# Patient Record
Sex: FEMALE | Race: WHITE | NOT HISPANIC OR LATINO | Employment: OTHER | URBAN - NONMETROPOLITAN AREA
[De-identification: names, ages, dates, MRNs, and addresses within clinical notes are randomized per-mention and may not be internally consistent; named-entity substitution may affect disease eponyms.]

---

## 2017-01-16 DIAGNOSIS — R12 HEARTBURN: Primary | ICD-10-CM

## 2017-01-16 RX ORDER — METOPROLOL SUCCINATE 50 MG/1
TABLET, EXTENDED RELEASE ORAL
Qty: 30 TABLET | Refills: 2 | Status: SHIPPED | OUTPATIENT
Start: 2017-01-16 | End: 2017-04-16 | Stop reason: SDUPTHER

## 2017-01-16 RX ORDER — PANTOPRAZOLE SODIUM 40 MG/1
TABLET, DELAYED RELEASE ORAL
Qty: 30 TABLET | Refills: 5 | Status: SHIPPED | OUTPATIENT
Start: 2017-01-16 | End: 2017-12-12 | Stop reason: SDUPTHER

## 2017-01-16 RX ORDER — BUPROPION HYDROCHLORIDE 100 MG/1
TABLET, EXTENDED RELEASE ORAL
Qty: 60 TABLET | Refills: 0 | Status: SHIPPED | OUTPATIENT
Start: 2017-01-16 | End: 2017-02-15 | Stop reason: SDUPTHER

## 2017-01-16 RX ORDER — LEVOTHYROXINE SODIUM 88 UG/1
TABLET ORAL
Qty: 60 TABLET | Refills: 0 | Status: SHIPPED | OUTPATIENT
Start: 2017-01-16 | End: 2017-04-16 | Stop reason: SDUPTHER

## 2017-02-07 ENCOUNTER — APPOINTMENT (OUTPATIENT)
Dept: GENERAL RADIOLOGY | Facility: HOSPITAL | Age: 72
End: 2017-02-07

## 2017-02-07 ENCOUNTER — HOSPITAL ENCOUNTER (EMERGENCY)
Facility: HOSPITAL | Age: 72
Discharge: HOME | End: 2017-02-07
Attending: EMERGENCY MEDICINE | Admitting: EMERGENCY MEDICINE

## 2017-02-07 ENCOUNTER — APPOINTMENT (OUTPATIENT)
Dept: CT IMAGING | Facility: HOSPITAL | Age: 72
End: 2017-02-07

## 2017-02-07 VITALS
RESPIRATION RATE: 18 BRPM | HEIGHT: 65 IN | DIASTOLIC BLOOD PRESSURE: 78 MMHG | TEMPERATURE: 98.5 F | BODY MASS INDEX: 29.99 KG/M2 | OXYGEN SATURATION: 99 % | HEART RATE: 70 BPM | SYSTOLIC BLOOD PRESSURE: 152 MMHG | WEIGHT: 180 LBS

## 2017-02-07 DIAGNOSIS — R06.00 DYSPNEA, UNSPECIFIED TYPE: Primary | ICD-10-CM

## 2017-02-07 LAB
ALBUMIN SERPL-MCNC: 4.2 G/DL (ref 3.5–5)
ALBUMIN/GLOB SERPL: 1.4 G/DL (ref 1–2)
ALP SERPL-CCNC: 65 U/L (ref 38–126)
ALT SERPL W P-5'-P-CCNC: 32 U/L (ref 13–69)
ANION GAP SERPL CALCULATED.3IONS-SCNC: 15.6 MMOL/L
AST SERPL-CCNC: 20 U/L (ref 15–46)
BASOPHILS # BLD AUTO: 0.05 10*3/MM3 (ref 0–0.2)
BASOPHILS NFR BLD AUTO: 0.8 % (ref 0–2.5)
BILIRUB SERPL-MCNC: 0.6 MG/DL (ref 0.2–1.3)
BUN BLD-MCNC: 11 MG/DL (ref 7–20)
BUN/CREAT SERPL: 12.2 (ref 7.1–23.5)
CALCIUM SPEC-SCNC: 9.6 MG/DL (ref 8.4–10.2)
CHLORIDE SERPL-SCNC: 104 MMOL/L (ref 98–107)
CO2 SERPL-SCNC: 25 MMOL/L (ref 26–30)
CREAT BLD-MCNC: 0.9 MG/DL (ref 0.6–1.3)
D-DIMER, QUANTITATIVE (MAD,POW, STR): 1587 NG/ML (FEU) (ref 0–500)
DEPRECATED RDW RBC AUTO: 39.5 FL (ref 37–54)
EOSINOPHIL # BLD AUTO: 0.1 10*3/MM3 (ref 0–0.7)
EOSINOPHIL NFR BLD AUTO: 1.6 % (ref 0–7)
ERYTHROCYTE [DISTWIDTH] IN BLOOD BY AUTOMATED COUNT: 11.9 % (ref 11.5–14.5)
GFR SERPL CREATININE-BSD FRML MDRD: 62 ML/MIN/1.73
GLOBULIN UR ELPH-MCNC: 3.1 GM/DL
GLUCOSE BLD-MCNC: 101 MG/DL (ref 74–98)
HCT VFR BLD AUTO: 40.7 % (ref 37–47)
HGB BLD-MCNC: 14.4 G/DL (ref 12–16)
HOLD SPECIMEN: NORMAL
IMM GRANULOCYTES # BLD: 0.03 10*3/MM3 (ref 0–0.06)
IMM GRANULOCYTES NFR BLD: 0.5 % (ref 0–0.6)
LYMPHOCYTES # BLD AUTO: 2.51 10*3/MM3 (ref 0.6–3.4)
LYMPHOCYTES NFR BLD AUTO: 39.6 % (ref 10–50)
MCH RBC QN AUTO: 32.2 PG (ref 27–31)
MCHC RBC AUTO-ENTMCNC: 35.4 G/DL (ref 30–37)
MCV RBC AUTO: 91.1 FL (ref 81–99)
MONOCYTES # BLD AUTO: 0.63 10*3/MM3 (ref 0–0.9)
MONOCYTES NFR BLD AUTO: 9.9 % (ref 0–12)
NEUTROPHILS # BLD AUTO: 3.02 10*3/MM3 (ref 2–6.9)
NEUTROPHILS NFR BLD AUTO: 47.6 % (ref 37–80)
NRBC BLD MANUAL-RTO: 0 /100 WBC (ref 0–0)
NT-PROBNP SERPL-MCNC: 130 PG/ML (ref 0–125)
PLATELET # BLD AUTO: 320 10*3/MM3 (ref 130–400)
PMV BLD AUTO: 9.2 FL (ref 6–12)
POTASSIUM BLD-SCNC: 3.6 MMOL/L (ref 3.5–5.1)
PROT SERPL-MCNC: 7.3 G/DL (ref 6.3–8.2)
RBC # BLD AUTO: 4.47 10*6/MM3 (ref 4.2–5.4)
SODIUM BLD-SCNC: 141 MMOL/L (ref 137–145)
TROPONIN I SERPL-MCNC: <0.012 NG/ML (ref 0–0.03)
WBC NRBC COR # BLD: 6.34 10*3/MM3 (ref 4.8–10.8)
WHOLE BLOOD HOLD SPECIMEN: NORMAL
WHOLE BLOOD HOLD SPECIMEN: NORMAL

## 2017-02-07 PROCEDURE — 85379 FIBRIN DEGRADATION QUANT: CPT | Performed by: EMERGENCY MEDICINE

## 2017-02-07 PROCEDURE — 80053 COMPREHEN METABOLIC PANEL: CPT | Performed by: EMERGENCY MEDICINE

## 2017-02-07 PROCEDURE — 0 IOPAMIDOL 61 % SOLUTION: Performed by: EMERGENCY MEDICINE

## 2017-02-07 PROCEDURE — 85025 COMPLETE CBC W/AUTO DIFF WBC: CPT | Performed by: EMERGENCY MEDICINE

## 2017-02-07 PROCEDURE — 84484 ASSAY OF TROPONIN QUANT: CPT | Performed by: EMERGENCY MEDICINE

## 2017-02-07 PROCEDURE — 83880 ASSAY OF NATRIURETIC PEPTIDE: CPT | Performed by: EMERGENCY MEDICINE

## 2017-02-07 PROCEDURE — 99285 EMERGENCY DEPT VISIT HI MDM: CPT

## 2017-02-07 PROCEDURE — 71010 HC CHEST PA OR AP: CPT

## 2017-02-07 PROCEDURE — 71275 CT ANGIOGRAPHY CHEST: CPT

## 2017-02-07 PROCEDURE — 93005 ELECTROCARDIOGRAM TRACING: CPT

## 2017-02-07 PROCEDURE — 63710000001 PREDNISONE PER 5 MG: Performed by: EMERGENCY MEDICINE

## 2017-02-07 RX ORDER — SODIUM CHLORIDE 0.9 % (FLUSH) 0.9 %
10 SYRINGE (ML) INJECTION AS NEEDED
Status: DISCONTINUED | OUTPATIENT
Start: 2017-02-07 | End: 2017-02-07 | Stop reason: HOSPADM

## 2017-02-07 RX ORDER — PREDNISONE 20 MG/1
60 TABLET ORAL DAILY
Qty: 12 TABLET | Refills: 0 | Status: SHIPPED | OUTPATIENT
Start: 2017-02-08 | End: 2017-02-14

## 2017-02-07 RX ORDER — ASPIRIN 325 MG
325 TABLET ORAL ONCE
Status: COMPLETED | OUTPATIENT
Start: 2017-02-07 | End: 2017-02-07

## 2017-02-07 RX ADMIN — IOPAMIDOL 90 ML: 612 INJECTION, SOLUTION INTRAVENOUS at 15:45

## 2017-02-07 RX ADMIN — Medication 10 ML: at 13:10

## 2017-02-07 RX ADMIN — PREDNISONE 60 MG: 50 TABLET ORAL at 16:22

## 2017-02-07 RX ADMIN — ASPIRIN 325 MG ORAL TABLET 325 MG: 325 PILL ORAL at 13:07

## 2017-02-07 NOTE — ED PROVIDER NOTES
Subjective   HPI Comments: Patient is a 71-year-old female who reports emergency department complaining of couple days of intermittent dyspnea and some mild chest tightness.  She states that it can occur at any time with little or no exertion.  She denies santiago chest pain.  She is otherwise been well without significant cough or fever.  States that these episodes may last up to an hour.      History provided by:  Patient      Review of Systems   Constitutional: Negative for chills and fever.   HENT: Negative for congestion.    Respiratory: Positive for chest tightness and shortness of breath. Negative for cough.    Cardiovascular: Negative for chest pain and palpitations.   Gastrointestinal: Negative for abdominal pain, diarrhea, nausea and vomiting.   Musculoskeletal: Negative for back pain and neck pain.   All other systems reviewed and are negative.      Past Medical History   Diagnosis Date   • Abdominal bloating    • Abnormal EKG    • Anxiety and depression 2004   • Colon cancer screening    • Colon polyp 04/2016   • Dyspnea    • Elbow pain, left    • Fatigue    • Gastritis    • GERD (gastroesophageal reflux disease)    • H/O mammogram    • H/O sinusitis    • H/O: pneumonia    • Heartburn    • Sebaceous cyst    • Sinusitis        No Known Allergies    Past Surgical History   Procedure Laterality Date   • Tubal abdominal ligation     • Colonoscopy     • Upper gastrointestinal endoscopy  03/2016       Family History   Problem Relation Age of Onset   • Heart attack Other    • Arthritis Other    • Cancer Other    • Diabetes Other    • Hypertension Other    • Stroke Other    • Cancer Mother    • Colon polyps Neg Hx    • Esophageal cancer Neg Hx    • Irritable bowel syndrome Neg Hx    • Liver cancer Neg Hx    • Liver disease Neg Hx    • Stomach cancer Neg Hx    • Colon cancer Neg Hx        Social History     Social History   • Marital status:      Spouse name: N/A   • Number of children: N/A   • Years of  education: N/A     Social History Main Topics   • Smoking status: Former Smoker   • Smokeless tobacco: Never Used   • Alcohol use Yes      Comment: social   • Drug use: No   • Sexual activity: Defer     Other Topics Concern   • None     Social History Narrative   • None           Objective   Physical Exam   Constitutional: She is oriented to person, place, and time. She appears well-developed and well-nourished. No distress.   HENT:   Head: Normocephalic and atraumatic.   Eyes: Pupils are equal, round, and reactive to light.   Neck: Neck supple.   Cardiovascular: Normal rate, regular rhythm and normal heart sounds.    Pulmonary/Chest: Effort normal and breath sounds normal.   Abdominal: Soft. There is no tenderness.   Musculoskeletal: Normal range of motion. She exhibits no edema.   Neurological: She is alert and oriented to person, place, and time.   Skin: Skin is warm and dry. She is not diaphoretic.   Psychiatric: She has a normal mood and affect. Her behavior is normal.   Nursing note and vitals reviewed.      ECG 12 Lead    Date/Time: 2/7/2017 1:44 PM  Performed by: JASMIN DONAHUE  Authorized by: JASMIN DONAHUE   Interpreted by physician  Rhythm: sinus rhythm  Rate: normal  QRS axis: normal  Conduction: conduction normal  Clinical impression: non-specific ECG               ED Course  ED Course                  MDM  Number of Diagnoses or Management Options  Dyspnea, unspecified type:   Diagnosis management comments: CT of the chest and chest x-ray showed no acute findings.    Patient resting calmly throughout her stay in the emergency department.  She states she has never had any chest pain with any of this.  She does not have any dyspnea in the emergency department.  She is questioning whether this might have been anxiety.  I discussed that we could not eliminate other possibilities completely and discussed possibility of admission.  Patient felt that she could go home and has family to stay with her.  She  is asymptomatic but will return if she has any new or worsening symptoms.  She has no appointment with her cardiologist next week but will follow up sooner if possible.  She is a previous smoker until last year and we will use a short steroid course to see if that provides her some relief.  We discussed indications for return at any time and need for close follow-up.       Amount and/or Complexity of Data Reviewed  Clinical lab tests: ordered and reviewed  Tests in the radiology section of CPT®: ordered and reviewed  Tests in the medicine section of CPT®: ordered and reviewed        Final diagnoses:   Dyspnea, unspecified type            Alireza Lantigua MD  02/07/17 0927

## 2017-02-07 NOTE — DISCHARGE INSTRUCTIONS
Return to the emergency department for any chest pain, shortness of breath, new or worsening symptoms.

## 2017-02-08 LAB — HOLD SPECIMEN: NORMAL

## 2017-02-14 ENCOUNTER — OFFICE VISIT (OUTPATIENT)
Dept: INTERNAL MEDICINE | Facility: CLINIC | Age: 72
End: 2017-02-14

## 2017-02-14 VITALS
BODY MASS INDEX: 32.99 KG/M2 | HEIGHT: 65 IN | RESPIRATION RATE: 14 BRPM | WEIGHT: 198 LBS | SYSTOLIC BLOOD PRESSURE: 122 MMHG | DIASTOLIC BLOOD PRESSURE: 84 MMHG | TEMPERATURE: 98.6 F | HEART RATE: 64 BPM | OXYGEN SATURATION: 97 %

## 2017-02-14 DIAGNOSIS — I70.90 ARTERIOSCLEROTIC VASCULAR DISEASE: ICD-10-CM

## 2017-02-14 DIAGNOSIS — I10 ESSENTIAL HYPERTENSION: Primary | ICD-10-CM

## 2017-02-14 DIAGNOSIS — E66.3 OVERWEIGHT: ICD-10-CM

## 2017-02-14 DIAGNOSIS — E03.8 OTHER SPECIFIED HYPOTHYROIDISM: ICD-10-CM

## 2017-02-14 DIAGNOSIS — F41.8 DEPRESSION WITH ANXIETY: ICD-10-CM

## 2017-02-14 DIAGNOSIS — E78.5 HYPERLIPIDEMIA, UNSPECIFIED HYPERLIPIDEMIA TYPE: ICD-10-CM

## 2017-02-14 PROCEDURE — 99214 OFFICE O/P EST MOD 30 MIN: CPT | Performed by: INTERNAL MEDICINE

## 2017-02-14 RX ORDER — ALPRAZOLAM 0.5 MG/1
0.5 TABLET ORAL 2 TIMES DAILY PRN
Qty: 30 TABLET | Refills: 0 | Status: SHIPPED | OUTPATIENT
Start: 2017-02-14 | End: 2019-02-06 | Stop reason: SDUPTHER

## 2017-02-14 NOTE — PROGRESS NOTES
Subjective   Deya Hernandez is a 71 y.o. female.     Chief Complaint   Patient presents with   • Shortness of Breath     had previously went to ER for SOB was put on steroid still no help   • Hypertension   • Rapid Heart Rate       History of Present Illness   Patient has short of breath comes and goes denies any chest pain patient also has significant anxiety depression denies any suicidal thoughts.  Hypothyroidism stable medication.  Hypertension stable medication.  Patient is taking multiple medicines for depression.  Knee joint arthritis is stable seeing orthopedist     Current Outpatient Prescriptions:   •  amLODIPine (NORVASC) 2.5 MG tablet, take 1 tablet by mouth once daily, Disp: 30 tablet, Rfl: 3  •  buPROPion SR (WELLBUTRIN SR) 100 MG 12 hr tablet, take 1 tablet by mouth twice a day, Disp: 60 tablet, Rfl: 0  •  Cholecalciferol (VITAMIN D3) 1000 UNITS capsule, Take  by mouth. 3 DAILY, Disp: , Rfl:   •  HYALGAN 20 MG/2ML solution prefilled syringe, , Disp: , Rfl:   •  levothyroxine (SYNTHROID, LEVOTHROID) 88 MCG tablet, take 1 tablet by mouth once daily, Disp: 60 tablet, Rfl: 0  •  metoprolol succinate XL (TOPROL-XL) 50 MG 24 hr tablet, take 1 tablet by mouth once daily, Disp: 30 tablet, Rfl: 2  •  mirtazapine (REMERON) 45 MG tablet, Take 1 tablet by mouth Every Night., Disp: 30 tablet, Rfl: 1  •  pantoprazole (PROTONIX) 40 MG EC tablet, take 1 tablet by mouth every morning 30 MINUTES BEFORE BREAKFAST, Disp: 30 tablet, Rfl: 5  •  simvastatin (ZOCOR) 40 MG tablet, take 1 tablet by mouth at bedtime, Disp: 30 tablet, Rfl: 11  •  trandolapril (MAVIK) 4 MG tablet, take 1 tablet by mouth once daily, Disp: 30 tablet, Rfl: 6  •  vitamin B-12 (CYANOCOBALAMIN) 1000 MCG tablet, Take 1 tablet by mouth daily. As directed, Disp: , Rfl:     Current Facility-Administered Medications:   •  sodium hyaluronate (SUPARTZ) injection 25 mg, 25 mg, Intra-articular, Once, Arturo Geronimo PA-C    The following portions of the  patient's history were reviewed and updated as appropriate: allergies, current medications, past family history, past medical history, past social history, past surgical history and problem list.    Review of Systems   Constitutional: Negative.    Respiratory: Negative.    Cardiovascular: Negative.    Gastrointestinal: Negative.    Musculoskeletal: Negative.    Skin: Negative.    Neurological: Negative.    Psychiatric/Behavioral: Negative.        Objective   Physical Exam   Constitutional: She is oriented to person, place, and time. She appears well-nourished.   Neck: Neck supple.   Cardiovascular: Normal rate, regular rhythm and normal heart sounds.    Pulmonary/Chest: Effort normal and breath sounds normal.   Abdominal: Bowel sounds are normal.   Neurological: She is alert and oriented to person, place, and time.   Skin: Skin is warm.   Psychiatric: She has a normal mood and affect.       All tests have been reviewed.    Assessment/Plan   There are no diagnoses linked to this encounter.          soa still off and on, without chest pain,  abnormal EKG, nuc stress not covered, GXT nondiagnostic due to tachycardia, no further study needed per cardio, rec nuc stress test. ?anxiety start xanax  Gastritis, omeprazole continue medicine s/p EGD  Probable osteoarthritis of the knee, aleve, glucosamine. XR may need MRI in the future. Avoid direct impact activities. May need physical therapy if no better  Overweight continue good diet   Vitamin D low normal continue vitamin D3 1000 units daily,   pneumovax done zostavax done, flu done, prevnar today, check rec  Hypothyroidism continue medication  Hyperlipidemia unable to afford crestor, lipitor muscle pain, on zocor 40, lipitor 20 cause problem in the past.   Hypertension continue meds  Depression anxiety stable on meds. continue mirtazapine 45, bupropion 100 mg bid start xanax  pap done  dexa normal  Knee OA Follow up with ortho  10/2009 colon hemorroid only obtain  report  Weight gain encouraged patient to have good diet and exercise.  Follow-up in 1 mo PE

## 2017-02-15 ENCOUNTER — OFFICE VISIT (OUTPATIENT)
Dept: CARDIOLOGY | Facility: CLINIC | Age: 72
End: 2017-02-15

## 2017-02-15 VITALS
WEIGHT: 198 LBS | HEIGHT: 65 IN | HEART RATE: 78 BPM | SYSTOLIC BLOOD PRESSURE: 132 MMHG | BODY MASS INDEX: 32.99 KG/M2 | DIASTOLIC BLOOD PRESSURE: 90 MMHG

## 2017-02-15 DIAGNOSIS — E78.2 MIXED HYPERLIPIDEMIA: ICD-10-CM

## 2017-02-15 DIAGNOSIS — I20.9 ANGINA PECTORIS (HCC): Primary | ICD-10-CM

## 2017-02-15 DIAGNOSIS — I10 ESSENTIAL HYPERTENSION: ICD-10-CM

## 2017-02-15 PROCEDURE — 99213 OFFICE O/P EST LOW 20 MIN: CPT | Performed by: INTERNAL MEDICINE

## 2017-02-15 RX ORDER — BUPROPION HYDROCHLORIDE 100 MG/1
TABLET, EXTENDED RELEASE ORAL
Qty: 60 TABLET | Refills: 0 | Status: SHIPPED | OUTPATIENT
Start: 2017-02-15 | End: 2017-03-18 | Stop reason: SDUPTHER

## 2017-02-15 RX ORDER — MIRTAZAPINE 45 MG/1
TABLET, FILM COATED ORAL
Qty: 30 TABLET | Refills: 1 | Status: SHIPPED | OUTPATIENT
Start: 2017-02-15 | End: 2017-04-16 | Stop reason: SDUPTHER

## 2017-02-15 NOTE — PROGRESS NOTES
Deya MOTLEY Cochise  1945  [phone]  [phone]        PCP: Santi Dalal MD  [address] [place] [address] [address]  [facility] [id number]    IDENTIFICATION: A 71 y.o. female  adi jefferson from [place]    CC:  Chief Complaint   Patient presents with   • Shortness of Breath   • Hypertension   • Rapid Heart Rate       PROBLEM LIST:  Patient Active Problem List    Diagnosis   • Heartburn [R12]     Overview Note:     Controlled with Pantoprazole daily. No Theodore's.     • Delinquent immunization status [Z28.3]   • Pulsatile tinnitus [H93.A9]   • Depression with anxiety [F41.8]   • BMI 30.0-30.9,adult [Z68.30]   • Overweight [E66.3]   • Diverticulosis of large intestine without hemorrhage [K57.30]     Overview Note:     Stable. Uncomplicated. Scant diverticular change left colon.     • Colon polyps [K63.5]     Overview Note:     Tubular adenoma     • Internal hemorrhoids [K64.8]     Overview Note:     Stable. Uncomplicated.     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 Scant vascular ectasia within the transverse colon.     • Hypertension [I10]   • Hypothyroidism [E03.9]   • Osteoarthritis of knee [M17.9]     Overview Note:     2008     • Hyperlipidemia [E78.5]   • Arteriosclerotic vascular disease [I70.90]     Problem List:    Probable nonobstructive CAD  4/16 EKG stress abnormal due to withdrawal of beta-blockade with tachycardia and hypertension    Hypertension presumed essential    Dyslipidemia on statin therapy    Reformed nicotine addiction 2016    Exogenous obesity    Hypothyroidism    Allergies  No Known Allergies    Current Medications    Current Outpatient Prescriptions:   •  ALPRAZolam (XANAX) 0.5 MG tablet, Take 1 tablet by mouth 2 (Two) Times a Day As Needed for anxiety., Disp: 30 tablet, Rfl: 0  •  amLODIPine (NORVASC) 2.5 MG tablet, take 1 tablet by mouth once daily, Disp: 30 tablet, Rfl: 3  •  buPROPion SR (WELLBUTRIN SR) 100 MG 12 hr tablet, take 1 tablet by mouth twice a day,  Disp: 60 tablet, Rfl: 0  •  Cholecalciferol (VITAMIN D3) 1000 UNITS capsule, Take  by mouth. 3 DAILY, Disp: , Rfl:   •  HYALGAN 20 MG/2ML solution prefilled syringe, , Disp: , Rfl:   •  levothyroxine (SYNTHROID, LEVOTHROID) 88 MCG tablet, take 1 tablet by mouth once daily, Disp: 60 tablet, Rfl: 0  •  metoprolol succinate XL (TOPROL-XL) 50 MG 24 hr tablet, take 1 tablet by mouth once daily, Disp: 30 tablet, Rfl: 2  •  mirtazapine (REMERON) 45 MG tablet, take 1 tablet by mouth at bedtime, Disp: 30 tablet, Rfl: 1  •  pantoprazole (PROTONIX) 40 MG EC tablet, take 1 tablet by mouth every morning 30 MINUTES BEFORE BREAKFAST, Disp: 30 tablet, Rfl: 5  •  simvastatin (ZOCOR) 40 MG tablet, take 1 tablet by mouth at bedtime, Disp: 30 tablet, Rfl: 11  •  trandolapril (MAVIK) 4 MG tablet, take 1 tablet by mouth once daily, Disp: 30 tablet, Rfl: 6  •  vitamin B-12 (CYANOCOBALAMIN) 1000 MCG tablet, Take 1 tablet by mouth daily. As directed, Disp: , Rfl:     Current Facility-Administered Medications:   •  sodium hyaluronate (SUPARTZ) injection 25 mg, 25 mg, Intra-articular, Once, Arturo Geronimo PA-C    History of Present Illness   Shortness of Breath     Hypertension   Associated symptoms include shortness of breath.       Pt recent ER evaluation w dyspnea.  Enzymes, ekg, and CT chest wnl.  Given 5 day prescription for prednisone.  She states that she will have some fullness and pressure tightness or chest can happen at most any time.  She continues to exercise at a gym a few times weekly and cannot state that this worsens her activities per se.  She is found no alleviating factors.  She describes a shortness of breath as moderate associate with any nausea or palpitations.  Been compliant with all of her medications.      ROS  Review of Systems   Constitutional: Negative.    HENT: Negative.    Eyes: Negative.    Respiratory: Positive for shortness of breath.    Cardiovascular: Negative.    Gastrointestinal: Negative.   "  Endocrine: Negative.    Genitourinary: Negative.    Musculoskeletal: Negative.    Skin: Negative.    Allergic/Immunologic: Negative.    Neurological: Negative.    Hematological: Negative.    Psychiatric/Behavioral: Positive for dysphoric mood.       SOCIAL HX  Social History     Social History   • Marital status:      Spouse name: N/A   • Number of children: N/A   • Years of education: N/A     Occupational History   • Not on file.     Social History Main Topics   • Smoking status: Former Smoker   • Smokeless tobacco: Never Used   • Alcohol use Yes      Comment: social   • Drug use: No   • Sexual activity: Defer     Other Topics Concern   • Not on file     Social History Narrative       FAMILY HX  Family History   Problem Relation Age of Onset   • Heart attack Other    • Arthritis Other    • Cancer Other    • Diabetes Other    • Hypertension Other    • Stroke Other    • Cancer Mother    • Emphysema Father    • Colon polyps Neg Hx    • Esophageal cancer Neg Hx    • Irritable bowel syndrome Neg Hx    • Liver cancer Neg Hx    • Liver disease Neg Hx    • Stomach cancer Neg Hx    • Colon cancer Neg Hx        Vitals:    02/15/17 1010 02/15/17 1016   BP: 138/96 132/90   BP Location: Right arm Left arm   Patient Position: Sitting Sitting   Pulse: 78    Weight: 198 lb (89.8 kg)    Height: 65\" (165.1 cm)        PHYSICAL EXAMINATION:  Physical Exam   Constitutional: She is oriented to person, place, and time. She appears well-developed and well-nourished. No distress.   HENT:   Head: Normocephalic and atraumatic.   Nose: Nose normal.   Mouth/Throat: Uvula is midline, oropharynx is clear and moist and mucous membranes are normal.   Eyes: Conjunctivae and EOM are normal. Pupils are equal, round, and reactive to light. No scleral icterus.   Neck: Normal range of motion. Neck supple. No hepatojugular reflux and no JVD present. Carotid bruit is not present. No tracheal deviation present. No thyromegaly present. "   Cardiovascular: Normal rate, regular rhythm, S1 normal, S2 normal, intact distal pulses and normal pulses.  PMI is not displaced.  Exam reveals no gallop, no distant heart sounds, no friction rub, no midsystolic click and no opening snap.    No murmur heard.  Pulses:       Radial pulses are 2+ on the right side, and 2+ on the left side.        Dorsalis pedis pulses are 2+ on the right side, and 2+ on the left side.        Posterior tibial pulses are 2+ on the right side, and 2+ on the left side.   Pulmonary/Chest: Effort normal and breath sounds normal. She has no wheezes. She has no rhonchi. She has no rales.   Abdominal: Soft. Bowel sounds are normal. She exhibits no mass. There is no tenderness. There is no guarding.   Musculoskeletal: She exhibits no edema or tenderness.   Lymphadenopathy:     She has no cervical adenopathy.   Neurological: She is alert and oriented to person, place, and time.   Skin: Skin is warm, dry and intact. No rash noted. No cyanosis or erythema. Nails show no clubbing.   Psychiatric: She has a normal mood and affect. Her behavior is normal.   Nursing note and vitals reviewed.      Diagnostic Data:  Procedures  Lab Results   Component Value Date    CHLPL 236 (H) 01/06/2016    TRIG 184 (H) 09/14/2016    HDL 70 (H) 09/14/2016    LDLDIRECT 132 (H) 09/14/2016     Lab Results   Component Value Date    GLUCOSE 101 (H) 02/07/2017    BUN 11 02/07/2017    CREATININE 0.90 02/07/2017     02/07/2017    K 3.6 02/07/2017     02/07/2017    CO2 25.0 (L) 02/07/2017    CALCIUM 9.6 02/07/2017    EGFRCLEARA 62 04/28/2016    ANIONGAP 15.6 02/07/2017     No results found for: HGBA1C, LABMEAN  Lab Results   Component Value Date    WBC 6.34 02/07/2017    RBC 4.47 02/07/2017    HGB 14.4 02/07/2017    HCT 40.7 02/07/2017    MCV 91.1 02/07/2017    MCH 32.2 (H) 02/07/2017    MCHC 35.4 02/07/2017    RDWCV 12.9 04/06/2016     02/07/2017       ASSESSMENT:   Diagnosis Plan   1. Angina pectoris      2. Essential hypertension     3. Mixed hyperlipidemia           PLAN:  Lexiscan Cardiolite on medications to evaluate for obstructive coronary disease and LV function.    Hypertension controlled on current regimen    Dyslipidemia on statin therapy with acceptable total to HDL ratio      Timbo Roberts MD, FACC

## 2017-02-22 ENCOUNTER — HOSPITAL ENCOUNTER (OUTPATIENT)
Dept: CARDIOLOGY | Facility: HOSPITAL | Age: 72
Discharge: HOME | End: 2017-02-22
Attending: INTERNAL MEDICINE | Admitting: INTERNAL MEDICINE

## 2017-02-22 ENCOUNTER — HOSPITAL ENCOUNTER (OUTPATIENT)
Dept: CARDIOLOGY | Facility: HOSPITAL | Age: 72
Discharge: HOME | End: 2017-02-22
Attending: INTERNAL MEDICINE

## 2017-02-22 DIAGNOSIS — I20.9 ANGINA PECTORIS (HCC): ICD-10-CM

## 2017-02-22 PROCEDURE — 78451 HT MUSCLE IMAGE SPECT SING: CPT

## 2017-02-22 PROCEDURE — A9500 TC99M SESTAMIBI: HCPCS | Performed by: INTERNAL MEDICINE

## 2017-02-22 PROCEDURE — 78452 HT MUSCLE IMAGE SPECT MULT: CPT | Performed by: INTERNAL MEDICINE

## 2017-02-22 PROCEDURE — 93018 CV STRESS TEST I&R ONLY: CPT | Performed by: INTERNAL MEDICINE

## 2017-02-22 PROCEDURE — 25010000002 REGADENOSON 0.4 MG/5ML SOLUTION: Performed by: INTERNAL MEDICINE

## 2017-02-22 PROCEDURE — 0 TECHNETIUM SESTAMIBI: Performed by: INTERNAL MEDICINE

## 2017-02-22 PROCEDURE — 93017 CV STRESS TEST TRACING ONLY: CPT

## 2017-02-22 RX ADMIN — Medication 1 DOSE: at 08:15

## 2017-02-22 RX ADMIN — REGADENOSON 0.4 MG: 0.08 INJECTION, SOLUTION INTRAVENOUS at 09:57

## 2017-02-22 RX ADMIN — Medication 1 DOSE: at 10:00

## 2017-02-24 LAB
BH CV STRESS BP STAGE 1: NORMAL
BH CV STRESS BP STAGE 2: NORMAL
BH CV STRESS BP STAGE 3: NORMAL
BH CV STRESS COMMENTS STAGE 1: NORMAL
BH CV STRESS COMMENTS STAGE 2: NORMAL
BH CV STRESS DOSE REGADENOSON STAGE 1: 0.4
BH CV STRESS DURATION MIN STAGE 1: 1
BH CV STRESS DURATION MIN STAGE 2: 3
BH CV STRESS DURATION MIN STAGE 3: 2
BH CV STRESS DURATION SEC STAGE 1: 0
BH CV STRESS DURATION SEC STAGE 2: 0
BH CV STRESS DURATION SEC STAGE 3: 0
BH CV STRESS HR STAGE 1: 72
BH CV STRESS HR STAGE 2: 83
BH CV STRESS HR STAGE 3: 80
BH CV STRESS PROTOCOL 1: NORMAL
BH CV STRESS RECOVERY BP: NORMAL MMHG
BH CV STRESS RECOVERY HR: 80 BPM
BH CV STRESS STAGE 1: 1
BH CV STRESS STAGE 2: 2
BH CV STRESS STAGE 3: 3
LV EF NUC BP: 72 %
MAXIMAL PREDICTED HEART RATE: 151 BPM
PERCENT MAX PREDICTED HR: 61.59 %
STRESS BASELINE BP: NORMAL MMHG
STRESS BASELINE HR: 61 BPM
STRESS PERCENT HR: 72 %
STRESS POST PEAK BP: NORMAL MMHG
STRESS POST PEAK HR: 93 BPM
STRESS TARGET HR: 128 BPM

## 2017-03-20 RX ORDER — BUPROPION HYDROCHLORIDE 100 MG/1
TABLET, EXTENDED RELEASE ORAL
Qty: 60 TABLET | Refills: 11 | Status: SHIPPED | OUTPATIENT
Start: 2017-03-20 | End: 2018-03-11 | Stop reason: SDUPTHER

## 2017-03-20 RX ORDER — AMLODIPINE BESYLATE 2.5 MG/1
TABLET ORAL
Qty: 30 TABLET | Refills: 11 | Status: SHIPPED | OUTPATIENT
Start: 2017-03-20 | End: 2018-03-12 | Stop reason: SDUPTHER

## 2017-03-20 RX ORDER — TRANDOLAPRIL TABLETS 4 MG/1
TABLET ORAL
Qty: 30 TABLET | Refills: 11 | Status: SHIPPED | OUTPATIENT
Start: 2017-03-20 | End: 2018-03-11 | Stop reason: SDUPTHER

## 2017-04-18 RX ORDER — MIRTAZAPINE 45 MG/1
TABLET, FILM COATED ORAL
Qty: 30 TABLET | Refills: 1 | Status: SHIPPED | OUTPATIENT
Start: 2017-04-18 | End: 2017-06-15 | Stop reason: SDUPTHER

## 2017-04-18 RX ORDER — METOPROLOL SUCCINATE 50 MG/1
TABLET, EXTENDED RELEASE ORAL
Qty: 30 TABLET | Refills: 2 | Status: SHIPPED | OUTPATIENT
Start: 2017-04-18 | End: 2017-07-16 | Stop reason: SDUPTHER

## 2017-04-18 RX ORDER — LEVOTHYROXINE SODIUM 88 UG/1
TABLET ORAL
Qty: 60 TABLET | Refills: 0 | Status: SHIPPED | OUTPATIENT
Start: 2017-04-18 | End: 2017-05-16 | Stop reason: SDUPTHER

## 2017-05-16 RX ORDER — SIMVASTATIN 40 MG
TABLET ORAL
Qty: 30 TABLET | Refills: 11 | Status: SHIPPED | OUTPATIENT
Start: 2017-05-16 | End: 2018-04-04 | Stop reason: SDUPTHER

## 2017-05-16 RX ORDER — LEVOTHYROXINE SODIUM 88 UG/1
TABLET ORAL
Qty: 30 TABLET | Refills: 5 | Status: SHIPPED | OUTPATIENT
Start: 2017-05-16 | End: 2017-12-12 | Stop reason: SDUPTHER

## 2017-06-06 DIAGNOSIS — M25.562 LEFT KNEE PAIN, UNSPECIFIED CHRONICITY: Primary | ICD-10-CM

## 2017-06-15 RX ORDER — MIRTAZAPINE 45 MG/1
TABLET, FILM COATED ORAL
Qty: 30 TABLET | Refills: 1 | Status: SHIPPED | OUTPATIENT
Start: 2017-06-15 | End: 2017-08-14 | Stop reason: SDUPTHER

## 2017-07-16 DIAGNOSIS — R12 HEARTBURN: ICD-10-CM

## 2017-07-17 RX ORDER — PANTOPRAZOLE SODIUM 40 MG/1
TABLET, DELAYED RELEASE ORAL
Qty: 30 TABLET | Refills: 5 | OUTPATIENT
Start: 2017-07-17

## 2017-07-17 RX ORDER — METOPROLOL SUCCINATE 50 MG/1
TABLET, EXTENDED RELEASE ORAL
Qty: 30 TABLET | Refills: 2 | Status: SHIPPED | OUTPATIENT
Start: 2017-07-17 | End: 2017-10-13 | Stop reason: SDUPTHER

## 2017-07-17 NOTE — TELEPHONE ENCOUNTER
Pantoprazole 40 mg -take 1 tab 30 min before breakfast- #30 with 4 refills to [facility] in [facility]

## 2017-08-14 RX ORDER — MIRTAZAPINE 45 MG/1
TABLET, FILM COATED ORAL
Qty: 30 TABLET | Refills: 1 | Status: SHIPPED | OUTPATIENT
Start: 2017-08-14 | End: 2017-10-13 | Stop reason: SDUPTHER

## 2017-09-01 ENCOUNTER — OFFICE VISIT (OUTPATIENT)
Dept: ORTHOPEDIC SURGERY | Facility: CLINIC | Age: 72
End: 2017-09-01

## 2017-09-01 VITALS — HEIGHT: 65 IN | WEIGHT: 200 LBS | RESPIRATION RATE: 18 BRPM | BODY MASS INDEX: 33.32 KG/M2

## 2017-09-01 DIAGNOSIS — M17.12 OSTEOARTHRITIS OF LEFT KNEE, UNSPECIFIED OSTEOARTHRITIS TYPE: Primary | ICD-10-CM

## 2017-09-01 PROCEDURE — 20610 DRAIN/INJ JOINT/BURSA W/O US: CPT | Performed by: PHYSICIAN ASSISTANT

## 2017-09-01 RX ORDER — HYALURONATE SODIUM 10 MG/ML
20 SYRINGE (ML) INTRAARTICULAR
Status: COMPLETED | OUTPATIENT
Start: 2017-09-01 | End: 2017-09-01

## 2017-09-01 RX ADMIN — Medication 20 MG: at 13:05

## 2017-09-01 NOTE — PROGRESS NOTES
"Subjective   Deya Hernandez is a 72 y.o.  female is here today for injection therapy.  Injections of the Left Knee (Euflexxa #1 (Sample))      History of Present Illness     Patient is here for her first Visco supplementation injection into the left knee.    Past Medical History:   Diagnosis Date   • Abdominal bloating    • Abnormal EKG    • Anxiety and depression 2004   • Colon cancer screening    • Colon polyp 04/2016   • Dyspnea    • Elbow pain, left    • Fatigue    • Gastritis    • GERD (gastroesophageal reflux disease)    • H/O mammogram    • H/O sinusitis    • H/O: pneumonia    • Heartburn    • Sebaceous cyst    • Sinusitis         Past Surgical History:   Procedure Laterality Date   • COLONOSCOPY     • TUBAL ABDOMINAL LIGATION     • UPPER GASTROINTESTINAL ENDOSCOPY  03/2016       No Known Allergies    Review of Systems    Objective   Resp 18  Ht 65\" (165.1 cm)  Wt 200 lb (90.7 kg)  BMI 33.28 kg/m2   Physical Exam  Skin exam stable with no erythema, ecchymosis or rash.  No new swelling.  No motor or sensory changes.  Distal pulse intact.    Large Joint Arthrocentesis - SAMPLE MEDICATION   Date/Time: 9/1/2017 1:05 PM  Consent given by: patient  Site marked: site marked  Timeout: Immediately prior to procedure a time out was called to verify the correct patient, procedure, equipment, support staff and site/side marked as required   Supporting Documentation  Indications: pain   Procedure Details  Location: knee - L knee  Preparation: Patient was prepped and draped in the usual sterile fashion  Needle size: 22 G  Medications administered: 20 mg Sodium Hyaluronate 20 MG/2ML  Patient tolerance: patient tolerated the procedure well with no immediate complications          Assessment/Plan   Independent Review of Radiographic Studies:    Reviewed at a prior visit.  Laboratory and Other Studies:  No new results reviewed today.       Deya was seen today for injections.    Diagnoses and all orders for this " visit:    Osteoarthritis of left knee, unspecified osteoarthritis type  -     Large Joint Arthrocentesis    Orthopedic activities reviewed and patient expressed appreciation  Discussion of orthopedic goals  Risk, benefits, and merits of treatment alternatives reviewed with the patient and questions answered  Call or notify for any adverse effect from injection therapy    Recommendations/Plan:  Exercise, medications, injections, other patient advice, and return appointment as noted.  Patient is encouraged to call or return for any issues or concerns.    FU in 1 week  Patient agreeable to call or return sooner for any concerns.

## 2017-09-06 ENCOUNTER — OFFICE VISIT (OUTPATIENT)
Dept: CARDIOLOGY | Facility: CLINIC | Age: 72
End: 2017-09-06

## 2017-09-06 VITALS
DIASTOLIC BLOOD PRESSURE: 84 MMHG | BODY MASS INDEX: 33.82 KG/M2 | HEIGHT: 65 IN | SYSTOLIC BLOOD PRESSURE: 138 MMHG | WEIGHT: 203 LBS | HEART RATE: 76 BPM

## 2017-09-06 DIAGNOSIS — R07.89 OTHER CHEST PAIN: Primary | ICD-10-CM

## 2017-09-06 DIAGNOSIS — I10 ESSENTIAL HYPERTENSION: ICD-10-CM

## 2017-09-06 DIAGNOSIS — E78.5 HYPERLIPIDEMIA, UNSPECIFIED HYPERLIPIDEMIA TYPE: ICD-10-CM

## 2017-09-06 PROCEDURE — 99214 OFFICE O/P EST MOD 30 MIN: CPT | Performed by: INTERNAL MEDICINE

## 2017-09-06 NOTE — PROGRESS NOTES
[facility]  Office Progress Note  Deya Hernandez  1945  [phone]    Visit Date: 09/06/2017    PCP: Santi Dalal MD  [address] [place] [id number]    IDENTIFICATION: A 72 y.o. female     Chief Complaint   Patient presents with   • Follow-up   • Shortness of Breath       PROBLEM LIST:   1. 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 4/16 EKG stress abnormal due to withdrawal of beta-blockade with tachycardia and hypertension  2. 2/15/17 Marie scan Cardiolite WNL  2. Hypertension presumed essential  3. Dyslipidemia   1. on statin therapy  4. Reformed nicotine addiction 2016  5. Exogenous obesity  6. Hypothyroidism    Allergies  No Known Allergies    Current Medications    Current Outpatient Prescriptions:   •  amLODIPine (NORVASC) 2.5 MG tablet, take 1 tablet by mouth once daily, Disp: 30 tablet, Rfl: 11  •  buPROPion SR (WELLBUTRIN SR) 100 MG 12 hr tablet, take 1 tablet by mouth twice a day, Disp: 60 tablet, Rfl: 11  •  Cholecalciferol (VITAMIN D3) 1000 UNITS capsule, Take  by mouth. 3 DAILY, Disp: , Rfl:   •  HYALGAN 20 MG/2ML solution prefilled syringe, , Disp: , Rfl:   •  levothyroxine (SYNTHROID, LEVOTHROID) 88 MCG tablet, take 1 tablet by mouth once daily, Disp: 30 tablet, Rfl: 5  •  metoprolol succinate XL (TOPROL-XL) 50 MG 24 hr tablet, take 1 tablet by mouth once daily, Disp: 30 tablet, Rfl: 2  •  mirtazapine (REMERON) 45 MG tablet, take 1 tablet by mouth at bedtime, Disp: 30 tablet, Rfl: 1  •  pantoprazole (PROTONIX) 40 MG EC tablet, take 1 tablet by mouth every morning 30 MINUTES BEFORE BREAKFAST, Disp: 30 tablet, Rfl: 5  •  simvastatin (ZOCOR) 40 MG tablet, take 1 tablet by mouth at bedtime, Disp: 30 tablet, Rfl: 11  •  trandolapril (MAVIK) 4 MG tablet, take 1 tablet by mouth once daily, Disp: 30 tablet, Rfl: 11  •  vitamin B-12 (CYANOCOBALAMIN) 1000 MCG tablet, Take 1 tablet by mouth daily. As directed, Disp: , Rfl:   •  ALPRAZolam (XANAX) 0.5 MG  "tablet, Take 1 tablet by mouth 2 (Two) Times a Day As Needed for anxiety., Disp: 30 tablet, Rfl: 0    Current Facility-Administered Medications:   •  sodium hyaluronate (SUPARTZ) injection 25 mg, 25 mg, Intra-articular, Once, Arturo Geronimo PA-C      History of Present Illness   Pt here for follow up. Had a negative Lexiscan last spring. Still occassionally has dyspnea on exertion and lower chest pressure that is relieved with belching. Has also been given prn xanax for anxiety when she feels symptoms. HTN and HLD are well controlled on rx. Pt has not been going to the gym as often lately due to knee pain and plantar fasciitis.  She is doing exercises to help her plantar fasciitis and getting injections in hernia present, which is helping.  She intends on getting back in the gym.  She is gained about 5 pounds since last visit.  Pt denies any  orthopnea, PND, palpitations, lower extremity edema, or claudication.    ROS:  All systems have been reviewed and are negative with the exception of those mentioned in the HPI.    OBJECTIVE:  Vitals:    09/06/17 0937   BP: 138/84   BP Location: Right arm   Patient Position: Sitting   Pulse: 76   Weight: 203 lb (92.1 kg)   Height: 65\" (165.1 cm)     Physical Exam   Constitutional: She is oriented to person, place, and time. She appears well-developed and well-nourished. No distress.   obese   Cardiovascular: Normal rate, regular rhythm, normal heart sounds and intact distal pulses.    No murmur heard.  Pulses:       Radial pulses are 2+ on the right side, and 2+ on the left side.        Dorsalis pedis pulses are 2+ on the right side, and 2+ on the left side.        Posterior tibial pulses are 2+ on the right side, and 2+ on the left side.   Pulmonary/Chest: Effort normal and breath sounds normal. She has no wheezes. She has no rales.   Abdominal: Soft. Bowel sounds are normal. There is no tenderness. There is no guarding.   Musculoskeletal: She exhibits no edema or " tenderness.   Neurological: She is alert and oriented to person, place, and time.   Skin: Skin is warm and dry. No rash noted.   Psychiatric: She has a normal mood and affect.   Nursing note and vitals reviewed.      Diagnostic Data:  Procedures      ASSESSMENT:   Diagnosis Plan   1. Other chest pain     2. Essential hypertension     3. Hyperlipidemia, unspecified hyperlipidemia type         PLAN:  1. Atypical chest pain relieved by belching with negative stress 6 months ago, recommended the patient to try simethicone for gas pain  2. Well controlled today, continue antihypertensives per PCP  3. Labs from PCP, continue statin therapy with simvastatin  4. Patient encouraged to get back into an exercise routine.  Counseled that diet modifications are more effective in weight loss and exercise.  Patient counseled to avoid starchy foods such as bread, pasta, potatoes, sweets.    Santi Dalal MD, thank you for referring Ms. Hernandez for evaluation.  I have forwarded my electronically generated recommendations to you for review.  Please do not hesitate to call with any questions.    RTC 1 year, sooner if needd    Scribed for Timbo Roberts MD by Akosua Proctor PA-C. 9/6/2017  10:02 AM  I, Timbo Roberts MD, personally performed the services described in this documentation as scribed by the above named individual in my presence, and it is both accurate and complete.  9/6/2017  10:07 AM    Timbo Roberts MD, FACC

## 2017-09-08 ENCOUNTER — OFFICE VISIT (OUTPATIENT)
Dept: ORTHOPEDIC SURGERY | Facility: CLINIC | Age: 72
End: 2017-09-08

## 2017-09-08 VITALS — RESPIRATION RATE: 18 BRPM | WEIGHT: 203 LBS | BODY MASS INDEX: 33.82 KG/M2 | HEIGHT: 65 IN

## 2017-09-08 DIAGNOSIS — M17.12 PRIMARY OSTEOARTHRITIS OF LEFT KNEE: Primary | ICD-10-CM

## 2017-09-08 PROCEDURE — 20610 DRAIN/INJ JOINT/BURSA W/O US: CPT | Performed by: PHYSICIAN ASSISTANT

## 2017-09-08 RX ORDER — HYALURONATE SODIUM 10 MG/ML
20 SYRINGE (ML) INTRAARTICULAR
Status: COMPLETED | OUTPATIENT
Start: 2017-09-08 | End: 2017-09-08

## 2017-09-08 RX ADMIN — Medication 20 MG: at 13:26

## 2017-09-08 NOTE — PROGRESS NOTES
"Subjective   Deya Hernandez is a 72 y.o.  female is here today for injection therapy.  Follow-up, Pain, and Injections of the Left Knee      History of Present Illness     Patient is here for her second Visco supplementation injection.    Past Medical History:   Diagnosis Date   • Abdominal bloating    • Abnormal EKG    • Anxiety and depression 2004   • Colon cancer screening    • Colon polyp 04/2016   • Dyspnea    • Elbow pain, left    • Fatigue    • Gastritis    • GERD (gastroesophageal reflux disease)    • H/O mammogram    • H/O sinusitis    • H/O: pneumonia    • Heartburn    • Plantar fasciitis    • Sebaceous cyst    • Sinusitis         Past Surgical History:   Procedure Laterality Date   • COLONOSCOPY     • TUBAL ABDOMINAL LIGATION     • UPPER GASTROINTESTINAL ENDOSCOPY  03/2016       No Known Allergies    Review of Systems   Constitutional: Negative for fever.   HENT: Negative for voice change.    Eyes: Negative for visual disturbance.   Respiratory: Negative for shortness of breath.    Cardiovascular: Negative for chest pain.   Gastrointestinal: Negative for abdominal distention and abdominal pain.   Genitourinary: Negative for dysuria.   Musculoskeletal: Positive for arthralgias. Negative for gait problem and joint swelling.   Skin: Negative for rash.   Neurological: Negative for speech difficulty.   Hematological: Does not bruise/bleed easily.   Psychiatric/Behavioral: Negative for confusion.       Objective   Resp 18  Ht 65\" (165.1 cm)  Wt 203 lb (92.1 kg)  BMI 33.78 kg/m2   Physical Exam  Since last injection, patient notes mild relief of symptoms.  No adverse effects of prior injection.  Skin exam stable with no erythema, ecchymosis or rash.  No new swelling.  No motor or sensory changes.  Distal pulse intact.    Large Joint Arthrocentesis- SAMPLE  Date/Time: 9/8/2017 1:26 PM  Consent given by: patient  Site marked: site marked  Timeout: Immediately prior to procedure a time out was called to verify " the correct patient, procedure, equipment, support staff and site/side marked as required   Supporting Documentation  Indications: pain   Procedure Details  Location: knee - L knee  Preparation: Patient was prepped and draped in the usual sterile fashion  Needle size: 22 G  Approach: anterolateral  Medications administered: 20 mg Sodium Hyaluronate 20 MG/2ML  Patient tolerance: patient tolerated the procedure well with no immediate complications          Assessment/Plan   Independent Review of Radiographic Studies:    No new imaging done today.      Deya was seen today for follow-up, pain and injections.    Diagnoses and all orders for this visit:    Primary osteoarthritis of left knee    Other orders  -     Large Joint Arthrocentesis    Orthopedic activities reviewed and patient expressed appreciation  Discussion of orthopedic goals  Risk, benefits, and merits of treatment alternatives reviewed with the patient and questions answered  Call or notify for any adverse effect from injection therapy    Recommendations/Plan:  Exercise, medications, injections, other patient advice, and return appointment as noted.  Patient is encouraged to call or return for any issues or concerns.  FU in 1 week     Patient agreeable to call or return sooner for any concerns.

## 2017-09-15 ENCOUNTER — OFFICE VISIT (OUTPATIENT)
Dept: ORTHOPEDIC SURGERY | Facility: CLINIC | Age: 72
End: 2017-09-15

## 2017-09-15 VITALS — RESPIRATION RATE: 18 BRPM | HEIGHT: 65 IN | BODY MASS INDEX: 33.82 KG/M2 | WEIGHT: 203 LBS

## 2017-09-15 DIAGNOSIS — M17.12 PRIMARY OSTEOARTHRITIS OF LEFT KNEE: Primary | ICD-10-CM

## 2017-09-15 PROCEDURE — 20610 DRAIN/INJ JOINT/BURSA W/O US: CPT | Performed by: PHYSICIAN ASSISTANT

## 2017-09-15 RX ORDER — HYALURONATE SODIUM 10 MG/ML
20 SYRINGE (ML) INTRAARTICULAR
Status: COMPLETED | OUTPATIENT
Start: 2017-09-15 | End: 2017-09-15

## 2017-09-15 RX ADMIN — Medication 20 MG: at 13:37

## 2017-09-15 NOTE — PROGRESS NOTES
"Sebastien Hernandez is a 72 y.o.  female is here today for injection therapy.  Follow-up, Pain, and Injections of the Left Knee      History of Present Illness     Past Medical History:   Diagnosis Date   • Abdominal bloating    • Abnormal EKG    • Anxiety and depression 2004   • Colon cancer screening    • Colon polyp 04/2016   • Dyspnea    • Elbow pain, left    • Fatigue    • Gastritis    • GERD (gastroesophageal reflux disease)    • H/O mammogram    • H/O sinusitis    • H/O: pneumonia    • Heartburn    • Plantar fasciitis    • Sebaceous cyst    • Sinusitis         Past Surgical History:   Procedure Laterality Date   • COLONOSCOPY     • TUBAL ABDOMINAL LIGATION     • UPPER GASTROINTESTINAL ENDOSCOPY  03/2016       No Known Allergies    Review of Systems   Constitutional: Negative for fever.   HENT: Negative for voice change.    Eyes: Negative for visual disturbance.   Respiratory: Negative for shortness of breath.    Cardiovascular: Negative for chest pain.   Gastrointestinal: Negative for abdominal distention and abdominal pain.   Genitourinary: Negative for dysuria.   Musculoskeletal: Positive for arthralgias. Negative for gait problem and joint swelling.   Skin: Negative for rash.   Neurological: Negative for speech difficulty.   Hematological: Does not bruise/bleed easily.   Psychiatric/Behavioral: Negative for confusion.       Objective   Resp 18  Ht 65\" (165.1 cm)  Wt 203 lb (92.1 kg)  BMI 33.78 kg/m2   Physical Exam  Since last injection, patient notes substantial relief of symptoms.  No adverse effects of prior injection.  Skin exam stable with no erythema, ecchymosis or rash.  No new swelling.  No motor or sensory changes.  Distal pulse intact.    Large Joint Arthrocentesis-SAMPLE  Date/Time: 9/15/2017 1:37 PM  Consent given by: patient  Site marked: site marked  Timeout: Immediately prior to procedure a time out was called to verify the correct patient, procedure, equipment, support staff " and site/side marked as required   Supporting Documentation  Indications: pain   Procedure Details  Location: knee - L knee  Preparation: Patient was prepped and draped in the usual sterile fashion  Needle size: 22 G  Approach: anterolateral  Medications administered: 20 mg Sodium Hyaluronate 20 MG/2ML  Patient tolerance: patient tolerated the procedure well with no immediate complications          Assessment/Plan   Independent Review of Radiographic Studies:    No new imaging done today.  Laboratory and Other Studies:  No new results reviewed today.       Deya was seen today for follow-up, pain and injections.    Diagnoses and all orders for this visit:    Primary osteoarthritis of left knee  -     Large Joint Arthrocentesis    Orthopedic activities reviewed and patient expressed appreciation  Discussion of orthopedic goals  Risk, benefits, and merits of treatment alternatives reviewed with the patient and questions answered  Call or notify for any adverse effect from injection therapy    Recommendations/Plan:  Exercise, medications, injections, other patient advice, and return appointment as noted.  Patient is encouraged to call or return for any issues or concerns.    FU in 6 months or as needed  Patient agreeable to call or return sooner for any concerns.

## 2017-10-13 RX ORDER — MIRTAZAPINE 45 MG/1
TABLET, FILM COATED ORAL
Qty: 30 TABLET | Refills: 1 | Status: SHIPPED | OUTPATIENT
Start: 2017-10-13 | End: 2018-01-26 | Stop reason: SDUPTHER

## 2017-10-13 RX ORDER — METOPROLOL SUCCINATE 50 MG/1
TABLET, EXTENDED RELEASE ORAL
Qty: 30 TABLET | Refills: 2 | Status: SHIPPED | OUTPATIENT
Start: 2017-10-13 | End: 2018-03-11 | Stop reason: SDUPTHER

## 2017-11-02 RX ORDER — METOPROLOL SUCCINATE 50 MG/1
TABLET, EXTENDED RELEASE ORAL
Qty: 30 TABLET | Refills: 2 | Status: SHIPPED | OUTPATIENT
Start: 2017-11-02 | End: 2018-01-22 | Stop reason: SDUPTHER

## 2017-11-02 RX ORDER — MIRTAZAPINE 45 MG/1
TABLET, FILM COATED ORAL
Qty: 30 TABLET | Refills: 1 | Status: SHIPPED | OUTPATIENT
Start: 2017-11-02 | End: 2018-01-22 | Stop reason: SDUPTHER

## 2017-12-12 DIAGNOSIS — R12 HEARTBURN: ICD-10-CM

## 2017-12-12 RX ORDER — PANTOPRAZOLE SODIUM 40 MG/1
TABLET, DELAYED RELEASE ORAL
Qty: 30 TABLET | Refills: 0 | Status: SHIPPED | OUTPATIENT
Start: 2017-12-12 | End: 2018-09-10

## 2017-12-12 RX ORDER — LEVOTHYROXINE SODIUM 88 UG/1
TABLET ORAL
Qty: 30 TABLET | Refills: 5 | Status: SHIPPED | OUTPATIENT
Start: 2017-12-12 | End: 2018-05-22 | Stop reason: SDUPTHER

## 2018-01-22 ENCOUNTER — HOSPITAL ENCOUNTER (OUTPATIENT)
Dept: CT IMAGING | Facility: HOSPITAL | Age: 73
Discharge: HOME | End: 2018-01-22
Attending: INTERNAL MEDICINE | Admitting: INTERNAL MEDICINE

## 2018-01-22 ENCOUNTER — OFFICE VISIT (OUTPATIENT)
Dept: INTERNAL MEDICINE | Facility: CLINIC | Age: 73
End: 2018-01-22

## 2018-01-22 VITALS
DIASTOLIC BLOOD PRESSURE: 78 MMHG | HEIGHT: 65 IN | WEIGHT: 206 LBS | BODY MASS INDEX: 34.32 KG/M2 | RESPIRATION RATE: 14 BRPM | TEMPERATURE: 98.5 F | SYSTOLIC BLOOD PRESSURE: 130 MMHG | OXYGEN SATURATION: 94 % | HEART RATE: 64 BPM

## 2018-01-22 DIAGNOSIS — H53.8 BLURRED VISION, BILATERAL: ICD-10-CM

## 2018-01-22 DIAGNOSIS — R51.9 NONINTRACTABLE HEADACHE, UNSPECIFIED CHRONICITY PATTERN, UNSPECIFIED HEADACHE TYPE: ICD-10-CM

## 2018-01-22 DIAGNOSIS — R51.9 NONINTRACTABLE HEADACHE, UNSPECIFIED CHRONICITY PATTERN, UNSPECIFIED HEADACHE TYPE: Primary | ICD-10-CM

## 2018-01-22 PROCEDURE — 99214 OFFICE O/P EST MOD 30 MIN: CPT | Performed by: INTERNAL MEDICINE

## 2018-01-22 PROCEDURE — 70450 CT HEAD/BRAIN W/O DYE: CPT

## 2018-01-22 RX ORDER — DIPHENHYDRAMINE HYDROCHLORIDE 25 MG/1
3000 TABLET ORAL DAILY
COMMUNITY
End: 2021-03-10

## 2018-01-22 NOTE — PROGRESS NOTES
"Subjective   Deya Hernandez is a 72 y.o. female.     Chief Complaint   Patient presents with   • Eye Problem     was at a movie when her vision went extreamly fuzzy - after that she had a small headache        Eye Problem    Both eyes are affected.This is a new problem. The current episode started yesterday. Episode frequency: yesterday 1 PM while watching movie lasted for 3\" The problem has been resolved. There was no injury mechanism. Associated symptoms include blurred vision. Pertinent negatives include no eye redness, nausea, photophobia, tingling or vomiting. Associated symptoms comments: Headache afterwards mild for whole evening. She has tried nothing for the symptoms.   Headache    This is a new problem. The current episode started yesterday. Episode frequency: afternoon. The problem has been resolved. Pain location: diffuse. The pain does not radiate. The pain quality is not similar to prior headaches. The quality of the pain is described as aching. The pain is at a severity of 4/10. Associated symptoms include blurred vision. Pertinent negatives include no ear pain, eye pain, eye redness, eye watering, muscle aches, nausea, phonophobia, photophobia, tingling or vomiting. Nothing aggravates the symptoms. She has tried Excedrin for the symptoms. The treatment provided significant relief.          Current Outpatient Prescriptions:   •  ALPRAZolam (XANAX) 0.5 MG tablet, Take 1 tablet by mouth 2 (Two) Times a Day As Needed for anxiety., Disp: 30 tablet, Rfl: 0  •  amLODIPine (NORVASC) 2.5 MG tablet, take 1 tablet by mouth once daily, Disp: 30 tablet, Rfl: 11  •  Biotin 5 MG capsule, Take  by mouth., Disp: , Rfl:   •  buPROPion SR (WELLBUTRIN SR) 100 MG 12 hr tablet, take 1 tablet by mouth twice a day, Disp: 60 tablet, Rfl: 11  •  Cholecalciferol (VITAMIN D3) 1000 UNITS capsule, Take  by mouth. 3 DAILY, Disp: , Rfl:   •  HYALGAN 20 MG/2ML solution prefilled syringe, , Disp: , Rfl:   •  levothyroxine " (SYNTHROID, LEVOTHROID) 88 MCG tablet, take 1 tablet by mouth once daily, Disp: 30 tablet, Rfl: 5  •  metoprolol succinate XL (TOPROL-XL) 50 MG 24 hr tablet, take 1 tablet by mouth once daily, Disp: 30 tablet, Rfl: 2  •  mirtazapine (REMERON) 45 MG tablet, take 1 tablet by mouth at bedtime, Disp: 30 tablet, Rfl: 1  •  pantoprazole (PROTONIX) 40 MG EC tablet, take 1 tablet by mouth every morning 30 MINUTES BEFORE BREAKFAST as directed, Disp: 30 tablet, Rfl: 0  •  simvastatin (ZOCOR) 40 MG tablet, take 1 tablet by mouth at bedtime, Disp: 30 tablet, Rfl: 11  •  trandolapril (MAVIK) 4 MG tablet, take 1 tablet by mouth once daily, Disp: 30 tablet, Rfl: 11  •  vitamin B-12 (CYANOCOBALAMIN) 1000 MCG tablet, Take 1 tablet by mouth daily. As directed, Disp: , Rfl:     Current Facility-Administered Medications:   •  sodium hyaluronate (SUPARTZ) injection 25 mg, 25 mg, Intra-articular, Once, Arturo Geronimo PA-C    The following portions of the patient's history were reviewed and updated as appropriate: allergies, current medications, past family history, past medical history, past social history, past surgical history and problem list.    Review of Systems   Constitutional: Negative.    HENT: Negative for ear pain.         Blurred vision   Eyes: Positive for blurred vision. Negative for photophobia, pain and redness.   Respiratory: Negative.    Cardiovascular: Negative.    Gastrointestinal: Negative.  Negative for nausea and vomiting.   Musculoskeletal: Negative.    Skin: Negative.    Neurological: Positive for headaches. Negative for tingling.   Psychiatric/Behavioral: Negative.        Objective   Physical Exam   Constitutional: She is oriented to person, place, and time. She appears well-developed and well-nourished.   HENT:   Head: Normocephalic and atraumatic.   Eyes: Pupils are equal, round, and reactive to light.   Neck: Neck supple.   Cardiovascular: Normal rate, regular rhythm and normal heart sounds.     Pulmonary/Chest: Effort normal and breath sounds normal.   Abdominal: Bowel sounds are normal.   Musculoskeletal: She exhibits no tenderness.   Neurological: She is alert and oriented to person, place, and time. No cranial nerve deficit.   Skin: Skin is warm.   Psychiatric: She has a normal mood and affect.       All tests have been reviewed.    Assessment/Plan   Diagnoses and all orders for this visit:    Nonintractable headache, unspecified chronicity pattern, unspecified headache type  -     CT Head Without Contrast; Future    Blurred vision, bilateral  -     CT Head Without Contrast; Future

## 2018-01-25 ENCOUNTER — TELEPHONE (OUTPATIENT)
Dept: INTERNAL MEDICINE | Facility: CLINIC | Age: 73
End: 2018-01-25

## 2018-01-26 RX ORDER — MIRTAZAPINE 45 MG/1
TABLET, FILM COATED ORAL
Qty: 30 TABLET | Refills: 1 | Status: SHIPPED | OUTPATIENT
Start: 2018-01-26 | End: 2018-03-29 | Stop reason: SDUPTHER

## 2018-02-05 DIAGNOSIS — H53.8 BLURRY VISION: Primary | ICD-10-CM

## 2018-02-05 DIAGNOSIS — R12 HEARTBURN: ICD-10-CM

## 2018-02-05 RX ORDER — PANTOPRAZOLE SODIUM 40 MG/1
TABLET, DELAYED RELEASE ORAL
Qty: 30 TABLET | Refills: 0 | OUTPATIENT
Start: 2018-02-05

## 2018-02-10 DIAGNOSIS — R12 HEARTBURN: ICD-10-CM

## 2018-02-12 RX ORDER — PANTOPRAZOLE SODIUM 40 MG/1
TABLET, DELAYED RELEASE ORAL
Qty: 30 TABLET | Refills: 0 | OUTPATIENT
Start: 2018-02-12

## 2018-03-01 DIAGNOSIS — R12 HEARTBURN: ICD-10-CM

## 2018-03-01 RX ORDER — PANTOPRAZOLE SODIUM 40 MG/1
TABLET, DELAYED RELEASE ORAL
Qty: 30 TABLET | Refills: 0 | OUTPATIENT
Start: 2018-03-01

## 2018-03-12 DIAGNOSIS — R12 HEARTBURN: ICD-10-CM

## 2018-03-12 RX ORDER — METOPROLOL SUCCINATE 50 MG/1
TABLET, EXTENDED RELEASE ORAL
Qty: 30 TABLET | Refills: 2 | Status: SHIPPED | OUTPATIENT
Start: 2018-03-12 | End: 2018-06-12 | Stop reason: SDUPTHER

## 2018-03-12 RX ORDER — AMLODIPINE BESYLATE 2.5 MG/1
TABLET ORAL
Qty: 60 TABLET | Refills: 11 | Status: SHIPPED | OUTPATIENT
Start: 2018-03-12 | End: 2018-09-13 | Stop reason: SDUPTHER

## 2018-03-12 RX ORDER — BUPROPION HYDROCHLORIDE 100 MG/1
TABLET, EXTENDED RELEASE ORAL
Qty: 60 TABLET | Refills: 11 | Status: SHIPPED | OUTPATIENT
Start: 2018-03-12 | End: 2018-04-04

## 2018-03-12 RX ORDER — TRANDOLAPRIL TABLETS 4 MG/1
TABLET ORAL
Qty: 30 TABLET | Refills: 11 | Status: SHIPPED | OUTPATIENT
Start: 2018-03-12 | End: 2019-03-10 | Stop reason: SDUPTHER

## 2018-03-13 RX ORDER — PANTOPRAZOLE SODIUM 40 MG/1
TABLET, DELAYED RELEASE ORAL
Qty: 30 TABLET | Refills: 0 | OUTPATIENT
Start: 2018-03-13

## 2018-03-14 ENCOUNTER — OFFICE VISIT (OUTPATIENT)
Dept: INTERNAL MEDICINE | Facility: CLINIC | Age: 73
End: 2018-03-14

## 2018-03-14 VITALS
BODY MASS INDEX: 33.99 KG/M2 | HEART RATE: 74 BPM | RESPIRATION RATE: 14 BRPM | HEIGHT: 65 IN | WEIGHT: 204 LBS | TEMPERATURE: 98.6 F | OXYGEN SATURATION: 97 % | DIASTOLIC BLOOD PRESSURE: 60 MMHG | SYSTOLIC BLOOD PRESSURE: 110 MMHG

## 2018-03-14 DIAGNOSIS — E03.8 OTHER SPECIFIED HYPOTHYROIDISM: ICD-10-CM

## 2018-03-14 DIAGNOSIS — F41.8 DEPRESSION WITH ANXIETY: ICD-10-CM

## 2018-03-14 DIAGNOSIS — I10 ESSENTIAL HYPERTENSION: Primary | ICD-10-CM

## 2018-03-14 DIAGNOSIS — M83.2 ADULT OSTEOMALACIA DUE TO MALABSORPTION: ICD-10-CM

## 2018-03-14 DIAGNOSIS — L08.1 ERYTHRASMA: ICD-10-CM

## 2018-03-14 DIAGNOSIS — K55.20 VASCULAR ECTASIA OF COLON: ICD-10-CM

## 2018-03-14 DIAGNOSIS — I70.90 ARTERIOSCLEROTIC VASCULAR DISEASE: ICD-10-CM

## 2018-03-14 DIAGNOSIS — E78.5 HYPERLIPIDEMIA, UNSPECIFIED HYPERLIPIDEMIA TYPE: ICD-10-CM

## 2018-03-14 DIAGNOSIS — M17.9 OSTEOARTHRITIS OF KNEE, UNSPECIFIED LATERALITY, UNSPECIFIED OSTEOARTHRITIS TYPE: ICD-10-CM

## 2018-03-14 DIAGNOSIS — E66.3 OVERWEIGHT: ICD-10-CM

## 2018-03-14 DIAGNOSIS — K64.8 INTERNAL HEMORRHOIDS: ICD-10-CM

## 2018-03-14 DIAGNOSIS — K63.5 POLYP OF COLON, UNSPECIFIED PART OF COLON, UNSPECIFIED TYPE: ICD-10-CM

## 2018-03-14 DIAGNOSIS — K57.30 DIVERTICULOSIS OF LARGE INTESTINE WITHOUT HEMORRHAGE: ICD-10-CM

## 2018-03-14 DIAGNOSIS — Z12.39 BREAST SCREENING: ICD-10-CM

## 2018-03-14 PROCEDURE — 99213 OFFICE O/P EST LOW 20 MIN: CPT | Performed by: INTERNAL MEDICINE

## 2018-03-14 PROCEDURE — G0439 PPPS, SUBSEQ VISIT: HCPCS | Performed by: INTERNAL MEDICINE

## 2018-03-14 PROCEDURE — 99397 PER PM REEVAL EST PAT 65+ YR: CPT | Performed by: INTERNAL MEDICINE

## 2018-03-14 RX ORDER — CLOTRIMAZOLE AND BETAMETHASONE DIPROPIONATE 10; .64 MG/G; MG/G
CREAM TOPICAL EVERY 12 HOURS SCHEDULED
Qty: 45 G | Refills: 0 | Status: SHIPPED | OUTPATIENT
Start: 2018-03-14 | End: 2018-04-04 | Stop reason: SDUPTHER

## 2018-03-14 NOTE — PROGRESS NOTES
QUICK REFERENCE INFORMATION:  The ABCs of the Annual Wellness Visit    Subsequent Medicare Wellness Visit    HEALTH RISK ASSESSMENT    1945    Recent Hospitalizations:  No hospitalization(s) within the last year..        Current Medical Providers:  Patient Care Team:  Santi Dalal MD as PCP - General  Santi Dalal MD as PCP - Family Medicine        Smoking Status:  History   Smoking Status   • Former Smoker   Smokeless Tobacco   • Never Used       Alcohol Consumption:  History   Alcohol Use   • Yes     Comment: social       Depression Screen:   PHQ-2/PHQ-9 Depression Screening 3/14/2018   Little interest or pleasure in doing things 0   Feeling down, depressed, or hopeless 0   Total Score 0       Health Habits and Functional and Cognitive Screening:  Functional & Cognitive Status 3/14/2018   Do you have difficulty preparing food and eating? No   Do you have difficulty bathing yourself, getting dressed or grooming yourself? No   Do you have difficulty using the toilet? No   Do you have difficulty moving around from place to place? No   Do you have trouble with steps or getting out of a bed or a chair? No   In the past year have you fallen or experienced a near fall? No   Current Diet Well Balanced Diet   Dental Exam Up to date   Eye Exam Up to date   Exercise (times per week) 3 times per week   Current Exercise Activities Include Cardiovasular Workout on Exercise Equipment   Do you need help using the phone?  No   Are you deaf or do you have serious difficulty hearing?  No   Do you need help with transportation? No   Do you need help shopping? No   Do you need help preparing meals?  No   Do you need help with housework?  No   Do you need help with laundry? No   Do you need help taking your medications? No   Do you need help managing money? No   Have you felt unusual stress, anger or loneliness in the last month? No   Who do you live with? Alone   If you need help, do you have trouble finding someone available  to you? No   Have you been bothered in the last four weeks by sexual problems? No   Do you have difficulty concentrating, remembering or making decisions? No           Does the patient have evidence of cognitive impairment? No    Aspirin use counseling: Does not need ASA (and currently is not on it)      Recent Lab Results:  CMP:  Lab Results   Component Value Date     (H) 04/08/2016    BUN 11 02/07/2017    CREATININE 0.90 02/07/2017    EGFRIFNONA 62 02/07/2017    BCR 12.2 02/07/2017     02/07/2017    K 3.6 02/07/2017    CO2 25.0 (L) 02/07/2017    CALCIUM 9.6 02/07/2017    ALBUMIN 4.20 02/07/2017    LABIL2 1.4 02/07/2017    BILITOT 0.6 02/07/2017    ALKPHOS 65 02/07/2017    AST 20 02/07/2017    ALT 32 02/07/2017     Lipid Panel:  Lab Results   Component Value Date    CHOL 228 (H) 09/14/2016    TRIG 184 (H) 09/14/2016    HDL 70 (H) 09/14/2016     HbA1c:       Visual Acuity:  No exam data present    Age-appropriate Screening Schedule:  Refer to the list below for future screening recommendations based on patient's age, sex and/or medical conditions. Orders for these recommended tests are listed in the plan section. The patient has been provided with a written plan.    Health Maintenance   Topic Date Due   • ZOSTER VACCINE  04/28/2016   • PNEUMOCOCCAL VACCINES (65+ LOW/MEDIUM RISK) (2 of 2 - PCV13) 02/04/2017   • INFLUENZA VACCINE  08/01/2017   • MAMMOGRAM  08/10/2017   • LIPID PANEL  09/14/2017   • COLONOSCOPY  04/07/2021   • TDAP/TD VACCINES (2 - Td) 07/03/2023        Subjective   History of Present Illness    Deya Hernandez is a 72 y.o. female who presents for an Subsequent Wellness Visit.Patient here for annual checkup.  Also physical.  Hypertension stable medication.  Hyperlipidemia stable medication.  Depression anxiety patient is on medication stable.  Possible occasional anxiety attack.  Knee joint arthritis patient is going to see orthopedist.  Hypothyroidism stable.  Patient also complains rash  at the skin folds area after going to [place]. Itch OTC med  No help    The following portions of the patient's history were reviewed and updated as appropriate: allergies, current medications, past family history, past medical history, past social history, past surgical history and problem list.    Outpatient Medications Prior to Visit   Medication Sig Dispense Refill   • ALPRAZolam (XANAX) 0.5 MG tablet Take 1 tablet by mouth 2 (Two) Times a Day As Needed for anxiety. 30 tablet 0   • amLODIPine (NORVASC) 2.5 MG tablet take 1 tablet by mouth once daily 60 tablet 11   • Biotin 5 MG capsule Take  by mouth.     • buPROPion SR (WELLBUTRIN SR) 100 MG 12 hr tablet take 1 tablet by mouth twice a day 60 tablet 11   • Cholecalciferol (VITAMIN D3) 1000 UNITS capsule Take  by mouth. 3 DAILY     • HYALGAN 20 MG/2ML solution prefilled syringe      • levothyroxine (SYNTHROID, LEVOTHROID) 88 MCG tablet take 1 tablet by mouth once daily 30 tablet 5   • metoprolol succinate XL (TOPROL-XL) 50 MG 24 hr tablet take 1 tablet by mouth once daily 30 tablet 2   • mirtazapine (REMERON) 45 MG tablet take 1 tablet by mouth at bedtime 30 tablet 1   • pantoprazole (PROTONIX) 40 MG EC tablet take 1 tablet by mouth every morning 30 MINUTES BEFORE BREAKFAST as directed 30 tablet 0   • simvastatin (ZOCOR) 40 MG tablet take 1 tablet by mouth at bedtime 30 tablet 11   • trandolapril (MAVIK) 4 MG tablet take 1 tablet by mouth once daily 30 tablet 11   • vitamin B-12 (CYANOCOBALAMIN) 1000 MCG tablet Take 1 tablet by mouth daily. 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    Review of Systems   Constitutional: Negative.    HENT: Negative.    Eyes: Negative.    Respiratory: Negative.    Cardiovascular: Negative.    Gastrointestinal: Negative.    Endocrine: Negative.    Genitourinary: Negative.    Musculoskeletal: Negative.    Skin: Negative.    Allergic/Immunologic: Negative.    Neurological: Negative.    Hematological: Negative.    Psychiatric/Behavioral: Negative.        Compared to one year ago, the patient feels her physical health is the same.  Compared to one year ago, the patient feels her mental health is the same.    Objective     Physical Exam   Constitutional: She is oriented to person, place, and time. She appears well-developed and well-nourished.   HENT:   Head: Normocephalic and atraumatic.   Right Ear: External ear normal.   Left Ear: External ear normal.   Nose: Nose normal.   Mouth/Throat: Oropharynx is clear and moist.   Eyes: Conjunctivae and EOM are normal. Pupils are equal, round, and reactive to light.   Neck: Normal range of motion. Neck supple.   Cardiovascular: Normal rate, regular rhythm, normal heart sounds and intact distal pulses.    Pulmonary/Chest: Effort normal and breath sounds normal.   Abdominal: Soft. Bowel sounds are normal.   Genitourinary:   Genitourinary Comments: Pap smear done today   Musculoskeletal: Normal range of motion.   Neurological: She is alert and oriented to person, place, and time. She has normal reflexes.   Skin: Skin is warm and dry.   Psychiatric: She has a normal  "mood and affect. Her behavior is normal. Judgment and thought content normal.       Vitals:    03/14/18 1503   BP: 110/60   Pulse: 74   Resp: 14   Temp: 98.6 °F (37 °C)   SpO2: 97%   Weight: 92.5 kg (204 lb)   Height: 165.1 cm (65\")       Body mass index is 33.95 kg/m².  Discussed the patient's BMI with her. BMI is above normal parameters. Follow-up plan includes:  exercise counseling.    Assessment/Plan   Patient Self-Management and Personalized Health Advice  The patient has been provided with information about: diet, exercise and weight management and preventive services including:   · Advance directive.    Visit Diagnoses:  No diagnosis found.    No orders of the defined types were placed in this encounter.      Outpatient Encounter Prescriptions as of 3/14/2018   Medication Sig Dispense Refill   • ALPRAZolam (XANAX) 0.5 MG tablet Take 1 tablet by mouth 2 (Two) Times a Day As Needed for anxiety. 30 tablet 0   • amLODIPine (NORVASC) 2.5 MG tablet take 1 tablet by mouth once daily 60 tablet 11   • Biotin 5 MG capsule Take  by mouth.     • buPROPion SR (WELLBUTRIN SR) 100 MG 12 hr tablet take 1 tablet by mouth twice a day 60 tablet 11   • Cholecalciferol (VITAMIN D3) 1000 UNITS capsule Take  by mouth. 3 DAILY     • HYALGAN 20 MG/2ML solution prefilled syringe      • levothyroxine (SYNTHROID, LEVOTHROID) 88 MCG tablet take 1 tablet by mouth once daily 30 tablet 5   • metoprolol succinate XL (TOPROL-XL) 50 MG 24 hr tablet take 1 tablet by mouth once daily 30 tablet 2   • mirtazapine (REMERON) 45 MG tablet take 1 tablet by mouth at bedtime 30 tablet 1   • pantoprazole (PROTONIX) 40 MG EC tablet take 1 tablet by mouth every morning 30 MINUTES BEFORE BREAKFAST as directed 30 tablet 0   • simvastatin (ZOCOR) 40 MG tablet take 1 tablet by mouth at bedtime 30 tablet 11   • trandolapril (MAVIK) 4 MG tablet take 1 tablet by mouth once daily 30 tablet 11   • vitamin B-12 (CYANOCOBALAMIN) 1000 MCG tablet Take 1 tablet by mouth " daily. As directed       Facility-Administered Encounter Medications as of 3/14/2018   Medication Dose Route Frequency Provider Last Rate Last Dose   • sodium hyaluronate (SUPARTZ) injection 25 mg  25 mg Intra-articular Once Arturo Geronimo PA-C           Reviewed use of high risk medication in the elderly: yes  Reviewed for potential of harmful drug interactions in the elderly: no    Follow Up:  No Follow-up on file.     An After Visit Summary and PPPS with all of these plans were given to the patient.           Nonintractable headache, resolved CT Head Without Contrast; normal     Blurred vision, bilateral CT Head Without Contrast; normal   soa resolved  Gastritis, omeprazole continue medicine s/p EGD  Probable osteoarthritis of the knee, follow ortho  Overweight continue good diet   Vitamin D low normal continue vitamin D3 1000 units daily,   pneumovax done zostavax done, flu done, prevnar today, check rec--  Hypothyroidism continue medication  Hyperlipidemia unable to afford crestor, lipitor muscle pain, on zocor 40, lipitor 20 cause problem in the past.   Hypertension continue meds  Depression anxiety stable on meds. continue mirtazapine 45, bupropion 100 mg bid continue prn xanax  pap today, mamm to schedule  dexa normal  colon 4/7/16  Weight gain encouraged patient to have good diet and exercise.  Erythrasma start clotrimazole  Follow-up in 6 mo    Pap smear done today with issues

## 2018-03-30 RX ORDER — MIRTAZAPINE 45 MG/1
TABLET, FILM COATED ORAL
Qty: 90 TABLET | Refills: 3 | Status: SHIPPED | OUTPATIENT
Start: 2018-03-30 | End: 2018-04-04 | Stop reason: SDUPTHER

## 2018-04-02 LAB
25(OH)D3+25(OH)D2 SERPL-MCNC: 55.1 NG/ML
ALBUMIN SERPL-MCNC: 3.8 G/DL (ref 3.5–5)
ALBUMIN/GLOB SERPL: 1.5 G/DL (ref 1–2)
ALP SERPL-CCNC: 64 U/L (ref 38–126)
ALT SERPL-CCNC: 29 U/L (ref 13–69)
APPEARANCE UR: ABNORMAL
AST SERPL-CCNC: 19 U/L (ref 15–46)
BACTERIA #/AREA URNS HPF: ABNORMAL /HPF
BASOPHILS # BLD AUTO: 0.04 10*3/MM3 (ref 0–0.2)
BASOPHILS NFR BLD AUTO: 0.7 % (ref 0–2.5)
BILIRUB SERPL-MCNC: 0.5 MG/DL (ref 0.2–1.3)
BILIRUB UR QL STRIP: NEGATIVE
BUN SERPL-MCNC: 13 MG/DL (ref 7–20)
BUN/CREAT SERPL: 14.4 (ref 7.1–23.5)
CALCIUM SERPL-MCNC: 10.3 MG/DL (ref 8.4–10.2)
CASTS URNS MICRO: ABNORMAL
CHLORIDE SERPL-SCNC: 104 MMOL/L (ref 98–107)
CHOLEST SERPL-MCNC: 227 MG/DL (ref 0–199)
CK SERPL-CCNC: 77 U/L (ref 30–170)
CO2 SERPL-SCNC: 31 MMOL/L (ref 26–30)
COLOR UR: YELLOW
CREAT SERPL-MCNC: 0.9 MG/DL (ref 0.6–1.3)
EOSINOPHIL # BLD AUTO: 0.09 10*3/MM3 (ref 0–0.7)
EOSINOPHIL NFR BLD AUTO: 1.5 % (ref 0–7)
EPI CELLS #/AREA URNS HPF: ABNORMAL /HPF
ERYTHROCYTE [DISTWIDTH] IN BLOOD BY AUTOMATED COUNT: 12.8 % (ref 11.5–14.5)
GFR SERPLBLD CREATININE-BSD FMLA CKD-EPI: 62 ML/MIN/1.73
GFR SERPLBLD CREATININE-BSD FMLA CKD-EPI: 75 ML/MIN/1.73
GLOBULIN SER CALC-MCNC: 2.6 GM/DL
GLUCOSE SERPL-MCNC: 100 MG/DL (ref 74–98)
GLUCOSE UR QL: NEGATIVE
HCT VFR BLD AUTO: 42.2 % (ref 37–47)
HDLC SERPL-MCNC: 66 MG/DL (ref 40–60)
HGB BLD-MCNC: 14.3 G/DL (ref 12–16)
HGB UR QL STRIP: NEGATIVE
IMM GRANULOCYTES # BLD: 0.01 10*3/MM3 (ref 0–0.06)
IMM GRANULOCYTES NFR BLD: 0.2 % (ref 0–0.6)
KETONES UR QL STRIP: NEGATIVE
LDLC SERPL CALC-MCNC: 122 MG/DL (ref 0–99)
LEUKOCYTE ESTERASE UR QL STRIP: ABNORMAL
LYMPHOCYTES # BLD AUTO: 2.44 10*3/MM3 (ref 0.6–3.4)
LYMPHOCYTES NFR BLD AUTO: 42 % (ref 10–50)
MCH RBC QN AUTO: 32.6 PG (ref 27–31)
MCHC RBC AUTO-ENTMCNC: 33.9 G/DL (ref 30–37)
MCV RBC AUTO: 96.1 FL (ref 81–99)
MONOCYTES # BLD AUTO: 0.53 10*3/MM3 (ref 0–0.9)
MONOCYTES NFR BLD AUTO: 9.1 % (ref 0–12)
MUCOUS THREADS URNS QL MICRO: ABNORMAL /HPF
NEUTROPHILS # BLD AUTO: 2.7 10*3/MM3 (ref 2–6.9)
NEUTROPHILS NFR BLD AUTO: 46.5 % (ref 37–80)
NITRITE UR QL STRIP: NEGATIVE
NRBC BLD AUTO-RTO: 0 /100 WBC (ref 0–0)
PH UR STRIP: 6.5 [PH] (ref 5–8)
PLATELET # BLD AUTO: 341 10*3/MM3 (ref 130–400)
POTASSIUM SERPL-SCNC: 4.5 MMOL/L (ref 3.5–5.1)
PROT SERPL-MCNC: 6.4 G/DL (ref 6.3–8.2)
PROT UR QL STRIP: ABNORMAL
RBC # BLD AUTO: 4.39 10*6/MM3 (ref 4.2–5.4)
RBC #/AREA URNS HPF: ABNORMAL /HPF
SODIUM SERPL-SCNC: 142 MMOL/L (ref 137–145)
SP GR UR: 1.02 (ref 1–1.03)
TRIGL SERPL-MCNC: 196 MG/DL
TSH SERPL DL<=0.005 MIU/L-ACNC: 1.79 MIU/ML (ref 0.47–4.68)
UROBILINOGEN UR STRIP-MCNC: ABNORMAL MG/DL
VIT B12 SERPL-MCNC: 965 PG/ML (ref 239–931)
VLDLC SERPL CALC-MCNC: 39.2 MG/DL
WBC # BLD AUTO: 5.81 10*3/MM3 (ref 4.8–10.8)
WBC #/AREA URNS HPF: ABNORMAL /HPF

## 2018-04-03 LAB — HCV AB S/CO SERPL IA: <0.1 S/CO RATIO (ref 0–0.9)

## 2018-04-03 RX ORDER — NITROFURANTOIN 25; 75 MG/1; MG/1
100 CAPSULE ORAL 2 TIMES DAILY
Qty: 14 CAPSULE | Refills: 0 | Status: SHIPPED | OUTPATIENT
Start: 2018-04-03 | End: 2018-09-10

## 2018-04-04 ENCOUNTER — OFFICE VISIT (OUTPATIENT)
Dept: INTERNAL MEDICINE | Facility: CLINIC | Age: 73
End: 2018-04-04

## 2018-04-04 VITALS
OXYGEN SATURATION: 96 % | SYSTOLIC BLOOD PRESSURE: 124 MMHG | HEIGHT: 65 IN | WEIGHT: 206 LBS | DIASTOLIC BLOOD PRESSURE: 78 MMHG | BODY MASS INDEX: 34.32 KG/M2 | RESPIRATION RATE: 14 BRPM | HEART RATE: 76 BPM | TEMPERATURE: 99 F

## 2018-04-04 DIAGNOSIS — I70.90 ARTERIOSCLEROTIC VASCULAR DISEASE: ICD-10-CM

## 2018-04-04 DIAGNOSIS — K57.30 DIVERTICULOSIS OF LARGE INTESTINE WITHOUT HEMORRHAGE: ICD-10-CM

## 2018-04-04 DIAGNOSIS — K63.5 POLYP OF COLON, UNSPECIFIED PART OF COLON, UNSPECIFIED TYPE: ICD-10-CM

## 2018-04-04 DIAGNOSIS — F41.8 DEPRESSION WITH ANXIETY: ICD-10-CM

## 2018-04-04 DIAGNOSIS — E78.5 HYPERLIPIDEMIA, UNSPECIFIED HYPERLIPIDEMIA TYPE: ICD-10-CM

## 2018-04-04 DIAGNOSIS — I10 ESSENTIAL HYPERTENSION: Primary | ICD-10-CM

## 2018-04-04 DIAGNOSIS — M17.9 OSTEOARTHRITIS OF KNEE, UNSPECIFIED LATERALITY, UNSPECIFIED OSTEOARTHRITIS TYPE: ICD-10-CM

## 2018-04-04 DIAGNOSIS — R51.9 NONINTRACTABLE HEADACHE, UNSPECIFIED CHRONICITY PATTERN, UNSPECIFIED HEADACHE TYPE: ICD-10-CM

## 2018-04-04 DIAGNOSIS — E03.8 OTHER SPECIFIED HYPOTHYROIDISM: ICD-10-CM

## 2018-04-04 PROBLEM — H53.8 BLURRED VISION, BILATERAL: Status: RESOLVED | Noted: 2018-01-22 | Resolved: 2018-04-04

## 2018-04-04 PROCEDURE — 99214 OFFICE O/P EST MOD 30 MIN: CPT | Performed by: INTERNAL MEDICINE

## 2018-04-04 RX ORDER — CLOTRIMAZOLE AND BETAMETHASONE DIPROPIONATE 10; .64 MG/G; MG/G
CREAM TOPICAL EVERY 12 HOURS SCHEDULED
Qty: 45 G | Refills: 0 | Status: SHIPPED | OUTPATIENT
Start: 2018-04-04 | End: 2019-02-06 | Stop reason: SDUPTHER

## 2018-04-04 RX ORDER — CLOTRIMAZOLE AND BETAMETHASONE DIPROPIONATE 10; .64 MG/G; MG/G
CREAM TOPICAL EVERY 12 HOURS SCHEDULED
Qty: 45 G | Refills: 0 | Status: SHIPPED | OUTPATIENT
Start: 2018-04-04 | End: 2018-04-04 | Stop reason: SDUPTHER

## 2018-04-04 RX ORDER — MIRTAZAPINE 30 MG/1
45 TABLET, FILM COATED ORAL
Qty: 30 TABLET | Refills: 1 | Status: SHIPPED | OUTPATIENT
Start: 2018-04-04 | End: 2018-08-28

## 2018-04-04 RX ORDER — SIMVASTATIN 40 MG
40 TABLET ORAL
Qty: 30 TABLET | Refills: 11 | Status: SHIPPED | OUTPATIENT
Start: 2018-04-04 | End: 2019-03-04 | Stop reason: SDUPTHER

## 2018-04-04 NOTE — PROGRESS NOTES
Subjective   Deya Hernandez is a 72 y.o. female.     Chief Complaint   Patient presents with   • Follow-up       History of Present Illness   Patient here for follow-up.  Depression anxiety much improved tissue self changed bupropion from twice a day to once a day.  Pleural effusion resolved headache a resolved weight is still elevated.  Vitamin D slightly elevated.  Sugar elevated calcium mildly elevated hyperlipidemia still high on medication.  Blood pressure stable medication.  Skin rash improved on medication.  Urine test that showed a UTI    Current Outpatient Prescriptions:   •  ALPRAZolam (XANAX) 0.5 MG tablet, Take 1 tablet by mouth 2 (Two) Times a Day As Needed for anxiety., Disp: 30 tablet, Rfl: 0  •  amLODIPine (NORVASC) 2.5 MG tablet, take 1 tablet by mouth once daily, Disp: 60 tablet, Rfl: 11  •  Biotin 5 MG capsule, Take  by mouth., Disp: , Rfl:   •  buPROPion SR (WELLBUTRIN SR) 100 MG 12 hr tablet, take 1 tablet by mouth twice a day, Disp: 60 tablet, Rfl: 11  •  Cholecalciferol (VITAMIN D3) 1000 UNITS capsule, Take  by mouth. 3 DAILY, Disp: , Rfl:   •  clotrimazole-betamethasone (LOTRISONE) 1-0.05 % cream, Apply  topically Every 12 (Twelve) Hours., Disp: 45 g, Rfl: 0  •  HYALGAN 20 MG/2ML solution prefilled syringe, , Disp: , Rfl:   •  levothyroxine (SYNTHROID, LEVOTHROID) 88 MCG tablet, take 1 tablet by mouth once daily, Disp: 30 tablet, Rfl: 5  •  metoprolol succinate XL (TOPROL-XL) 50 MG 24 hr tablet, take 1 tablet by mouth once daily, Disp: 30 tablet, Rfl: 2  •  mirtazapine (REMERON) 45 MG tablet, TAKE 1 TABLET BY MOUTH EVERY NIGHT AT BEDTIME, Disp: 90 tablet, Rfl: 3  •  nitrofurantoin, macrocrystal-monohydrate, (MACROBID) 100 MG capsule, Take 1 capsule by mouth 2 (Two) Times a Day., Disp: 14 capsule, Rfl: 0  •  pantoprazole (PROTONIX) 40 MG EC tablet, take 1 tablet by mouth every morning 30 MINUTES BEFORE BREAKFAST as directed, Disp: 30 tablet, Rfl: 0  •  simvastatin (ZOCOR) 40 MG tablet, take  1 tablet by mouth at bedtime, Disp: 30 tablet, Rfl: 11  •  trandolapril (MAVIK) 4 MG tablet, take 1 tablet by mouth once daily, Disp: 30 tablet, Rfl: 11  •  vitamin B-12 (CYANOCOBALAMIN) 1000 MCG tablet, Take 1 tablet by mouth daily. As directed, Disp: , Rfl:     Current Facility-Administered Medications:   •  sodium hyaluronate (SUPARTZ) injection 25 mg, 25 mg, Intra-articular, Once, Arturo Geronimo PA-C    The following portions of the patient's history were reviewed and updated as appropriate: allergies, current medications, past family history, past medical history, past social history, past surgical history and problem list.    Review of Systems   Constitutional: Negative.    Respiratory: Negative.    Cardiovascular: Negative.    Gastrointestinal: Negative.    Musculoskeletal: Negative.    Skin: Negative.    Neurological: Negative.    Psychiatric/Behavioral: Negative.        Objective   Physical Exam   Constitutional: She is oriented to person, place, and time. She appears well-nourished.   Neck: Neck supple.   Cardiovascular: Normal rate, regular rhythm and normal heart sounds.    Pulmonary/Chest: Effort normal and breath sounds normal.   Abdominal: Bowel sounds are normal.   Neurological: She is alert and oriented to person, place, and time.   Skin: Skin is warm.   Psychiatric: She has a normal mood and affect.       All tests have been reviewed.    Assessment/Plan   There are no diagnoses linked to this encounter.          Nonintractable headache, resolved CT Head Without Contrast; normal     Blurred vision, bilateral resolved , CT Head Without Contrast; normal   Gastritis, omeprazole continue medicine s/p EGD  Probable osteoarthritis of the knee, follow ortho  Overweight continue good diet   Vitamin D low normal continue vitamin D3 1000 units daily,   pneumovax done zostavax done, flu done, prevnar today, check rec--  Hypothyroidism continue medication  Hyperlipidemia unable to afford crestor, lipitor  muscle pain, on zocor 40, lipitor 20 cause problem in the past.   Hypertension continue meds  Depression anxiety stable on meds. continue mirtazapine 45 cut down to 30 , bupropion 100 mg daily d/c now  pap done, mamm to schedule  dexa normal  colon 4/7/16  Calcium high repeat next  Glucose 100 diet  UTI start macrobid  Erythrasma continue clotrimazole  Follow-up in 6 mo

## 2018-04-11 ENCOUNTER — APPOINTMENT (OUTPATIENT)
Dept: MAMMOGRAPHY | Facility: HOSPITAL | Age: 73
End: 2018-04-11
Attending: INTERNAL MEDICINE

## 2018-04-11 DIAGNOSIS — R12 HEARTBURN: ICD-10-CM

## 2018-04-11 RX ORDER — PANTOPRAZOLE SODIUM 40 MG/1
TABLET, DELAYED RELEASE ORAL
Qty: 30 TABLET | Refills: 0 | OUTPATIENT
Start: 2018-04-11

## 2018-04-11 RX ORDER — MIRTAZAPINE 45 MG/1
TABLET, FILM COATED ORAL
Qty: 30 TABLET | Refills: 1 | Status: SHIPPED | OUTPATIENT
Start: 2018-04-11 | End: 2018-08-08 | Stop reason: SDUPTHER

## 2018-04-22 ENCOUNTER — APPOINTMENT (OUTPATIENT)
Dept: GENERAL RADIOLOGY | Facility: HOSPITAL | Age: 73
End: 2018-04-22

## 2018-04-22 ENCOUNTER — HOSPITAL ENCOUNTER (EMERGENCY)
Facility: HOSPITAL | Age: 73
Discharge: HOME | End: 2018-04-22
Attending: EMERGENCY MEDICINE | Admitting: EMERGENCY MEDICINE

## 2018-04-22 ENCOUNTER — APPOINTMENT (OUTPATIENT)
Dept: CT IMAGING | Facility: HOSPITAL | Age: 73
End: 2018-04-22

## 2018-04-22 VITALS
OXYGEN SATURATION: 96 % | SYSTOLIC BLOOD PRESSURE: 141 MMHG | WEIGHT: 190 LBS | HEART RATE: 78 BPM | TEMPERATURE: 98 F | RESPIRATION RATE: 18 BRPM | DIASTOLIC BLOOD PRESSURE: 95 MMHG | HEIGHT: 65 IN | BODY MASS INDEX: 31.65 KG/M2

## 2018-04-22 DIAGNOSIS — Z86.59 HX OF ANXIETY DISORDER: ICD-10-CM

## 2018-04-22 DIAGNOSIS — R00.2 PALPITATIONS: Primary | ICD-10-CM

## 2018-04-22 DIAGNOSIS — Z87.898 HISTORY OF PALPITATIONS: ICD-10-CM

## 2018-04-22 LAB
ALBUMIN SERPL-MCNC: 4.5 G/DL (ref 3.5–5)
ALBUMIN/GLOB SERPL: 1.6 G/DL (ref 1–2)
ALP SERPL-CCNC: 72 U/L (ref 38–126)
ALT SERPL W P-5'-P-CCNC: 24 U/L (ref 13–69)
ANION GAP SERPL CALCULATED.3IONS-SCNC: 17.8 MMOL/L (ref 10–20)
AST SERPL-CCNC: 24 U/L (ref 15–46)
BASOPHILS # BLD AUTO: 0.05 10*3/MM3 (ref 0–0.2)
BASOPHILS NFR BLD AUTO: 0.7 % (ref 0–2.5)
BILIRUB SERPL-MCNC: 0.5 MG/DL (ref 0.2–1.3)
BUN BLD-MCNC: 11 MG/DL (ref 7–20)
BUN/CREAT SERPL: 12.2 (ref 7.1–23.5)
CALCIUM SPEC-SCNC: 10.1 MG/DL (ref 8.4–10.2)
CHLORIDE SERPL-SCNC: 102 MMOL/L (ref 98–107)
CO2 SERPL-SCNC: 27 MMOL/L (ref 26–30)
CREAT BLD-MCNC: 0.9 MG/DL (ref 0.6–1.3)
D-DIMER, QUANTITATIVE (MAD,POW, STR): 1909 NG/ML (FEU) (ref 0–500)
DEPRECATED RDW RBC AUTO: 41.9 FL (ref 37–54)
EOSINOPHIL # BLD AUTO: 0.07 10*3/MM3 (ref 0–0.7)
EOSINOPHIL NFR BLD AUTO: 1 % (ref 0–7)
ERYTHROCYTE [DISTWIDTH] IN BLOOD BY AUTOMATED COUNT: 12.2 % (ref 11.5–14.5)
GFR SERPL CREATININE-BSD FRML MDRD: 62 ML/MIN/1.73
GLOBULIN UR ELPH-MCNC: 2.9 GM/DL
GLUCOSE BLD-MCNC: 200 MG/DL (ref 74–98)
HCT VFR BLD AUTO: 41 % (ref 37–47)
HGB BLD-MCNC: 14.3 G/DL (ref 12–16)
IMM GRANULOCYTES # BLD: 0.01 10*3/MM3 (ref 0–0.06)
IMM GRANULOCYTES NFR BLD: 0.1 % (ref 0–0.6)
LYMPHOCYTES # BLD AUTO: 2.66 10*3/MM3 (ref 0.6–3.4)
LYMPHOCYTES NFR BLD AUTO: 37.3 % (ref 10–50)
MCH RBC QN AUTO: 32.5 PG (ref 27–31)
MCHC RBC AUTO-ENTMCNC: 34.9 G/DL (ref 30–37)
MCV RBC AUTO: 93.2 FL (ref 81–99)
MONOCYTES # BLD AUTO: 0.51 10*3/MM3 (ref 0–0.9)
MONOCYTES NFR BLD AUTO: 7.1 % (ref 0–12)
NEUTROPHILS # BLD AUTO: 3.84 10*3/MM3 (ref 2–6.9)
NEUTROPHILS NFR BLD AUTO: 53.8 % (ref 37–80)
NRBC BLD MANUAL-RTO: 0 /100 WBC (ref 0–0)
PLATELET # BLD AUTO: 331 10*3/MM3 (ref 130–400)
PMV BLD AUTO: 8.8 FL (ref 6–12)
POTASSIUM BLD-SCNC: 3.8 MMOL/L (ref 3.5–5.1)
PROT SERPL-MCNC: 7.4 G/DL (ref 6.3–8.2)
RBC # BLD AUTO: 4.4 10*6/MM3 (ref 4.2–5.4)
SODIUM BLD-SCNC: 143 MMOL/L (ref 137–145)
TROPONIN I SERPL-MCNC: <0.012 NG/ML (ref 0–0.03)
TSH SERPL DL<=0.05 MIU/L-ACNC: 0.48 MIU/ML (ref 0.47–4.68)
WBC NRBC COR # BLD: 7.14 10*3/MM3 (ref 4.8–10.8)

## 2018-04-22 PROCEDURE — 84443 ASSAY THYROID STIM HORMONE: CPT | Performed by: PHYSICIAN ASSISTANT

## 2018-04-22 PROCEDURE — 93005 ELECTROCARDIOGRAM TRACING: CPT | Performed by: EMERGENCY MEDICINE

## 2018-04-22 PROCEDURE — 99283 EMERGENCY DEPT VISIT LOW MDM: CPT

## 2018-04-22 PROCEDURE — 71275 CT ANGIOGRAPHY CHEST: CPT

## 2018-04-22 PROCEDURE — 80053 COMPREHEN METABOLIC PANEL: CPT | Performed by: PHYSICIAN ASSISTANT

## 2018-04-22 PROCEDURE — 84484 ASSAY OF TROPONIN QUANT: CPT | Performed by: PHYSICIAN ASSISTANT

## 2018-04-22 PROCEDURE — 71046 X-RAY EXAM CHEST 2 VIEWS: CPT

## 2018-04-22 PROCEDURE — 85025 COMPLETE CBC W/AUTO DIFF WBC: CPT | Performed by: PHYSICIAN ASSISTANT

## 2018-04-22 PROCEDURE — 25010000002 IOPAMIDOL 61 % SOLUTION: Performed by: EMERGENCY MEDICINE

## 2018-04-22 PROCEDURE — 85379 FIBRIN DEGRADATION QUANT: CPT | Performed by: PHYSICIAN ASSISTANT

## 2018-04-22 RX ORDER — SODIUM CHLORIDE 0.9 % (FLUSH) 0.9 %
10 SYRINGE (ML) INJECTION AS NEEDED
Status: DISCONTINUED | OUTPATIENT
Start: 2018-04-22 | End: 2018-04-22 | Stop reason: HOSPADM

## 2018-04-22 RX ORDER — SODIUM CHLORIDE 9 MG/ML
125 INJECTION, SOLUTION INTRAVENOUS CONTINUOUS
Status: DISCONTINUED | OUTPATIENT
Start: 2018-04-22 | End: 2018-04-22 | Stop reason: HOSPADM

## 2018-04-22 RX ADMIN — IOPAMIDOL 100 ML: 612 INJECTION, SOLUTION INTRAVENOUS at 19:38

## 2018-04-22 NOTE — ED PROVIDER NOTES
Subjective   Patient is here with complaint of intermittent palpitation elevated heart rate earlier today.     this am.. patient states she has had history of the same in the past... denies chest pain shortness of air no fevers chills... No nausea vomiting reported        History provided by:  Patient and friend      Review of Systems   Constitutional: Negative.    HENT: Negative.    Respiratory: Negative.  Negative for chest tightness and shortness of breath.    Cardiovascular: Positive for palpitations. Negative for leg swelling.   Gastrointestinal: Negative.    Genitourinary: Negative.    Musculoskeletal: Negative.    Skin: Negative.    Neurological: Negative.    Psychiatric/Behavioral: The patient is nervous/anxious.    All other systems reviewed and are negative.      Past Medical History:   Diagnosis Date   • Abdominal bloating    • Abnormal EKG    • Anxiety and depression 2004   • Colon cancer screening    • Colon polyp 04/2016   • Disease of thyroid gland    • Dyspnea    • Elbow pain, left    • Fatigue    • Gastritis    • GERD (gastroesophageal reflux disease)    • H/O mammogram    • H/O sinusitis    • H/O: pneumonia    • Heartburn    • Hypertension    • Plantar fasciitis    • Sebaceous cyst    • Sinusitis        No Known Allergies    Past Surgical History:   Procedure Laterality Date   • COLONOSCOPY     • TUBAL ABDOMINAL LIGATION     • UPPER GASTROINTESTINAL ENDOSCOPY  03/2016       Family History   Problem Relation Age of Onset   • Heart attack Other    • Arthritis Other    • Cancer Other    • Diabetes Other    • Hypertension Other    • Stroke Other    • Cancer Mother    • Emphysema Father    • Colon polyps Neg Hx    • Esophageal cancer Neg Hx    • Irritable bowel syndrome Neg Hx    • Liver cancer Neg Hx    • Liver disease Neg Hx    • Stomach cancer Neg Hx    • Colon cancer Neg Hx        Social History     Social History   • Marital status:      Social History Main Topics   • Smoking status: Former  Smoker   • Smokeless tobacco: Never Used   • Alcohol use Yes      Comment: social   • Drug use: No   • Sexual activity: Defer     Other Topics Concern   • Not on file           Objective   Physical Exam   Constitutional: She is oriented to person, place, and time. She appears well-developed and well-nourished. No distress.   Afebrile nontoxic no acute distress heart rate currently 90   HENT:   Head: Normocephalic and atraumatic.   Mouth/Throat: No oropharyngeal exudate.   Eyes: Conjunctivae and EOM are normal. Pupils are equal, round, and reactive to light.   Neck: Normal range of motion.   Cardiovascular: Normal rate, regular rhythm, normal heart sounds and intact distal pulses.    Pulmonary/Chest: Effort normal and breath sounds normal.   Abdominal: Soft. There is no tenderness.   Musculoskeletal: Normal range of motion. She exhibits no edema or deformity.   Neurological: She is alert and oriented to person, place, and time. No cranial nerve deficit. She exhibits normal muscle tone. Coordination normal.   Skin: Skin is warm and dry. Capillary refill takes less than 2 seconds.   Psychiatric: Her behavior is normal. Judgment and thought content normal.   Anxious   Nursing note and vitals reviewed.      Procedures         ED Course  ED Course   Comment By Time   EKG reviewed by Dr. Valdemar Tran PA-C 04/22 1745   Patient case and management reviewed with Dr. Aldrich  Patient resting comfortably no distress Bob Tran PA-C 04/22 1756   Patient resting comfortably no acute distress Bob Tran PA-C 04/22 1852   Patient case labs management reviewed with Dr. Etelvina Tran PA-C 04/22 1946   Patient resting comfortably no distress Bob Tran PA-C 04/22 1949   Patient has been back and forth to the restroom no distress no chest pain shortness of air reported again have had discussed with patient to DC caffeine she does have Xanax at home when she has had this  before was advised to take one when necessary I've advised if she wants to take that she could also recommended she follow-up with cardiology for Holter monitoring she is agreeable with this plan to return here for any sudden changes worsening symptoms otherwise Bob Tran PA-C 04/22 2013                  [facility]    Final diagnoses:   Palpitations   History of palpitations   Hx of anxiety disorder            Bob Tran PA-C  04/22/18 2034

## 2018-04-23 ENCOUNTER — TELEPHONE (OUTPATIENT)
Dept: CARDIOLOGY | Facility: CLINIC | Age: 73
End: 2018-04-23

## 2018-04-23 DIAGNOSIS — R00.2 PALPITATIONS: Primary | ICD-10-CM

## 2018-04-23 NOTE — TELEPHONE ENCOUNTER
Patient was seen in ER for palpitations and advised to f/u with DR Roberts. Appeared they also recommended a monitor will  order and then determine need for follow up following results. Discussed with patient today.

## 2018-04-26 ENCOUNTER — TELEPHONE (OUTPATIENT)
Dept: INTERNAL MEDICINE | Facility: CLINIC | Age: 73
End: 2018-04-26

## 2018-04-26 NOTE — TELEPHONE ENCOUNTER
Pt accidentally took 2 thyroid meds this am, wanted to know how to procede, she is currently wearing a holter for Dr. Roberts for palpitations, is there anything she should worry about re: side effects, when should she resume med

## 2018-04-30 ENCOUNTER — APPOINTMENT (OUTPATIENT)
Dept: MAMMOGRAPHY | Facility: HOSPITAL | Age: 73
End: 2018-04-30
Attending: INTERNAL MEDICINE

## 2018-05-11 DIAGNOSIS — R12 HEARTBURN: ICD-10-CM

## 2018-05-11 RX ORDER — PANTOPRAZOLE SODIUM 40 MG/1
TABLET, DELAYED RELEASE ORAL
Qty: 30 TABLET | Refills: 0 | OUTPATIENT
Start: 2018-05-11

## 2018-05-18 ENCOUNTER — HOSPITAL ENCOUNTER (OUTPATIENT)
Dept: MAMMOGRAPHY | Facility: HOSPITAL | Age: 73
Discharge: HOME | End: 2018-05-18
Attending: INTERNAL MEDICINE | Admitting: INTERNAL MEDICINE

## 2018-05-18 DIAGNOSIS — Z12.39 BREAST SCREENING: ICD-10-CM

## 2018-05-18 PROCEDURE — 77067 SCR MAMMO BI INCL CAD: CPT | Performed by: RADIOLOGY

## 2018-05-18 PROCEDURE — 77063 BREAST TOMOSYNTHESIS BI: CPT | Performed by: RADIOLOGY

## 2018-05-18 PROCEDURE — 77063 BREAST TOMOSYNTHESIS BI: CPT

## 2018-05-18 PROCEDURE — 77067 SCR MAMMO BI INCL CAD: CPT

## 2018-05-22 DIAGNOSIS — I47.1 PAROXYSMAL SVT (SUPRAVENTRICULAR TACHYCARDIA) (HCC): Primary | ICD-10-CM

## 2018-05-22 RX ORDER — LEVOTHYROXINE SODIUM 88 UG/1
TABLET ORAL
Qty: 30 TABLET | Refills: 5 | Status: SHIPPED | OUTPATIENT
Start: 2018-05-22 | End: 2018-11-06 | Stop reason: SDUPTHER

## 2018-05-23 ENCOUNTER — TELEPHONE (OUTPATIENT)
Dept: CARDIOLOGY | Facility: CLINIC | Age: 73
End: 2018-05-23

## 2018-05-23 NOTE — TELEPHONE ENCOUNTER
Called patient and let her know that Dr Roberts reviewed her monitor results and she did have some beats of SVT and he recommends continuing her metoprolol and she can add OTC magnesium. Patient stated that some of this could be anxiety related and Dr Dalal ordered anxiety meds but she hadn't tried yet but will. Advised patient if the anxiety meds do not help and she has more episodes of heart racing to please call and we will get her set up to see Dr Roberts earlier. Patient verbalized understanding.

## 2018-06-12 RX ORDER — METOPROLOL SUCCINATE 50 MG/1
TABLET, EXTENDED RELEASE ORAL
Qty: 30 TABLET | Refills: 2 | Status: SHIPPED | OUTPATIENT
Start: 2018-06-12 | End: 2018-09-10

## 2018-08-08 RX ORDER — MIRTAZAPINE 45 MG/1
TABLET, FILM COATED ORAL
Qty: 30 TABLET | Refills: 1 | Status: SHIPPED | OUTPATIENT
Start: 2018-08-08 | End: 2018-10-06 | Stop reason: SDUPTHER

## 2018-08-28 ENCOUNTER — OFFICE VISIT (OUTPATIENT)
Dept: INTERNAL MEDICINE | Facility: CLINIC | Age: 73
End: 2018-08-28

## 2018-08-28 VITALS
TEMPERATURE: 98.6 F | RESPIRATION RATE: 14 BRPM | WEIGHT: 204 LBS | DIASTOLIC BLOOD PRESSURE: 90 MMHG | HEIGHT: 65 IN | OXYGEN SATURATION: 96 % | SYSTOLIC BLOOD PRESSURE: 152 MMHG | HEART RATE: 68 BPM | BODY MASS INDEX: 33.99 KG/M2

## 2018-08-28 DIAGNOSIS — R73.9 HYPERGLYCEMIA: ICD-10-CM

## 2018-08-28 DIAGNOSIS — E78.5 HYPERLIPIDEMIA, UNSPECIFIED HYPERLIPIDEMIA TYPE: ICD-10-CM

## 2018-08-28 DIAGNOSIS — I70.90 ARTERIOSCLEROTIC VASCULAR DISEASE: ICD-10-CM

## 2018-08-28 DIAGNOSIS — E03.8 OTHER SPECIFIED HYPOTHYROIDISM: ICD-10-CM

## 2018-08-28 DIAGNOSIS — E55.9 VITAMIN D DEFICIENCY: ICD-10-CM

## 2018-08-28 DIAGNOSIS — M17.9 OSTEOARTHRITIS OF KNEE, UNSPECIFIED LATERALITY, UNSPECIFIED OSTEOARTHRITIS TYPE: ICD-10-CM

## 2018-08-28 DIAGNOSIS — F41.8 DEPRESSION WITH ANXIETY: ICD-10-CM

## 2018-08-28 DIAGNOSIS — I10 ESSENTIAL HYPERTENSION: Primary | ICD-10-CM

## 2018-08-28 PROCEDURE — 99214 OFFICE O/P EST MOD 30 MIN: CPT | Performed by: INTERNAL MEDICINE

## 2018-08-28 RX ORDER — CARVEDILOL 12.5 MG/1
12.5 TABLET ORAL 2 TIMES DAILY WITH MEALS
Qty: 60 TABLET | Refills: 1 | Status: SHIPPED | OUTPATIENT
Start: 2018-08-28 | End: 2018-10-25 | Stop reason: SDUPTHER

## 2018-08-28 NOTE — PROGRESS NOTES
Subjective   Deya Hernandez is a 73 y.o. female.     Chief Complaint   Patient presents with   • Hypertension   • Fatigue       History of Present Illness   Patient here for follow-up.  Blood pressure elevated.  Patient also has a lot of stress in life.  Depression anxiety stable.  Hyperlipidemia stable on medication.  Gastritis is stable medication.    Current Outpatient Prescriptions:   •  ALPRAZolam (XANAX) 0.5 MG tablet, Take 1 tablet by mouth 2 (Two) Times a Day As Needed for anxiety., Disp: 30 tablet, Rfl: 0  •  amLODIPine (NORVASC) 2.5 MG tablet, take 1 tablet by mouth once daily, Disp: 60 tablet, Rfl: 11  •  Biotin 5 MG capsule, Take  by mouth., Disp: , Rfl:   •  Cholecalciferol (VITAMIN D3) 1000 UNITS capsule, Take  by mouth. 3 DAILY, Disp: , Rfl:   •  clotrimazole-betamethasone (LOTRISONE) 1-0.05 % cream, Apply  topically Every 12 (Twelve) Hours., Disp: 45 g, Rfl: 0  •  HYALGAN 20 MG/2ML solution prefilled syringe, , Disp: , Rfl:   •  levothyroxine (SYNTHROID, LEVOTHROID) 88 MCG tablet, take 1 tablet by mouth once daily, Disp: 30 tablet, Rfl: 5  •  metoprolol succinate XL (TOPROL-XL) 50 MG 24 hr tablet, take 1 tablet by mouth once daily, Disp: 30 tablet, Rfl: 2  •  mirtazapine (REMERON) 45 MG tablet, take 1 tablet by mouth at bedtime, Disp: 30 tablet, Rfl: 1  •  nitrofurantoin, macrocrystal-monohydrate, (MACROBID) 100 MG capsule, Take 1 capsule by mouth 2 (Two) Times a Day., Disp: 14 capsule, Rfl: 0  •  pantoprazole (PROTONIX) 40 MG EC tablet, take 1 tablet by mouth every morning 30 MINUTES BEFORE BREAKFAST as directed, Disp: 30 tablet, Rfl: 0  •  simvastatin (ZOCOR) 40 MG tablet, Take 1 tablet by mouth every night at bedtime., Disp: 30 tablet, Rfl: 11  •  trandolapril (MAVIK) 4 MG tablet, take 1 tablet by mouth once daily, Disp: 30 tablet, Rfl: 11  •  vitamin B-12 (CYANOCOBALAMIN) 1000 MCG tablet, Take 1 tablet by mouth daily. As directed, Disp: , Rfl:     Current Facility-Administered Medications:   •   sodium hyaluronate (SUPARTZ) injection 25 mg, 25 mg, Intra-articular, Once, Arturo Geronimo PA-C    The following portions of the patient's history were reviewed and updated as appropriate: allergies, current medications, past family history, past medical history, past social history, past surgical history and problem list.    Review of Systems   Constitutional: Negative.    Respiratory: Negative.    Cardiovascular: Negative.    Gastrointestinal: Negative.    Musculoskeletal: Negative.    Skin: Negative.    Neurological: Negative.    Psychiatric/Behavioral: Negative.        Objective   Physical Exam   Constitutional: She is oriented to person, place, and time. She appears well-nourished.   Neck: Neck supple.   Cardiovascular: Normal rate, regular rhythm and normal heart sounds.    Pulmonary/Chest: Effort normal and breath sounds normal.   Abdominal: Bowel sounds are normal.   Neurological: She is alert and oriented to person, place, and time.   Skin: Skin is warm.   Psychiatric: She has a normal mood and affect.       All tests have been reviewed.    Assessment/Plan   There are no diagnoses linked to this encounter.          Nonintractable headache, resolved CT Head Without Contrast; normal     Blurred vision, bilateral resolved , CT Head Without Contrast; normal   Gastritis, omeprazole continue medicine s/p EGD  Probable osteoarthritis of the knee, follow ortho  Overweight continue good diet   Vitamin D low normal continue vitamin D3 1000 units daily,   pneumovax done zostavax done, flu done, prevnar today, check rec--  Hypothyroidism continue medication  Hyperlipidemia unable to afford crestor, lipitor muscle pain, on zocor 40,  Hypertension continue meds high now. Change toprol to coreg  Depression anxiety stable on meds. continue mirtazapine 45   pap done, mamm done  dexa normal  colon 4/7/16  Calcium high repeat next  Glucose 100 diet  Erythrasma continue clotrimazole  Follow-up in 3 weeks after labs

## 2018-09-10 ENCOUNTER — OFFICE VISIT (OUTPATIENT)
Dept: CARDIOLOGY | Facility: CLINIC | Age: 73
End: 2018-09-10

## 2018-09-10 VITALS
DIASTOLIC BLOOD PRESSURE: 72 MMHG | HEIGHT: 65 IN | SYSTOLIC BLOOD PRESSURE: 126 MMHG | WEIGHT: 200 LBS | HEART RATE: 76 BPM | BODY MASS INDEX: 33.32 KG/M2

## 2018-09-10 DIAGNOSIS — R53.82 CHRONIC FATIGUE: ICD-10-CM

## 2018-09-10 DIAGNOSIS — E78.2 MIXED HYPERLIPIDEMIA: ICD-10-CM

## 2018-09-10 DIAGNOSIS — I10 ESSENTIAL HYPERTENSION: Primary | ICD-10-CM

## 2018-09-10 DIAGNOSIS — R00.2 PALPITATIONS: ICD-10-CM

## 2018-09-10 PROCEDURE — 93000 ELECTROCARDIOGRAM COMPLETE: CPT | Performed by: INTERNAL MEDICINE

## 2018-09-10 PROCEDURE — 99214 OFFICE O/P EST MOD 30 MIN: CPT | Performed by: INTERNAL MEDICINE

## 2018-09-10 RX ORDER — METOPROLOL SUCCINATE 50 MG/1
TABLET, EXTENDED RELEASE ORAL
Qty: 30 TABLET | Refills: 2 | Status: SHIPPED | OUTPATIENT
Start: 2018-09-10 | End: 2018-09-13

## 2018-09-10 NOTE — PROGRESS NOTES
[facility]  Office Progress Note  Deya Hernandez  1945  [phone]      Visit Date: 9/10/2018     PCP: Santi Dalal MD  [address] [place] [id number]    IDENTIFICATION: A 73 y.o. female     Chief Complaint   Patient presents with   • Hypertension   • Fatigue   • Depression       PROBLEM LIST:   1. 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 4/16 EKG stress abnormal due to withdrawal of beta-blockade with tachycardia and hypertension  2. 2/15/17 Marie scan Cardiolite WNL  2. Hypertension presumed essential  3. Dyslipidemia   1. on statin therapy  2. 4/18 227/196/66/122  4. Palpitations/SVT  1. ED visit 5/18. W/u benign. Holter recommended.  2. Holter 5/18 nonsustained SVT  5. Reformed nicotine addiction 2016  6. Exogenous obesity  7. Hypothyroidism    Allergies  No Known Allergies    Current Medications    Current Outpatient Prescriptions:   •  ALPRAZolam (XANAX) 0.5 MG tablet, Take 1 tablet by mouth 2 (Two) Times a Day As Needed for anxiety., Disp: 30 tablet, Rfl: 0  •  amLODIPine (NORVASC) 2.5 MG tablet, take 1 tablet by mouth once daily, Disp: 60 tablet, Rfl: 11  •  Biotin 5 MG capsule, Take  by mouth Daily., Disp: , Rfl:   •  carvedilol (COREG) 12.5 MG tablet, Take 1 tablet by mouth 2 (Two) Times a Day With Meals., Disp: 60 tablet, Rfl: 1  •  Cholecalciferol (VITAMIN D3) 1000 UNITS capsule, Take  by mouth. 3 DAILY, Disp: , Rfl:   •  clotrimazole-betamethasone (LOTRISONE) 1-0.05 % cream, Apply  topically Every 12 (Twelve) Hours., Disp: 45 g, Rfl: 0  •  HYALGAN 20 MG/2ML solution prefilled syringe, , Disp: , Rfl:   •  levothyroxine (SYNTHROID, LEVOTHROID) 88 MCG tablet, take 1 tablet by mouth once daily, Disp: 30 tablet, Rfl: 5  •  mirtazapine (REMERON) 45 MG tablet, take 1 tablet by mouth at bedtime, Disp: 30 tablet, Rfl: 1  •  simvastatin (ZOCOR) 40 MG tablet, Take 1 tablet by mouth every night at bedtime., Disp: 30 tablet, Rfl: 11  •  trandolapril (MAVIK) 4 MG tablet,  "take 1 tablet by mouth once daily, Disp: 30 tablet, Rfl: 11  •  Turmeric, Curcuma Longa, powder, Daily., Disp: , Rfl:   •  vitamin B-12 (CYANOCOBALAMIN) 1000 MCG tablet, Take 1 tablet by mouth daily. As directed, Disp: , Rfl:     Current Facility-Administered Medications:   •  sodium hyaluronate (SUPARTZ) injection 25 mg, 25 mg, Intra-articular, Once, Arturo Geronimo PA-C      History of Present Illness   Pt here for follow up. Reports her BP was not optimally controlled, but was recently switched from metoprolol to carvedilol with acceptable readings.  Reports feeling very fatigued. Likes she needs \"4 batteries to operate but is only running on 2\". Reports a history of depression. Has an upcomming appointment to discuss this w Dr. Dalal. Reports not exercising much.  Pt denies any  orthopnea, PND, lower extremity edema, or claudication.    ROS:  All systems have been reviewed and are negative with the exception of those mentioned in the HPI.    OBJECTIVE:  Vitals:    09/10/18 0914   BP: 126/72   BP Location: Right arm   Patient Position: Sitting   Pulse: 76   Weight: 90.7 kg (200 lb)   Height: 165.1 cm (65\")     Physical Exam   Constitutional: She is oriented to person, place, and time. She appears well-developed and well-nourished. No distress.   obese   Cardiovascular: Normal rate, regular rhythm, normal heart sounds and intact distal pulses.  Frequent extrasystoles are present.   No murmur heard.  Pulses:       Radial pulses are 2+ on the right side, and 2+ on the left side.        Dorsalis pedis pulses are 2+ on the right side, and 2+ on the left side.        Posterior tibial pulses are 2+ on the right side, and 2+ on the left side.   Pulmonary/Chest: Effort normal and breath sounds normal. She has no wheezes. She has no rales.   Abdominal: Soft. Bowel sounds are normal. There is no tenderness. There is no guarding.   Musculoskeletal: She exhibits no edema or tenderness.   Neurological: She is alert and " oriented to person, place, and time.   Skin: Skin is warm and dry. No rash noted.   Psychiatric: She has a normal mood and affect.   Nursing note and vitals reviewed.      Diagnostic Data:    ECG 12 Lead  Date/Time: 9/10/2018 9:44 AM  Performed by: FAMILIA ROBERTS  Authorized by: FAMILIA ROBERTS   Rhythm: sinus rhythm  BPM: 64  Clinical impression: non-specific ECG  Comments: QRS changes same as previous.              ASSESSMENT:   Diagnosis Plan   1. Essential hypertension     2. Mixed hyperlipidemia     3. Palpitations  ECG 12 Lead   4. Chronic fatigue  ECG 12 Lead       PLAN:  1. Well controlled today, continue antihypertensives per PCP  2. Labs from PCP, continue statin therapy with simvastatin  3. Patient encouraged to get back into an exercise routine.  Counseled that diet modifications are more effective in weight loss and exercise.  Patient counseled to avoid starchy foods such as bread, pasta, potatoes, sweets. PCP has order for labs including TSH, vit D, CBC.  4. EKG today completed due to ectopy heard on exam, was unchanged from previous.    Santi Dalal MD, thank you for referring Ms. Hernandez for evaluation.  I have forwarded my electronically generated recommendations to you for review.  Please do not hesitate to call with any questions.    RTC 1 year, sooner if needd    Scribed for Familia Roberts MD by Akosua Proctor PA-C. 9/10/2018  9:54 AM  I, Familia Roberts MD, personally performed the services described in this documentation as scribed by the above named individual in my presence, and it is both accurate and complete.  9/10/2018  10:03 AM    Familia Roberts MD, FACC

## 2018-09-13 ENCOUNTER — OFFICE VISIT (OUTPATIENT)
Dept: INTERNAL MEDICINE | Facility: CLINIC | Age: 73
End: 2018-09-13

## 2018-09-13 VITALS
HEIGHT: 65 IN | OXYGEN SATURATION: 96 % | HEART RATE: 74 BPM | DIASTOLIC BLOOD PRESSURE: 98 MMHG | SYSTOLIC BLOOD PRESSURE: 148 MMHG

## 2018-09-13 DIAGNOSIS — R06.02 SOB (SHORTNESS OF BREATH) ON EXERTION: ICD-10-CM

## 2018-09-13 DIAGNOSIS — R53.83 OTHER FATIGUE: ICD-10-CM

## 2018-09-13 DIAGNOSIS — I10 ESSENTIAL HYPERTENSION: Primary | ICD-10-CM

## 2018-09-13 DIAGNOSIS — F41.8 DEPRESSION WITH ANXIETY: ICD-10-CM

## 2018-09-13 PROCEDURE — 99214 OFFICE O/P EST MOD 30 MIN: CPT | Performed by: INTERNAL MEDICINE

## 2018-09-13 PROCEDURE — 93000 ELECTROCARDIOGRAM COMPLETE: CPT | Performed by: INTERNAL MEDICINE

## 2018-09-13 RX ORDER — AMLODIPINE BESYLATE 5 MG/1
TABLET ORAL
Qty: 30 TABLET | Refills: 1 | Status: SHIPPED | OUTPATIENT
Start: 2018-09-13 | End: 2018-12-13 | Stop reason: SDUPTHER

## 2018-09-13 RX ORDER — BUPROPION HYDROCHLORIDE 150 MG/1
150 TABLET ORAL DAILY
Qty: 30 TABLET | Refills: 1 | OUTPATIENT
Start: 2018-09-13 | End: 2018-12-19 | Stop reason: SDUPTHER

## 2018-09-13 NOTE — PROGRESS NOTES
Subjective   Deya Hernandez is a 73 y.o. female.     Chief Complaint   Patient presents with   • Illness   • Fatigue       History of Present Illness   After shower this am patient c/o fatigue, started exe program this week. No chest pain and mild soa , no dizzy no palpitation. Sleep well. No breakfast today. Mild congestion and running nose for 30' . This has been there for years. Mild loose stool. No fever chills. No worsening of depression or anxiety. BP elevated this am no HA.     Current Outpatient Prescriptions:   •  ALPRAZolam (XANAX) 0.5 MG tablet, Take 1 tablet by mouth 2 (Two) Times a Day As Needed for anxiety., Disp: 30 tablet, Rfl: 0  •  amLODIPine (NORVASC) 2.5 MG tablet, take 1 tablet by mouth once daily, Disp: 60 tablet, Rfl: 11  •  Biotin 5 MG capsule, Take  by mouth Daily., Disp: , Rfl:   •  carvedilol (COREG) 12.5 MG tablet, Take 1 tablet by mouth 2 (Two) Times a Day With Meals., Disp: 60 tablet, Rfl: 1  •  Cholecalciferol (VITAMIN D3) 1000 UNITS capsule, Take  by mouth. 3 DAILY, Disp: , Rfl:   •  clotrimazole-betamethasone (LOTRISONE) 1-0.05 % cream, Apply  topically Every 12 (Twelve) Hours., Disp: 45 g, Rfl: 0  •  HYALGAN 20 MG/2ML solution prefilled syringe, , Disp: , Rfl:   •  levothyroxine (SYNTHROID, LEVOTHROID) 88 MCG tablet, take 1 tablet by mouth once daily, Disp: 30 tablet, Rfl: 5  •  metoprolol succinate XL (TOPROL-XL) 50 MG 24 hr tablet, take 1 tablet by mouth once daily, Disp: 30 tablet, Rfl: 2  •  mirtazapine (REMERON) 45 MG tablet, take 1 tablet by mouth at bedtime, Disp: 30 tablet, Rfl: 1  •  simvastatin (ZOCOR) 40 MG tablet, Take 1 tablet by mouth every night at bedtime., Disp: 30 tablet, Rfl: 11  •  trandolapril (MAVIK) 4 MG tablet, take 1 tablet by mouth once daily, Disp: 30 tablet, Rfl: 11  •  Turmeric, Curcuma Longa, powder, Daily., Disp: , Rfl:   •  vitamin B-12 (CYANOCOBALAMIN) 1000 MCG tablet, Take 1 tablet by mouth daily. As directed, Disp: , Rfl:     Current  Facility-Administered Medications:   •  sodium hyaluronate (SUPARTZ) injection 25 mg, 25 mg, Intra-articular, Once, Arturo Geronimo PA-C    The following portions of the patient's history were reviewed and updated as appropriate: allergies, current medications, past family history, past medical history, past social history, past surgical history and problem list.    Review of Systems   Constitutional: Positive for fatigue.   Respiratory: Negative.    Cardiovascular: Negative.    Gastrointestinal: Negative.    Musculoskeletal: Negative.    Skin: Negative.    Neurological: Negative.    Psychiatric/Behavioral: The patient is nervous/anxious.        Objective   Physical Exam   Constitutional: She is oriented to person, place, and time. She appears well-developed and well-nourished.   Neck: Neck supple.   Cardiovascular: Normal rate, regular rhythm and normal heart sounds.    Pulmonary/Chest: Effort normal and breath sounds normal.   Abdominal: Bowel sounds are normal.   Neurological: She is alert and oriented to person, place, and time.   Skin: Skin is warm.   Psychiatric:   melancholy       All tests have been reviewed.    Assessment/Plan   Diagnoses and all orders for this visit:    Essential hypertension increase amlodipine to 5mg daily.     soa mild, do EKG     Other fatigue do labs, ?related increase in dose of coreg.    Depression with anxiety add bupropion    As scheduled        ECG 12 Lead  Date/Time: 9/13/2018 2:24 PM  Performed by: MARIS WALDRON  Authorized by: MARIS WALDRON   Comparison: not compared with previous ECG   Rhythm: sinus rhythm  Rate: normal  Conduction: conduction normal  ST Segments: ST segments normal  T Waves: T waves normal  QRS axis: normal  Other: no other findings

## 2018-09-17 DIAGNOSIS — I10 ESSENTIAL HYPERTENSION: Primary | ICD-10-CM

## 2018-09-17 LAB
25(OH)D3+25(OH)D2 SERPL-MCNC: 68.5 NG/ML
ALBUMIN SERPL-MCNC: 4.2 G/DL (ref 3.5–5)
ALBUMIN/GLOB SERPL: 1.8 G/DL (ref 1–2)
ALP SERPL-CCNC: 53 U/L (ref 38–126)
ALT SERPL-CCNC: 33 U/L (ref 13–69)
AST SERPL-CCNC: 29 U/L (ref 15–46)
BILIRUB SERPL-MCNC: 0.6 MG/DL (ref 0.2–1.3)
BUN SERPL-MCNC: 6 MG/DL (ref 7–20)
BUN/CREAT SERPL: 7.5 (ref 7.1–23.5)
CALCIUM SERPL-MCNC: 9.9 MG/DL (ref 8.4–10.2)
CHLORIDE SERPL-SCNC: 102 MMOL/L (ref 98–107)
CHOLEST SERPL-MCNC: 166 MG/DL (ref 0–199)
CK SERPL-CCNC: 163 U/L (ref 30–170)
CO2 SERPL-SCNC: 28 MMOL/L (ref 26–30)
CREAT SERPL-MCNC: 0.8 MG/DL (ref 0.6–1.3)
GLOBULIN SER CALC-MCNC: 2.3 GM/DL
GLUCOSE SERPL-MCNC: 95 MG/DL (ref 74–98)
HBA1C MFR BLD: 5.8 %
HDLC SERPL-MCNC: 71 MG/DL (ref 40–60)
LDLC SERPL CALC-MCNC: 64 MG/DL (ref 0–99)
POTASSIUM SERPL-SCNC: 4.7 MMOL/L (ref 3.5–5.1)
PROT SERPL-MCNC: 6.5 G/DL (ref 6.3–8.2)
SODIUM SERPL-SCNC: 138 MMOL/L (ref 137–145)
TRIGL SERPL-MCNC: 157 MG/DL
TSH SERPL DL<=0.005 MIU/L-ACNC: 1.77 MIU/ML (ref 0.47–4.68)
VLDLC SERPL CALC-MCNC: 31.4 MG/DL

## 2018-09-18 ENCOUNTER — OFFICE VISIT (OUTPATIENT)
Dept: INTERNAL MEDICINE | Facility: CLINIC | Age: 73
End: 2018-09-18

## 2018-09-18 VITALS
SYSTOLIC BLOOD PRESSURE: 110 MMHG | DIASTOLIC BLOOD PRESSURE: 70 MMHG | HEIGHT: 65 IN | WEIGHT: 199 LBS | HEART RATE: 84 BPM | OXYGEN SATURATION: 96 % | BODY MASS INDEX: 33.15 KG/M2

## 2018-09-18 DIAGNOSIS — R51.9 NONINTRACTABLE HEADACHE, UNSPECIFIED CHRONICITY PATTERN, UNSPECIFIED HEADACHE TYPE: ICD-10-CM

## 2018-09-18 DIAGNOSIS — I10 ESSENTIAL HYPERTENSION: Primary | ICD-10-CM

## 2018-09-18 DIAGNOSIS — E03.8 OTHER SPECIFIED HYPOTHYROIDISM: ICD-10-CM

## 2018-09-18 DIAGNOSIS — R53.83 OTHER FATIGUE: ICD-10-CM

## 2018-09-18 DIAGNOSIS — E78.2 MIXED HYPERLIPIDEMIA: ICD-10-CM

## 2018-09-18 DIAGNOSIS — F41.8 DEPRESSION WITH ANXIETY: ICD-10-CM

## 2018-09-18 PROCEDURE — 99214 OFFICE O/P EST MOD 30 MIN: CPT | Performed by: INTERNAL MEDICINE

## 2018-09-18 PROCEDURE — G0008 ADMIN INFLUENZA VIRUS VAC: HCPCS | Performed by: INTERNAL MEDICINE

## 2018-09-18 PROCEDURE — 90662 IIV NO PRSV INCREASED AG IM: CPT | Performed by: INTERNAL MEDICINE

## 2018-09-18 NOTE — PROGRESS NOTES
Subjective   Deya Hernandez is a 73 y.o. female.     Chief Complaint   Patient presents with   • Follow-up       History of Present Illness   Patient here for follow-up.  The blood pressure normalized after increase dose of 4 amlodipine.  Fatigue also improved after bupropion and a headache a resolved.  Depression anxiety stable on medication.  Weight is still elevated.  The hypothyroidism stable medication.  Hyperlipidemia stable medication.  Patient requests to be seen by endocrinologist for fluctuation of hypertension.    Current Outpatient Prescriptions:   •  ALPRAZolam (XANAX) 0.5 MG tablet, Take 1 tablet by mouth 2 (Two) Times a Day As Needed for anxiety., Disp: 30 tablet, Rfl: 0  •  amLODIPine (NORVASC) 5 MG tablet, 1 daily po, Disp: 30 tablet, Rfl: 1  •  Biotin 5 MG capsule, Take  by mouth Daily., Disp: , Rfl:   •  buPROPion XL (WELLBUTRIN XL) 150 MG 24 hr tablet, Take 1 tablet by mouth Daily., Disp: 30 tablet, Rfl: 1  •  carvedilol (COREG) 12.5 MG tablet, Take 1 tablet by mouth 2 (Two) Times a Day With Meals., Disp: 60 tablet, Rfl: 1  •  Cholecalciferol (VITAMIN D3) 1000 UNITS capsule, Take  by mouth. 3 DAILY, Disp: , Rfl:   •  clotrimazole-betamethasone (LOTRISONE) 1-0.05 % cream, Apply  topically Every 12 (Twelve) Hours., Disp: 45 g, Rfl: 0  •  HYALGAN 20 MG/2ML solution prefilled syringe, , Disp: , Rfl:   •  levothyroxine (SYNTHROID, LEVOTHROID) 88 MCG tablet, take 1 tablet by mouth once daily, Disp: 30 tablet, Rfl: 5  •  mirtazapine (REMERON) 45 MG tablet, take 1 tablet by mouth at bedtime, Disp: 30 tablet, Rfl: 1  •  simvastatin (ZOCOR) 40 MG tablet, Take 1 tablet by mouth every night at bedtime., Disp: 30 tablet, Rfl: 11  •  trandolapril (MAVIK) 4 MG tablet, take 1 tablet by mouth once daily, Disp: 30 tablet, Rfl: 11  •  Turmeric, Curcuma Longa, powder, Daily., Disp: , Rfl:   •  vitamin B-12 (CYANOCOBALAMIN) 1000 MCG tablet, Take 1 tablet by mouth daily. As directed, Disp: , Rfl:     Current  Facility-Administered Medications:   •  sodium hyaluronate (SUPARTZ) injection 25 mg, 25 mg, Intra-articular, Once, Arturo Geronimo PA-C    The following portions of the patient's history were reviewed and updated as appropriate: allergies, current medications, past family history, past medical history, past social history, past surgical history and problem list.    Review of Systems   Constitutional: Negative.    Respiratory: Negative.    Cardiovascular: Negative.    Gastrointestinal: Negative.    Musculoskeletal: Negative.    Skin: Negative.    Neurological: Negative.    Psychiatric/Behavioral: Negative.        Objective   Physical Exam   Constitutional: She is oriented to person, place, and time. She appears well-nourished.   Neck: Neck supple.   Cardiovascular: Normal rate, regular rhythm and normal heart sounds.    Pulmonary/Chest: Effort normal and breath sounds normal.   Abdominal: Bowel sounds are normal.   Neurological: She is alert and oriented to person, place, and time.   Skin: Skin is warm.   Psychiatric: She has a normal mood and affect.       All tests have been reviewed.    Assessment/Plan   There are no diagnoses linked to this encounter.          Essential hypertensioncontinue amlodipine 5mg daily. Patient believes there is endocrinological reason for her blood pressure to be fluctuated.  I told patient the fluctuations due to stress mood disorder.  Patient prefers to see endocrinologist to clarify. Referral made     soa mild, no more   Other fatigue normal lab, improved after bupropion     Depression with anxiety continue bupropion   Nonintractable headache, resolved CT Head Without Contrast; normal  Gastritis, omeprazole continue medicine s/p EGD  Probable osteoarthritis of the knee, follow ortho  Overweight continue good diet   Vitamin D low normal continue vitamin D3 1000 units daily,   pneumovax done zostavax done, flu done, prevnar today, check rec--  Hypothyroidism continue  medication  Hyperlipidemia unable to afford crestor, lipitor muscle pain, on zocor 40,  pap done, mamm done  dexa normal  colon 4/7/16  Glucose 100 diet  Erythrasma continue clotrimazole  Follow-up in 3 mo

## 2018-10-08 RX ORDER — MIRTAZAPINE 45 MG/1
TABLET, FILM COATED ORAL
Qty: 30 TABLET | Refills: 1 | Status: SHIPPED | OUTPATIENT
Start: 2018-10-08 | End: 2018-12-13 | Stop reason: SDUPTHER

## 2018-10-25 RX ORDER — CARVEDILOL 12.5 MG/1
TABLET ORAL
Qty: 60 TABLET | Refills: 1 | Status: SHIPPED | OUTPATIENT
Start: 2018-10-25 | End: 2018-11-26 | Stop reason: SDUPTHER

## 2018-11-06 RX ORDER — LEVOTHYROXINE SODIUM 88 UG/1
TABLET ORAL
Qty: 30 TABLET | Refills: 5 | Status: SHIPPED | OUTPATIENT
Start: 2018-11-06 | End: 2019-03-11 | Stop reason: SDUPTHER

## 2018-11-27 RX ORDER — CARVEDILOL 12.5 MG/1
TABLET ORAL
Qty: 60 TABLET | Refills: 1 | Status: SHIPPED | OUTPATIENT
Start: 2018-11-27 | End: 2019-01-08 | Stop reason: SDUPTHER

## 2018-12-13 RX ORDER — MIRTAZAPINE 45 MG/1
TABLET, FILM COATED ORAL
Qty: 30 TABLET | Refills: 1 | Status: SHIPPED | OUTPATIENT
Start: 2018-12-13 | End: 2019-03-10 | Stop reason: SDUPTHER

## 2018-12-13 RX ORDER — AMLODIPINE BESYLATE 5 MG/1
TABLET ORAL
Qty: 30 TABLET | Refills: 1 | Status: SHIPPED | OUTPATIENT
Start: 2018-12-13 | End: 2019-03-10 | Stop reason: SDUPTHER

## 2018-12-19 ENCOUNTER — OFFICE VISIT (OUTPATIENT)
Dept: INTERNAL MEDICINE | Facility: CLINIC | Age: 73
End: 2018-12-19

## 2018-12-19 VITALS
HEIGHT: 65 IN | SYSTOLIC BLOOD PRESSURE: 110 MMHG | BODY MASS INDEX: 33.12 KG/M2 | HEART RATE: 74 BPM | DIASTOLIC BLOOD PRESSURE: 60 MMHG | OXYGEN SATURATION: 99 %

## 2018-12-19 DIAGNOSIS — E03.8 OTHER SPECIFIED HYPOTHYROIDISM: ICD-10-CM

## 2018-12-19 DIAGNOSIS — M17.9 OSTEOARTHRITIS OF KNEE, UNSPECIFIED LATERALITY, UNSPECIFIED OSTEOARTHRITIS TYPE: ICD-10-CM

## 2018-12-19 DIAGNOSIS — I70.90 ARTERIOSCLEROTIC VASCULAR DISEASE: ICD-10-CM

## 2018-12-19 DIAGNOSIS — E78.2 MIXED HYPERLIPIDEMIA: ICD-10-CM

## 2018-12-19 DIAGNOSIS — I10 ESSENTIAL HYPERTENSION: Primary | ICD-10-CM

## 2018-12-19 DIAGNOSIS — E53.8 B12 DEFICIENCY: ICD-10-CM

## 2018-12-19 DIAGNOSIS — R51.9 NONINTRACTABLE HEADACHE, UNSPECIFIED CHRONICITY PATTERN, UNSPECIFIED HEADACHE TYPE: ICD-10-CM

## 2018-12-19 DIAGNOSIS — F41.8 DEPRESSION WITH ANXIETY: ICD-10-CM

## 2018-12-19 DIAGNOSIS — R53.83 OTHER FATIGUE: ICD-10-CM

## 2018-12-19 PROCEDURE — 99214 OFFICE O/P EST MOD 30 MIN: CPT | Performed by: INTERNAL MEDICINE

## 2018-12-19 RX ORDER — METOPROLOL SUCCINATE 50 MG/1
TABLET, EXTENDED RELEASE ORAL
Refills: 0 | COMMUNITY
Start: 2018-12-13 | End: 2019-06-06

## 2018-12-19 RX ORDER — AMLODIPINE BESYLATE 2.5 MG/1
TABLET ORAL
Refills: 1 | COMMUNITY
Start: 2018-11-03 | End: 2018-12-19

## 2018-12-19 RX ORDER — BUPROPION HYDROCHLORIDE 300 MG/1
300 TABLET ORAL DAILY
Qty: 30 TABLET | Refills: 6 | OUTPATIENT
Start: 2018-12-19 | End: 2019-01-22 | Stop reason: SDUPTHER

## 2018-12-19 NOTE — PROGRESS NOTES
Subjective   Deya Hernadnez is a 73 y.o. female.     Chief Complaint   Patient presents with   • Follow-up     3 month follow up        History of Present Illness   Hypertension stable medication.  Fatigue improved after medication depression anxiety improved after increasing Wellbutrin to 300 mg daily.  Weight loss 10 pounds on purpose.  Rash improved on medication.  Hypothyroidism stable medication.    Current Outpatient Medications:   •  ALPRAZolam (XANAX) 0.5 MG tablet, Take 1 tablet by mouth 2 (Two) Times a Day As Needed for anxiety., Disp: 30 tablet, Rfl: 0  •  amLODIPine (NORVASC) 2.5 MG tablet, , Disp: , Rfl: 1  •  amLODIPine (NORVASC) 5 MG tablet, take 1 tablet by mouth once daily, Disp: 30 tablet, Rfl: 1  •  Biotin 5 MG capsule, Take  by mouth Daily., Disp: , Rfl:   •  buPROPion XL (WELLBUTRIN XL) 150 MG 24 hr tablet, Take 1 tablet by mouth Daily., Disp: 30 tablet, Rfl: 1  •  carvedilol (COREG) 12.5 MG tablet, take 1 tablet by mouth twice a day with meals, Disp: 60 tablet, Rfl: 1  •  Cholecalciferol (VITAMIN D3) 1000 UNITS capsule, Take  by mouth. 3 DAILY, Disp: , Rfl:   •  clotrimazole-betamethasone (LOTRISONE) 1-0.05 % cream, Apply  topically Every 12 (Twelve) Hours., Disp: 45 g, Rfl: 0  •  HYALGAN 20 MG/2ML solution prefilled syringe, , Disp: , Rfl:   •  levothyroxine (SYNTHROID, LEVOTHROID) 88 MCG tablet, take 1 tablet by mouth once daily, Disp: 30 tablet, Rfl: 5  •  metoprolol succinate XL (TOPROL-XL) 50 MG 24 hr tablet, , Disp: , Rfl: 0  •  mirtazapine (REMERON) 45 MG tablet, take 1 tablet by mouth at bedtime, Disp: 30 tablet, Rfl: 1  •  simvastatin (ZOCOR) 40 MG tablet, Take 1 tablet by mouth every night at bedtime., Disp: 30 tablet, Rfl: 11  •  trandolapril (MAVIK) 4 MG tablet, take 1 tablet by mouth once daily, Disp: 30 tablet, Rfl: 11  •  Turmeric, Curcuma Longa, powder, Daily., Disp: , Rfl:   •  vitamin B-12 (CYANOCOBALAMIN) 1000 MCG tablet, Take 1 tablet by mouth daily. As directed, Disp: ,  Rfl:     Current Facility-Administered Medications:   •  sodium hyaluronate (SUPARTZ) injection 25 mg, 25 mg, Intra-articular, Once, Arturo Geronimo PA-C    The following portions of the patient's history were reviewed and updated as appropriate: allergies, current medications, past family history, past medical history, past social history, past surgical history and problem list.    Review of Systems   Constitutional: Negative.    Respiratory: Negative.    Cardiovascular: Negative.    Gastrointestinal: Negative.    Musculoskeletal: Negative.    Skin: Negative.    Neurological: Negative.    Psychiatric/Behavioral: Negative.        Objective   Physical Exam   Constitutional: She is oriented to person, place, and time. She appears well-nourished.   Neck: Neck supple.   Cardiovascular: Normal rate, regular rhythm and normal heart sounds.   Pulmonary/Chest: Effort normal and breath sounds normal.   Abdominal: Bowel sounds are normal.   Neurological: She is alert and oriented to person, place, and time.   Skin: Skin is warm.   Psychiatric: She has a normal mood and affect.       All tests have been reviewed.    Assessment/Plan   There are no diagnoses linked to this encounter.          Essential hypertension continue med  Other fatigue normal lab, improved after bupropion   Depression with anxiety continue bupropion   Nonintractable headache, resolved CT Head Without Contrast; normal  Gastritis, omeprazole continue medicine s/p EGD  Probable osteoarthritis of the knee, follow ortho  Overweight continue good diet , weight loss 10 Ib on purpose  Vitamin D low normal continue vitamin D3 1000 units daily,   pneumovax done zostavax done, flu done, prevnar today, check rec--  Hypothyroidism continue medication  Hyperlipidemia unable to afford crestor, lipitor muscle pain, on zocor 40,  pap done, mamm done dexa normal  colon 4/7/16  Glucose 100 diet  Erythrasma continue clotrimazole  Follow-up in 3 mo

## 2019-01-08 RX ORDER — CARVEDILOL 12.5 MG/1
TABLET ORAL
Qty: 60 TABLET | Refills: 5 | Status: SHIPPED | OUTPATIENT
Start: 2019-01-08 | End: 2019-05-04 | Stop reason: SDUPTHER

## 2019-01-23 RX ORDER — BUPROPION HYDROCHLORIDE 300 MG/1
300 TABLET ORAL DAILY
Qty: 30 TABLET | Refills: 6 | Status: SHIPPED | OUTPATIENT
Start: 2019-01-23 | End: 2019-08-21 | Stop reason: SDUPTHER

## 2019-02-08 RX ORDER — CLOTRIMAZOLE AND BETAMETHASONE DIPROPIONATE 10; .64 MG/G; MG/G
CREAM TOPICAL EVERY 12 HOURS SCHEDULED
Qty: 45 G | Refills: 0 | Status: SHIPPED | OUTPATIENT
Start: 2019-02-08 | End: 2019-06-06

## 2019-02-11 RX ORDER — ALPRAZOLAM 0.5 MG/1
0.5 TABLET ORAL 2 TIMES DAILY PRN
Qty: 30 TABLET | Refills: 0 | Status: SHIPPED | OUTPATIENT
Start: 2019-02-11 | End: 2022-09-09 | Stop reason: SDUPTHER

## 2019-02-11 RX ORDER — ALPRAZOLAM 0.5 MG/1
0.5 TABLET ORAL 2 TIMES DAILY PRN
Qty: 30 TABLET | Refills: 0 | Status: SHIPPED | OUTPATIENT
Start: 2019-02-11 | End: 2019-02-11 | Stop reason: SDUPTHER

## 2019-02-11 RX ORDER — ALPRAZOLAM 0.5 MG/1
0.5 TABLET ORAL 2 TIMES DAILY PRN
Qty: 30 TABLET | Refills: 0 | OUTPATIENT
Start: 2019-02-11

## 2019-03-04 RX ORDER — SIMVASTATIN 40 MG
TABLET ORAL
Qty: 90 TABLET | Refills: 3 | Status: SHIPPED | OUTPATIENT
Start: 2019-03-04 | End: 2019-12-03 | Stop reason: SDUPTHER

## 2019-03-11 RX ORDER — TRANDOLAPRIL TABLETS 4 MG/1
TABLET ORAL
Qty: 90 TABLET | Refills: 1 | Status: SHIPPED | OUTPATIENT
Start: 2019-03-11 | End: 2019-08-11 | Stop reason: SDUPTHER

## 2019-03-11 RX ORDER — LEVOTHYROXINE SODIUM 88 UG/1
TABLET ORAL
Qty: 90 TABLET | Refills: 3 | Status: SHIPPED | OUTPATIENT
Start: 2019-03-11 | End: 2019-09-26 | Stop reason: SDUPTHER

## 2019-03-11 RX ORDER — MIRTAZAPINE 45 MG/1
TABLET, FILM COATED ORAL
Qty: 30 TABLET | Refills: 1 | Status: SHIPPED | OUTPATIENT
Start: 2019-03-11 | End: 2019-05-04 | Stop reason: SDUPTHER

## 2019-03-11 RX ORDER — AMLODIPINE BESYLATE 5 MG/1
TABLET ORAL
Qty: 30 TABLET | Refills: 1 | Status: SHIPPED | OUTPATIENT
Start: 2019-03-11 | End: 2019-04-07 | Stop reason: SDUPTHER

## 2019-03-11 RX ORDER — TRANDOLAPRIL TABLETS 4 MG/1
TABLET ORAL
Qty: 30 TABLET | Refills: 0 | Status: SHIPPED | OUTPATIENT
Start: 2019-03-11 | End: 2019-03-11 | Stop reason: SDUPTHER

## 2019-03-26 ENCOUNTER — OFFICE VISIT (OUTPATIENT)
Dept: INTERNAL MEDICINE | Facility: CLINIC | Age: 74
End: 2019-03-26

## 2019-03-26 VITALS
WEIGHT: 182.8 LBS | BODY MASS INDEX: 30.46 KG/M2 | DIASTOLIC BLOOD PRESSURE: 74 MMHG | OXYGEN SATURATION: 99 % | HEART RATE: 70 BPM | TEMPERATURE: 96.9 F | SYSTOLIC BLOOD PRESSURE: 116 MMHG | HEIGHT: 65 IN

## 2019-03-26 DIAGNOSIS — I10 ESSENTIAL HYPERTENSION: Primary | ICD-10-CM

## 2019-03-26 DIAGNOSIS — K55.20 VASCULAR ECTASIA OF COLON: ICD-10-CM

## 2019-03-26 DIAGNOSIS — F41.8 DEPRESSION WITH ANXIETY: ICD-10-CM

## 2019-03-26 DIAGNOSIS — Z78.0 MENOPAUSE: ICD-10-CM

## 2019-03-26 DIAGNOSIS — I70.90 ARTERIOSCLEROTIC VASCULAR DISEASE: ICD-10-CM

## 2019-03-26 DIAGNOSIS — R53.83 OTHER FATIGUE: ICD-10-CM

## 2019-03-26 DIAGNOSIS — K63.5 POLYP OF COLON, UNSPECIFIED PART OF COLON, UNSPECIFIED TYPE: ICD-10-CM

## 2019-03-26 DIAGNOSIS — R79.9 ABNORMAL FINDING OF BLOOD CHEMISTRY: ICD-10-CM

## 2019-03-26 DIAGNOSIS — M17.9 OSTEOARTHRITIS OF KNEE, UNSPECIFIED LATERALITY, UNSPECIFIED OSTEOARTHRITIS TYPE: ICD-10-CM

## 2019-03-26 DIAGNOSIS — E03.8 OTHER SPECIFIED HYPOTHYROIDISM: ICD-10-CM

## 2019-03-26 DIAGNOSIS — K57.30 DIVERTICULOSIS OF LARGE INTESTINE WITHOUT HEMORRHAGE: ICD-10-CM

## 2019-03-26 DIAGNOSIS — E55.9 VITAMIN D DEFICIENCY: ICD-10-CM

## 2019-03-26 DIAGNOSIS — E78.2 MIXED HYPERLIPIDEMIA: ICD-10-CM

## 2019-03-26 DIAGNOSIS — K64.8 INTERNAL HEMORRHOIDS: ICD-10-CM

## 2019-03-26 DIAGNOSIS — R51.9 NONINTRACTABLE HEADACHE, UNSPECIFIED CHRONICITY PATTERN, UNSPECIFIED HEADACHE TYPE: ICD-10-CM

## 2019-03-26 PROCEDURE — 99397 PER PM REEVAL EST PAT 65+ YR: CPT | Performed by: INTERNAL MEDICINE

## 2019-03-26 PROCEDURE — G0439 PPPS, SUBSEQ VISIT: HCPCS | Performed by: INTERNAL MEDICINE

## 2019-03-26 NOTE — PROGRESS NOTES
"Subsequent Medicare Wellness Visit   The ABC's of the Annual Wellness Visit    Chief Complaint   Patient presents with   • Medicare Wellness-subsequent       HPI:  Deya Hernandez, -1945, is a 73 y.o. female who presents for a Subsequent Medicare Wellness Visit.    Recent Hospitalizations:  {Hospitalization history:[id number]::\"No hospitalization(s) within the last year.\"}.    Current Medical Providers:  Patient Care Team:  Santi Dalal MD as PCP - General  Santi Dalal MD as PCP - Family Medicine    Health Habits and Functional and Cognitive Screening and Depression Screening:  Functional & Cognitive Status 3/14/2018   Do you have difficulty preparing food and eating? No   Do you have difficulty bathing yourself, getting dressed or grooming yourself? No   Do you have difficulty using the toilet? No   Do you have difficulty moving around from place to place? No   Do you have trouble with steps or getting out of a bed or a chair? No   In the past year have you fallen or experienced a near fall? No   Current Diet Well Balanced Diet   Dental Exam Up to date   Eye Exam Up to date   Exercise (times per week) 3 times per week   Current Exercise Activities Include Cardiovasular Workout on Exercise Equipment   Do you need help using the phone?  No   Are you deaf or do you have serious difficulty hearing?  No   Do you need help with transportation? No   Do you need help shopping? No   Do you need help preparing meals?  No   Do you need help with housework?  No   Do you need help with laundry? No   Do you need help taking your medications? No   Do you need help managing money? No   Have you felt unusual stress, anger or loneliness in the last month? No   Who do you live with? Alone   If you need help, do you have trouble finding someone available to you? No   Have you been bothered in the last four weeks by sexual problems? No   Do you have difficulty concentrating, remembering or making decisions? No " "      Compared to one year ago, the patient feels her physical health is {better worse same:68561} and her mental health is {better worse same:85386}.    Depression Screen:  PHQ-2/PHQ-9 Depression Screening 3/14/2018   Little interest or pleasure in doing things 0   Feeling down, depressed, or hopeless 0   Total Score 0         Past Medical/Family/Social History:  The following portions of the patient's history were reviewed and updated as appropriate: {history reviewed:20406::\"allergies\",\"current medications\",\"past family history\",\"past medical history\",\"past social history\",\"past surgical history\",\"problem list\"}.    No Known Allergies      Current Outpatient Medications:   •  ALPRAZolam (XANAX) 0.5 MG tablet, Take 1 tablet by mouth 2 (Two) Times a Day As Needed for Anxiety., Disp: 30 tablet, Rfl: 0  •  amLODIPine (NORVASC) 5 MG tablet, take 1 tablet by mouth once daily, Disp: 30 tablet, Rfl: 1  •  Biotin 5 MG capsule, Take  by mouth Daily., Disp: , Rfl:   •  buPROPion XL (WELLBUTRIN XL) 300 MG 24 hr tablet, Take 1 tablet by mouth Daily., Disp: 30 tablet, Rfl: 6  •  carvedilol (COREG) 12.5 MG tablet, take 1 tablet by mouth twice a day with meals, Disp: 60 tablet, Rfl: 5  •  Cholecalciferol (VITAMIN D3) 1000 UNITS capsule, Take  by mouth. 3 DAILY, Disp: , Rfl:   •  clotrimazole-betamethasone (LOTRISONE) 1-0.05 % cream, Apply  topically to the appropriate area as directed Every 12 (Twelve) Hours., Disp: 45 g, Rfl: 0  •  HYALGAN 20 MG/2ML solution prefilled syringe, , Disp: , Rfl:   •  levothyroxine (SYNTHROID, LEVOTHROID) 88 MCG tablet, take 1 tablet by mouth once daily, Disp: 90 tablet, Rfl: 3  •  metoprolol succinate XL (TOPROL-XL) 50 MG 24 hr tablet, , Disp: , Rfl: 0  •  mirtazapine (REMERON) 45 MG tablet, take 1 tablet by mouth at bedtime, Disp: 30 tablet, Rfl: 1  •  simvastatin (ZOCOR) 40 MG tablet, take 1 tablet by mouth at bedtime, Disp: 90 tablet, Rfl: 3  •  trandolapril (MAVIK) 4 MG tablet, take 1 tablet by " "mouth once daily, Disp: 90 tablet, Rfl: 1  •  Turmeric, Curcuma Longa, powder, Daily., Disp: , Rfl:   •  vitamin B-12 (CYANOCOBALAMIN) 1000 MCG tablet, Take 1 tablet by mouth daily. As directed, Disp: , Rfl:     Current Facility-Administered Medications:   •  sodium hyaluronate (SUPARTZ) injection 25 mg, 25 mg, Intra-articular, Once, Arturo Geronimo PA-C    Aspirin use counseling: {Aspirin :84409}    {Review of High Risk Medication/Potential Harmful Drug Interactions:0996827977::\"Current medication list contains no high risk medications.  No harmful drug interactions have been identified. \"}    Family History   Problem Relation Age of Onset   • Heart attack Other    • Arthritis Other    • Cancer Other    • Diabetes Other    • Hypertension Other    • Stroke Other    • Cancer Mother    • Breast cancer Mother 70   • Emphysema Father    • Colon polyps Neg Hx    • Esophageal cancer Neg Hx    • Irritable bowel syndrome Neg Hx    • Liver cancer Neg Hx    • Liver disease Neg Hx    • Stomach cancer Neg Hx    • Colon cancer Neg Hx    • Ovarian cancer Neg Hx        Social History     Tobacco Use   • Smoking status: Light Tobacco Smoker     Packs/day: 0.25   • Smokeless tobacco: Never Used   Substance Use Topics   • Alcohol use: Yes     Comment: social       Past Surgical History:   Procedure Laterality Date   • COLONOSCOPY     • TUBAL ABDOMINAL LIGATION     • UPPER GASTROINTESTINAL ENDOSCOPY  03/2016       Patient Active Problem List   Diagnosis   • Hypertension   • Hypothyroidism   • Osteoarthritis of knee   • Hyperlipidemia   • Arteriosclerotic vascular disease   • Diverticulosis of large intestine without hemorrhage   • Colon polyps   • Internal hemorrhoids   • Vascular ectasia of colon   • Depression with anxiety   • Nonintractable headache   • Other fatigue       Review of Systems    Objective     Vitals:    03/26/19 1358   BP: 116/74   Pulse: 70   Temp: 96.9 °F (36.1 °C)   TempSrc: Temporal   SpO2: 99% " "  Weight: 82.9 kg (182 lb 12.8 oz)   Height: 165.1 cm (65\")       Patient's Body mass index is 30.42 kg/m². BMI is {BMI range:24602}.      No exam data present    {Cognition Testing for Medicare Wellness:3896292359::\"The patient has no evidence of cognitve impairment. \"}    Physical Exam    Recent Lab Results:  Lab Results   Component Value Date    GLU 95 09/17/2018     Lab Results   Component Value Date    CHOL 228 (H) 09/14/2016    TRIG 157 (H) 09/17/2018    HDL 71 (H) 09/17/2018    VLDL 31.4 09/17/2018       Assessment/Plan   Age-appropriate Screening Schedule:  Refer to the list below for future screening recommendations based on patient's age, sex and/or medical conditions.      Health Maintenance   Topic Date Due   • PNEUMOCOCCAL VACCINES (65+ LOW/MEDIUM RISK) (2 of 2 - PCV13) 02/04/2017   • ZOSTER VACCINE (2 of 2) 12/28/2019 (Originally 4/1/2015)   • LIPID PANEL  09/17/2019   • MAMMOGRAM  05/18/2020   • COLONOSCOPY  04/07/2021   • TDAP/TD VACCINES (2 - Td) 07/03/2023   • INFLUENZA VACCINE  Completed       Medicare Risks and Personalized Health Plan:  {Medicare Wellness Risk Factors and Personalized Health Plan:31668}      CMS-Preventive Services Quick Reference  Medicare Preventive Services Addressed:  {Available Medicare Preventative Services (Details under hyperlink CMS-Medicare Preventive Services above);:96414}    Advance Care Planning:  {Advance Directive Status:67961}    There are no diagnoses linked to this encounter.    An After Visit Summary and PPPS with all of these plans were given to the patient.      Follow Up:  No Follow-up on file.                 Essential hypertension continue med  Other fatigue normal lab, improved after bupropion   Depression with anxiety continue bupropion   Nonintractable headache, resolved CT Head Without Contrast; normal  Gastritis, omeprazole continue medicine s/p EGD  Probable osteoarthritis of the knee, follow ortho  Overweight continue good diet , weight loss 17 Ib " again on purpose  Vitamin D low normal continue vitamin D3 1000 units daily,   pneumovax done zostavax done, flu done, prevnar today, check rec--  Hypothyroidism continue medication  Hyperlipidemia unable to afford crestor, lipitor muscle pain, on zocor 40,  pap done, mamm done dexa normal  colon 4/7/16  Glucose 100 diet  Erythrasma continue clotrimazole  Follow-up in 3 mo

## 2019-03-26 NOTE — PROGRESS NOTES
Subsequent Medicare Wellness Visit   The ABC's of the Annual Wellness Visit    Chief Complaint   Patient presents with   • Medicare Wellness-subsequent       HPI:  Deya Hernandez, -1945, is a 73 y.o. female who presents for a Subsequent Medicare Wellness Visit.    Recent Hospitalizations:  No hospitalization(s) within the last year..    Current Medical Providers:  Patient Care Team:  Santi Dalal MD as PCP - General  Santi Dalal MD as PCP - Family Medicine    Health Habits and Functional and Cognitive Screening and Depression Screening:  Functional & Cognitive Status 3/26/2019   Do you have difficulty preparing food and eating? No   Do you have difficulty bathing yourself, getting dressed or grooming yourself? No   Do you have difficulty using the toilet? No   Do you have difficulty moving around from place to place? No   Do you have trouble with steps or getting out of a bed or a chair? No   In the past year have you fallen or experienced a near fall? No   Current Diet Well Balanced Diet   Dental Exam Up to date   Eye Exam Up to date   Exercise (times per week) 2 times per week   Current Exercise Activities Include Yoga   Do you need help using the phone?  No   Are you deaf or do you have serious difficulty hearing?  No   Do you need help with transportation? No   Do you need help shopping? No   Do you need help preparing meals?  No   Do you need help with housework?  No   Do you need help with laundry? No   Do you need help taking your medications? No   Do you need help managing money? No   Do you ever drive or ride in a car without wearing a seat belt? No   Have you felt unusual stress, anger or loneliness in the last month? No   Who do you live with? Alone   If you need help, do you have trouble finding someone available to you? No   Have you been bothered in the last four weeks by sexual problems? No   Do you have difficulty concentrating, remembering or making decisions? No       Compared to one  year ago, the patient feels her physical health is better and her mental health is better.    Depression Screen:  PHQ-2/PHQ-9 Depression Screening 3/26/2019   Little interest or pleasure in doing things 0   Feeling down, depressed, or hopeless 0   Total Score 0         Past Medical/Family/Social History:  The following portions of the patient's history were reviewed and updated as appropriate: allergies, current medications, past family history, past medical history, past social history, past surgical history and problem list.    No Known Allergies      Current Outpatient Medications:   •  ALPRAZolam (XANAX) 0.5 MG tablet, Take 1 tablet by mouth 2 (Two) Times a Day As Needed for Anxiety., Disp: 30 tablet, Rfl: 0  •  amLODIPine (NORVASC) 5 MG tablet, take 1 tablet by mouth once daily, Disp: 30 tablet, Rfl: 1  •  Biotin 5 MG capsule, Take  by mouth Daily., Disp: , Rfl:   •  buPROPion XL (WELLBUTRIN XL) 300 MG 24 hr tablet, Take 1 tablet by mouth Daily., Disp: 30 tablet, Rfl: 6  •  carvedilol (COREG) 12.5 MG tablet, take 1 tablet by mouth twice a day with meals, Disp: 60 tablet, Rfl: 5  •  Cholecalciferol (VITAMIN D3) 1000 UNITS capsule, Take  by mouth. 3 DAILY, Disp: , Rfl:   •  clotrimazole-betamethasone (LOTRISONE) 1-0.05 % cream, Apply  topically to the appropriate area as directed Every 12 (Twelve) Hours., Disp: 45 g, Rfl: 0  •  HYALGAN 20 MG/2ML solution prefilled syringe, , Disp: , Rfl:   •  levothyroxine (SYNTHROID, LEVOTHROID) 88 MCG tablet, take 1 tablet by mouth once daily, Disp: 90 tablet, Rfl: 3  •  metoprolol succinate XL (TOPROL-XL) 50 MG 24 hr tablet, , Disp: , Rfl: 0  •  mirtazapine (REMERON) 45 MG tablet, take 1 tablet by mouth at bedtime, Disp: 30 tablet, Rfl: 1  •  simvastatin (ZOCOR) 40 MG tablet, take 1 tablet by mouth at bedtime, Disp: 90 tablet, Rfl: 3  •  trandolapril (MAVIK) 4 MG tablet, take 1 tablet by mouth once daily, Disp: 90 tablet, Rfl: 1  •  Turmeric, Curcuma Longa, powder, Daily.,  Disp: , Rfl:   •  vitamin B-12 (CYANOCOBALAMIN) 1000 MCG tablet, Take 1 tablet by mouth daily. As directed, Disp: , Rfl:     Current Facility-Administered Medications:   •  sodium hyaluronate (SUPARTZ) injection 25 mg, 25 mg, Intra-articular, Once, Arturo Geronimo PA-C    Aspirin use counseling: Does not need ASA (and currently is not on it)    Current medication list contains no high risk medications.  No harmful drug interactions have been identified.     Family History   Problem Relation Age of Onset   • Heart attack Other    • Arthritis Other    • Cancer Other    • Diabetes Other    • Hypertension Other    • Stroke Other    • Cancer Mother    • Breast cancer Mother 70   • Emphysema Father    • Colon polyps Neg Hx    • Esophageal cancer Neg Hx    • Irritable bowel syndrome Neg Hx    • Liver cancer Neg Hx    • Liver disease Neg Hx    • Stomach cancer Neg Hx    • Colon cancer Neg Hx    • Ovarian cancer Neg Hx        Social History     Tobacco Use   • Smoking status: Former Smoker   • Smokeless tobacco: Never Used   • Tobacco comment: Quit in 2015   Substance Use Topics   • Alcohol use: Yes     Comment: social       Past Surgical History:   Procedure Laterality Date   • COLONOSCOPY     • TUBAL ABDOMINAL LIGATION     • UPPER GASTROINTESTINAL ENDOSCOPY  03/2016       Patient Active Problem List   Diagnosis   • Hypertension   • Hypothyroidism   • Osteoarthritis of knee   • Hyperlipidemia   • Arteriosclerotic vascular disease   • Diverticulosis of large intestine without hemorrhage   • Colon polyps   • Internal hemorrhoids   • Vascular ectasia of colon   • Depression with anxiety   • Nonintractable headache   • Other fatigue       Review of Systems   Constitutional: Negative.    HENT: Negative.    Eyes: Negative.    Respiratory: Negative.    Cardiovascular: Negative.    Gastrointestinal: Negative.    Endocrine: Negative.    Genitourinary: Negative.    Musculoskeletal: Negative.    Skin: Negative.   "  Allergic/Immunologic: Negative.    Neurological: Negative.    Hematological: Negative.    Psychiatric/Behavioral: Negative.        Objective     Vitals:    03/26/19 1358   BP: 116/74   Pulse: 70   Temp: 96.9 °F (36.1 °C)   TempSrc: Temporal   SpO2: 99%   Weight: 82.9 kg (182 lb 12.8 oz)   Height: 165.1 cm (65\")       Patient's Body mass index is 30.42 kg/m². BMI is above normal parameters. Recommendations include: exercise counseling.      No exam data present    The patient has no evidence of cognitve impairment.     Physical Exam   Constitutional: She is oriented to person, place, and time. She appears well-developed and well-nourished.   HENT:   Head: Normocephalic and atraumatic.   Right Ear: External ear normal.   Left Ear: External ear normal.   Nose: Nose normal.   Mouth/Throat: Oropharynx is clear and moist.   Eyes: Conjunctivae and EOM are normal. Pupils are equal, round, and reactive to light.   Neck: Normal range of motion. Neck supple.   Cardiovascular: Normal rate, regular rhythm, normal heart sounds and intact distal pulses.   Pulmonary/Chest: Effort normal and breath sounds normal.   Abdominal: Soft. Bowel sounds are normal.   Musculoskeletal: Normal range of motion.   Neurological: She is alert and oriented to person, place, and time. She has normal reflexes.   Skin: Skin is warm.   Psychiatric: She has a normal mood and affect. Her behavior is normal. Judgment and thought content normal.   Nursing note and vitals reviewed.      Recent Lab Results:  Lab Results   Component Value Date    GLU 95 09/17/2018     Lab Results   Component Value Date    CHOL 228 (H) 09/14/2016    TRIG 157 (H) 09/17/2018    HDL 71 (H) 09/17/2018    VLDL 31.4 09/17/2018       Assessment/Plan   Age-appropriate Screening Schedule:  Refer to the list below for future screening recommendations based on patient's age, sex and/or medical conditions.      Health Maintenance   Topic Date Due   • PNEUMOCOCCAL VACCINES (65+ LOW/MEDIUM " RISK) (2 of 2 - PCV13) 02/04/2017   • ZOSTER VACCINE (2 of 2) 12/28/2019 (Originally 4/1/2015)   • LIPID PANEL  09/17/2019   • MAMMOGRAM  05/18/2020   • COLONOSCOPY  04/07/2021   • TDAP/TD VACCINES (2 - Td) 07/03/2023   • INFLUENZA VACCINE  Completed       Medicare Risks and Personalized Health Plan:  Obesity/Overweight condition;  Referral to Nutritionist ordered and Weight Watchers advised      CMS-Preventive Services Quick Reference  Medicare Preventive Services Addressed:  Annual Wellness Visit (AWV)    Advance Care Planning:  Patient does not have an advance directive - information provided to the patient today    There are no diagnoses linked to this encounter.    An After Visit Summary and PPPS with all of these plans were given to the patient.      Follow Up:  No Follow-up on file.          Essential hypertension continue med  Other fatigue normal lab, improved after bupropion   Depression with anxiety continue bupropion   Nonintractable headache, resolved CT Head Without Contrast; normal  Gastritis, omeprazole continue medicine s/p EGD  Probable osteoarthritis of the knee, follow ortho  Overweight continue good diet , weight loss 10 Ib on purpose  Vitamin D low normal continue vitamin D3 1000 units daily,   pneumovax done zostavax done, flu done, prevnar today, check rec--  Hypothyroidism continue medication  Hyperlipidemia unable to afford crestor, lipitor muscle pain, on zocor 40,  pap done, mamm done dexa normal  colon 4/7/16  Glucose 100 diet  Erythrasma resolved after clotrimazole  Follow-up in 3 mo

## 2019-04-05 ENCOUNTER — APPOINTMENT (OUTPATIENT)
Dept: BONE DENSITY | Facility: HOSPITAL | Age: 74
End: 2019-04-05

## 2019-04-05 RX ORDER — TRANDOLAPRIL TABLETS 4 MG/1
TABLET ORAL
Qty: 30 TABLET | Refills: 0 | Status: SHIPPED | OUTPATIENT
Start: 2019-04-05 | End: 2019-06-06 | Stop reason: SDUPTHER

## 2019-04-08 ENCOUNTER — APPOINTMENT (OUTPATIENT)
Dept: BONE DENSITY | Facility: HOSPITAL | Age: 74
End: 2019-04-08

## 2019-04-08 PROCEDURE — 77080 DXA BONE DENSITY AXIAL: CPT

## 2019-04-08 RX ORDER — AMLODIPINE BESYLATE 5 MG/1
TABLET ORAL
Qty: 60 TABLET | Refills: 0 | Status: SHIPPED | OUTPATIENT
Start: 2019-04-08 | End: 2019-07-09 | Stop reason: SDUPTHER

## 2019-04-15 RX ORDER — TRANDOLAPRIL TABLETS 4 MG/1
TABLET ORAL
Qty: 90 TABLET | Refills: 1 | OUTPATIENT
Start: 2019-04-15

## 2019-04-24 LAB
25(OH)D3+25(OH)D2 SERPL-MCNC: 56.7 NG/ML
ALBUMIN SERPL-MCNC: 4 G/DL (ref 3.5–5)
ALBUMIN/GLOB SERPL: 1.5 G/DL (ref 1–2)
ALP SERPL-CCNC: 59 U/L (ref 38–126)
ALT SERPL-CCNC: 18 U/L (ref 13–69)
APPEARANCE UR: CLEAR
AST SERPL-CCNC: 20 U/L (ref 15–46)
BACTERIA #/AREA URNS HPF: ABNORMAL /HPF
BASOPHILS # BLD AUTO: 0.04 10*3/MM3 (ref 0–0.2)
BASOPHILS NFR BLD AUTO: 0.6 % (ref 0–1.5)
BILIRUB SERPL-MCNC: 0.4 MG/DL (ref 0.2–1.3)
BILIRUB UR QL STRIP: NEGATIVE
BUN SERPL-MCNC: 11 MG/DL (ref 7–20)
BUN/CREAT SERPL: 12.2 (ref 7.1–23.5)
CALCIUM SERPL-MCNC: 9.8 MG/DL (ref 8.4–10.2)
CASTS URNS MICRO: ABNORMAL
CHLORIDE SERPL-SCNC: 101 MMOL/L (ref 98–107)
CHOLEST SERPL-MCNC: 192 MG/DL (ref 0–199)
CK SERPL-CCNC: 96 U/L (ref 30–170)
CO2 SERPL-SCNC: 30 MMOL/L (ref 26–30)
COLOR UR: YELLOW
CREAT SERPL-MCNC: 0.9 MG/DL (ref 0.6–1.3)
EOSINOPHIL # BLD AUTO: 0.1 10*3/MM3 (ref 0–0.4)
EOSINOPHIL NFR BLD AUTO: 1.6 % (ref 0.3–6.2)
EPI CELLS #/AREA URNS HPF: ABNORMAL /HPF
ERYTHROCYTE [DISTWIDTH] IN BLOOD BY AUTOMATED COUNT: 12 % (ref 12.3–15.4)
GLOBULIN SER CALC-MCNC: 2.6 GM/DL
GLUCOSE SERPL-MCNC: 89 MG/DL (ref 74–98)
GLUCOSE UR QL: NEGATIVE
HBA1C MFR BLD: 5.6 % (ref 4.8–5.6)
HCT VFR BLD AUTO: 40.9 % (ref 34–46.6)
HDLC SERPL-MCNC: 64 MG/DL (ref 40–60)
HGB BLD-MCNC: 14 G/DL (ref 12–15.9)
HGB UR QL STRIP: NEGATIVE
IMM GRANULOCYTES # BLD AUTO: 0.01 10*3/MM3 (ref 0–0.05)
IMM GRANULOCYTES NFR BLD AUTO: 0.2 % (ref 0–0.5)
KETONES UR QL STRIP: NEGATIVE
LDLC SERPL CALC-MCNC: 94 MG/DL (ref 0–99)
LEUKOCYTE ESTERASE UR QL STRIP: ABNORMAL
LYMPHOCYTES # BLD AUTO: 2.32 10*3/MM3 (ref 0.7–3.1)
LYMPHOCYTES NFR BLD AUTO: 36.8 % (ref 19.6–45.3)
MCH RBC QN AUTO: 32.4 PG (ref 26.6–33)
MCHC RBC AUTO-ENTMCNC: 34.2 G/DL (ref 31.5–35.7)
MCV RBC AUTO: 94.7 FL (ref 79–97)
MONOCYTES # BLD AUTO: 0.52 10*3/MM3 (ref 0.1–0.9)
MONOCYTES NFR BLD AUTO: 8.2 % (ref 5–12)
NEUTROPHILS # BLD AUTO: 3.32 10*3/MM3 (ref 1.7–7)
NEUTROPHILS NFR BLD AUTO: 52.6 % (ref 42.7–76)
NITRITE UR QL STRIP: NEGATIVE
NRBC BLD AUTO-RTO: 0 /100 WBC (ref 0–0.2)
PH UR STRIP: 7 [PH] (ref 5–8)
PLATELET # BLD AUTO: 346 10*3/MM3 (ref 140–450)
POTASSIUM SERPL-SCNC: 4.1 MMOL/L (ref 3.5–5.1)
PROT SERPL-MCNC: 6.6 G/DL (ref 6.3–8.2)
PROT UR QL STRIP: NEGATIVE
RBC # BLD AUTO: 4.32 10*6/MM3 (ref 3.77–5.28)
RBC #/AREA URNS HPF: ABNORMAL /HPF
SODIUM SERPL-SCNC: 141 MMOL/L (ref 137–145)
SP GR UR: 1.02 (ref 1–1.03)
TRIGL SERPL-MCNC: 170 MG/DL
TSH SERPL DL<=0.005 MIU/L-ACNC: 3.29 MIU/ML (ref 0.47–4.68)
UROBILINOGEN UR STRIP-MCNC: ABNORMAL MG/DL
VLDLC SERPL CALC-MCNC: 34 MG/DL
WBC # BLD AUTO: 6.31 10*3/MM3 (ref 3.4–10.8)
WBC #/AREA URNS HPF: ABNORMAL /HPF

## 2019-05-06 RX ORDER — MIRTAZAPINE 45 MG/1
TABLET, FILM COATED ORAL
Qty: 30 TABLET | Refills: 1 | Status: SHIPPED | OUTPATIENT
Start: 2019-05-06 | End: 2019-07-09 | Stop reason: SDUPTHER

## 2019-05-06 RX ORDER — CARVEDILOL 12.5 MG/1
TABLET ORAL
Qty: 180 TABLET | Refills: 1 | Status: SHIPPED | OUTPATIENT
Start: 2019-05-06 | End: 2019-11-05 | Stop reason: SDUPTHER

## 2019-06-05 ENCOUNTER — TELEPHONE (OUTPATIENT)
Dept: INTERNAL MEDICINE | Facility: CLINIC | Age: 74
End: 2019-06-05

## 2019-06-05 NOTE — TELEPHONE ENCOUNTER
Patient called stating she has been having pain and swelling in the back of her right knee. Patient stated pain started on 06/01/19. Patient states she did not notice swelling until yesterday. (06/04/19)  Patient requested to be seen by Dr. Dalal, informed patient Dr. Dalal was out of the office the rest of the week, patient requested to be seen by another provider on Thursday.  Scheduled patient an appointment with Dr. Keating on 06/06/19, informed patient of appointment.

## 2019-06-06 ENCOUNTER — OFFICE VISIT (OUTPATIENT)
Dept: INTERNAL MEDICINE | Facility: CLINIC | Age: 74
End: 2019-06-06

## 2019-06-06 ENCOUNTER — HOSPITAL ENCOUNTER (OUTPATIENT)
Dept: ULTRASOUND IMAGING | Facility: HOSPITAL | Age: 74
Discharge: HOME | End: 2019-06-06
Admitting: FAMILY MEDICINE

## 2019-06-06 VITALS
HEART RATE: 76 BPM | WEIGHT: 179 LBS | HEIGHT: 65 IN | BODY MASS INDEX: 29.82 KG/M2 | TEMPERATURE: 98.1 F | DIASTOLIC BLOOD PRESSURE: 86 MMHG | SYSTOLIC BLOOD PRESSURE: 120 MMHG | OXYGEN SATURATION: 98 %

## 2019-06-06 DIAGNOSIS — M79.661 PAIN AND SWELLING OF RIGHT LOWER LEG: Primary | ICD-10-CM

## 2019-06-06 DIAGNOSIS — M79.89 PAIN AND SWELLING OF RIGHT LOWER LEG: Primary | ICD-10-CM

## 2019-06-06 PROCEDURE — 93971 EXTREMITY STUDY: CPT

## 2019-06-06 PROCEDURE — 99213 OFFICE O/P EST LOW 20 MIN: CPT | Performed by: FAMILY MEDICINE

## 2019-06-11 NOTE — PROGRESS NOTES
Deya Hernandez is a 74 y.o. female.    Chief Complaint   Patient presents with   • Knee Pain     Pt states that the back of her right knee has been hurting for the past 4-5 days, she states it feels swollen as well.        HPI   Patient complains of pain and swelling to her right knee.  Pain is located behind the knee.  This has been ongoing for the past 4 days.  She denies any known injury to the area.      The following portions of the patient's history were reviewed and updated as appropriate: allergies, current medications, past family history, past medical history, past social history, past surgical history and problem list.     No Known Allergies      Current Outpatient Medications:   •  ALPRAZolam (XANAX) 0.5 MG tablet, Take 1 tablet by mouth 2 (Two) Times a Day As Needed for Anxiety., Disp: 30 tablet, Rfl: 0  •  amLODIPine (NORVASC) 5 MG tablet, take 1 tablet by mouth once daily, Disp: 60 tablet, Rfl: 0  •  Biotin 5 MG capsule, Take 3,000 mcg by mouth Daily., Disp: , Rfl:   •  buPROPion XL (WELLBUTRIN XL) 300 MG 24 hr tablet, Take 1 tablet by mouth Daily., Disp: 30 tablet, Rfl: 6  •  carvedilol (COREG) 12.5 MG tablet, take 1 tablet by mouth twice a day with food, Disp: 180 tablet, Rfl: 1  •  Cholecalciferol (VITAMIN D3) 1000 UNITS capsule, Take  by mouth. 3 DAILY, Disp: , Rfl:   •  levothyroxine (SYNTHROID, LEVOTHROID) 88 MCG tablet, take 1 tablet by mouth once daily, Disp: 90 tablet, Rfl: 3  •  mirtazapine (REMERON) 45 MG tablet, take 1 tablet by mouth at bedtime, Disp: 30 tablet, Rfl: 1  •  simvastatin (ZOCOR) 40 MG tablet, take 1 tablet by mouth at bedtime, Disp: 90 tablet, Rfl: 3  •  trandolapril (MAVIK) 4 MG tablet, take 1 tablet by mouth once daily, Disp: 90 tablet, Rfl: 1  •  Turmeric, Curcuma Longa, powder, Daily., Disp: , Rfl:   •  vitamin B-12 (CYANOCOBALAMIN) 1000 MCG tablet, Take 1 tablet by mouth daily. As directed, Disp: , Rfl:   •  diclofenac (VOLTAREN) 1 % gel gel, Apply 4 g topically to  "the appropriate area as directed 4 (Four) Times a Day As Needed (pain)., Disp: 100 g, Rfl: 0    ROS    Review of Systems   Constitutional: Negative for chills and fever.   Respiratory: Negative for shortness of breath.    Cardiovascular: Positive for leg swelling. Negative for chest pain.   Musculoskeletal:        Right lower extremity pain and swelling.       Vitals:    06/06/19 1107   BP: 120/86   Pulse: 76   Temp: 98.1 °F (36.7 °C)   TempSrc: Temporal   SpO2: 98%   Weight: 81.2 kg (179 lb)   Height: 165.1 cm (65\")     Body mass index is 29.79 kg/m².    Physical Exam     Physical Exam   Constitutional: She is oriented to person, place, and time. She appears well-developed and well-nourished. No distress.   HENT:   Head: Normocephalic and atraumatic.   Right Ear: External ear normal.   Left Ear: External ear normal.   Eyes: Conjunctivae and EOM are normal.   Cardiovascular: Normal rate and regular rhythm.   No murmur heard.  Pulmonary/Chest: Effort normal and breath sounds normal. No respiratory distress. She has no wheezes.   Abdominal: Soft. Bowel sounds are normal. She exhibits no distension. There is no tenderness.   Musculoskeletal:        Legs:  Slight swelling just below the popliteal region on the right leg.  There is trace tenderness to palpation.  No noticeable redness or heat.      Neurological: She is alert and oriented to person, place, and time. No cranial nerve deficit.   Skin: Skin is warm and dry.   Psychiatric: She has a normal mood and affect. Her behavior is normal.       Assessment/Plan    Problem List Items Addressed This Visit        Nervous and Auditory    Pain and swelling of right lower leg - Primary     Will obtain an ultrasound of the RLE to r/o DVT.  If negative will treat as a muscle strain.          Relevant Orders    US venous doppler lower extremity right (duplex) (Completed)          No orders of the defined types were placed in this encounter.      No orders of the defined types " were placed in this encounter.      No Follow-up on file.    Blanca Keating DO

## 2019-07-10 RX ORDER — MIRTAZAPINE 45 MG/1
TABLET, FILM COATED ORAL
Qty: 60 TABLET | Refills: 5 | Status: SHIPPED | OUTPATIENT
Start: 2019-07-10 | End: 2020-04-03 | Stop reason: SDUPTHER

## 2019-07-10 RX ORDER — LEVOTHYROXINE SODIUM 88 UG/1
TABLET ORAL
Qty: 90 TABLET | Refills: 1 | Status: SHIPPED | OUTPATIENT
Start: 2019-07-10 | End: 2019-09-18 | Stop reason: SDUPTHER

## 2019-07-10 RX ORDER — AMLODIPINE BESYLATE 5 MG/1
TABLET ORAL
Qty: 60 TABLET | Refills: 5 | Status: SHIPPED | OUTPATIENT
Start: 2019-07-10 | End: 2020-04-03 | Stop reason: SDUPTHER

## 2019-08-13 RX ORDER — TRANDOLAPRIL TABLETS 4 MG/1
TABLET ORAL
Qty: 90 TABLET | Refills: 1 | Status: SHIPPED | OUTPATIENT
Start: 2019-08-13 | End: 2020-04-30

## 2019-08-22 RX ORDER — BUPROPION HYDROCHLORIDE 300 MG/1
TABLET ORAL
Qty: 210 TABLET | Refills: 0 | Status: SHIPPED | OUTPATIENT
Start: 2019-08-22 | End: 2020-02-22 | Stop reason: SDUPTHER

## 2019-09-18 ENCOUNTER — OFFICE VISIT (OUTPATIENT)
Dept: CARDIOLOGY | Facility: CLINIC | Age: 74
End: 2019-09-18

## 2019-09-18 VITALS
BODY MASS INDEX: 30.16 KG/M2 | HEART RATE: 70 BPM | WEIGHT: 181 LBS | SYSTOLIC BLOOD PRESSURE: 124 MMHG | HEIGHT: 65 IN | DIASTOLIC BLOOD PRESSURE: 70 MMHG | OXYGEN SATURATION: 98 %

## 2019-09-18 DIAGNOSIS — E78.2 MIXED HYPERLIPIDEMIA: ICD-10-CM

## 2019-09-18 DIAGNOSIS — R00.2 PALPITATIONS: Primary | ICD-10-CM

## 2019-09-18 DIAGNOSIS — I10 ESSENTIAL HYPERTENSION: ICD-10-CM

## 2019-09-18 PROCEDURE — 99214 OFFICE O/P EST MOD 30 MIN: CPT | Performed by: INTERNAL MEDICINE

## 2019-09-18 NOTE — PROGRESS NOTES
[facility] at [facility]  Office Progress Note  Deya Hernandez  1945  [phone]      Visit Date: 9/18/2019     PCP: Santi Dalal MD  [address] [place] [id number]    IDENTIFICATION: A 74 y.o. female     Chief Complaint   Patient presents with   • Hypertension       PROBLEM LIST:   1. 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 4/16 EKG stress abnormal due to withdrawal of beta-blockade with tachycardia and hypertension  2. 2/15/17 Marie scan Cardiolite WNL  2. Hypertension presumed essential  3. Dyslipidemia   1. on statin therapy  2. 4/18 227/196/66/122  4. Palpitations/SVT  1. ED visit 5/18. W/u benign. Holter recommended.  2. Holter 5/18 nonsustained SVT  5. Reformed nicotine addiction 2016  6. Exogenous obesity  7. Hypothyroidism    Allergies  No Known Allergies    Current Medications    Current Outpatient Medications:   •  ALPRAZolam (XANAX) 0.5 MG tablet, Take 1 tablet by mouth 2 (Two) Times a Day As Needed for Anxiety., Disp: 30 tablet, Rfl: 0  •  amLODIPine (NORVASC) 5 MG tablet, take 1 tablet by mouth once daily, Disp: 60 tablet, Rfl: 5  •  Biotin 5 MG capsule, Take 3,000 mcg by mouth Daily., Disp: , Rfl:   •  buPROPion XL (WELLBUTRIN XL) 300 MG 24 hr tablet, take 1 tablet by mouth once daily, Disp: 210 tablet, Rfl: 0  •  carvedilol (COREG) 12.5 MG tablet, take 1 tablet by mouth twice a day with food, Disp: 180 tablet, Rfl: 1  •  Cholecalciferol (VITAMIN D3) 1000 UNITS capsule, Take 2,500 mg by mouth Daily., Disp: , Rfl:   •  diclofenac (VOLTAREN) 1 % gel gel, Apply 4 g topically to the appropriate area as directed 4 (Four) Times a Day As Needed (pain)., Disp: 100 g, Rfl: 0  •  levothyroxine (SYNTHROID, LEVOTHROID) 88 MCG tablet, take 1 tablet by mouth once daily, Disp: 90 tablet, Rfl: 3  •  mirtazapine (REMERON) 45 MG tablet, take 1 tablet by mouth at bedtime, Disp: 60 tablet, Rfl: 5  •  simvastatin (ZOCOR) 40 MG tablet, take 1 tablet by mouth at bedtime, Disp: 90 tablet, Rfl:  "3  •  trandolapril (MAVIK) 4 MG tablet, take 1 tablet by mouth once daily, Disp: 90 tablet, Rfl: 1  •  Turmeric, Curcuma Longa, powder, Daily., Disp: , Rfl:   •  vitamin B-12 (CYANOCOBALAMIN) 1000 MCG tablet, Take 1 tablet by mouth daily. As directed, Disp: , Rfl:       History of Present Illness   Pt here for follow up. No new palpitations.  Has lost 19 lbs since last visit doing some yoga activity but no regular aerobics.  No chest pain issues.    OBJECTIVE:  Vitals:    09/18/19 1252   BP: 124/70   BP Location: Right arm   Patient Position: Sitting   Pulse: 70   SpO2: 98%   Weight: 82.1 kg (181 lb)   Height: 165.1 cm (65\")     Physical Exam   Constitutional: She is oriented to person, place, and time. She appears well-developed and well-nourished. No distress.   Cardiovascular: Normal rate, regular rhythm, normal heart sounds and intact distal pulses.  No extrasystoles are present.   No murmur heard.  Pulses:       Radial pulses are 2+ on the right side, and 2+ on the left side.        Dorsalis pedis pulses are 2+ on the right side, and 2+ on the left side.        Posterior tibial pulses are 2+ on the right side, and 2+ on the left side.   Pulmonary/Chest: Effort normal and breath sounds normal. She has no wheezes. She has no rales.   Abdominal: Soft. Bowel sounds are normal. There is no tenderness. There is no guarding.   Musculoskeletal: She exhibits no edema or tenderness.   Neurological: She is alert and oriented to person, place, and time.   Skin: Skin is warm and dry. No rash noted.   Psychiatric: She has a normal mood and affect.   Nursing note and vitals reviewed.      Diagnostic Data:  Procedures      ASSESSMENT:   Diagnosis Plan   1. Palpitations     2. Essential hypertension     3. Mixed hyperlipidemia         PLAN:  1. Palpitations much improved w Rx change and weight loss.  Nl lvef  2. HTn controlled on mavik/coreg/amlod  3. Mixed hyperlipidemia- cont statin  4. Commended weight loss    Santi Dalal, " MD, thank you for referring Ms. Hernandez for evaluation.  I have forwarded my electronically generated recommendations to you for review.  Please do not hesitate to call with any questions.      Timbo Roberts MD, FACC

## 2019-09-26 ENCOUNTER — OFFICE VISIT (OUTPATIENT)
Dept: INTERNAL MEDICINE | Facility: CLINIC | Age: 74
End: 2019-09-26

## 2019-09-26 VITALS
HEIGHT: 65 IN | SYSTOLIC BLOOD PRESSURE: 118 MMHG | RESPIRATION RATE: 18 BRPM | OXYGEN SATURATION: 98 % | HEART RATE: 92 BPM | WEIGHT: 181.8 LBS | DIASTOLIC BLOOD PRESSURE: 82 MMHG | BODY MASS INDEX: 30.29 KG/M2 | TEMPERATURE: 97.3 F

## 2019-09-26 DIAGNOSIS — I10 ESSENTIAL HYPERTENSION: Primary | ICD-10-CM

## 2019-09-26 DIAGNOSIS — R79.9 ABNORMAL FINDING OF BLOOD CHEMISTRY: ICD-10-CM

## 2019-09-26 DIAGNOSIS — E03.8 OTHER SPECIFIED HYPOTHYROIDISM: ICD-10-CM

## 2019-09-26 DIAGNOSIS — E78.2 MIXED HYPERLIPIDEMIA: ICD-10-CM

## 2019-09-26 DIAGNOSIS — M17.9 OSTEOARTHRITIS OF KNEE, UNSPECIFIED LATERALITY, UNSPECIFIED OSTEOARTHRITIS TYPE: ICD-10-CM

## 2019-09-26 DIAGNOSIS — F41.8 DEPRESSION WITH ANXIETY: ICD-10-CM

## 2019-09-26 PROBLEM — R53.83 OTHER FATIGUE: Status: RESOLVED | Noted: 2018-09-13 | Resolved: 2019-09-26

## 2019-09-26 PROCEDURE — 99214 OFFICE O/P EST MOD 30 MIN: CPT | Performed by: INTERNAL MEDICINE

## 2019-09-26 RX ORDER — HEPATITIS A VACCINE 1440 [IU]/ML
INJECTION, SUSPENSION INTRAMUSCULAR
Refills: 0 | COMMUNITY
Start: 2019-08-11 | End: 2020-12-16

## 2019-09-26 RX ORDER — ZOSTER VACCINE RECOMBINANT, ADJUVANTED 50 MCG/0.5
KIT INTRAMUSCULAR
Refills: 0 | COMMUNITY
Start: 2019-08-11 | End: 2020-12-16

## 2019-09-26 NOTE — PROGRESS NOTES
Subjective   Deya Hernandez is a 74 y.o. female.     Chief Complaint   Patient presents with   • Follow-up     6 mo f/u for HTN, HLD.        History of Present Illness   Patient here for follow-up.  Hypertension stable on medication.  Depression anxiety stable on medication gastritis is stable on medication.  Knee arthritis is stable now.  Weight is still high.  Vitamin D stable on supplement.  Hypothyroidism stable on medication.  Hyperlipidemia stable now.    Current Outpatient Medications:   •  ALPRAZolam (XANAX) 0.5 MG tablet, Take 1 tablet by mouth 2 (Two) Times a Day As Needed for Anxiety., Disp: 30 tablet, Rfl: 0  •  amLODIPine (NORVASC) 5 MG tablet, take 1 tablet by mouth once daily, Disp: 60 tablet, Rfl: 5  •  Biotin 5 MG capsule, Take 3,000 mcg by mouth Daily., Disp: , Rfl:   •  buPROPion XL (WELLBUTRIN XL) 300 MG 24 hr tablet, take 1 tablet by mouth once daily, Disp: 210 tablet, Rfl: 0  •  carvedilol (COREG) 12.5 MG tablet, take 1 tablet by mouth twice a day with food, Disp: 180 tablet, Rfl: 1  •  Cholecalciferol (VITAMIN D3) 1000 UNITS capsule, Take 2,500 mg by mouth Daily., Disp: , Rfl:   •  diclofenac (VOLTAREN) 1 % gel gel, Apply 4 g topically to the appropriate area as directed 4 (Four) Times a Day As Needed (pain)., Disp: 100 g, Rfl: 0  •  HAVRIX 1440 EL U/ML vaccine, inject 1 milliliter intramuscularly, Disp: , Rfl: 0  •  mirtazapine (REMERON) 45 MG tablet, take 1 tablet by mouth at bedtime, Disp: 60 tablet, Rfl: 5  •  SHINGRIX 50 MCG/0.5ML reconstituted suspension, inject 0.5 milliliter intramuscularly, Disp: , Rfl: 0  •  simvastatin (ZOCOR) 40 MG tablet, take 1 tablet by mouth at bedtime, Disp: 90 tablet, Rfl: 3  •  trandolapril (MAVIK) 4 MG tablet, take 1 tablet by mouth once daily, Disp: 90 tablet, Rfl: 1  •  Turmeric, Curcuma Longa, powder, Daily., Disp: , Rfl:   •  vitamin B-12 (CYANOCOBALAMIN) 1000 MCG tablet, Take 1 tablet by mouth daily. As directed, Disp: , Rfl:     The following  portions of the patient's history were reviewed and updated as appropriate: allergies, current medications, past family history, past medical history, past social history, past surgical history and problem list.    Review of Systems   Constitutional: Negative.    Respiratory: Negative.    Cardiovascular: Negative.    Gastrointestinal: Negative.    Musculoskeletal: Negative.    Skin: Negative.    Neurological: Negative.    Psychiatric/Behavioral: Negative.        Objective   Physical Exam   Constitutional: She is oriented to person, place, and time. She appears well-nourished.   Neck: Neck supple.   Cardiovascular: Normal rate, regular rhythm and normal heart sounds.   Pulmonary/Chest: Effort normal and breath sounds normal.   Abdominal: Bowel sounds are normal.   Neurological: She is alert and oriented to person, place, and time.   Skin: Skin is warm.   Psychiatric: She has a normal mood and affect.       All tests have been reviewed.    Assessment/Plan   There are no diagnoses linked to this encounter.           Essential hypertension continue med  Other fatigue normal lab, improved after bupropion   Depression with anxiety continue bupropion   Nonintractable headache, resolved CT Head Without Contrast; normal  Gastritis, omeprazole continue medicine s/p EGD  Probable osteoarthritis of the knee, follow ortho  Overweight continue good diet ,   Vitamin D low normal continue vitamin D3 1000 units daily,   pneumovax done zostavax done, flu   Hypothyroidism continue medication  Hyperlipidemia unable to afford crestor, lipitor muscle pain, on zocor 40,  pap done, mamm done dexa normal  colon 4/7/16  Glucose 100 diet  Do lab now  Follow-up in 6 mo wellness

## 2019-10-02 LAB
ALBUMIN SERPL-MCNC: 4.4 G/DL (ref 3.5–5.2)
ALBUMIN/GLOB SERPL: 2 G/DL
ALP SERPL-CCNC: 57 U/L (ref 39–117)
ALT SERPL-CCNC: 13 U/L (ref 1–33)
AST SERPL-CCNC: 15 U/L (ref 1–32)
BILIRUB SERPL-MCNC: 0.3 MG/DL (ref 0.2–1.2)
BUN SERPL-MCNC: 10 MG/DL (ref 8–23)
BUN/CREAT SERPL: 10.4 (ref 7–25)
CALCIUM SERPL-MCNC: 9.7 MG/DL (ref 8.6–10.5)
CHLORIDE SERPL-SCNC: 104 MMOL/L (ref 98–107)
CHOLEST SERPL-MCNC: 219 MG/DL (ref 0–200)
CK SERPL-CCNC: 82 U/L (ref 20–180)
CO2 SERPL-SCNC: 28.8 MMOL/L (ref 22–29)
CREAT SERPL-MCNC: 0.96 MG/DL (ref 0.57–1)
GLOBULIN SER CALC-MCNC: 2.2 GM/DL
GLUCOSE SERPL-MCNC: 92 MG/DL (ref 65–99)
HBA1C MFR BLD: 5.5 % (ref 4.8–5.6)
HDLC SERPL-MCNC: 69 MG/DL (ref 40–60)
LDLC SERPL CALC-MCNC: 114 MG/DL (ref 0–100)
POTASSIUM SERPL-SCNC: 4.1 MMOL/L (ref 3.5–5.2)
PROT SERPL-MCNC: 6.6 G/DL (ref 6–8.5)
SODIUM SERPL-SCNC: 142 MMOL/L (ref 136–145)
TRIGL SERPL-MCNC: 182 MG/DL (ref 0–150)
VLDLC SERPL CALC-MCNC: 36.4 MG/DL

## 2019-11-05 RX ORDER — CARVEDILOL 12.5 MG/1
TABLET ORAL
Qty: 180 TABLET | Refills: 1 | Status: SHIPPED | OUTPATIENT
Start: 2019-11-05 | End: 2020-07-27

## 2019-11-22 ENCOUNTER — OFFICE VISIT (OUTPATIENT)
Dept: INTERNAL MEDICINE | Facility: CLINIC | Age: 74
End: 2019-11-22

## 2019-11-22 VITALS
OXYGEN SATURATION: 98 % | RESPIRATION RATE: 18 BRPM | BODY MASS INDEX: 30.79 KG/M2 | WEIGHT: 184.8 LBS | HEIGHT: 65 IN | HEART RATE: 79 BPM | DIASTOLIC BLOOD PRESSURE: 84 MMHG | TEMPERATURE: 97 F | SYSTOLIC BLOOD PRESSURE: 126 MMHG

## 2019-11-22 DIAGNOSIS — M79.661 RIGHT CALF PAIN: ICD-10-CM

## 2019-11-22 DIAGNOSIS — M17.9 OSTEOARTHRITIS OF KNEE, UNSPECIFIED LATERALITY, UNSPECIFIED OSTEOARTHRITIS TYPE: Primary | ICD-10-CM

## 2019-11-22 PROCEDURE — 99213 OFFICE O/P EST LOW 20 MIN: CPT | Performed by: INTERNAL MEDICINE

## 2019-11-22 PROCEDURE — G0008 ADMIN INFLUENZA VIRUS VAC: HCPCS | Performed by: INTERNAL MEDICINE

## 2019-11-22 PROCEDURE — 90653 IIV ADJUVANT VACCINE IM: CPT | Performed by: INTERNAL MEDICINE

## 2019-11-22 RX ORDER — LEVOTHYROXINE SODIUM 88 UG/1
TABLET ORAL
Refills: 0 | COMMUNITY
Start: 2019-10-07 | End: 2020-04-01

## 2019-11-22 NOTE — PROGRESS NOTES
Subjective   Deya Hernandez is a 74 y.o. female.     Chief Complaint   Patient presents with   • Leg Pain     Pt c/o of right leg pain last Saturday, she states she worked out and started experencing pain on right side of calf. She states the pain is intermittent and not constant.        History of Present Illness   Patient complains right leg pain mainly right side of upper calf pain after exercise and also some twinges in the left upper thigh after exercise.  Patient had right knee joint popliteal pain in the past 5 months ago.  Ultrasound showed a Baker's cyst 5.4 cm.  Weight is still elevated.  Blood pressure within normal limits    Current Outpatient Medications:   •  ALPRAZolam (XANAX) 0.5 MG tablet, Take 1 tablet by mouth 2 (Two) Times a Day As Needed for Anxiety., Disp: 30 tablet, Rfl: 0  •  amLODIPine (NORVASC) 5 MG tablet, take 1 tablet by mouth once daily, Disp: 60 tablet, Rfl: 5  •  Biotin 5 MG capsule, Take 3,000 mcg by mouth Daily., Disp: , Rfl:   •  buPROPion XL (WELLBUTRIN XL) 300 MG 24 hr tablet, take 1 tablet by mouth once daily, Disp: 210 tablet, Rfl: 0  •  carvedilol (COREG) 12.5 MG tablet, take 1 tablet by mouth twice a day with food, Disp: 180 tablet, Rfl: 1  •  Cholecalciferol (VITAMIN D3) 1000 UNITS capsule, Take 2,500 mg by mouth Daily., Disp: , Rfl:   •  diclofenac (VOLTAREN) 1 % gel gel, Apply 4 g topically to the appropriate area as directed 4 (Four) Times a Day As Needed (pain)., Disp: 100 g, Rfl: 0  •  HAVRIX 1440 EL U/ML vaccine, inject 1 milliliter intramuscularly, Disp: , Rfl: 0  •  levothyroxine (SYNTHROID, LEVOTHROID) 88 MCG tablet, , Disp: , Rfl: 0  •  mirtazapine (REMERON) 45 MG tablet, take 1 tablet by mouth at bedtime, Disp: 60 tablet, Rfl: 5  •  SHINGRIX 50 MCG/0.5ML reconstituted suspension, inject 0.5 milliliter intramuscularly, Disp: , Rfl: 0  •  simvastatin (ZOCOR) 40 MG tablet, take 1 tablet by mouth at bedtime, Disp: 90 tablet, Rfl: 3  •  trandolapril (MAVIK) 4 MG  tablet, take 1 tablet by mouth once daily, Disp: 90 tablet, Rfl: 1  •  Turmeric, Curcuma Longa, powder, Daily., Disp: , Rfl:   •  vitamin B-12 (CYANOCOBALAMIN) 1000 MCG tablet, Take 1 tablet by mouth daily. As directed, Disp: , Rfl:     The following portions of the patient's history were reviewed and updated as appropriate: allergies, current medications, past family history, past medical history, past social history, past surgical history and problem list.    Review of Systems   Constitutional: Negative.    Respiratory: Negative.    Cardiovascular: Negative.    Gastrointestinal: Negative.    Musculoskeletal: Positive for arthralgias.   Skin: Negative.    Neurological: Negative.    Psychiatric/Behavioral: Negative.        Objective   Physical Exam   Constitutional: She is oriented to person, place, and time. She appears well-nourished.   Neck: Neck supple.   Cardiovascular: Normal rate, regular rhythm and normal heart sounds.   Pulmonary/Chest: Effort normal and breath sounds normal.   Abdominal: Bowel sounds are normal.   Musculoskeletal: She exhibits no tenderness.   Deluna's cyst on the right side   Neurological: She is alert and oriented to person, place, and time.   Skin: Skin is warm.   Psychiatric: She has a normal mood and affect.       All tests have been reviewed.    Assessment/Plan   Diagnoses and all orders for this visit:    Osteoarthritis of knee, unspecified laterality, unspecified osteoarthritis type Baker's cyst counseling on diet losing weight, Aleve glucosamine and a strengthening muscle around the knee    Right calf pain I asked patient to do stretching exercises before vigorous exercise.  Aleve as needed ice as needed resting for now    Call if no better

## 2019-12-03 RX ORDER — SIMVASTATIN 40 MG
TABLET ORAL
Qty: 90 TABLET | Refills: 3 | Status: SHIPPED | OUTPATIENT
Start: 2019-12-03 | End: 2020-12-10 | Stop reason: SDUPTHER

## 2020-02-24 RX ORDER — BUPROPION HYDROCHLORIDE 300 MG/1
300 TABLET ORAL DAILY
Qty: 210 TABLET | Refills: 0 | Status: SHIPPED | OUTPATIENT
Start: 2020-02-24 | End: 2020-03-24

## 2020-02-24 RX ORDER — BUPROPION HYDROCHLORIDE 300 MG/1
TABLET ORAL
Qty: 210 TABLET | Refills: 0 | OUTPATIENT
Start: 2020-02-24

## 2020-03-24 RX ORDER — BUPROPION HYDROCHLORIDE 300 MG/1
TABLET ORAL
Qty: 30 TABLET | Refills: 5 | Status: SHIPPED | OUTPATIENT
Start: 2020-03-24 | End: 2020-10-23 | Stop reason: SDUPTHER

## 2020-04-01 RX ORDER — LEVOTHYROXINE SODIUM 88 UG/1
TABLET ORAL
Qty: 90 TABLET | Refills: 0 | Status: SHIPPED | OUTPATIENT
Start: 2020-04-01 | End: 2020-04-03 | Stop reason: SDUPTHER

## 2020-04-06 RX ORDER — LEVOTHYROXINE SODIUM 88 UG/1
88 TABLET ORAL DAILY
Qty: 90 TABLET | Refills: 0 | Status: SHIPPED | OUTPATIENT
Start: 2020-04-06 | End: 2020-09-28

## 2020-04-06 RX ORDER — MIRTAZAPINE 45 MG/1
45 TABLET, FILM COATED ORAL
Qty: 60 TABLET | Refills: 5 | Status: SHIPPED | OUTPATIENT
Start: 2020-04-06 | End: 2020-06-15 | Stop reason: SDUPTHER

## 2020-04-06 RX ORDER — AMLODIPINE BESYLATE 5 MG/1
5 TABLET ORAL DAILY
Qty: 60 TABLET | Refills: 5 | Status: SHIPPED | OUTPATIENT
Start: 2020-04-06 | End: 2021-03-22

## 2020-04-30 RX ORDER — TRANDOLAPRIL TABLETS 4 MG/1
TABLET ORAL
Qty: 90 TABLET | Refills: 1 | Status: SHIPPED | OUTPATIENT
Start: 2020-04-30 | End: 2020-10-28 | Stop reason: SDUPTHER

## 2020-06-15 ENCOUNTER — OFFICE VISIT (OUTPATIENT)
Dept: INTERNAL MEDICINE | Facility: CLINIC | Age: 75
End: 2020-06-15

## 2020-06-15 VITALS
DIASTOLIC BLOOD PRESSURE: 76 MMHG | HEIGHT: 65 IN | TEMPERATURE: 97.5 F | OXYGEN SATURATION: 97 % | WEIGHT: 191 LBS | HEART RATE: 71 BPM | RESPIRATION RATE: 16 BRPM | BODY MASS INDEX: 31.82 KG/M2 | SYSTOLIC BLOOD PRESSURE: 128 MMHG

## 2020-06-15 DIAGNOSIS — K55.20 VASCULAR ECTASIA OF COLON: ICD-10-CM

## 2020-06-15 DIAGNOSIS — K64.8 INTERNAL HEMORRHOIDS: ICD-10-CM

## 2020-06-15 DIAGNOSIS — F41.8 DEPRESSION WITH ANXIETY: ICD-10-CM

## 2020-06-15 DIAGNOSIS — K57.30 DIVERTICULOSIS OF LARGE INTESTINE WITHOUT HEMORRHAGE: ICD-10-CM

## 2020-06-15 DIAGNOSIS — E03.8 OTHER SPECIFIED HYPOTHYROIDISM: ICD-10-CM

## 2020-06-15 DIAGNOSIS — K63.5 POLYP OF COLON, UNSPECIFIED PART OF COLON, UNSPECIFIED TYPE: ICD-10-CM

## 2020-06-15 DIAGNOSIS — M17.9 OSTEOARTHRITIS OF KNEE, UNSPECIFIED LATERALITY, UNSPECIFIED OSTEOARTHRITIS TYPE: ICD-10-CM

## 2020-06-15 DIAGNOSIS — I10 ESSENTIAL HYPERTENSION: Primary | ICD-10-CM

## 2020-06-15 DIAGNOSIS — E78.2 MIXED HYPERLIPIDEMIA: ICD-10-CM

## 2020-06-15 DIAGNOSIS — M79.89 PAIN AND SWELLING OF RIGHT LOWER LEG: ICD-10-CM

## 2020-06-15 DIAGNOSIS — R51.9 NONINTRACTABLE HEADACHE, UNSPECIFIED CHRONICITY PATTERN, UNSPECIFIED HEADACHE TYPE: ICD-10-CM

## 2020-06-15 DIAGNOSIS — I70.90 ARTERIOSCLEROTIC VASCULAR DISEASE: ICD-10-CM

## 2020-06-15 DIAGNOSIS — M79.661 PAIN AND SWELLING OF RIGHT LOWER LEG: ICD-10-CM

## 2020-06-15 PROCEDURE — G0439 PPPS, SUBSEQ VISIT: HCPCS | Performed by: INTERNAL MEDICINE

## 2020-06-15 RX ORDER — MIRTAZAPINE 30 MG/1
TABLET, FILM COATED ORAL
Qty: 30 TABLET | Refills: 1 | Status: SHIPPED | OUTPATIENT
Start: 2020-06-15 | End: 2020-08-17

## 2020-06-15 NOTE — PROGRESS NOTES
The ABCs of the Annual Wellness Visit  Subsequent Medicare Wellness Visit    Chief Complaint   Patient presents with   • Medicare Wellness-subsequent     wellness, some fatigue lately       Subjective   History of Present Illness:  Deya Hernandez is a 75 y.o. female who presents for a Subsequent Medicare Wellness Visit.    HEALTH RISK ASSESSMENT    Recent Hospitalizations:  No hospitalization(s) within the last year.    Current Medical Providers:  Patient Care Team:  Santi Dalal MD as PCP - General  Santi Dalal MD as PCP - Family Medicine    Smoking Status:  Social History     Tobacco Use   Smoking Status Former Smoker   Smokeless Tobacco Never Used   Tobacco Comment    Quit in 2015       Alcohol Consumption:  Social History     Substance and Sexual Activity   Alcohol Use Yes    Comment: social       Depression Screen:   PHQ-2/PHQ-9 Depression Screening 6/15/2020   Little interest or pleasure in doing things 0   Feeling down, depressed, or hopeless 0   Total Score 0       Fall Risk Screen:  LINDAADI Fall Risk Assessment has not been completed.    Health Habits and Functional and Cognitive Screening:  Functional & Cognitive Status 6/15/2020   Do you have difficulty preparing food and eating? No   Do you have difficulty bathing yourself, getting dressed or grooming yourself? No   Do you have difficulty using the toilet? No   Do you have difficulty moving around from place to place? No   Do you have trouble with steps or getting out of a bed or a chair? No   Current Diet Well Balanced Diet   Dental Exam Up to date   Eye Exam Up to date   Exercise (times per week) (No Data)   Current Exercise Activities Include -   Do you need help using the phone?  No   Are you deaf or do you have serious difficulty hearing?  No   Do you need help with transportation? No   Do you need help shopping? No   Do you need help preparing meals?  No   Do you need help with housework?  No   Do you need help with laundry? No   Do you need  help taking your medications? No   Do you need help managing money? No   Do you ever drive or ride in a car without wearing a seat belt? No   Have you felt unusual stress, anger or loneliness in the last month? No   Who do you live with? Alone   If you need help, do you have trouble finding someone available to you? No   Have you been bothered in the last four weeks by sexual problems? No   Do you have difficulty concentrating, remembering or making decisions? No         Does the patient have evidence of cognitive impairment? No    Asprin use counseling:Start ASA 81 mg daily     Age-appropriate Screening Schedule:  Refer to the list below for future screening recommendations based on patient's age, sex and/or medical conditions. Orders for these recommended tests are listed in the plan section. The patient has been provided with a written plan.    Health Maintenance   Topic Date Due   • MAMMOGRAM  05/18/2020   • INFLUENZA VACCINE  08/01/2020   • LIPID PANEL  10/02/2020   • COLONOSCOPY  04/07/2021   • TDAP/TD VACCINES (2 - Td) 07/03/2023   • ZOSTER VACCINE  Completed          The following portions of the patient's history were reviewed and updated as appropriate: allergies, current medications, past family history, past medical history, past social history, past surgical history and problem list.    Outpatient Medications Prior to Visit   Medication Sig Dispense Refill   • ALPRAZolam (XANAX) 0.5 MG tablet Take 1 tablet by mouth 2 (Two) Times a Day As Needed for Anxiety. 30 tablet 0   • amLODIPine (NORVASC) 5 MG tablet Take 1 tablet by mouth Daily. 60 tablet 5   • Biotin 5 MG capsule Take 3,000 mcg by mouth Daily.     • buPROPion XL (WELLBUTRIN XL) 300 MG 24 hr tablet TAKE 1 TABLET BY MOUTH EVERY DAY 30 tablet 5   • carvedilol (COREG) 12.5 MG tablet take 1 tablet by mouth twice a day with food 180 tablet 1   • Cholecalciferol (VITAMIN D3) 1000 UNITS capsule Take 2,500 mg by mouth Daily.     • diclofenac (VOLTAREN) 1  % gel gel Apply 4 g topically to the appropriate area as directed 4 (Four) Times a Day As Needed (pain). 100 g 0   • levothyroxine (SYNTHROID, LEVOTHROID) 88 MCG tablet Take 1 tablet by mouth Daily. 90 tablet 0   • simvastatin (ZOCOR) 40 MG tablet take 1 tablet by mouth at bedtime 90 tablet 3   • trandolapril (MAVIK) 4 MG tablet TAKE 1 TABLET BY MOUTH ONCE DAILY 90 tablet 1   • Turmeric, Curcuma Longa, powder Daily.     • vitamin B-12 (CYANOCOBALAMIN) 1000 MCG tablet Take 1 tablet by mouth daily. As directed     • mirtazapine (REMERON) 45 MG tablet Take 1 tablet by mouth every night at bedtime. 60 tablet 5   • HAVRIX 1440 EL U/ML vaccine inject 1 milliliter intramuscularly  0   • SHINGRIX 50 MCG/0.5ML reconstituted suspension inject 0.5 milliliter intramuscularly  0     No facility-administered medications prior to visit.        Patient Active Problem List   Diagnosis   • Hypertension   • Hypothyroidism   • Osteoarthritis of knee   • Hyperlipidemia   • Arteriosclerotic vascular disease   • Diverticulosis of large intestine without hemorrhage   • Colon polyps   • Internal hemorrhoids   • Vascular ectasia of colon   • Depression with anxiety   • Nonintractable headache   • Pain and swelling of right lower leg       Advanced Care Planning:  ACP discussion was held with the patient during this visit. Patient does not have an advance directive, information provided.    Review of Systems   Constitutional: Negative.    HENT: Negative.    Eyes: Negative.    Respiratory: Negative.    Cardiovascular: Negative.    Gastrointestinal: Negative.    Endocrine: Negative.    Genitourinary: Negative.    Musculoskeletal: Negative.    Skin: Negative.    Allergic/Immunologic: Negative.    Neurological: Negative.    Hematological: Negative.    Psychiatric/Behavioral: Negative.        Compared to one year ago, the patient feels her physical health is the same.  Compared to one year ago, the patient feels her mental health is the  "same.    Reviewed chart for potential of high risk medication in the elderly: yes  Reviewed chart for potential of harmful drug interactions in the elderly:no    Objective         Vitals:    06/15/20 1022   BP: 128/76   Pulse: 71   Resp: 16   Temp: 97.5 °F (36.4 °C)   SpO2: 97%   Weight: 86.6 kg (191 lb)   Height: 165.1 cm (65\")   PainSc: 0-No pain       Body mass index is 31.78 kg/m².  Discussed the patient's BMI with her. The BMI is above average; BMI management plan is completed.    Physical Exam   Constitutional: She is oriented to person, place, and time. She appears well-developed and well-nourished.   HENT:   Head: Normocephalic and atraumatic.   Right Ear: External ear normal.   Left Ear: External ear normal.   Nose: Nose normal.   Mouth/Throat: Oropharynx is clear and moist.   Eyes: Pupils are equal, round, and reactive to light. Conjunctivae and EOM are normal.   Neck: Normal range of motion. Neck supple.   Cardiovascular: Normal rate, regular rhythm, normal heart sounds and intact distal pulses.   Pulmonary/Chest: Effort normal and breath sounds normal.   Abdominal: Soft. Bowel sounds are normal.   Musculoskeletal: Normal range of motion.   Neurological: She is alert and oriented to person, place, and time. She has normal reflexes.   Skin: Skin is warm and dry.   Psychiatric: She has a normal mood and affect. Her behavior is normal. Judgment and thought content normal.             Assessment/Plan   Medicare Risks and Personalized Health Plan  CMS Preventative Services Quick Reference  Advance Directive Discussion    The above risks/problems have been discussed with the patient.  Pertinent information has been shared with the patient in the After Visit Summary.  Follow up plans and orders are seen below in the Assessment/Plan Section.    Diagnoses and all orders for this visit:    1. Essential hypertension (Primary)    2. Mixed hyperlipidemia    3. Arteriosclerotic vascular disease    4. Internal " hemorrhoids    5. Vascular ectasia of colon    6. Diverticulosis of large intestine without hemorrhage    7. Polyp of colon, unspecified part of colon, unspecified type    8. Other specified hypothyroidism    9. Nonintractable headache, unspecified chronicity pattern, unspecified headache type    10. Pain and swelling of right lower leg    11. Osteoarthritis of knee, unspecified laterality, unspecified osteoarthritis type    12. Depression with anxiety wean off  remeron  -     mirtazapine (REMERON) 30 MG tablet; 1 qhs po  Dispense: 30 tablet; Refill: 1      Follow Up:  Return in about 6 months (around 12/15/2020) for Recheck.     An After Visit Summary and PPPS were given to the patient.       Below is to historical records for reference only:  Essential hypertension continue med  Other fatigue normal lab, improved after bupropion   Depression with anxiety continue bupropion   knee oa stable,   Start asa for ascvd   Nonintractable headache, resolved CT Head Without Contrast; normal  Gastritis, omeprazole continue medicine s/p EGD  Probable osteoarthritis of the knee, follow ortho  Overweight continue good diet ,   Vitamin D low normal continue vitamin D3 1000 units daily,   pneumovax done shingrex, done, flu   Hypothyroidism continue medication  Hyperlipidemia unable to afford crestor, lipitor muscle pain, on zocor 40,  pap done, mamm pending  colon 4/7/16  Glucose 100 diet  Do lab now    Answers for HPI/ROS submitted by the patient on 6/13/2020   What is the primary reason for your visit?: Physical

## 2020-07-20 ENCOUNTER — TELEPHONE (OUTPATIENT)
Dept: INTERNAL MEDICINE | Facility: CLINIC | Age: 75
End: 2020-07-20

## 2020-07-20 DIAGNOSIS — Z12.11 COLON CANCER SCREENING: Primary | ICD-10-CM

## 2020-07-20 NOTE — TELEPHONE ENCOUNTER
"\"This is eDya Hernandez. Date of birth [birth year]. I'm a patient of Dr. Dalal but I tried to make an appointment with Reasor at [facility] here in [place] and they said I would need a referral sent to the been four years since I've been in to see her. So if I could get a referral to see her I would appreciate it because I really need this appointment. You can reach me at [phone]. Thank you.\"    "

## 2020-07-22 ENCOUNTER — OFFICE VISIT (OUTPATIENT)
Dept: GASTROENTEROLOGY | Facility: CLINIC | Age: 75
End: 2020-07-22

## 2020-07-22 VITALS
RESPIRATION RATE: 18 BRPM | TEMPERATURE: 98.4 F | WEIGHT: 191 LBS | HEART RATE: 80 BPM | HEIGHT: 65 IN | SYSTOLIC BLOOD PRESSURE: 164 MMHG | DIASTOLIC BLOOD PRESSURE: 88 MMHG | BODY MASS INDEX: 31.82 KG/M2

## 2020-07-22 DIAGNOSIS — R14.2 BURPING: Chronic | ICD-10-CM

## 2020-07-22 DIAGNOSIS — R19.7 DIARRHEA, UNSPECIFIED TYPE: ICD-10-CM

## 2020-07-22 DIAGNOSIS — R10.9 ABDOMINAL PRESSURE: Primary | Chronic | ICD-10-CM

## 2020-07-22 PROCEDURE — 99203 OFFICE O/P NEW LOW 30 MIN: CPT | Performed by: NURSE PRACTITIONER

## 2020-07-22 RX ORDER — PANTOPRAZOLE SODIUM 40 MG/1
TABLET, DELAYED RELEASE ORAL
Qty: 30 TABLET | Refills: 5 | Status: SHIPPED | OUTPATIENT
Start: 2020-07-22 | End: 2020-10-21 | Stop reason: SDUPTHER

## 2020-07-22 NOTE — PATIENT INSTRUCTIONS
1. Antireflux measures: Avoid fried, fatty foods, alcohol, chocolate, coffee, tea,  soft drinks, peppermint and spearmint, spicy foods, tomatoes and tomato based foods, onion based foods, and smoking. Other antireflux measures include weight reduction if overweight, avoiding tight clothing around the abdomen, elevating the head of the bed 6 inches with blocks under the head board, and don't drink or eat before going to bed and avoid lying down immediately after meals.  2. Pantoprazole 40 mg 1 po daily in the am 30 minutes before breakfast.  3. Recommended to take Levothyroxine first in the am upon waking, wait 30 minutes, then take Pantoprazole 40 mg, wait 30 minutes, then eat breakfast and take other medications.  4. Possible EGD in the future if indicated.  5. Follow up: 8 weeks

## 2020-07-22 NOTE — PROGRESS NOTES
"     New Patient Consult      Date: 2020   Patient Name: Deya Hernandez  MRN: [id number]  : 1945     Primary Care Provider: Santi Dalal MD    Chief Complaint   Patient presents with   • Bloated     History of Present Illness: Deya Hernandez is a 75 y.o. female who is here today to establish care with Gastroenterology for abdominal pressure.    For the past year or so, the patient will have periumbilical abdominal pain and bloating. The pain is described as a pressure in her abdomen and is very mild. The pressure may last a few days, then \"go away\" for a few days or even a month or so. Burping or passing gas improves the pressure. The patient denies nausea or vomiting. There is no history of heartburn or reflux. She had taken Pantoprazole in the past for similar symptoms, but she stopped it over 3 years ago.    The patient has had a few episodes of diarrhea over the past 2 weeks or so. She is not sure if it was something she had eaten. No rectal bleeding.     Last EGD was 3/28/2016 with esophagitis, gastritis, bulbar duodenitis. Biopsies negative  Last colonoscopy was 2016 with small polyps (1 tubular adenoma without dysplasia), diverticulosis, and internal hemorrhoids.     Subjective      Past Medical History:   Diagnosis Date   • Abdominal bloating    • Abnormal EKG    • Anxiety and depression    • Colon cancer screening    • Colon polyp 2016   • Disease of thyroid gland    • Dyspnea    • Elbow pain, left    • Fatigue    • Gastritis    • GERD (gastroesophageal reflux disease)    • H/O mammogram    • H/O sinusitis    • H/O: pneumonia    • Heartburn    • Hypertension    • Plantar fasciitis    • Sebaceous cyst    • Sinusitis      Past Surgical History:   Procedure Laterality Date   • COLONOSCOPY  2016   • TUBAL ABDOMINAL LIGATION     • UPPER GASTROINTESTINAL ENDOSCOPY  2016     Family History   Problem Relation Age of Onset   • Heart attack Other    • Arthritis Other    • " Cancer Other    • Diabetes Other    • Hypertension Other    • Stroke Other    • Cancer Mother    • Breast cancer Mother 70   • Emphysema Father    • Colon polyps Neg Hx    • Esophageal cancer Neg Hx    • Irritable bowel syndrome Neg Hx    • Liver cancer Neg Hx    • Liver disease Neg Hx    • Stomach cancer Neg Hx    • Colon cancer Neg Hx    • Ovarian cancer Neg Hx      Social History     Socioeconomic History   • Marital status:      Spouse name: Not on file   • Number of children: Not on file   • Years of education: Not on file   • Highest education level: Not on file   Tobacco Use   • Smoking status: Former Smoker   • Smokeless tobacco: Never Used   • Tobacco comment: Quit in 2015   Substance and Sexual Activity   • Alcohol use: Yes     Comment: social   • Drug use: No   • Sexual activity: Not Currently       Current Outpatient Medications:   •  ALPRAZolam (XANAX) 0.5 MG tablet, Take 1 tablet by mouth 2 (Two) Times a Day As Needed for Anxiety., Disp: 30 tablet, Rfl: 0  •  amLODIPine (NORVASC) 5 MG tablet, Take 1 tablet by mouth Daily., Disp: 60 tablet, Rfl: 5  •  Biotin 5 MG capsule, Take 3,000 mcg by mouth Daily., Disp: , Rfl:   •  buPROPion XL (WELLBUTRIN XL) 300 MG 24 hr tablet, TAKE 1 TABLET BY MOUTH EVERY DAY, Disp: 30 tablet, Rfl: 5  •  Calcium Carbonate Antacid (TUMS PO), Take  by mouth As Needed., Disp: , Rfl:   •  carvedilol (COREG) 12.5 MG tablet, take 1 tablet by mouth twice a day with food, Disp: 180 tablet, Rfl: 1  •  Cholecalciferol (VITAMIN D3) 1000 UNITS capsule, Take 2,500 mg by mouth Daily., Disp: , Rfl:   •  diclofenac (VOLTAREN) 1 % gel gel, Apply 4 g topically to the appropriate area as directed 4 (Four) Times a Day As Needed (pain)., Disp: 100 g, Rfl: 0  •  levothyroxine (SYNTHROID, LEVOTHROID) 88 MCG tablet, Take 1 tablet by mouth Daily., Disp: 90 tablet, Rfl: 0  •  mirtazapine (REMERON) 30 MG tablet, 1 qhs po, Disp: 30 tablet, Rfl: 1  •  Simethicone (GAS-X PO), Take  by mouth As  Needed., Disp: , Rfl:   •  simvastatin (ZOCOR) 40 MG tablet, take 1 tablet by mouth at bedtime, Disp: 90 tablet, Rfl: 3  •  trandolapril (MAVIK) 4 MG tablet, TAKE 1 TABLET BY MOUTH ONCE DAILY, Disp: 90 tablet, Rfl: 1  •  Turmeric, Curcuma Longa, powder, Daily., Disp: , Rfl:   •  vitamin B-12 (CYANOCOBALAMIN) 1000 MCG tablet, Take 1 tablet by mouth daily. As directed, Disp: , Rfl:   •  HAVRIX 1440 EL U/ML vaccine, inject 1 milliliter intramuscularly, Disp: , Rfl: 0  •  pantoprazole (PROTONIX) 40 MG EC tablet, 1 po daily in the am 30 minutes before breakfast, Disp: 30 tablet, Rfl: 5  •  SHINGRIX 50 MCG/0.5ML reconstituted suspension, inject 0.5 milliliter intramuscularly, Disp: , Rfl: 0    No Known Allergies    Review of Systems   Constitutional: Negative for appetite change and unexpected weight change.   HENT: Negative for trouble swallowing.    Gastrointestinal: Positive for abdominal distention, abdominal pain and diarrhea. Negative for anal bleeding, blood in stool, constipation, nausea and vomiting.     The following portions of the patient's history were reviewed and updated as appropriate: allergies, current medications, past family history, past medical history, past social history, past surgical history and problem list.    Objective     Physical Exam   Constitutional: She is oriented to person, place, and time. She appears well-developed and well-nourished. No distress.   HENT:   Head: Normocephalic and atraumatic.   Right Ear: Hearing and external ear normal.   Left Ear: Hearing and external ear normal.   Nose: Nose normal.   Mouth/Throat: Oropharynx is clear and moist and mucous membranes are normal. Mucous membranes are not pale, not dry and not cyanotic. No oral lesions. No oropharyngeal exudate.   Eyes: Conjunctivae and EOM are normal. Right eye exhibits no discharge. Left eye exhibits no discharge.   Neck: Trachea normal. Neck supple. No JVD present. No edema present. No thyroid mass and no  "thyromegaly present.   Cardiovascular: Normal rate, regular rhythm, S2 normal and normal heart sounds. Exam reveals no gallop, no S3 and no friction rub.   No murmur heard.  Pulmonary/Chest: Effort normal and breath sounds normal. No respiratory distress. She exhibits no tenderness.   Abdominal: Normal appearance and bowel sounds are normal. She exhibits no distension, no ascites and no mass. There is no splenomegaly or hepatomegaly. There is no tenderness. There is no rigidity, no rebound and no guarding. No hernia.     Vascular Status -  Her right foot exhibits no edema. Her left foot exhibits no edema.  Lymphadenopathy:     She has no cervical adenopathy.        Left: No supraclavicular adenopathy present.   Neurological: She is alert and oriented to person, place, and time. She has normal strength. No cranial nerve deficit or sensory deficit.   Skin: No rash noted. She is not diaphoretic. No cyanosis. No pallor. Nails show no clubbing.   Psychiatric: She has a normal mood and affect.   Nursing note and vitals reviewed.    Vital Signs:   Vitals:    07/22/20 1033   BP: 164/88   Pulse: 80   Resp: 18   Temp: 98.4 °F (36.9 °C)   Weight: 86.6 kg (191 lb)   Height: 165.1 cm (65\")     Results Review:   I have reviewed the patient's new clinical and imaging results.    No visits with results within 180 Day(s) from this visit.   Latest known visit with results is:   Office Visit on 09/26/2019   Component Date Value Ref Range Status   • Creatine Kinase 10/02/2019 82  20 - 180 U/L Final   • Glucose 10/02/2019 92  65 - 99 mg/dL Final   • BUN 10/02/2019 10  8 - 23 mg/dL Final   • Creatinine 10/02/2019 0.96  0.57 - 1.00 mg/dL Final   • eGFR Non  Am 10/02/2019 57* >60 mL/min/1.73 Final    Comment: The MDRD GFR formula is only valid for adults with stable  renal function between ages 18 and 70.     • eGFR  Am 10/02/2019 69  >60 mL/min/1.73 Final   • BUN/Creatinine Ratio 10/02/2019 10.4  7.0 - 25.0 Final   • Sodium " 10/02/2019 142  136 - 145 mmol/L Final   • Potassium 10/02/2019 4.1  3.5 - 5.2 mmol/L Final   • Chloride 10/02/2019 104  98 - 107 mmol/L Final   • Total CO2 10/02/2019 28.8  22.0 - 29.0 mmol/L Final   • Calcium 10/02/2019 9.7  8.6 - 10.5 mg/dL Final   • Total Protein 10/02/2019 6.6  6.0 - 8.5 g/dL Final   • Albumin 10/02/2019 4.40  3.50 - 5.20 g/dL Final   • Globulin 10/02/2019 2.2  gm/dL Final   • A/G Ratio 10/02/2019 2.0  g/dL Final   • Total Bilirubin 10/02/2019 0.3  0.2 - 1.2 mg/dL Final   • Alkaline Phosphatase 10/02/2019 57  39 - 117 U/L Final   • AST (SGOT) 10/02/2019 15  1 - 32 U/L Final   • ALT (SGPT) 10/02/2019 13  1 - 33 U/L Final   • Total Cholesterol 10/02/2019 219* 0 - 200 mg/dL Final   • Triglycerides 10/02/2019 182* 0 - 150 mg/dL Final   • HDL Cholesterol 10/02/2019 69* 40 - 60 mg/dL Final   • VLDL Cholesterol 10/02/2019 36.4  mg/dL Final   • LDL Cholesterol  10/02/2019 114* 0 - 100 mg/dL Final   • Hemoglobin A1C 10/02/2019 5.50  4.80 - 5.60 % Final    Comment: Hemoglobin A1C Ranges:  Increased Risk for Diabetes  5.7% to 6.4%  Diabetes                     >= 6.5%  Diabetic Goal                < 7.0%        Assessment / Plan      Diagnoses and all orders for this visit:    Abdominal pressure  Differentials include gastritis. Patient had taken Pantoprazole in the past with improvement of similar symptoms.    -     pantoprazole (PROTONIX) 40 MG EC tablet; 1 po daily in the am 30 minutes before breakfast    Burping  Differentials include gastritis.  -     pantoprazole (PROTONIX) 40 MG EC tablet; 1 po daily in the am 30 minutes before breakfast    Diarrhea, unspecified type  2 episodes in 2 weeks. Etiology unclear. Differentials include viral.    Patient Instructions   1. Antireflux measures: Avoid fried, fatty foods, alcohol, chocolate, coffee, tea,  soft drinks, peppermint and spearmint, spicy foods, tomatoes and tomato based foods, onion based foods, and smoking. Other antireflux measures include  weight reduction if overweight, avoiding tight clothing around the abdomen, elevating the head of the bed 6 inches with blocks under the head board, and don't drink or eat before going to bed and avoid lying down immediately after meals.  2. Pantoprazole 40 mg 1 po daily in the am 30 minutes before breakfast.  3. Recommended to take Levothyroxine first in the am upon waking, wait 30 minutes, then take Pantoprazole 40 mg, wait 30 minutes, then eat breakfast and take other medications.  4. Possible EGD in the future if indicated.  5. Follow up: 8 weeks     Cedrick Chowdhury, APRN  7/22/2020    Please note that portions of this note may have been completed with a voice recognition program. Efforts were made to edit the dictations, but occasionally words are mistranscribed.

## 2020-07-27 RX ORDER — CARVEDILOL 12.5 MG/1
TABLET ORAL
Qty: 180 TABLET | Refills: 1 | Status: SHIPPED | OUTPATIENT
Start: 2020-07-27 | End: 2021-01-29

## 2020-08-17 DIAGNOSIS — F41.8 DEPRESSION WITH ANXIETY: ICD-10-CM

## 2020-08-17 RX ORDER — MIRTAZAPINE 30 MG/1
TABLET, FILM COATED ORAL
Qty: 30 TABLET | Refills: 1 | Status: SHIPPED | OUTPATIENT
Start: 2020-08-17 | End: 2021-01-09 | Stop reason: SDUPTHER

## 2020-09-16 ENCOUNTER — RESULTS ENCOUNTER (OUTPATIENT)
Dept: GASTROENTEROLOGY | Facility: CLINIC | Age: 75
End: 2020-09-16

## 2020-09-16 ENCOUNTER — OFFICE VISIT (OUTPATIENT)
Dept: GASTROENTEROLOGY | Facility: CLINIC | Age: 75
End: 2020-09-16

## 2020-09-16 VITALS
HEIGHT: 65 IN | RESPIRATION RATE: 16 BRPM | WEIGHT: 193 LBS | BODY MASS INDEX: 32.15 KG/M2 | HEART RATE: 68 BPM | DIASTOLIC BLOOD PRESSURE: 78 MMHG | SYSTOLIC BLOOD PRESSURE: 158 MMHG | TEMPERATURE: 97.7 F

## 2020-09-16 DIAGNOSIS — R14.2 BURPING: ICD-10-CM

## 2020-09-16 DIAGNOSIS — R10.9 ABDOMINAL PRESSURE: ICD-10-CM

## 2020-09-16 DIAGNOSIS — R19.7 DIARRHEA, UNSPECIFIED TYPE: ICD-10-CM

## 2020-09-16 DIAGNOSIS — R10.9 ABDOMINAL PRESSURE: Primary | ICD-10-CM

## 2020-09-16 PROCEDURE — 99214 OFFICE O/P EST MOD 30 MIN: CPT | Performed by: NURSE PRACTITIONER

## 2020-09-16 NOTE — PATIENT INSTRUCTIONS
1. Antireflux measures: Avoid fried, fatty foods, alcohol, chocolate, coffee, tea,  soft drinks, peppermint and spearmint, spicy foods, tomatoes and tomato based foods, onion based foods, and smoking. Other antireflux measures include weight reduction if overweight, avoiding tight clothing around the abdomen, elevating the head of the bed 6 inches with blocks under the head board, and don't drink or eat before going to bed and avoid lying down immediately after meals.  2. Pantoprazole 40 mg 1 by mouth in the am 30 minutes before breakfast.  3. Discussed trial of avoidance of dairy.   4. CBC, CMP, TSH, IgA, tTg IgA  5. CT abdomen and pelvis with contrast  6. Follow up: 4 weeks

## 2020-09-16 NOTE — PROGRESS NOTES
"     Follow Up Note     Date: 2020   Patient Name: Deya Hernandez  MRN: [id number]  : 1945     Referring Physician: Santi Dalal MD    Chief Complaint   Patient presents with   • Follow-up   • Bloated   • Gas     2020  Deya Hernandez is a 75 y.o. female who is here today for follow up for abdominal pressure. The patient has been taking Pantoprazole and had some improvement of her symptoms, but she has continued to not feel well in general. She has a difficult time explaining how she feels, states she \"just doesn't feel good\". She continues to have abdominal pressure and bloating. Frequently has gas. She has had a few episodes of loose stool since her last visit, but nothing on a regular basis. She cannot link it to anything particular she has eaten.    Interval History:   2020  For the past year or so, the patient will have periumbilical abdominal pain and bloating. The pain is described as a pressure in her abdomen and is very mild. The pressure may last a few days, then \"go away\" for a few days or even a month or so. Burping or passing gas improves the pressure. The patient denies nausea or vomiting. There is no history of heartburn or reflux. She had taken Pantoprazole in the past for similar symptoms, but she stopped it over 3 years ago.     The patient has had a few episodes of diarrhea over the past 2 weeks or so. She is not sure if it was something she had eaten. No rectal bleeding.      Last EGD was 3/28/2016 with esophagitis, gastritis, bulbar duodenitis. Biopsies negative  Last colonoscopy was 2016 with small polyps (1 tubular adenoma without dysplasia), diverticulosis, and internal hemorrhoids.     Subjective      Past Medical History:   Diagnosis Date   • Abdominal bloating    • Abnormal EKG    • Anxiety and depression    • Colon cancer screening    • Colon polyp 2016   • Disease of thyroid gland    • Dyspnea    • Elbow pain, left    • Fatigue    • Gastritis    • " GERD (gastroesophageal reflux disease)    • H/O mammogram    • H/O sinusitis    • H/O: pneumonia    • Heartburn    • Hypertension    • Plantar fasciitis    • Sebaceous cyst    • Sinusitis      Past Surgical History:   Procedure Laterality Date   • COLONOSCOPY  04/07/2016   • TUBAL ABDOMINAL LIGATION     • UPPER GASTROINTESTINAL ENDOSCOPY  03/2016     Family History   Problem Relation Age of Onset   • Heart attack Other    • Arthritis Other    • Cancer Other    • Diabetes Other    • Hypertension Other    • Stroke Other    • Cancer Mother    • Breast cancer Mother 70   • Emphysema Father    • Colon polyps Neg Hx    • Esophageal cancer Neg Hx    • Irritable bowel syndrome Neg Hx    • Liver cancer Neg Hx    • Liver disease Neg Hx    • Stomach cancer Neg Hx    • Colon cancer Neg Hx    • Ovarian cancer Neg Hx      Social History     Socioeconomic History   • Marital status:      Spouse name: Not on file   • Number of children: Not on file   • Years of education: Not on file   • Highest education level: Not on file   Tobacco Use   • Smoking status: Former Smoker   • Smokeless tobacco: Never Used   • Tobacco comment: Quit in 2015   Substance and Sexual Activity   • Alcohol use: Yes     Comment: social   • Drug use: No   • Sexual activity: Not Currently     Medications:     Current Outpatient Medications:   •  ALPRAZolam (XANAX) 0.5 MG tablet, Take 1 tablet by mouth 2 (Two) Times a Day As Needed for Anxiety., Disp: 30 tablet, Rfl: 0  •  amLODIPine (NORVASC) 5 MG tablet, Take 1 tablet by mouth Daily., Disp: 60 tablet, Rfl: 5  •  Biotin 5 MG capsule, Take 3,000 mcg by mouth Daily., Disp: , Rfl:   •  buPROPion XL (WELLBUTRIN XL) 300 MG 24 hr tablet, TAKE 1 TABLET BY MOUTH EVERY DAY, Disp: 30 tablet, Rfl: 5  •  Calcium Carbonate Antacid (TUMS PO), Take  by mouth As Needed., Disp: , Rfl:   •  carvedilol (COREG) 12.5 MG tablet, TAKE 1 TABLET BY MOUTH TWICE A DAY WITH FOOD, Disp: 180 tablet, Rfl: 1  •  Cholecalciferol  (VITAMIN D3) 1000 UNITS capsule, Take 2,500 mg by mouth Daily., Disp: , Rfl:   •  diclofenac (VOLTAREN) 1 % gel gel, Apply 4 g topically to the appropriate area as directed 4 (Four) Times a Day As Needed (pain)., Disp: 100 g, Rfl: 0  •  levothyroxine (SYNTHROID, LEVOTHROID) 88 MCG tablet, Take 1 tablet by mouth Daily., Disp: 90 tablet, Rfl: 0  •  mirtazapine (REMERON) 30 MG tablet, TAKE 1 TABLET BY MOUTH EVERY NIGHT AT BEDTIME, Disp: 30 tablet, Rfl: 1  •  pantoprazole (PROTONIX) 40 MG EC tablet, 1 po daily in the am 30 minutes before breakfast, Disp: 30 tablet, Rfl: 5  •  Simethicone (GAS-X PO), Take  by mouth As Needed., Disp: , Rfl:   •  simvastatin (ZOCOR) 40 MG tablet, take 1 tablet by mouth at bedtime, Disp: 90 tablet, Rfl: 3  •  trandolapril (MAVIK) 4 MG tablet, TAKE 1 TABLET BY MOUTH ONCE DAILY, Disp: 90 tablet, Rfl: 1  •  Turmeric, Curcuma Longa, powder, Daily., Disp: , Rfl:   •  vitamin B-12 (CYANOCOBALAMIN) 1000 MCG tablet, Take 1 tablet by mouth daily. As directed, Disp: , Rfl:   •  HAVRIX 1440 EL U/ML vaccine, inject 1 milliliter intramuscularly, Disp: , Rfl: 0  •  SHINGRIX 50 MCG/0.5ML reconstituted suspension, inject 0.5 milliliter intramuscularly, Disp: , Rfl: 0    Allergies:   No Known Allergies    Review of Systems:   Review of Systems   Constitutional: Negative for appetite change and unexpected weight loss.   HENT: Negative for trouble swallowing.    Eyes: Negative for blurred vision.   Respiratory: Negative for choking and chest tightness.    Cardiovascular: Negative for leg swelling.   Gastrointestinal: Positive for abdominal distention and diarrhea. Negative for abdominal pain, anal bleeding, blood in stool, constipation, nausea, rectal pain, vomiting, GERD and indigestion.   Endocrine: Negative for polyphagia.   Genitourinary: Negative for hematuria.   Musculoskeletal: Negative for arthralgias and myalgias.   Skin: Negative for rash.   Allergic/Immunologic: Negative for food allergies.  "  Neurological: Negative for dizziness, syncope and confusion.   Hematological: Does not bruise/bleed easily.   Psychiatric/Behavioral: Negative for depressed mood.     The following portions of the patient's history were reviewed and updated as appropriate: allergies, current medications, past family history, past medical history, past social history, past surgical history and problem list.    Objective     Physical Exam:  Vital Signs:   Vitals:    09/16/20 1422   BP: 158/78   Pulse: 68   Resp: 16   Temp: 97.7 °F (36.5 °C)   Weight: 87.5 kg (193 lb)   Height: 165.1 cm (65\")     Physical Exam  Vitals signs and nursing note reviewed.   Constitutional:       General: She is awake. She is not in acute distress.     Appearance: Normal appearance. She is well-developed and normal weight. She is not diaphoretic.   HENT:      Head: Normocephalic and atraumatic.      Right Ear: Hearing and external ear normal.      Left Ear: Hearing and external ear normal.      Nose: Nose normal.      Mouth/Throat:      Lips: Pink.      Mouth: Mucous membranes are not pale, not dry and not cyanotic. No oral lesions.      Pharynx: No oropharyngeal exudate.   Eyes:      General: Lids are normal.         Right eye: No discharge.         Left eye: No discharge.      Conjunctiva/sclera: Conjunctivae normal.   Neck:      Musculoskeletal: Neck supple. No edema.      Thyroid: No thyroid mass or thyromegaly.      Vascular: No JVD.      Trachea: Trachea normal.   Cardiovascular:      Rate and Rhythm: Normal rate and regular rhythm.      Heart sounds: Normal heart sounds and S2 normal. No murmur. No friction rub. No gallop. No S3 sounds.    Pulmonary:      Effort: Pulmonary effort is normal. No respiratory distress.      Breath sounds: Normal breath sounds.   Chest:      Chest wall: No tenderness.   Abdominal:      General: Bowel sounds are normal. There is no distension.      Palpations: Abdomen is not rigid. There is no hepatomegaly, splenomegaly " or mass.      Tenderness: There is no abdominal tenderness. There is no guarding or rebound.      Hernia: No hernia is present.   Musculoskeletal: Normal range of motion.   Lymphadenopathy:      Cervical: No cervical adenopathy.      Upper Body:      Left upper body: No supraclavicular adenopathy.   Skin:     General: Skin is warm and dry.      Coloration: Skin is not pale.      Findings: No rash.      Nails: There is no clubbing.     Neurological:      Mental Status: She is alert and oriented to person, place, and time.      Cranial Nerves: No cranial nerve deficit.      Sensory: No sensory deficit.   Psychiatric:         Mood and Affect: Mood normal.         Speech: Speech normal.         Behavior: Behavior is cooperative.       Results Review:   I reviewed the patient's new clinical results.    No visits with results within 90 Day(s) from this visit.   Latest known visit with results is:   Office Visit on 09/26/2019   Component Date Value Ref Range Status   • Creatine Kinase 10/02/2019 82  20 - 180 U/L Final   • Glucose 10/02/2019 92  65 - 99 mg/dL Final   • BUN 10/02/2019 10  8 - 23 mg/dL Final   • Creatinine 10/02/2019 0.96  0.57 - 1.00 mg/dL Final   • eGFR Non  Am 10/02/2019 57* >60 mL/min/1.73 Final    Comment: The MDRD GFR formula is only valid for adults with stable  renal function between ages 18 and 70.     • eGFR  Am 10/02/2019 69  >60 mL/min/1.73 Final   • BUN/Creatinine Ratio 10/02/2019 10.4  7.0 - 25.0 Final   • Sodium 10/02/2019 142  136 - 145 mmol/L Final   • Potassium 10/02/2019 4.1  3.5 - 5.2 mmol/L Final   • Chloride 10/02/2019 104  98 - 107 mmol/L Final   • Total CO2 10/02/2019 28.8  22.0 - 29.0 mmol/L Final   • Calcium 10/02/2019 9.7  8.6 - 10.5 mg/dL Final   • Total Protein 10/02/2019 6.6  6.0 - 8.5 g/dL Final   • Albumin 10/02/2019 4.40  3.50 - 5.20 g/dL Final   • Globulin 10/02/2019 2.2  gm/dL Final   • A/G Ratio 10/02/2019 2.0  g/dL Final   • Total Bilirubin 10/02/2019 0.3   0.2 - 1.2 mg/dL Final   • Alkaline Phosphatase 10/02/2019 57  39 - 117 U/L Final   • AST (SGOT) 10/02/2019 15  1 - 32 U/L Final   • ALT (SGPT) 10/02/2019 13  1 - 33 U/L Final   • Total Cholesterol 10/02/2019 219* 0 - 200 mg/dL Final   • Triglycerides 10/02/2019 182* 0 - 150 mg/dL Final   • HDL Cholesterol 10/02/2019 69* 40 - 60 mg/dL Final   • VLDL Cholesterol 10/02/2019 36.4  mg/dL Final   • LDL Cholesterol  10/02/2019 114* 0 - 100 mg/dL Final   • Hemoglobin A1C 10/02/2019 5.50  4.80 - 5.60 % Final    Comment: Hemoglobin A1C Ranges:  Increased Risk for Diabetes  5.7% to 6.4%  Diabetes                     >= 6.5%  Diabetic Goal                < 7.0%        No radiology results for the last 90 days.    EGD dated 3/28/2016 reveals erythematous distal esophagitis. No Theodore's esophagus. Erythematous erosive gastritis. Erythematous bulbar duodenitis. Tiny sliding hiatal hernia. Second portion duodenum biopsies revealed preserved villous architecture with no significant histopathologic change. No parasites noted. No villous blunting or increased intraepithelial lymphocytes suggestive of celiac disease. Gastric antrum body biopsies revealed reactive gastritis with focal activity. No definitive H pylori-like organisms noted on routine staining. No intestinal metaplasia or dysplasia identified.     Colonoscopy dated 4/7/2016 reveals scant vascular ectasia within the transverse colon. Small colon polyps. Scant early diverticular change in the left colon. Internal hemorrhoids and hypertrophic anal papilla. Rectal polyps ×5 revealed hyperplastic polyp. Multiple fragments of rectal mucosa showing prolapse changes. Cecum: Biopsy reveals no pathologic alterations, mucosal lymphoid aggregate. Hepatic flexure polyp ×1 reveals tubular adenoma without high-grade dysplasia.    Assessment / Plan      Diagnoses and all orders for this visit:    1. Abdominal pressure (Primary)  History of abdominal pressure for the past year or so. No  "improvement with Pantoprazole. The patient has a difficult time explaining her symptoms, but states she \"does not feel good\". The patient denies abdominal pain, just pressure. No nausea or vomiting. No heartburn or reflux. No difficulty swallowing.  EGD with esophagitis, gastritis and duodenitis, otherwise unremarkable. Colonoscopy with small polyps, otherwise unremarkable.  Basic labs, IgA/tTg IgA to rule out celiac.  CT abdomen and pelvis with contrast to rule out solid organ pathology.    -     CT Abdomen Pelvis With Contrast; Future  -     Tissue Transglutaminase, IgA; Future  -     IgA; Future  -     Comprehensive Metabolic Panel; Future  -     CBC (No Diff); Future    2. Burping  History of frequent burping for the past year. Improved with PPI therapy. Will continue for now.    3. Diarrhea, unspecified type  History of intermittent diarrhea for the past month or so. Diarrhea is very sporadic and does not occur on a regular basis. Upon further discussion, the patient had eaten ice cream yesterday and within an hour, she began having an \"unwell\" feeling, then had an episode of diarrhea this morning. No history of rectal bleeding.  Discussed trial of avoiding all dairy.  The patient will keep food diary and log of times she is experiencing symptoms.  TSH as patient is on Levothyroxine. Last level was normal in April 2019.  -     TSH; Future    Patient Instructions   1. Antireflux measures: Avoid fried, fatty foods, alcohol, chocolate, coffee, tea,  soft drinks, peppermint and spearmint, spicy foods, tomatoes and tomato based foods, onion based foods, and smoking. Other antireflux measures include weight reduction if overweight, avoiding tight clothing around the abdomen, elevating the head of the bed 6 inches with blocks under the head board, and don't drink or eat before going to bed and avoid lying down immediately after meals.  2. Pantoprazole 40 mg 1 by mouth in the am 30 minutes before breakfast.  3. " Discussed trial of avoidance of dairy.   4. CBC, CMP, TSH, IgA, tTg IgA  5. CT abdomen and pelvis with contrast  6. Follow up: 4 weeks    TERESA Tobias  [facility]  9/16/2020  16:15 EDT     Please note that portions of this note may have been completed with a voice recognition program.

## 2020-09-17 LAB
ALBUMIN SERPL-MCNC: 4.6 G/DL (ref 3.5–5.2)
ALBUMIN/GLOB SERPL: 2.7 G/DL
ALP SERPL-CCNC: 62 U/L (ref 39–117)
ALT SERPL-CCNC: 16 U/L (ref 1–33)
AST SERPL-CCNC: 17 U/L (ref 1–32)
BILIRUB SERPL-MCNC: 0.3 MG/DL (ref 0–1.2)
BUN SERPL-MCNC: 10 MG/DL (ref 8–23)
BUN/CREAT SERPL: 11 (ref 7–25)
CALCIUM SERPL-MCNC: 9.5 MG/DL (ref 8.6–10.5)
CHLORIDE SERPL-SCNC: 98 MMOL/L (ref 98–107)
CO2 SERPL-SCNC: 25.2 MMOL/L (ref 22–29)
CREAT SERPL-MCNC: 0.91 MG/DL (ref 0.57–1)
ERYTHROCYTE [DISTWIDTH] IN BLOOD BY AUTOMATED COUNT: 12.1 % (ref 12.3–15.4)
GLOBULIN SER CALC-MCNC: 1.7 GM/DL
GLUCOSE SERPL-MCNC: 99 MG/DL (ref 65–99)
HCT VFR BLD AUTO: 37.6 % (ref 34–46.6)
HGB BLD-MCNC: 13.1 G/DL (ref 12–15.9)
IGA SERPL-MCNC: 155 MG/DL (ref 64–422)
MCH RBC QN AUTO: 32.2 PG (ref 26.6–33)
MCHC RBC AUTO-ENTMCNC: 34.8 G/DL (ref 31.5–35.7)
MCV RBC AUTO: 92.4 FL (ref 79–97)
PLATELET # BLD AUTO: 364 10*3/MM3 (ref 140–450)
POTASSIUM SERPL-SCNC: 4.2 MMOL/L (ref 3.5–5.2)
PROT SERPL-MCNC: 6.3 G/DL (ref 6–8.5)
RBC # BLD AUTO: 4.07 10*6/MM3 (ref 3.77–5.28)
SODIUM SERPL-SCNC: 140 MMOL/L (ref 136–145)
TSH SERPL DL<=0.005 MIU/L-ACNC: 2.2 UIU/ML (ref 0.27–4.2)
TTG IGA SER-ACNC: <2 U/ML (ref 0–3)
WBC # BLD AUTO: 6.99 10*3/MM3 (ref 3.4–10.8)

## 2020-09-28 ENCOUNTER — HOSPITAL ENCOUNTER (OUTPATIENT)
Dept: CT IMAGING | Facility: HOSPITAL | Age: 75
Discharge: HOME | End: 2020-09-28
Admitting: NURSE PRACTITIONER

## 2020-09-28 DIAGNOSIS — R10.9 ABDOMINAL PRESSURE: ICD-10-CM

## 2020-09-28 PROCEDURE — 25010000002 IOPAMIDOL 61 % SOLUTION: Performed by: NURSE PRACTITIONER

## 2020-09-28 PROCEDURE — 74177 CT ABD & PELVIS W/CONTRAST: CPT

## 2020-09-28 RX ORDER — LEVOTHYROXINE SODIUM 88 UG/1
88 TABLET ORAL DAILY
Qty: 90 TABLET | Refills: 0 | Status: SHIPPED | OUTPATIENT
Start: 2020-09-28 | End: 2020-12-24

## 2020-09-28 RX ADMIN — IOPAMIDOL 100 ML: 612 INJECTION, SOLUTION INTRAVENOUS at 11:48

## 2020-10-12 ENCOUNTER — OFFICE VISIT (OUTPATIENT)
Dept: GASTROENTEROLOGY | Facility: CLINIC | Age: 75
End: 2020-10-12

## 2020-10-12 VITALS
RESPIRATION RATE: 16 BRPM | HEART RATE: 72 BPM | SYSTOLIC BLOOD PRESSURE: 149 MMHG | DIASTOLIC BLOOD PRESSURE: 76 MMHG | TEMPERATURE: 98.4 F | BODY MASS INDEX: 32.32 KG/M2 | HEIGHT: 65 IN | WEIGHT: 194 LBS

## 2020-10-12 DIAGNOSIS — R10.9 ABDOMINAL PRESSURE: Primary | Chronic | ICD-10-CM

## 2020-10-12 DIAGNOSIS — K76.0 FATTY INFILTRATION OF LIVER: Chronic | ICD-10-CM

## 2020-10-12 DIAGNOSIS — Z86.010 PERSONAL HISTORY OF COLONIC POLYPS: Chronic | ICD-10-CM

## 2020-10-12 DIAGNOSIS — E66.09 CLASS 1 OBESITY DUE TO EXCESS CALORIES WITHOUT SERIOUS COMORBIDITY WITH BODY MASS INDEX (BMI) OF 32.0 TO 32.9 IN ADULT: Chronic | ICD-10-CM

## 2020-10-12 PROBLEM — E66.811 CLASS 1 OBESITY DUE TO EXCESS CALORIES WITHOUT SERIOUS COMORBIDITY WITH BODY MASS INDEX (BMI) OF 32.0 TO 32.9 IN ADULT: Status: ACTIVE | Noted: 2020-10-12

## 2020-10-12 PROBLEM — Z86.0100 PERSONAL HISTORY OF COLONIC POLYPS: Status: ACTIVE | Noted: 2020-10-12

## 2020-10-12 PROBLEM — R51.9 NONINTRACTABLE HEADACHE: Status: RESOLVED | Noted: 2018-01-22 | Resolved: 2020-10-12

## 2020-10-12 PROCEDURE — 99214 OFFICE O/P EST MOD 30 MIN: CPT | Performed by: NURSE PRACTITIONER

## 2020-10-12 NOTE — PROGRESS NOTES
"     Follow Up Note     Date: 10/12/2020   Patient Name: Deya Hernandez  MRN: [id number]  : 1945     Primary Care Provider: Santi Dalal MD     Chief Complaint:    Chief Complaint   Patient presents with   • Follow-up     History of present illness:   10/12/2020  Deya Hernandez is a 75 y.o. female who is here today for follow up.    She has been feeling much better. Diarrhea resolved.    Interval History:  2020  Deay Hernandez is a 75 y.o. female who is here today for follow up for abdominal pressure. The patient has been taking Pantoprazole and had some improvement of her symptoms, but she has continued to not feel well in general. She has a difficult time explaining how she feels, states she \"just doesn't feel good\". She continues to have abdominal pressure and bloating. Frequently has gas. She has had a few episodes of loose stool since her last visit, but nothing on a regular basis. She cannot link it to anything particular she has eaten.     2020  For the past year or so, the patient will have periumbilical abdominal pain and bloating. The pain is described as a pressure in her abdomen and is very mild. The pressure may last a few days, then \"go away\" for a few days or even a month or so. Burping or passing gas improves the pressure. The patient denies nausea or vomiting. There is no history of heartburn or reflux. She had taken Pantoprazole in the past for similar symptoms, but she stopped it over 3 years ago.     The patient has had a few episodes of diarrhea over the past 2 weeks or so. She is not sure if it was something she had eaten. No rectal bleeding.      Last colonoscopy was 2016 with small polyps (1 tubular adenoma without dysplasia), diverticulosis, and internal hemorrhoids.     Subjective      Past Medical History:   Diagnosis Date   • Abdominal bloating    • Abnormal EKG    • Anxiety and depression    • Colon cancer screening    • Colon polyp 2016   • Disease " of thyroid gland    • Dyspnea    • Elbow pain, left    • Fatigue    • Fatty infiltration of liver 10/12/2020   • Gastritis    • GERD (gastroesophageal reflux disease)    • H/O mammogram    • H/O sinusitis    • H/O: pneumonia    • Heartburn    • Hypertension    • Plantar fasciitis    • Sebaceous cyst    • Sinusitis      Past Surgical History:   Procedure Laterality Date   • COLONOSCOPY  04/07/2016   • TUBAL ABDOMINAL LIGATION     • UPPER GASTROINTESTINAL ENDOSCOPY  03/2016     Family History   Problem Relation Age of Onset   • Heart attack Other    • Arthritis Other    • Cancer Other    • Diabetes Other    • Hypertension Other    • Stroke Other    • Cancer Mother    • Breast cancer Mother 70   • Emphysema Father    • Colon polyps Neg Hx    • Esophageal cancer Neg Hx    • Irritable bowel syndrome Neg Hx    • Liver cancer Neg Hx    • Liver disease Neg Hx    • Stomach cancer Neg Hx    • Colon cancer Neg Hx    • Ovarian cancer Neg Hx      Social History     Socioeconomic History   • Marital status:      Spouse name: Not on file   • Number of children: Not on file   • Years of education: Not on file   • Highest education level: Not on file   Tobacco Use   • Smoking status: Former Smoker   • Smokeless tobacco: Never Used   • Tobacco comment: Quit in 2015   Substance and Sexual Activity   • Alcohol use: Yes     Comment: social   • Drug use: No   • Sexual activity: Not Currently       Current Outpatient Medications:   •  ALPRAZolam (XANAX) 0.5 MG tablet, Take 1 tablet by mouth 2 (Two) Times a Day As Needed for Anxiety., Disp: 30 tablet, Rfl: 0  •  amLODIPine (NORVASC) 5 MG tablet, Take 1 tablet by mouth Daily., Disp: 60 tablet, Rfl: 5  •  Biotin 5 MG capsule, Take 3,000 mcg by mouth Daily., Disp: , Rfl:   •  buPROPion XL (WELLBUTRIN XL) 300 MG 24 hr tablet, TAKE 1 TABLET BY MOUTH EVERY DAY, Disp: 30 tablet, Rfl: 5  •  Calcium Carbonate Antacid (TUMS PO), Take  by mouth As Needed., Disp: , Rfl:   •  carvedilol (COREG)  12.5 MG tablet, TAKE 1 TABLET BY MOUTH TWICE A DAY WITH FOOD, Disp: 180 tablet, Rfl: 1  •  diclofenac (VOLTAREN) 1 % gel gel, Apply 4 g topically to the appropriate area as directed 4 (Four) Times a Day As Needed (pain)., Disp: 100 g, Rfl: 0  •  levothyroxine (SYNTHROID, LEVOTHROID) 88 MCG tablet, TAKE 1 TABLET BY MOUTH DAILY, Disp: 90 tablet, Rfl: 0  •  mirtazapine (REMERON) 30 MG tablet, TAKE 1 TABLET BY MOUTH EVERY NIGHT AT BEDTIME, Disp: 30 tablet, Rfl: 1  •  Misc Natural Products (IMMUNE FORMULA PO), Take  by mouth., Disp: , Rfl:   •  pantoprazole (PROTONIX) 40 MG EC tablet, 1 po daily in the am 30 minutes before breakfast, Disp: 30 tablet, Rfl: 5  •  Simethicone (GAS-X PO), Take  by mouth As Needed., Disp: , Rfl:   •  simvastatin (ZOCOR) 40 MG tablet, take 1 tablet by mouth at bedtime, Disp: 90 tablet, Rfl: 3  •  trandolapril (MAVIK) 4 MG tablet, TAKE 1 TABLET BY MOUTH ONCE DAILY, Disp: 90 tablet, Rfl: 1  •  Turmeric, Curcuma Longa, powder, Daily., Disp: , Rfl:   •  vitamin B-12 (CYANOCOBALAMIN) 1000 MCG tablet, Take 1 tablet by mouth daily. As directed, Disp: , Rfl:   •  Cholecalciferol (VITAMIN D3) 1000 UNITS capsule, Take 2,500 mg by mouth Daily., Disp: , Rfl:   •  HAVRIX 1440 EL U/ML vaccine, inject 1 milliliter intramuscularly, Disp: , Rfl: 0  •  SHINGRIX 50 MCG/0.5ML reconstituted suspension, inject 0.5 milliliter intramuscularly, Disp: , Rfl: 0     No Known Allergies     Review of Systems   Constitutional: Negative for appetite change and unexpected weight loss.   HENT: Negative for trouble swallowing.    Eyes: Negative for blurred vision.   Respiratory: Negative for choking and chest tightness.    Cardiovascular: Negative for leg swelling.   Gastrointestinal: Positive for abdominal distention. Negative for abdominal pain, anal bleeding, blood in stool, constipation, diarrhea, nausea, rectal pain, vomiting, GERD and indigestion.   Endocrine: Negative for polyphagia.   Genitourinary: Negative for  hematuria.   Musculoskeletal: Negative for arthralgias and myalgias.   Skin: Negative for rash.   Allergic/Immunologic: Negative for food allergies.   Neurological: Negative for dizziness, syncope and confusion.   Hematological: Does not bruise/bleed easily.   Psychiatric/Behavioral: Negative for depressed mood.      The following portions of the patient's history were reviewed and updated as appropriate: allergies, current medications, past family history, past medical history, past social history, past surgical history and problem list.  Objective     Physical Exam  Vitals signs and nursing note reviewed.   Constitutional:       General: She is awake. She is not in acute distress.     Appearance: Normal appearance. She is well-developed. She is not diaphoretic.   HENT:      Head: Normocephalic and atraumatic.      Right Ear: Hearing and external ear normal.      Left Ear: Hearing and external ear normal.      Nose: Nose normal.      Mouth/Throat:      Lips: Pink.      Mouth: Mucous membranes are not pale, not dry and not cyanotic. No oral lesions.      Pharynx: No oropharyngeal exudate.   Eyes:      General: Lids are normal.         Right eye: No discharge.         Left eye: No discharge.      Conjunctiva/sclera: Conjunctivae normal.   Neck:      Musculoskeletal: Neck supple. No edema.      Thyroid: No thyroid mass or thyromegaly.      Vascular: No JVD.      Trachea: Trachea normal.   Cardiovascular:      Rate and Rhythm: Normal rate and regular rhythm.      Heart sounds: Normal heart sounds and S2 normal. No murmur. No friction rub. No gallop. No S3 sounds.    Pulmonary:      Effort: Pulmonary effort is normal. No respiratory distress.      Breath sounds: Normal breath sounds.   Chest:      Chest wall: No tenderness.   Abdominal:      General: Bowel sounds are normal. There is no distension.      Palpations: Abdomen is not rigid. There is no hepatomegaly, splenomegaly or mass.      Tenderness: There is no  "abdominal tenderness. There is no guarding or rebound.      Hernia: No hernia is present.   Musculoskeletal: Normal range of motion.   Lymphadenopathy:      Cervical: No cervical adenopathy.      Upper Body:      Left upper body: No supraclavicular adenopathy.   Skin:     General: Skin is warm and dry.      Coloration: Skin is not pale.      Findings: No rash.      Nails: There is no clubbing.     Neurological:      Mental Status: She is alert and oriented to person, place, and time.      Cranial Nerves: No cranial nerve deficit.      Sensory: No sensory deficit.   Psychiatric:         Mood and Affect: Mood normal.         Speech: Speech normal.         Behavior: Behavior is cooperative.       Vitals:    10/12/20 1544   BP: 149/76   Pulse: 72   Resp: 16   Temp: 98.4 °F (36.9 °C)   Weight: 88 kg (194 lb)   Height: 165.1 cm (65\")     Results Review:   I reviewed the patient's new clinical results.    Orders Only on 09/16/2020   Component Date Value Ref Range Status   • Glucose 09/16/2020 99  65 - 99 mg/dL Final   • BUN 09/16/2020 10  8 - 23 mg/dL Final   • Creatinine 09/16/2020 0.91  0.57 - 1.00 mg/dL Final   • eGFR Non  Am 09/16/2020 61  >60 mL/min/1.73 Final    Comment: The MDRD GFR formula is only valid for adults with stable  renal function between ages 18 and 70.     • eGFR  Am 09/16/2020 73  >60 mL/min/1.73 Final   • BUN/Creatinine Ratio 09/16/2020 11.0  7.0 - 25.0 Final   • Sodium 09/16/2020 140  136 - 145 mmol/L Final   • Potassium 09/16/2020 4.2  3.5 - 5.2 mmol/L Final   • Chloride 09/16/2020 98  98 - 107 mmol/L Final   • Total CO2 09/16/2020 25.2  22.0 - 29.0 mmol/L Final   • Calcium 09/16/2020 9.5  8.6 - 10.5 mg/dL Final   • Total Protein 09/16/2020 6.3  6.0 - 8.5 g/dL Final   • Albumin 09/16/2020 4.60  3.50 - 5.20 g/dL Final   • Globulin 09/16/2020 1.7  gm/dL Final   • A/G Ratio 09/16/2020 2.7  g/dL Final   • Total Bilirubin 09/16/2020 0.3  0.0 - 1.2 mg/dL Final   • Alkaline Phosphatase " 09/16/2020 62  39 - 117 U/L Final   • AST (SGOT) 09/16/2020 17  1 - 32 U/L Final   • ALT (SGPT) 09/16/2020 16  1 - 33 U/L Final   • WBC 09/16/2020 6.99  3.40 - 10.80 10*3/mm3 Final   • RBC 09/16/2020 4.07  3.77 - 5.28 10*6/mm3 Final   • Hemoglobin 09/16/2020 13.1  12.0 - 15.9 g/dL Final   • Hematocrit 09/16/2020 37.6  34.0 - 46.6 % Final   • MCV 09/16/2020 92.4  79.0 - 97.0 fL Final   • MCH 09/16/2020 32.2  26.6 - 33.0 pg Final   • MCHC 09/16/2020 34.8  31.5 - 35.7 g/dL Final   • RDW 09/16/2020 12.1* 12.3 - 15.4 % Final   • Platelets 09/16/2020 364  140 - 450 10*3/mm3 Final   • TSH 09/16/2020 2.200  0.270 - 4.200 uIU/mL Final   • Tissue Transglutaminase IgA 09/16/2020 <2  0 - 3 U/mL Final    Comment:                               Negative        0 -  3                                Weak Positive   4 - 10                                Positive           >10   Tissue Transglutaminase (tTG) has been identified   as the endomysial antigen.  Studies have demonstr-   ated that endomysial IgA antibodies have over 99%   specificity for gluten sensitive enteropathy.     • IgA 09/16/2020 155  64 - 422 mg/dL Final      Ct Abdomen Pelvis With Contrast    Result Date: 9/28/2020  No evidence of acute intra-abdominal process.  994.08 mGy.cm   This study was performed with techniques to keep radiation doses as low as reasonably achievable (ALARA). Individualized dose reduction techniques using automated exposure control or adjustment of mA and/or kV according to the patient size were employed.  This report was finalized on 9/28/2020 12:05 PM by Itzel Mendez M.D..     EGD dated 3/28/2016 reveals erythematous distal esophagitis. No Theodore's esophagus. Erythematous erosive gastritis. Erythematous bulbar duodenitis. Tiny sliding hiatal hernia. Second portion duodenum biopsies revealed preserved villous architecture with no significant histopathologic change. No parasites noted. No villous blunting or increased intraepithelial  lymphocytes suggestive of celiac disease. Gastric antrum body biopsies revealed reactive gastritis with focal activity. No definitive H pylori-like organisms noted on routine staining. No intestinal metaplasia or dysplasia identified.     Colonoscopy dated 4/7/2016 reveals scant vascular ectasia within the transverse colon. Small colon polyps. Scant early diverticular change in the left colon. Internal hemorrhoids and hypertrophic anal papilla. Rectal polyps ×5 revealed hyperplastic polyp. Multiple fragments of rectal mucosa showing prolapse changes. Cecum: Biopsy reveals no pathologic alterations, mucosal lymphoid aggregate. Hepatic flexure polyp ×1 reveals tubular adenoma without high-grade dysplasia.    Assessment / Plan      1. Abdominal pressure  Significantly improved with PPI daily. CT scan with fatty liver, otherwise unremarkable. TSH normal. Negative celiac panel.  Continue PPI    2. Fatty infiltration of liver  Incidental finding of fatty infiltration of liver noted on recent CT scan. There is no history of liver disease in the past. There is no history of elevated liver enzymes.  High fiber, low fat diet with liberal water intake.   Advised to exercise 30 minutes 3-4 days per week.  Advised to lose 15-20 pounds in the next 6-12 months.    3. Class 1 obesity due to excess calories without serious comorbidity with body mass index (BMI) of 32.0 to 32.9 in adult  BMI 32.3  The patient admits she has not been watching her diet since her  passed and COVID started.   High fiber, low fat diet with liberal water intake.   Advised to exercise 30 minutes 3-4 days per week.  Advised to lose 20 pounds in the next 6-12 months.    4. Personal history of colonic polyps  Colonoscopy in 2016 with 1 tubular adenoma removed. No family history of colon cancer.  Colonoscopy in 2021 for surveillance.    Patient Instructions   1. Antireflux measures: Avoid fried, fatty foods, alcohol, chocolate, coffee, tea,  soft drinks,  peppermint and spearmint, spicy foods, tomatoes and tomato based foods, onion based foods, and smoking. Other antireflux measures include weight reduction if overweight, avoiding tight clothing around the abdomen, elevating the head of the bed 6 inches with blocks under the head board, and don't drink or eat before going to bed and avoid lying down immediately after meals.  2. Pantoprazole 40 mg 1 by mouth in the am 30 minutes before breakfast.  3. High fiber, low fat diet with liberal water intake.   4. Advised to exercise 30 minutes 3-4 days per week.  5. Advised to lose 15-20 pounds in the next 6-12 months.  6. Colonoscopy due in April 2021.  7. Follow up: March 2020 or sooner if any problems    Cedrick Chowdhury, APRN  10/12/2020    Please note that portions of this note may have been completed with a voice recognition program. Efforts were made to edit the dictations, but occasionally words are mistranscribed.

## 2020-10-12 NOTE — PATIENT INSTRUCTIONS
1. Antireflux measures: Avoid fried, fatty foods, alcohol, chocolate, coffee, tea,  soft drinks, peppermint and spearmint, spicy foods, tomatoes and tomato based foods, onion based foods, and smoking. Other antireflux measures include weight reduction if overweight, avoiding tight clothing around the abdomen, elevating the head of the bed 6 inches with blocks under the head board, and don't drink or eat before going to bed and avoid lying down immediately after meals.  2. Pantoprazole 40 mg 1 by mouth in the am 30 minutes before breakfast.  3. High fiber, low fat diet with liberal water intake.   4. Advised to exercise 30 minutes 3-4 days per week.  5. Advised to lose 15-20 pounds in the next 6-12 months.  6. Colonoscopy due in April 2021.  7. Follow up: March 2020 or sooner if any problems

## 2020-10-21 DIAGNOSIS — R10.9 ABDOMINAL PRESSURE: Chronic | ICD-10-CM

## 2020-10-21 DIAGNOSIS — R14.2 BURPING: Chronic | ICD-10-CM

## 2020-10-21 RX ORDER — PANTOPRAZOLE SODIUM 40 MG/1
TABLET, DELAYED RELEASE ORAL
Qty: 30 TABLET | Refills: 5 | Status: SHIPPED | OUTPATIENT
Start: 2020-10-21 | End: 2021-03-18 | Stop reason: SDUPTHER

## 2020-10-26 RX ORDER — BUPROPION HYDROCHLORIDE 300 MG/1
300 TABLET ORAL DAILY
Qty: 90 TABLET | Refills: 3 | Status: SHIPPED | OUTPATIENT
Start: 2020-10-26 | End: 2021-10-14

## 2020-10-28 RX ORDER — TRANDOLAPRIL TABLETS 4 MG/1
4 TABLET ORAL DAILY
Qty: 90 TABLET | Refills: 3 | Status: SHIPPED | OUTPATIENT
Start: 2020-10-28 | End: 2021-10-14

## 2020-11-20 ENCOUNTER — TELEPHONE (OUTPATIENT)
Dept: INTERNAL MEDICINE | Facility: CLINIC | Age: 75
End: 2020-11-20

## 2020-11-20 NOTE — TELEPHONE ENCOUNTER
Patient requested an appointment with Dr. Dalal. Patient has a cough, shortness of breath, fatigue, and congestion. This started about 4 days ago. Patient refused a telehealth visit.     Please call and advise. Patient call back [phone]

## 2020-11-21 DIAGNOSIS — R06.02 SOB (SHORTNESS OF BREATH): Primary | ICD-10-CM

## 2020-11-22 DIAGNOSIS — Z20.822 EXPOSURE TO COVID-19 VIRUS: Primary | ICD-10-CM

## 2020-11-22 NOTE — TELEPHONE ENCOUNTER
Called the patient stating short of breath for 3 days.  Oxygen saturation 97%.  Currently patient has no short of breath.  Patient has a mild cough and congestion.  No fever chills.  Ordered a Covid test and the chest x-ray for patient.  In the meantime over-the-counter cough medicine decongestant as needed.  Patient knows to go to emergency room if short of breath is worsened.  [facility]

## 2020-12-10 RX ORDER — SIMVASTATIN 40 MG
40 TABLET ORAL
Qty: 90 TABLET | Refills: 3 | Status: SHIPPED | OUTPATIENT
Start: 2020-12-10 | End: 2021-12-18 | Stop reason: SDUPTHER

## 2020-12-16 ENCOUNTER — HOSPITAL ENCOUNTER (OUTPATIENT)
Dept: GENERAL RADIOLOGY | Facility: HOSPITAL | Age: 75
Discharge: HOME | End: 2020-12-16
Admitting: INTERNAL MEDICINE

## 2020-12-16 ENCOUNTER — OFFICE VISIT (OUTPATIENT)
Dept: INTERNAL MEDICINE | Facility: CLINIC | Age: 75
End: 2020-12-16

## 2020-12-16 VITALS
DIASTOLIC BLOOD PRESSURE: 84 MMHG | WEIGHT: 194.12 LBS | HEART RATE: 70 BPM | SYSTOLIC BLOOD PRESSURE: 122 MMHG | OXYGEN SATURATION: 99 % | BODY MASS INDEX: 32.34 KG/M2 | HEIGHT: 65 IN | TEMPERATURE: 97.7 F

## 2020-12-16 DIAGNOSIS — F41.8 DEPRESSION WITH ANXIETY: ICD-10-CM

## 2020-12-16 DIAGNOSIS — R06.09 DOE (DYSPNEA ON EXERTION): ICD-10-CM

## 2020-12-16 DIAGNOSIS — M79.89 PAIN AND SWELLING OF RIGHT LOWER LEG: ICD-10-CM

## 2020-12-16 DIAGNOSIS — I10 ESSENTIAL HYPERTENSION: Primary | ICD-10-CM

## 2020-12-16 DIAGNOSIS — R94.31 ABNORMAL EKG: ICD-10-CM

## 2020-12-16 DIAGNOSIS — E78.2 MIXED HYPERLIPIDEMIA: ICD-10-CM

## 2020-12-16 DIAGNOSIS — I70.90 ARTERIOSCLEROTIC VASCULAR DISEASE: ICD-10-CM

## 2020-12-16 DIAGNOSIS — E03.8 OTHER SPECIFIED HYPOTHYROIDISM: ICD-10-CM

## 2020-12-16 DIAGNOSIS — M79.661 PAIN AND SWELLING OF RIGHT LOWER LEG: ICD-10-CM

## 2020-12-16 PROBLEM — R10.9 ABDOMINAL PRESSURE: Chronic | Status: RESOLVED | Noted: 2020-07-22 | Resolved: 2020-12-16

## 2020-12-16 PROCEDURE — 71046 X-RAY EXAM CHEST 2 VIEWS: CPT

## 2020-12-16 PROCEDURE — 93005 ELECTROCARDIOGRAM TRACING: CPT | Performed by: INTERNAL MEDICINE

## 2020-12-16 PROCEDURE — 99214 OFFICE O/P EST MOD 30 MIN: CPT | Performed by: INTERNAL MEDICINE

## 2020-12-16 NOTE — PROGRESS NOTES
Subjective   Deya Hernandez is a 75 y.o. female.     Chief Complaint   Patient presents with   • Hypertension     6 mo f/u   • Hyperlipidemia   • Shortness of Breath     onset 3-4 weeks, pt states she is still having the sob but not as bad as it was.        History of Present Illness   soa with exertion for 4 weeks. No cough no fever or chill, no chest pain , occa lightheaded, no palpitation, occa soa with resting. bp normal and weight still high.  Mild fatigue or weakness, GERD stable on med, depression and anxiety stable on med.    Current Outpatient Medications:   •  ALPRAZolam (XANAX) 0.5 MG tablet, Take 1 tablet by mouth 2 (Two) Times a Day As Needed for Anxiety., Disp: 30 tablet, Rfl: 0  •  amLODIPine (NORVASC) 5 MG tablet, Take 1 tablet by mouth Daily., Disp: 60 tablet, Rfl: 5  •  Biotin 5 MG capsule, Take 3,000 mcg by mouth Daily., Disp: , Rfl:   •  buPROPion XL (WELLBUTRIN XL) 300 MG 24 hr tablet, Take 1 tablet by mouth Daily., Disp: 90 tablet, Rfl: 3  •  Calcium Carbonate Antacid (TUMS PO), Take  by mouth As Needed., Disp: , Rfl:   •  carvedilol (COREG) 12.5 MG tablet, TAKE 1 TABLET BY MOUTH TWICE A DAY WITH FOOD, Disp: 180 tablet, Rfl: 1  •  Cholecalciferol (VITAMIN D3) 1000 UNITS capsule, Take 2,500 mg by mouth Daily., Disp: , Rfl:   •  diclofenac (VOLTAREN) 1 % gel gel, Apply 4 g topically to the appropriate area as directed 4 (Four) Times a Day As Needed (pain)., Disp: 100 g, Rfl: 0  •  levothyroxine (SYNTHROID, LEVOTHROID) 88 MCG tablet, TAKE 1 TABLET BY MOUTH DAILY, Disp: 90 tablet, Rfl: 0  •  mirtazapine (REMERON) 30 MG tablet, TAKE 1 TABLET BY MOUTH EVERY NIGHT AT BEDTIME, Disp: 30 tablet, Rfl: 1  •  Misc Natural Products (IMMUNE FORMULA PO), Take  by mouth., Disp: , Rfl:   •  pantoprazole (PROTONIX) 40 MG EC tablet, 1 po daily in the am 30 minutes before breakfast, Disp: 30 tablet, Rfl: 5  •  Simethicone (GAS-X PO), Take  by mouth As Needed., Disp: , Rfl:   •  simvastatin (ZOCOR) 40 MG tablet,  Take 1 tablet by mouth every night at bedtime., Disp: 90 tablet, Rfl: 3  •  trandolapril (MAVIK) 4 MG tablet, Take 1 tablet by mouth Daily., Disp: 90 tablet, Rfl: 3  •  Turmeric, Curcuma Longa, powder, Daily., Disp: , Rfl:   •  vitamin B-12 (CYANOCOBALAMIN) 1000 MCG tablet, Take 1 tablet by mouth daily. As directed, Disp: , Rfl:     The following portions of the patient's history were reviewed and updated as appropriate: allergies, current medications, past family history, past medical history, past social history, past surgical history and problem list.    Review of Systems   Constitutional: Negative.    Respiratory: Positive for shortness of breath.    Cardiovascular: Negative.    Gastrointestinal: Negative.    Musculoskeletal: Negative.    Skin: Negative.    Neurological: Negative.    Psychiatric/Behavioral: Negative.        Objective   Physical Exam  HENT:      Head: Normocephalic and atraumatic.      Nose: Nose normal.   Neck:      Musculoskeletal: Neck supple.   Cardiovascular:      Rate and Rhythm: Normal rate and regular rhythm.      Heart sounds: Normal heart sounds.   Pulmonary:      Effort: Pulmonary effort is normal.      Breath sounds: Normal breath sounds.   Abdominal:      General: Bowel sounds are normal.   Skin:     General: Skin is warm.   Neurological:      Mental Status: She is alert and oriented to person, place, and time.   Psychiatric:         Mood and Affect: Mood normal.         Behavior: Behavior normal.         All tests have been reviewed.    Assessment/Plan   Diagnoses and all orders for this visit:    Essential hypertension continue medications    Mixed hyperlipidemia continue medication    Arteriosclerotic vascular disease short of breath with exertion do stress test.  EKG no significant changes    Other specified hypothyroidism continue medication    Pain and swelling of right lower leg resolved    Depression with anxiety continue medication    SHERIDAN (dyspnea on exertion) if chest x-ray  stress test are normal probably symptoms related to mood disorder or deconditioning.  -     Stress Test With Myocardial Perfusion One Day    Abnormal EKG  -     Stress Test With Myocardial Perfusion One Day    Other orders  -     ECG 12 Lead    1 month follow-up counseling for patient having good diet lose weight.    ECG 12 Lead    Date/Time: 12/16/2020 2:02 PM  Performed by: Santi Dalal MD  Authorized by: Santi Dalal MD   Comparison: compared with previous ECG from 9/13/2017  Rhythm: sinus rhythm  Rate: normal  Conduction: conduction normal  Q waves: II    ST Segments: ST segments normal  T elevation: V1  T inversion: II  QRS axis: normal  Other: no other findings    Clinical impression: abnormal EKG                 1 mo   Answers for HPI/ROS submitted by the patient on 12/14/2020   What is the primary reason for your visit?: Other  Please describe your symptoms.: This appt. was originally scheduled as a routine 6 month follow-up, I believe.   About 3 weeks ago I talked to Dr. Dalal about some shortness of breath I was experiencing.  He had me take a Covid test.  I got a negative result.  I’m still having some shortness of breath, but not as much as before.  Hoping we can address that issue at this  Wed.’s appt.  Have you had these symptoms before?: Yes  How long have you been having these symptoms?: Greater than 2 weeks  Please list any medications you are currently taking for this condition.: None  Please describe any probable cause for these symptoms. : ???

## 2020-12-27 RX ORDER — LEVOTHYROXINE SODIUM 88 UG/1
88 TABLET ORAL DAILY
Qty: 90 TABLET | Refills: 3 | Status: SHIPPED | OUTPATIENT
Start: 2020-12-27 | End: 2021-09-20

## 2020-12-31 ENCOUNTER — LAB (OUTPATIENT)
Dept: LAB | Facility: HOSPITAL | Age: 75
End: 2020-12-31

## 2020-12-31 DIAGNOSIS — Z01.818 PRE-OP TESTING: Primary | ICD-10-CM

## 2020-12-31 PROCEDURE — U0004 COV-19 TEST NON-CDC HGH THRU: HCPCS

## 2020-12-31 PROCEDURE — C9803 HOPD COVID-19 SPEC COLLECT: HCPCS

## 2021-01-01 LAB — SARS-COV-2 RNA RESP QL NAA+PROBE: NOT DETECTED

## 2021-01-04 ENCOUNTER — OFFICE VISIT (OUTPATIENT)
Dept: CARDIOLOGY | Facility: CLINIC | Age: 76
End: 2021-01-04

## 2021-01-04 ENCOUNTER — HOSPITAL ENCOUNTER (OUTPATIENT)
Dept: CARDIOLOGY | Facility: HOSPITAL | Age: 76
Discharge: HOME | End: 2021-01-04
Admitting: INTERNAL MEDICINE

## 2021-01-04 VITALS
HEART RATE: 91 BPM | SYSTOLIC BLOOD PRESSURE: 126 MMHG | BODY MASS INDEX: 32.32 KG/M2 | DIASTOLIC BLOOD PRESSURE: 78 MMHG | OXYGEN SATURATION: 98 % | WEIGHT: 194 LBS | HEIGHT: 65 IN

## 2021-01-04 VITALS — BODY MASS INDEX: 32.32 KG/M2 | WEIGHT: 194 LBS | HEIGHT: 65 IN

## 2021-01-04 DIAGNOSIS — I10 ESSENTIAL HYPERTENSION: ICD-10-CM

## 2021-01-04 DIAGNOSIS — R00.2 PALPITATIONS: Primary | ICD-10-CM

## 2021-01-04 DIAGNOSIS — E78.2 MIXED HYPERLIPIDEMIA: ICD-10-CM

## 2021-01-04 LAB
BH CV NUCLEAR PRIOR STUDY: 1
BH CV STRESS BP STAGE 1: NORMAL
BH CV STRESS DURATION MIN STAGE 1: 3
BH CV STRESS DURATION SEC STAGE 1: 30
BH CV STRESS GRADE STAGE 1: 10
BH CV STRESS HR STAGE 1: 131
BH CV STRESS METS STAGE 1: 4.3
BH CV STRESS O2 STAGE 1: 97
BH CV STRESS PROTOCOL 1: NORMAL
BH CV STRESS RECOVERY BP: NORMAL MMHG
BH CV STRESS RECOVERY HR: 86 BPM
BH CV STRESS RECOVERY O2: 98 %
BH CV STRESS SPEED STAGE 1: 1.7
BH CV STRESS STAGE 1: 1
LV EF NUC BP: 96 %
MAXIMAL PREDICTED HEART RATE: 147 BPM
PERCENT MAX PREDICTED HR: 90.48 %
STRESS BASELINE BP: NORMAL MMHG
STRESS BASELINE HR: 74 BPM
STRESS O2 SAT REST: 98 %
STRESS PERCENT HR: 106 %
STRESS POST ESTIMATED WORKLOAD: 4.3 METS
STRESS POST EXERCISE DUR MIN: 3 MIN
STRESS POST EXERCISE DUR SEC: 30 SEC
STRESS POST O2 SAT PEAK: 97 %
STRESS POST PEAK BP: NORMAL MMHG
STRESS POST PEAK HR: 133 BPM
STRESS TARGET HR: 125 BPM

## 2021-01-04 PROCEDURE — 0 TECHNETIUM SESTAMIBI: Performed by: INTERNAL MEDICINE

## 2021-01-04 PROCEDURE — 78452 HT MUSCLE IMAGE SPECT MULT: CPT

## 2021-01-04 PROCEDURE — 93018 CV STRESS TEST I&R ONLY: CPT | Performed by: INTERNAL MEDICINE

## 2021-01-04 PROCEDURE — 99214 OFFICE O/P EST MOD 30 MIN: CPT | Performed by: INTERNAL MEDICINE

## 2021-01-04 PROCEDURE — A9500 TC99M SESTAMIBI: HCPCS | Performed by: INTERNAL MEDICINE

## 2021-01-04 PROCEDURE — 93017 CV STRESS TEST TRACING ONLY: CPT

## 2021-01-04 PROCEDURE — 78452 HT MUSCLE IMAGE SPECT MULT: CPT | Performed by: INTERNAL MEDICINE

## 2021-01-04 RX ADMIN — TECHNETIUM TC 99M SESTAMIBI 1 DOSE: 1 INJECTION INTRAVENOUS at 08:35

## 2021-01-04 RX ADMIN — TECHNETIUM TC 99M SESTAMIBI 1 DOSE: 1 INJECTION INTRAVENOUS at 10:11

## 2021-01-04 NOTE — PROGRESS NOTES
[facility]  Office Progress Note  Deya Hernandez  1945  [phone]      Visit Date: 1/4/2021     PCP: Santi Dalal MD  [address] [address]    IDENTIFICATION: A 75 y.o. female     Chief Complaint   Patient presents with   • Hypertension       PROBLEM LIST:   1. 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 4/16 EKG stress abnormal due to withdrawal of beta-blockade with tachycardia and hypertension  2. 1/2021 Marie scan Cardiolite WNL ef 90%  2. Hypertension presumed essential  3. Dyslipidemia   1. on statin therapy  2. 4/18 227/196/66/122  4. Palpitations/SVT  1. ED visit 5/18. W/u benign. Holter recommended.  2. Holter 5/18 nonsustained SVT  5. Reformed nicotine addiction 2016  6. Exogenous obesity  7. Hypothyroidism    Allergies  No Known Allergies    Current Medications    Current Outpatient Medications:   •  ALPRAZolam (XANAX) 0.5 MG tablet, Take 1 tablet by mouth 2 (Two) Times a Day As Needed for Anxiety., Disp: 30 tablet, Rfl: 0  •  amLODIPine (NORVASC) 5 MG tablet, Take 1 tablet by mouth Daily., Disp: 60 tablet, Rfl: 5  •  Biotin 5 MG capsule, Take 3,000 mcg by mouth Daily., Disp: , Rfl:   •  buPROPion XL (WELLBUTRIN XL) 300 MG 24 hr tablet, Take 1 tablet by mouth Daily., Disp: 90 tablet, Rfl: 3  •  Calcium Carbonate Antacid (TUMS PO), Take  by mouth As Needed., Disp: , Rfl:   •  carvedilol (COREG) 12.5 MG tablet, TAKE 1 TABLET BY MOUTH TWICE A DAY WITH FOOD, Disp: 180 tablet, Rfl: 1  •  Cholecalciferol (VITAMIN D3) 1000 UNITS capsule, Take 2,500 mg by mouth Daily., Disp: , Rfl:   •  diclofenac (VOLTAREN) 1 % gel gel, Apply 4 g topically to the appropriate area as directed 4 (Four) Times a Day As Needed (pain)., Disp: 100 g, Rfl: 0  •  levothyroxine (SYNTHROID, LEVOTHROID) 88 MCG tablet, TAKE 1 TABLET BY MOUTH DAILY, Disp: 90 tablet, Rfl: 3  •  mirtazapine (REMERON) 30 MG tablet, TAKE 1 TABLET BY MOUTH EVERY NIGHT AT BEDTIME, Disp: 30 tablet, Rfl: 1  •  Misc  "Natural Products (IMMUNE FORMULA PO), Take  by mouth., Disp: , Rfl:   •  pantoprazole (PROTONIX) 40 MG EC tablet, 1 po daily in the am 30 minutes before breakfast, Disp: 30 tablet, Rfl: 5  •  Simethicone (GAS-X PO), Take  by mouth As Needed., Disp: , Rfl:   •  simvastatin (ZOCOR) 40 MG tablet, Take 1 tablet by mouth every night at bedtime., Disp: 90 tablet, Rfl: 3  •  trandolapril (MAVIK) 4 MG tablet, Take 1 tablet by mouth Daily., Disp: 90 tablet, Rfl: 3  •  Turmeric, Curcuma Longa, powder, Daily., Disp: , Rfl:   •  vitamin B-12 (CYANOCOBALAMIN) 1000 MCG tablet, Take 1 tablet by mouth daily. As directed, Disp: , Rfl:   No current facility-administered medications for this visit.       History of Present Illness   Pt here for follow up.  He is worked in today same day of her stress test that was low risk.  She states that she has had difficulty with gaining some of her weight back unfortunately.  She is been less active not going to the gym due to Covid.  OBJECTIVE:  Vitals:    01/04/21 1122   BP: 126/78   BP Location: Right arm   Patient Position: Sitting   Pulse: 91   SpO2: 98%   Weight: 88 kg (194 lb)   Height: 165.1 cm (65\")     Physical Exam   Constitutional: She is oriented to person, place, and time. She appears well-developed and well-nourished. No distress.   Cardiovascular: Normal rate, regular rhythm, normal heart sounds and intact distal pulses.  No extrasystoles are present.   No murmur heard.  Pulses:       Radial pulses are 2+ on the right side and 2+ on the left side.        Dorsalis pedis pulses are 2+ on the right side and 2+ on the left side.        Posterior tibial pulses are 2+ on the right side and 2+ on the left side.   Pulmonary/Chest: Effort normal and breath sounds normal. She has no wheezes. She has no rales.   Abdominal: Soft. Bowel sounds are normal. There is no abdominal tenderness. There is no guarding.   Musculoskeletal:         General: No tenderness or edema.   Neurological: She is " alert and oriented to person, place, and time.   Skin: Skin is warm and dry. No rash noted.   Psychiatric: She has a normal mood and affect.   Nursing note and vitals reviewed.      Diagnostic Data:  Procedures      ASSESSMENT:   Diagnosis Plan   1. Palpitations     2. Essential hypertension     3. Mixed hyperlipidemia         PLAN:  1. Palpitations much improved w Rx change and weight loss.  Nl lvef  2. HTn controlled on mavik/coreg/amlod  3. Mixed hyperlipidemia- cont statin  4. Commended weight loss    Santi Dalal MD, thank you for referring Ms. Hernandez for evaluation.  I have forwarded my electronically generated recommendations to you for review.  Please do not hesitate to call with any questions.      Timbo Roberts MD, FACC

## 2021-01-09 DIAGNOSIS — F41.8 DEPRESSION WITH ANXIETY: ICD-10-CM

## 2021-01-11 RX ORDER — MIRTAZAPINE 30 MG/1
TABLET, FILM COATED ORAL
Qty: 90 TABLET | Refills: 0 | Status: SHIPPED | OUTPATIENT
Start: 2021-01-11 | End: 2021-04-10

## 2021-01-12 ENCOUNTER — TRANSCRIBE ORDERS (OUTPATIENT)
Dept: ADMINISTRATIVE | Facility: HOSPITAL | Age: 76
End: 2021-01-12

## 2021-01-12 DIAGNOSIS — Z12.31 VISIT FOR SCREENING MAMMOGRAM: Primary | ICD-10-CM

## 2021-01-18 ENCOUNTER — OFFICE VISIT (OUTPATIENT)
Dept: INTERNAL MEDICINE | Facility: CLINIC | Age: 76
End: 2021-01-18

## 2021-01-18 VITALS
HEART RATE: 70 BPM | TEMPERATURE: 97.1 F | BODY MASS INDEX: 33.01 KG/M2 | DIASTOLIC BLOOD PRESSURE: 80 MMHG | WEIGHT: 198.12 LBS | SYSTOLIC BLOOD PRESSURE: 118 MMHG | HEIGHT: 65 IN | OXYGEN SATURATION: 98 %

## 2021-01-18 DIAGNOSIS — R06.09 DOE (DYSPNEA ON EXERTION): ICD-10-CM

## 2021-01-18 DIAGNOSIS — E66.09 CLASS 1 OBESITY DUE TO EXCESS CALORIES WITHOUT SERIOUS COMORBIDITY WITH BODY MASS INDEX (BMI) OF 32.0 TO 32.9 IN ADULT: ICD-10-CM

## 2021-01-18 DIAGNOSIS — I70.90 ARTERIOSCLEROTIC VASCULAR DISEASE: Primary | ICD-10-CM

## 2021-01-18 PROCEDURE — 99215 OFFICE O/P EST HI 40 MIN: CPT | Performed by: INTERNAL MEDICINE

## 2021-01-18 NOTE — PROGRESS NOTES
Subjective   Deya Hernandez is a 75 y.o. female.     Chief Complaint   Patient presents with   • Hypertension     1 mo f/u    • Hyperlipidemia       History of Present Illness   Patient here for follow-up exertional short of breath much improved.  Atherosclerosis stable now.  Stress test unremarkable chest x-ray unremarkable.  Weight is still elevated    Current Outpatient Medications:   •  ALPRAZolam (XANAX) 0.5 MG tablet, Take 1 tablet by mouth 2 (Two) Times a Day As Needed for Anxiety., Disp: 30 tablet, Rfl: 0  •  amLODIPine (NORVASC) 5 MG tablet, Take 1 tablet by mouth Daily., Disp: 60 tablet, Rfl: 5  •  Biotin 5 MG capsule, Take 3,000 mcg by mouth Daily., Disp: , Rfl:   •  buPROPion XL (WELLBUTRIN XL) 300 MG 24 hr tablet, Take 1 tablet by mouth Daily., Disp: 90 tablet, Rfl: 3  •  Calcium Carbonate Antacid (TUMS PO), Take  by mouth As Needed., Disp: , Rfl:   •  carvedilol (COREG) 12.5 MG tablet, TAKE 1 TABLET BY MOUTH TWICE A DAY WITH FOOD, Disp: 180 tablet, Rfl: 1  •  Cholecalciferol (VITAMIN D3) 1000 UNITS capsule, Take 2,500 mg by mouth Daily., Disp: , Rfl:   •  diclofenac (VOLTAREN) 1 % gel gel, Apply 4 g topically to the appropriate area as directed 4 (Four) Times a Day As Needed (pain)., Disp: 100 g, Rfl: 0  •  levothyroxine (SYNTHROID, LEVOTHROID) 88 MCG tablet, TAKE 1 TABLET BY MOUTH DAILY, Disp: 90 tablet, Rfl: 3  •  mirtazapine (REMERON) 30 MG tablet, TAKE 1 TABLET BY MOUTH EVERY NIGHT AT BEDTIME, Disp: 90 tablet, Rfl: 0  •  Misc Natural Products (IMMUNE FORMULA PO), Take  by mouth., Disp: , Rfl:   •  pantoprazole (PROTONIX) 40 MG EC tablet, 1 po daily in the am 30 minutes before breakfast, Disp: 30 tablet, Rfl: 5  •  Simethicone (GAS-X PO), Take  by mouth As Needed., Disp: , Rfl:   •  simvastatin (ZOCOR) 40 MG tablet, Take 1 tablet by mouth every night at bedtime., Disp: 90 tablet, Rfl: 3  •  trandolapril (MAVIK) 4 MG tablet, Take 1 tablet by mouth Daily., Disp: 90 tablet, Rfl: 3  •  Turmeric,  Curcuma Longa, powder, Daily., Disp: , Rfl:   •  vitamin B-12 (CYANOCOBALAMIN) 1000 MCG tablet, Take 1 tablet by mouth daily. As directed, Disp: , Rfl:     The following portions of the patient's history were reviewed and updated as appropriate: allergies, current medications, past family history, past medical history, past social history, past surgical history and problem list.    Review of Systems   Constitutional: Negative.    Respiratory: Negative.    Cardiovascular: Negative.    Gastrointestinal: Negative.    Musculoskeletal: Negative.    Skin: Negative.    Neurological: Negative.    Psychiatric/Behavioral: Negative.        Objective   Physical Exam  Neck:      Musculoskeletal: Neck supple.   Cardiovascular:      Rate and Rhythm: Normal rate and regular rhythm.      Heart sounds: Normal heart sounds.   Pulmonary:      Effort: Pulmonary effort is normal.      Breath sounds: Normal breath sounds.   Abdominal:      General: Bowel sounds are normal.   Skin:     General: Skin is warm.   Neurological:      Mental Status: She is alert and oriented to person, place, and time.         All tests have been reviewed.    Assessment/Plan   Diagnoses and all orders for this visit:    Arteriosclerotic vascular disease stable now    SHERIDAN (dyspnea on exertion) chest x-ray stress test negative    Class 1 obesity due to excess calories without serious comorbidity with body mass index (BMI) of 32.0 to 32.9 in adult    Counseling for weight loss through diet and exercise.    Counseling time plus charting time equals 40 minutes  5 months follow-up of physical     Answers for HPI/ROS submitted by the patient on 1/11/2021   What is the primary reason for your visit?: Other  Please describe your symptoms.: This is a follow-up appt.  regarding my recent shortness of breath symptoms.  Probably discussion of stress test results and other steps to take regarding my symptoms.  Have you had these symptoms before?: Yes  How long have you been  having these symptoms?: Greater than 2 weeks  Please describe any probable cause for these symptoms. : Age?, Weight?, Inactivity?

## 2021-01-29 RX ORDER — CARVEDILOL 12.5 MG/1
TABLET ORAL
Qty: 180 TABLET | Refills: 3 | Status: SHIPPED | OUTPATIENT
Start: 2021-01-29 | End: 2022-02-14 | Stop reason: SDUPTHER

## 2021-03-03 ENCOUNTER — APPOINTMENT (OUTPATIENT)
Dept: MAMMOGRAPHY | Facility: HOSPITAL | Age: 76
End: 2021-03-03

## 2021-03-10 ENCOUNTER — OFFICE VISIT (OUTPATIENT)
Dept: ORTHOPEDIC SURGERY | Facility: CLINIC | Age: 76
End: 2021-03-10

## 2021-03-10 VITALS — WEIGHT: 198 LBS | HEIGHT: 65 IN | RESPIRATION RATE: 16 BRPM | BODY MASS INDEX: 32.99 KG/M2 | TEMPERATURE: 97.1 F

## 2021-03-10 DIAGNOSIS — M25.562 ARTHRALGIA OF LEFT KNEE: Primary | ICD-10-CM

## 2021-03-10 DIAGNOSIS — M17.12 PRIMARY OSTEOARTHRITIS OF LEFT KNEE: ICD-10-CM

## 2021-03-10 PROCEDURE — 99203 OFFICE O/P NEW LOW 30 MIN: CPT | Performed by: PHYSICIAN ASSISTANT

## 2021-03-10 NOTE — PROGRESS NOTES
Subjective   Patient ID: Deya Hernandez is a 75 y.o. right hand dominant female  Pain of the Left Knee (Left knee pain. NKI. Ongoing for several years. Has had injection in the past that helped. Last injection 2017. )         History of Present Illness  Patient presents with continued chronic left medial knee pain ongoing for several years.  No injury or trauma.  She did receive viscosupplementation injections in 2017 which provided significant relief from her osteoarthritis pain.  Patient would like to reorder the Visco injections for her left knee.                                                 Past Medical History:   Diagnosis Date   • Abdominal bloating    • Abnormal EKG    • Anxiety and depression 2004   • Colon cancer screening    • Colon polyp 04/2016   • Disease of thyroid gland    • Dyspnea    • Elbow pain, left    • Fatigue    • Fatty infiltration of liver 10/12/2020   • Gastritis    • GERD (gastroesophageal reflux disease)    • H/O mammogram    • H/O sinusitis    • H/O: pneumonia    • Heartburn    • Hypertension    • Plantar fasciitis    • Sebaceous cyst    • Sinusitis         Past Surgical History:   Procedure Laterality Date   • COLONOSCOPY  04/07/2016   • TUBAL ABDOMINAL LIGATION     • UPPER GASTROINTESTINAL ENDOSCOPY  03/2016       Family History   Problem Relation Age of Onset   • Heart attack Other    • Arthritis Other    • Cancer Other    • Diabetes Other    • Hypertension Other    • Stroke Other    • Cancer Mother    • Breast cancer Mother 70   • Emphysema Father    • Colon polyps Neg Hx    • Esophageal cancer Neg Hx    • Irritable bowel syndrome Neg Hx    • Liver cancer Neg Hx    • Liver disease Neg Hx    • Stomach cancer Neg Hx    • Colon cancer Neg Hx    • Ovarian cancer Neg Hx        Social History     Socioeconomic History   • Marital status:      Spouse name: Not on file   • Number of children: Not on file   • Years of education: Not on file   • Highest education level: Not on file    Tobacco Use   • Smoking status: Former Smoker   • Smokeless tobacco: Never Used   • Tobacco comment: Quit in 2015   Vaping Use   • Vaping Use: Never used   Substance and Sexual Activity   • Alcohol use: Yes     Comment: social   • Drug use: No   • Sexual activity: Not Currently         Current Outpatient Medications:   •  ALPRAZolam (XANAX) 0.5 MG tablet, Take 1 tablet by mouth 2 (Two) Times a Day As Needed for Anxiety., Disp: 30 tablet, Rfl: 0  •  amLODIPine (NORVASC) 5 MG tablet, Take 1 tablet by mouth Daily., Disp: 60 tablet, Rfl: 5  •  buPROPion XL (WELLBUTRIN XL) 300 MG 24 hr tablet, Take 1 tablet by mouth Daily., Disp: 90 tablet, Rfl: 3  •  Calcium Carbonate Antacid (TUMS PO), Take  by mouth As Needed., Disp: , Rfl:   •  carvedilol (COREG) 12.5 MG tablet, TAKE 1 TABLET BY MOUTH TWICE DAILY WITH FOOD, Disp: 180 tablet, Rfl: 3  •  Cholecalciferol (VITAMIN D3) 1000 UNITS capsule, Take 2,500 mg by mouth Daily., Disp: , Rfl:   •  diclofenac (VOLTAREN) 1 % gel gel, Apply 4 g topically to the appropriate area as directed 4 (Four) Times a Day As Needed (pain)., Disp: 100 g, Rfl: 0  •  levothyroxine (SYNTHROID, LEVOTHROID) 88 MCG tablet, TAKE 1 TABLET BY MOUTH DAILY, Disp: 90 tablet, Rfl: 3  •  mirtazapine (REMERON) 30 MG tablet, TAKE 1 TABLET BY MOUTH EVERY NIGHT AT BEDTIME, Disp: 90 tablet, Rfl: 0  •  Misc Natural Products (IMMUNE FORMULA PO), Take  by mouth., Disp: , Rfl:   •  pantoprazole (PROTONIX) 40 MG EC tablet, 1 po daily in the am 30 minutes before breakfast, Disp: 30 tablet, Rfl: 5  •  Simethicone (GAS-X PO), Take  by mouth As Needed., Disp: , Rfl:   •  simvastatin (ZOCOR) 40 MG tablet, Take 1 tablet by mouth every night at bedtime., Disp: 90 tablet, Rfl: 3  •  trandolapril (MAVIK) 4 MG tablet, Take 1 tablet by mouth Daily., Disp: 90 tablet, Rfl: 3  •  Turmeric, Curcuma Longa, powder, Daily., Disp: , Rfl:   •  vitamin B-12 (CYANOCOBALAMIN) 1000 MCG tablet, Take 1 tablet by mouth daily. As directed, Disp: ,  "Rfl:     No Known Allergies    Review of Systems   Constitutional: Negative for diaphoresis, fever and unexpected weight change.   HENT: Negative for dental problem and sore throat.    Eyes: Negative for visual disturbance.   Respiratory: Negative for shortness of breath.    Cardiovascular: Negative for chest pain.   Gastrointestinal: Negative for abdominal pain, constipation, diarrhea, nausea and vomiting.   Genitourinary: Negative for difficulty urinating and frequency.   Musculoskeletal: Positive for arthralgias (Left knee) and joint swelling (Left knee).   Neurological: Negative for headaches.   Hematological: Does not bruise/bleed easily.        I have reviewed the medical and surgical history, family history, social history, medications, and/or allergies, and the review of systems of this report.    Objective   Temp 97.1 °F (36.2 °C)   Resp 16   Ht 165.1 cm (65\")   Wt 89.8 kg (198 lb)   BMI 32.95 kg/m²    Physical Exam  Vitals and nursing note reviewed.   Constitutional:       Appearance: Normal appearance.   Pulmonary:      Effort: Pulmonary effort is normal.   Neurological:      Mental Status: She is alert and oriented to person, place, and time.       Left Knee Exam     Range of Motion   Extension: 5   Flexion: 100     Other   Erythema: absent  Sensation: normal           Extremity DVT signs are negative on physical exam with negative Jeffy sign, no calf pain, no palpable cords and no skin tone change   Neurologic Exam     Mental Status   Oriented to person, place, and time.              Assessment/Plan   Independent Review of Radiographic Studies:    AP standing of both knees and lateral of the left  knee, Indication to evaluate joint condition, no comparison views or not available, shows evident severe end-stage varus tricompartment osteoarthritis.      Procedures       Diagnoses and all orders for this visit:    1. Arthralgia of left knee (Primary)  -     XR Knee 1 or 2 View Left; Future    2. " Primary osteoarthritis of left knee       Orthopedic activities reviewed and patient expressed appreciation  Discussion of orthopedic goals  Risk, benefits, and merits of treatment alternatives reviewed with the patient and questions answered  Ice, heat, and/or modalities as beneficial    Recommendations/Plan:  Patient is encouraged to call or return for any issues or concerns.    We will request authorization for Visco injections  Patient agreeable to call or return sooner for any concerns.               EMR Dragon-transcription disclaimer:  This encounter note is an electronic transcription of spoken language to printed text.  Electronic transcription of spoken language may permit erroneous or at times nonsensical words or phrases to be inadvertently transcribed.  Although I have reviewed the note for such errors, some may still exist

## 2021-03-17 DIAGNOSIS — M17.12 PRIMARY OSTEOARTHRITIS OF LEFT KNEE: Primary | ICD-10-CM

## 2021-03-17 RX ORDER — HYALURONATE SODIUM 16.8MG/2ML
16.8 SYRINGE (ML) INTRAARTICULAR WEEKLY
Qty: 6 ML | Refills: 0 | Status: SHIPPED | OUTPATIENT
Start: 2021-03-17 | End: 2021-04-01

## 2021-03-18 DIAGNOSIS — R14.2 BURPING: Chronic | ICD-10-CM

## 2021-03-18 DIAGNOSIS — R10.9 ABDOMINAL PRESSURE: Chronic | ICD-10-CM

## 2021-03-18 RX ORDER — PANTOPRAZOLE SODIUM 40 MG/1
TABLET, DELAYED RELEASE ORAL
Qty: 30 TABLET | Refills: 0 | Status: SHIPPED | OUTPATIENT
Start: 2021-03-18 | End: 2021-04-16

## 2021-03-22 RX ORDER — AMLODIPINE BESYLATE 5 MG/1
5 TABLET ORAL DAILY
Qty: 90 TABLET | Refills: 3 | Status: SHIPPED | OUTPATIENT
Start: 2021-03-22 | End: 2021-12-18

## 2021-04-05 ENCOUNTER — OFFICE VISIT (OUTPATIENT)
Dept: GASTROENTEROLOGY | Facility: CLINIC | Age: 76
End: 2021-04-05

## 2021-04-05 ENCOUNTER — OFFICE VISIT (OUTPATIENT)
Dept: ORTHOPEDIC SURGERY | Facility: CLINIC | Age: 76
End: 2021-04-05

## 2021-04-05 VITALS
DIASTOLIC BLOOD PRESSURE: 86 MMHG | RESPIRATION RATE: 16 BRPM | HEART RATE: 70 BPM | SYSTOLIC BLOOD PRESSURE: 158 MMHG | BODY MASS INDEX: 33.82 KG/M2 | TEMPERATURE: 98.4 F | HEIGHT: 65 IN | WEIGHT: 203 LBS

## 2021-04-05 VITALS — HEIGHT: 65 IN | BODY MASS INDEX: 33.82 KG/M2 | RESPIRATION RATE: 16 BRPM | TEMPERATURE: 97.3 F | WEIGHT: 203 LBS

## 2021-04-05 DIAGNOSIS — R10.9 ABDOMINAL PRESSURE: ICD-10-CM

## 2021-04-05 DIAGNOSIS — M17.12 PRIMARY OSTEOARTHRITIS OF LEFT KNEE: ICD-10-CM

## 2021-04-05 DIAGNOSIS — E66.09 CLASS 1 OBESITY DUE TO EXCESS CALORIES WITHOUT SERIOUS COMORBIDITY WITH BODY MASS INDEX (BMI) OF 33.0 TO 33.9 IN ADULT: ICD-10-CM

## 2021-04-05 DIAGNOSIS — K76.0 FATTY INFILTRATION OF LIVER: ICD-10-CM

## 2021-04-05 DIAGNOSIS — Z86.010 PERSONAL HISTORY OF COLONIC POLYPS: Primary | ICD-10-CM

## 2021-04-05 PROCEDURE — 99214 OFFICE O/P EST MOD 30 MIN: CPT | Performed by: NURSE PRACTITIONER

## 2021-04-05 PROCEDURE — 20610 DRAIN/INJ JOINT/BURSA W/O US: CPT | Performed by: PHYSICIAN ASSISTANT

## 2021-04-05 NOTE — PROGRESS NOTES
"Subjective   Deya Hernandez is a 75 y.o. female here today for injection therapy.    Chief Complaint   Patient presents with   • Left Knee - Pain     Injection therapy. Gelsyn #1. Left knee.    patient presents for left knee visco injection    Past Medical History:   Diagnosis Date   • Abdominal bloating    • Abnormal EKG    • Anxiety and depression 2004   • Colon cancer screening    • Colon polyp 04/2016   • Disease of thyroid gland    • Dyspnea    • Elbow pain, left    • Fatigue    • Fatty infiltration of liver 10/12/2020   • Gastritis    • GERD (gastroesophageal reflux disease)    • H/O mammogram    • H/O sinusitis    • H/O: pneumonia    • Heartburn    • Hypertension    • Plantar fasciitis    • Sebaceous cyst    • Sinusitis          Past Surgical History:   Procedure Laterality Date   • COLONOSCOPY  04/07/2016   • TUBAL ABDOMINAL LIGATION     • UPPER GASTROINTESTINAL ENDOSCOPY  03/2016       No Known Allergies    Objective   Temp 97.3 °F (36.3 °C)   Resp 16   Ht 165.1 cm (65\")   Wt 92.1 kg (203 lb)   BMI 33.78 kg/m²    Physical Exam        Large Joint Arthrocentesis: L knee  Date/Time: 4/5/2021 2:51 PM  Consent given by: patient  Site marked: site marked  Timeout: Immediately prior to procedure a time out was called to verify the correct patient, procedure, equipment, support staff and site/side marked as required   Supporting Documentation  Indications: pain   Procedure Details  Location: knee - L knee  Preparation: Patient was prepped and draped in the usual sterile fashion  Needle size: 22 G  Approach: anterolateral  Medications administered: Sodium Hyaluronate (Viscosup) 16.8 MG/2ML  Patient tolerance: patient tolerated the procedure well with no immediate complications          Assessment/Plan      Diagnosis Plan   1. Primary osteoarthritis of left knee  Large Joint Arthrocentesis: L knee    Sodium Hyaluronate (Viscosup) injection 16.8 mg       Discussion of orthopaedic goals and activities and patient " and/or guardian expressed appreciation.  Guided on proper techniques for mobility, strength, agility and/or conditioning exercises  Ice, heat, and/or modalities as beneficial  Watch for signs and symptoms of infection  Call or notify for any adverse effect from injection therapy    Recommendations:  Follow up in 1 week    Patient agreeable to call or return sooner for any concerns.

## 2021-04-05 NOTE — PATIENT INSTRUCTIONS
Antireflux measures: Avoid fried, fatty foods, alcohol, chocolate, coffee, tea,  soft drinks, peppermint and spearmint, spicy foods, tomatoes and tomato based foods, onion based foods, and smoking. Other antireflux measures include weight reduction if overweight, avoiding tight clothing around the abdomen, elevating the head of the bed 6 inches with blocks under the head board, and don't drink or eat before going to bed and avoid lying down immediately after meals.  Pantoprazole 40 mg 1 po daily in the am 30 minutes before breakfast.  Recommended to take Levothyroxine first in the am upon waking, wait 30 minutes, then take Pantoprazole 40 mg, wait 30 minutes, then eat breakfast and take other medications.   High fiber, low fat diet with liberal water intake.    Advised to exercise 30 minutes 3-4 days per week.   Advised to lose 20-25 pounds in the next 6-12 months.   Avoid dairy, including yogurt.   Colonoscopy for surveillance in 7 years per current ACG guidelines, 2023.  Labs - ordered by PCP  Follow up: 3 months or sooner if needed

## 2021-04-05 NOTE — PROGRESS NOTES
"     Follow Up Note     Date: 2021   Patient Name: Deya Hernandez  MRN: [id number]  : 1945     Primary Care Provider: Santi Dalal MD     Chief Complaint:    Chief Complaint   Patient presents with   • Follow-up     History of present illness:   2021  Deya Hernandez is a 75 y.o. female who is here today for follow up.    She has been feeling better. She continues to have intermittent abdominal pressure, but no pain. No constipation or diarrhea. No rectal bleeding. No nausea or vomiting. She has not lost weight as she has not been tracking food intake recently. She does eat Activia yogurt on a regular basis but has not noticed improvement in her symptoms.    Interval History:  10/12/2020  She has been feeling much better. Diarrhea resolved.     2020  Deya Hernandez is a 75 y.o. female who is here today for follow up for abdominal pressure. The patient has been taking Pantoprazole and had some improvement of her symptoms, but she has continued to not feel well in general. She has a difficult time explaining how she feels, states she \"just doesn't feel good\". She continues to have abdominal pressure and bloating. Frequently has gas. She has had a few episodes of loose stool since her last visit, but nothing on a regular basis. She cannot link it to anything particular she has eaten.     2020  For the past year or so, the patient will have periumbilical abdominal pain and bloating. The pain is described as a pressure in her abdomen and is very mild. The pressure may last a few days, then \"go away\" for a few days or even a month or so. Burping or passing gas improves the pressure. The patient denies nausea or vomiting. There is no history of heartburn or reflux. She had taken Pantoprazole in the past for similar symptoms, but she stopped it over 3 years ago.     The patient has had a few episodes of diarrhea over the past 2 weeks or so. She is not sure if it was something she had " eaten. No rectal bleeding.      Last colonoscopy was 4/7/2016 with small polyps (1 tubular adenoma without dysplasia), diverticulosis, and internal hemorrhoids.     Subjective      Past Medical History:   Diagnosis Date   • Abdominal bloating    • Abnormal EKG    • Anxiety and depression 2004   • Colon cancer screening    • Colon polyp 04/2016   • Disease of thyroid gland    • Dyspnea    • Elbow pain, left    • Fatigue    • Fatty infiltration of liver 10/12/2020   • Gastritis    • GERD (gastroesophageal reflux disease)    • H/O mammogram    • H/O sinusitis    • H/O: pneumonia    • Heartburn    • Hypertension    • Plantar fasciitis    • Sebaceous cyst    • Sinusitis      Past Surgical History:   Procedure Laterality Date   • COLONOSCOPY  04/07/2016   • TUBAL ABDOMINAL LIGATION     • UPPER GASTROINTESTINAL ENDOSCOPY  03/2016     Family History   Problem Relation Age of Onset   • Heart attack Other    • Arthritis Other    • Cancer Other    • Diabetes Other    • Hypertension Other    • Stroke Other    • Cancer Mother    • Breast cancer Mother 70   • Emphysema Father    • Colon polyps Neg Hx    • Esophageal cancer Neg Hx    • Irritable bowel syndrome Neg Hx    • Liver cancer Neg Hx    • Liver disease Neg Hx    • Stomach cancer Neg Hx    • Colon cancer Neg Hx    • Ovarian cancer Neg Hx      Social History     Socioeconomic History   • Marital status:      Spouse name: Not on file   • Number of children: Not on file   • Years of education: Not on file   • Highest education level: Not on file   Tobacco Use   • Smoking status: Former Smoker   • Smokeless tobacco: Never Used   • Tobacco comment: Quit in 2015   Vaping Use   • Vaping Use: Never used   Substance and Sexual Activity   • Alcohol use: Yes     Comment: social   • Drug use: No   • Sexual activity: Not Currently       Current Outpatient Medications:   •  ALPRAZolam (XANAX) 0.5 MG tablet, Take 1 tablet by mouth 2 (Two) Times a Day As Needed for Anxiety., Disp:  30 tablet, Rfl: 0  •  amLODIPine (NORVASC) 5 MG tablet, TAKE 1 TABLET BY MOUTH DAILY, Disp: 90 tablet, Rfl: 3  •  buPROPion XL (WELLBUTRIN XL) 300 MG 24 hr tablet, Take 1 tablet by mouth Daily., Disp: 90 tablet, Rfl: 3  •  Calcium Carbonate Antacid (TUMS PO), Take  by mouth As Needed., Disp: , Rfl:   •  carvedilol (COREG) 12.5 MG tablet, TAKE 1 TABLET BY MOUTH TWICE DAILY WITH FOOD, Disp: 180 tablet, Rfl: 3  •  Cholecalciferol (VITAMIN D3) 1000 UNITS capsule, Take 2,500 mg by mouth Daily., Disp: , Rfl:   •  diclofenac (VOLTAREN) 1 % gel gel, Apply 4 g topically to the appropriate area as directed 4 (Four) Times a Day As Needed (pain)., Disp: 100 g, Rfl: 0  •  levothyroxine (SYNTHROID, LEVOTHROID) 88 MCG tablet, TAKE 1 TABLET BY MOUTH DAILY, Disp: 90 tablet, Rfl: 3  •  mirtazapine (REMERON) 30 MG tablet, TAKE 1 TABLET BY MOUTH EVERY NIGHT AT BEDTIME, Disp: 90 tablet, Rfl: 0  •  Misc Natural Products (IMMUNE FORMULA PO), Take  by mouth., Disp: , Rfl:   •  pantoprazole (PROTONIX) 40 MG EC tablet, 1 po daily in the am 30 minutes before breakfast, Disp: 30 tablet, Rfl: 0  •  Simethicone (GAS-X PO), Take  by mouth As Needed., Disp: , Rfl:   •  simvastatin (ZOCOR) 40 MG tablet, Take 1 tablet by mouth every night at bedtime., Disp: 90 tablet, Rfl: 3  •  trandolapril (MAVIK) 4 MG tablet, Take 1 tablet by mouth Daily., Disp: 90 tablet, Rfl: 3  •  Turmeric, Curcuma Longa, powder, Daily., Disp: , Rfl:   •  vitamin B-12 (CYANOCOBALAMIN) 1000 MCG tablet, Take 1 tablet by mouth daily. As directed, Disp: , Rfl:   No current facility-administered medications for this visit.     No Known Allergies     The following portions of the patient's history were reviewed and updated as appropriate: allergies, current medications, past family history, past medical history, past social history, past surgical history and problem list.    Objective     Physical Exam  Vitals and nursing note reviewed.   Constitutional:       General: She is awake.  She is not in acute distress.     Appearance: Normal appearance. She is well-developed. She is not diaphoretic.   HENT:      Head: Normocephalic and atraumatic.      Right Ear: Hearing and external ear normal.      Left Ear: Hearing and external ear normal.      Nose: Nose normal.      Mouth/Throat:      Mouth: Mucous membranes are not pale, not dry and not cyanotic.   Eyes:      General: Lids are normal.         Right eye: No discharge.         Left eye: No discharge.      Conjunctiva/sclera: Conjunctivae normal.   Neck:      Thyroid: No thyroid mass or thyromegaly.      Vascular: No JVD.      Trachea: Trachea normal.   Cardiovascular:      Rate and Rhythm: Normal rate and regular rhythm.      Heart sounds: Normal heart sounds and S2 normal. No murmur heard.   No friction rub. No gallop. No S3 sounds.    Pulmonary:      Effort: Pulmonary effort is normal. No respiratory distress.      Breath sounds: Normal breath sounds.   Chest:      Chest wall: No tenderness.   Abdominal:      General: Bowel sounds are normal. There is no distension.      Palpations: Abdomen is not rigid. There is no hepatomegaly, splenomegaly or mass.      Tenderness: There is no abdominal tenderness. There is no guarding or rebound.      Hernia: No hernia is present.   Musculoskeletal:         General: Normal range of motion.      Cervical back: Neck supple. No edema.   Lymphadenopathy:      Cervical: No cervical adenopathy.      Upper Body:      Left upper body: No supraclavicular adenopathy.   Skin:     General: Skin is warm and dry.      Coloration: Skin is not pale.      Findings: No rash.      Nails: There is no clubbing.   Neurological:      Mental Status: She is alert and oriented to person, place, and time.      Cranial Nerves: No cranial nerve deficit.      Sensory: No sensory deficit.   Psychiatric:         Mood and Affect: Mood normal.         Speech: Speech normal.         Behavior: Behavior is cooperative.       Vitals:    04/05/21  "1358   BP: 158/86   Pulse: 70   Resp: 16   Temp: 98.4 °F (36.9 °C)   Weight: 92.1 kg (203 lb)   Height: 165.1 cm (65\")     Results Review:   I reviewed the patient's new clinical results.    No visits with results within 90 Day(s) from this visit.   Latest known visit with results is:   Lab on 12/31/2020   Component Date Value Ref Range Status   • SARS-CoV-2 ROSIO 12/31/2020 Not Detected  Not Detected Final      Ct Abdomen Pelvis With Contrast    Result Date: 9/28/2020  No evidence of acute intra-abdominal process.   This report was finalized on 9/28/2020 12:05 PM by Itzel Mendez M.D..     EGD dated 3/28/2016 reveals erythematous distal esophagitis. No Theoodre's esophagus. Erythematous erosive gastritis. Erythematous bulbar duodenitis. Tiny sliding hiatal hernia. Second portion duodenum biopsies revealed preserved villous architecture with no significant histopathologic change. No parasites noted. No villous blunting or increased intraepithelial lymphocytes suggestive of celiac disease. Gastric antrum body biopsies revealed reactive gastritis with focal activity. No definitive H pylori-like organisms noted on routine staining. No intestinal metaplasia or dysplasia identified.     Colonoscopy dated 4/7/2016 reveals scant vascular ectasia within the transverse colon. Small colon polyps. Scant early diverticular change in the left colon. Internal hemorrhoids and hypertrophic anal papilla. Rectal polyps ×5 revealed hyperplastic polyp. Multiple fragments of rectal mucosa showing prolapse changes. Cecum: Biopsy reveals no pathologic alterations, mucosal lymphoid aggregate. Hepatic flexure polyp ×1 reveals tubular adenoma without high-grade dysplasia.    Assessment / Plan      1. Personal history of colonic polyps  Colonoscopy in 2016 with 1 tubular adenoma, no dysplasia, removed.  There is no family history of colon cancer.  Per current ACG guidelines, colonoscopy for surveillance in 7 years, 2023, or sooner if " needed.    2. Abdominal pressure  Patient denies abdominal pain, nausea or vomiting, diarrhea or constipation.  There is no history of rectal bleeding.  She has been eating Activia yogurt on a regular basis as she thought this would improve her symptoms. Patient was not aware Activia was dairy.  She did change to lactose-free milk. Suspect lactose intolerance.  Avoid all dairy, including Activia yogurt. May eat dairy free yogurt, such as almond, soy or coconut milk based yogurt.     10/12/2020  Significantly improved with PPI daily. CT scan with fatty liver, otherwise unremarkable. TSH normal. Negative celiac panel.  Continue PPI    3. Fatty infiltration of liver  4. Class 1 obesity due to excess calories without serious comorbidity with body mass index (BMI) of 33.0 to 33.9 in adult  BMI 33.8. No history of elevated liver enzymes. Suspect NAFLD.  High fiber, low fat diet with liberal water intake.   Advised to exercise 30 minutes 3-4 days per week.  Advised to lose 15-20 pounds in the next 6-12 months.  Patient has labs ordered by PCP. Further evaluation if liver enzymes are elevated.    10/12/2020  Incidental finding of fatty infiltration of liver noted on recent CT scan. There is no history of liver disease in the past. There is no history of elevated liver enzymes.  The patient admits she has not been watching her diet since her  passed and COVID started.   High fiber, low fat diet with liberal water intake.   Advised to exercise 30 minutes 3-4 days per week.  Advised to lose 15-20 pounds in the next 6-12 months.    Patient Instructions   Antireflux measures: Avoid fried, fatty foods, alcohol, chocolate, coffee, tea,  soft drinks, peppermint and spearmint, spicy foods, tomatoes and tomato based foods, onion based foods, and smoking. Other antireflux measures include weight reduction if overweight, avoiding tight clothing around the abdomen, elevating the head of the bed 6 inches with blocks under the head  board, and don't drink or eat before going to bed and avoid lying down immediately after meals.  Pantoprazole 40 mg 1 po daily in the am 30 minutes before breakfast.  Recommended to take Levothyroxine first in the am upon waking, wait 30 minutes, then take Pantoprazole 40 mg, wait 30 minutes, then eat breakfast and take other medications.   High fiber, low fat diet with liberal water intake.    Advised to exercise 30 minutes 3-4 days per week.   Advised to lose 20-25 pounds in the next 6-12 months.   Avoid dairy, including yogurt.   Colonoscopy for surveillance in 7 years per current ACG guidelines, 2023.  Labs - ordered by PCP  Follow up: 3 months or sooner if needed    Cedrick Chowdhury, APRN  4/5/2021    Please note that portions of this note may have been completed with a voice recognition program. Efforts were made to edit the dictations, but occasionally words are mistranscribed.

## 2021-04-09 DIAGNOSIS — F41.8 DEPRESSION WITH ANXIETY: ICD-10-CM

## 2021-04-10 RX ORDER — MIRTAZAPINE 30 MG/1
TABLET, FILM COATED ORAL
Qty: 90 TABLET | Refills: 0 | Status: SHIPPED | OUTPATIENT
Start: 2021-04-10 | End: 2021-07-09

## 2021-04-12 ENCOUNTER — OFFICE VISIT (OUTPATIENT)
Dept: ORTHOPEDIC SURGERY | Facility: CLINIC | Age: 76
End: 2021-04-12

## 2021-04-12 ENCOUNTER — HOSPITAL ENCOUNTER (OUTPATIENT)
Dept: MAMMOGRAPHY | Facility: HOSPITAL | Age: 76
Discharge: HOME | End: 2021-04-12
Admitting: INTERNAL MEDICINE

## 2021-04-12 VITALS — RESPIRATION RATE: 16 BRPM | WEIGHT: 203 LBS | TEMPERATURE: 97.1 F | BODY MASS INDEX: 33.82 KG/M2 | HEIGHT: 65 IN

## 2021-04-12 DIAGNOSIS — M17.12 PRIMARY OSTEOARTHRITIS OF LEFT KNEE: ICD-10-CM

## 2021-04-12 DIAGNOSIS — Z12.31 VISIT FOR SCREENING MAMMOGRAM: ICD-10-CM

## 2021-04-12 PROCEDURE — 77067 SCR MAMMO BI INCL CAD: CPT | Performed by: RADIOLOGY

## 2021-04-12 PROCEDURE — 77063 BREAST TOMOSYNTHESIS BI: CPT | Performed by: RADIOLOGY

## 2021-04-12 PROCEDURE — 77067 SCR MAMMO BI INCL CAD: CPT

## 2021-04-12 PROCEDURE — 77063 BREAST TOMOSYNTHESIS BI: CPT

## 2021-04-12 PROCEDURE — 20610 DRAIN/INJ JOINT/BURSA W/O US: CPT | Performed by: PHYSICIAN ASSISTANT

## 2021-04-12 NOTE — PROGRESS NOTES
"Subjective   Deya Hernandez is a 75 y.o. female here today for injection therapy.    Chief Complaint   Patient presents with   • Left Knee - Follow-up     Left knee injection therapy. Gelsyn # 2.   patient presents for repeat left knee visco injection.    Past Medical History:   Diagnosis Date   • Abdominal bloating    • Abnormal EKG    • Anxiety and depression 2004   • Colon cancer screening    • Colon polyp 04/2016   • Disease of thyroid gland    • Dyspnea    • Elbow pain, left    • Fatigue    • Fatty infiltration of liver 10/12/2020   • Gastritis    • GERD (gastroesophageal reflux disease)    • H/O mammogram    • H/O sinusitis    • H/O: pneumonia    • Heartburn    • Hypertension    • Plantar fasciitis    • Sebaceous cyst    • Sinusitis          Past Surgical History:   Procedure Laterality Date   • COLONOSCOPY  04/07/2016   • TUBAL ABDOMINAL LIGATION     • UPPER GASTROINTESTINAL ENDOSCOPY  03/2016       No Known Allergies    Objective   Temp 97.1 °F (36.2 °C)   Resp 16   Ht 165.1 cm (65\")   Wt 92.1 kg (203 lb)   BMI 33.78 kg/m²    Physical Exam    Skin exam stable with no erythema, ecchymosis or rash.  No new swelling.  No motor or sensory changes.  Distal pulse intact.    Large Joint Arthrocentesis: L knee  Date/Time: 4/12/2021 3:16 PM  Consent given by: patient  Site marked: site marked  Timeout: Immediately prior to procedure a time out was called to verify the correct patient, procedure, equipment, support staff and site/side marked as required   Supporting Documentation  Indications: pain   Procedure Details  Location: knee - L knee  Preparation: Patient was prepped and draped in the usual sterile fashion  Needle size: 22 G  Approach: anteromedial  Medications administered: Sodium Hyaluronate (Viscosup) 16.8 MG/2ML  Patient tolerance: patient tolerated the procedure well with no immediate complications          Assessment/Plan      Diagnosis Plan   1. Primary osteoarthritis of left knee   "       Discussion of orthopaedic goals and activities and patient and/or guardian expressed appreciation.  Guided on proper techniques for mobility, strength, agility and/or conditioning exercises  Ice, heat, and/or modalities as beneficial  Watch for signs and symptoms of infection  Call or notify for any adverse effect from injection therapy    Recommendations:  Patient agreeable to call or return sooner for any concerns.

## 2021-04-16 DIAGNOSIS — R10.9 ABDOMINAL PRESSURE: Chronic | ICD-10-CM

## 2021-04-16 DIAGNOSIS — R14.2 BURPING: Chronic | ICD-10-CM

## 2021-04-16 RX ORDER — PANTOPRAZOLE SODIUM 40 MG/1
TABLET, DELAYED RELEASE ORAL
Qty: 30 TABLET | Refills: 1 | Status: SHIPPED | OUTPATIENT
Start: 2021-04-16 | End: 2021-06-09

## 2021-04-19 ENCOUNTER — OFFICE VISIT (OUTPATIENT)
Dept: ORTHOPEDIC SURGERY | Facility: CLINIC | Age: 76
End: 2021-04-19

## 2021-04-19 VITALS — TEMPERATURE: 96.9 F | BODY MASS INDEX: 33.82 KG/M2 | RESPIRATION RATE: 18 BRPM | HEIGHT: 65 IN | WEIGHT: 203 LBS

## 2021-04-19 DIAGNOSIS — M17.12 PRIMARY OSTEOARTHRITIS OF LEFT KNEE: ICD-10-CM

## 2021-04-19 PROCEDURE — 20610 DRAIN/INJ JOINT/BURSA W/O US: CPT | Performed by: PHYSICIAN ASSISTANT

## 2021-04-19 NOTE — PROGRESS NOTES
"Subjective   Deya Hernandez is a 75 y.o. female here today for injection therapy.    Chief Complaint   Patient presents with   • Left Knee - Follow-up     Injection therapy Gelsyn #3     Patient presents for third Visco injection.  Past Medical History:   Diagnosis Date   • Abdominal bloating    • Abnormal EKG    • Anxiety and depression 2004   • Colon cancer screening    • Colon polyp 04/2016   • Disease of thyroid gland    • Dyspnea    • Elbow pain, left    • Fatigue    • Fatty infiltration of liver 10/12/2020   • Gastritis    • GERD (gastroesophageal reflux disease)    • H/O mammogram    • H/O sinusitis    • H/O: pneumonia    • Heartburn    • Hypertension    • Plantar fasciitis    • Sebaceous cyst    • Sinusitis          Past Surgical History:   Procedure Laterality Date   • COLONOSCOPY  04/07/2016   • TUBAL ABDOMINAL LIGATION     • UPPER GASTROINTESTINAL ENDOSCOPY  03/2016       No Known Allergies    Objective   Temp 96.9 °F (36.1 °C)   Resp 18   Ht 165.1 cm (65\")   Wt 92.1 kg (203 lb)   BMI 33.78 kg/m²    Physical Exam    Skin exam stable with no erythema, ecchymosis or rash.  No new swelling.  No motor or sensory changes.  Distal pulse intact.    Large Joint Arthrocentesis: L knee  Date/Time: 4/19/2021 3:39 PM  Consent given by: patient  Site marked: site marked  Timeout: Immediately prior to procedure a time out was called to verify the correct patient, procedure, equipment, support staff and site/side marked as required   Supporting Documentation  Indications: pain   Procedure Details  Location: knee - L knee  Preparation: Patient was prepped and draped in the usual sterile fashion  Needle size: 22 G  Approach: anterolateral  Medications administered: Sodium Hyaluronate (Viscosup) 16.8 MG/2ML  Patient tolerance: patient tolerated the procedure well with no immediate complications          Assessment/Plan      Diagnosis Plan   1. Primary osteoarthritis of left knee         Discussion of orthopaedic " goals and activities and patient and/or guardian expressed appreciation.  Guided on proper techniques for mobility, strength, agility and/or conditioning exercises  Ice, heat, and/or modalities as beneficial  Watch for signs and symptoms of infection  Call or notify for any adverse effect from injection therapy    Recommendations:  Follow up in 6 months    Patient agreeable to call or return sooner for any concerns.

## 2021-05-07 ENCOUNTER — HOSPITAL ENCOUNTER (EMERGENCY)
Facility: HOSPITAL | Age: 76
Discharge: HOME | End: 2021-05-08
Attending: EMERGENCY MEDICINE | Admitting: EMERGENCY MEDICINE

## 2021-05-07 VITALS
TEMPERATURE: 98.4 F | HEART RATE: 82 BPM | SYSTOLIC BLOOD PRESSURE: 168 MMHG | BODY MASS INDEX: 33.99 KG/M2 | HEIGHT: 65 IN | DIASTOLIC BLOOD PRESSURE: 114 MMHG | RESPIRATION RATE: 18 BRPM | WEIGHT: 204 LBS | OXYGEN SATURATION: 97 %

## 2021-05-07 DIAGNOSIS — S61.512A WRIST LACERATION, LEFT, INITIAL ENCOUNTER: Primary | ICD-10-CM

## 2021-05-07 PROCEDURE — 99282 EMERGENCY DEPT VISIT SF MDM: CPT

## 2021-05-07 RX ORDER — LIDOCAINE HYDROCHLORIDE 10 MG/ML
10 INJECTION, SOLUTION INFILTRATION; PERINEURAL ONCE
Status: COMPLETED | OUTPATIENT
Start: 2021-05-07 | End: 2021-05-08

## 2021-05-08 PROCEDURE — 25010000003 LIDOCAINE 1 % SOLUTION: Performed by: PHYSICIAN ASSISTANT

## 2021-05-08 RX ADMIN — LIDOCAINE HYDROCHLORIDE 10 ML: 10 INJECTION, SOLUTION INFILTRATION; PERINEURAL at 00:02

## 2021-05-08 NOTE — ED PROVIDER NOTES
Subjective   Chief Complaint: Left wrist laceration  History of Present Illness: 75-year-old female comes in for evaluation of above complaint.  She states she was opening a bottle of sparkling wine and the bottle broke and she she sustained a laceration to the medial aspect of her left wrist.  Bleeding controlled at this time.  Full range of motion and sensation distally  Onset: Just prior to arrival  Exacerbating / Alleviating factors: Nothing makes symptoms better or worse  Associated symptoms: None      Nurses Notes reviewed and agree, including vitals, allergies, social history and prior medical history.          Review of Systems   Constitutional: Negative.    HENT: Negative.    Eyes: Negative.    Respiratory: Negative.    Cardiovascular: Negative.    Gastrointestinal: Negative.    Genitourinary: Negative.    Musculoskeletal: Negative.    Skin:        Laceration to the medial left wrist on the volar aspect   Neurological: Negative.    Psychiatric/Behavioral: Negative.        Past Medical History:   Diagnosis Date   • Abdominal bloating    • Abnormal EKG    • Anxiety and depression 2004   • Colon cancer screening    • Colon polyp 04/2016   • Disease of thyroid gland    • Dyspnea    • Elbow pain, left    • Fatigue    • Fatty infiltration of liver 10/12/2020   • Gastritis    • GERD (gastroesophageal reflux disease)    • H/O mammogram    • H/O sinusitis    • H/O: pneumonia    • Heartburn    • Hypertension    • Plantar fasciitis    • Sebaceous cyst    • Sinusitis        No Known Allergies    Past Surgical History:   Procedure Laterality Date   • COLONOSCOPY  04/07/2016   • TUBAL ABDOMINAL LIGATION     • UPPER GASTROINTESTINAL ENDOSCOPY  03/2016       Family History   Problem Relation Age of Onset   • Heart attack Other    • Arthritis Other    • Cancer Other    • Diabetes Other    • Hypertension Other    • Stroke Other    • Cancer Mother    • Breast cancer Mother 70   • Emphysema Father    • Colon polyps Neg Hx    •  Esophageal cancer Neg Hx    • Irritable bowel syndrome Neg Hx    • Liver cancer Neg Hx    • Liver disease Neg Hx    • Stomach cancer Neg Hx    • Colon cancer Neg Hx    • Ovarian cancer Neg Hx        Social History     Socioeconomic History   • Marital status:      Spouse name: Not on file   • Number of children: Not on file   • Years of education: Not on file   • Highest education level: Not on file   Tobacco Use   • Smoking status: Former Smoker   • Smokeless tobacco: Never Used   • Tobacco comment: Quit in 2015   Vaping Use   • Vaping Use: Never used   Substance and Sexual Activity   • Alcohol use: Yes     Comment: social   • Drug use: No   • Sexual activity: Not Currently           Objective   Physical Exam  Vitals and nursing note reviewed.   Constitutional:       Appearance: Normal appearance.   HENT:      Head: Normocephalic and atraumatic.   Eyes:      Extraocular Movements: Extraocular movements intact.   Cardiovascular:      Rate and Rhythm: Normal rate and regular rhythm.      Pulses: Normal pulses.   Pulmonary:      Effort: Pulmonary effort is normal.   Musculoskeletal:         General: Normal range of motion.      Cervical back: Normal range of motion.      Comments: Good strength in flexion and extension of all digits left hand without weakness.  Sensation intact distal.   Skin:     Capillary Refill: Capillary refill takes less than 2 seconds.      Comments: 3 and half centimeter laceration to the medial/volar aspect of the left wrist.  It is curved in shape.  Bleeding controlled at this time   Neurological:      General: No focal deficit present.      Mental Status: She is alert.   Psychiatric:         Mood and Affect: Mood normal.         Behavior: Behavior normal.         Procedures  Laceration Repair: 3 and half centimeter laceration to the left wrist.    Consent obtained, discussed with patient all risks and benefits.    Patient underwent sterile prep technique with cleansed and sterile  water    Anesthesia was obtained with 1% lidocaine without epinephrine    Wound explored and no foreign body/bodies observed    Evidence of tendon injury: None    Laceration was closed with 4-0 nylon simple interrupted sutures #9    Dressing stick and gauze wrap    Patient was examined post procedure and was neurovascular intact    Tolerated well, no complications         ED Course                                           MDM    Final diagnoses:   Wrist laceration, left, initial encounter       ED Disposition  ED Disposition     ED Disposition Condition Comment    Discharge Stable           [facility]  [address] [facility] [address] [phone]  On 5/10/2021  For suture removal         Medication List      No changes were made to your prescriptions during this visit.          Rafael Barcenas PA-C  05/08/21 0021       Rafael Barcenas PA-C  05/12/21 1124

## 2021-05-17 RX ORDER — CEPHALEXIN 500 MG/1
500 CAPSULE ORAL 3 TIMES DAILY
Qty: 21 CAPSULE | Refills: 0 | Status: SHIPPED | OUTPATIENT
Start: 2021-05-17 | End: 2021-05-24

## 2021-06-09 DIAGNOSIS — R14.2 BURPING: Chronic | ICD-10-CM

## 2021-06-09 DIAGNOSIS — R10.9 ABDOMINAL PRESSURE: Chronic | ICD-10-CM

## 2021-06-09 RX ORDER — PANTOPRAZOLE SODIUM 40 MG/1
TABLET, DELAYED RELEASE ORAL
Qty: 30 TABLET | Refills: 1 | Status: SHIPPED | OUTPATIENT
Start: 2021-06-09 | End: 2021-08-09

## 2021-07-08 DIAGNOSIS — F41.8 DEPRESSION WITH ANXIETY: ICD-10-CM

## 2021-07-12 RX ORDER — MIRTAZAPINE 30 MG/1
TABLET, FILM COATED ORAL
Qty: 90 TABLET | Refills: 3 | Status: SHIPPED | OUTPATIENT
Start: 2021-07-12 | End: 2022-07-05

## 2021-07-20 ENCOUNTER — OFFICE VISIT (OUTPATIENT)
Dept: INTERNAL MEDICINE | Facility: CLINIC | Age: 76
End: 2021-07-20

## 2021-07-20 VITALS
BODY MASS INDEX: 32.82 KG/M2 | TEMPERATURE: 97.5 F | DIASTOLIC BLOOD PRESSURE: 90 MMHG | WEIGHT: 197 LBS | HEART RATE: 76 BPM | HEIGHT: 65 IN | SYSTOLIC BLOOD PRESSURE: 140 MMHG | OXYGEN SATURATION: 96 %

## 2021-07-20 DIAGNOSIS — E66.09 CLASS 1 OBESITY DUE TO EXCESS CALORIES WITHOUT SERIOUS COMORBIDITY WITH BODY MASS INDEX (BMI) OF 32.0 TO 32.9 IN ADULT: ICD-10-CM

## 2021-07-20 DIAGNOSIS — R10.9 ABDOMINAL PRESSURE: Chronic | ICD-10-CM

## 2021-07-20 DIAGNOSIS — I70.90 ARTERIOSCLEROTIC VASCULAR DISEASE: ICD-10-CM

## 2021-07-20 DIAGNOSIS — K76.0 FATTY INFILTRATION OF LIVER: ICD-10-CM

## 2021-07-20 DIAGNOSIS — E03.8 OTHER SPECIFIED HYPOTHYROIDISM: ICD-10-CM

## 2021-07-20 DIAGNOSIS — E78.2 MIXED HYPERLIPIDEMIA: ICD-10-CM

## 2021-07-20 DIAGNOSIS — E55.9 VITAMIN D DEFICIENCY: ICD-10-CM

## 2021-07-20 DIAGNOSIS — Z78.0 POSTMENOPAUSE: ICD-10-CM

## 2021-07-20 DIAGNOSIS — M17.12 PRIMARY OSTEOARTHRITIS OF LEFT KNEE: ICD-10-CM

## 2021-07-20 DIAGNOSIS — R06.09 DOE (DYSPNEA ON EXERTION): ICD-10-CM

## 2021-07-20 DIAGNOSIS — F41.8 DEPRESSION WITH ANXIETY: ICD-10-CM

## 2021-07-20 DIAGNOSIS — R79.9 ABNORMAL FINDING OF BLOOD CHEMISTRY, UNSPECIFIED: ICD-10-CM

## 2021-07-20 DIAGNOSIS — I10 ESSENTIAL HYPERTENSION: Primary | ICD-10-CM

## 2021-07-20 PROBLEM — M79.89 PAIN AND SWELLING OF RIGHT LOWER LEG: Status: RESOLVED | Noted: 2019-06-06 | Resolved: 2021-07-20

## 2021-07-20 PROBLEM — M79.661 PAIN AND SWELLING OF RIGHT LOWER LEG: Status: RESOLVED | Noted: 2019-06-06 | Resolved: 2021-07-20

## 2021-07-20 PROCEDURE — 99397 PER PM REEVAL EST PAT 65+ YR: CPT | Performed by: INTERNAL MEDICINE

## 2021-07-20 PROCEDURE — 1170F FXNL STATUS ASSESSED: CPT | Performed by: INTERNAL MEDICINE

## 2021-07-20 PROCEDURE — 1159F MED LIST DOCD IN RCRD: CPT | Performed by: INTERNAL MEDICINE

## 2021-07-20 PROCEDURE — G0439 PPPS, SUBSEQ VISIT: HCPCS | Performed by: INTERNAL MEDICINE

## 2021-07-20 PROCEDURE — 1126F AMNT PAIN NOTED NONE PRSNT: CPT | Performed by: INTERNAL MEDICINE

## 2021-07-20 RX ORDER — ANTIARTHRITIC COMBINATION NO.2 900 MG
TABLET ORAL
COMMUNITY
End: 2021-08-11

## 2021-07-20 RX ORDER — ASPIRIN 81 MG/1
81 TABLET ORAL DAILY
Qty: 90 TABLET | Refills: 3 | Status: SHIPPED | OUTPATIENT
Start: 2021-07-20 | End: 2021-11-29 | Stop reason: SDUPTHER

## 2021-07-20 NOTE — PROGRESS NOTES
The ABCs of the Annual Wellness Visit  Subsequent Medicare Wellness Visit    Chief Complaint   Patient presents with   • Medicare Wellness-subsequent   • Hypothyroidism   • Hypertension       Subjective   History of Present Illness:  Deya Hernandez is a 76 y.o. female who presents for a Subsequent Medicare Wellness Visit.    HEALTH RISK ASSESSMENT    Recent Hospitalizations:  No hospitalization(s) within the last year.    Current Medical Providers:  Patient Care Team:  Santi Dalal MD as PCP - General (Internal Medicine)    Smoking Status:  Social History     Tobacco Use   Smoking Status Former Smoker   Smokeless Tobacco Never Used   Tobacco Comment    Quit in 2015       Alcohol Consumption:  Social History     Substance and Sexual Activity   Alcohol Use Yes    Comment: social       Depression Screen:   PHQ-2/PHQ-9 Depression Screening 7/20/2021   Little interest or pleasure in doing things 0   Feeling down, depressed, or hopeless 0   Total Score 0       Fall Risk Screen:  WILFRIDO Fall Risk Assessment was completed, and patient is at LOW risk for falls.Assessment completed on:7/20/2021    Health Habits and Functional and Cognitive Screening:  Functional & Cognitive Status 7/20/2021   Do you have difficulty preparing food and eating? No   Do you have difficulty bathing yourself, getting dressed or grooming yourself? No   Do you have difficulty using the toilet? No   Do you have difficulty moving around from place to place? No   Do you have trouble with steps or getting out of a bed or a chair? No   Current Diet Well Balanced Diet   Dental Exam Up to date        Dental Exam Comment Every 6 months   Eye Exam Up to date        Eye Exam Comment 2020   Exercise (times per week) 0 times per week        Exercise Frequency Comment -   Current Exercises Include No Regular Exercise   Current Exercise Activities Include -   Do you need help using the phone?  No   Are you deaf or do you have serious difficulty hearing?  No    Do you need help with transportation? No   Do you need help shopping? No   Do you need help preparing meals?  No   Do you need help with housework?  No   Do you need help with laundry? No   Do you need help taking your medications? No   Do you need help managing money? No   Do you ever drive or ride in a car without wearing a seat belt? No   Have you felt unusual stress, anger or loneliness in the last month? No   Who do you live with? Alone   If you need help, do you have trouble finding someone available to you? No   Have you been bothered in the last four weeks by sexual problems? No   Do you have difficulty concentrating, remembering or making decisions? No         Does the patient have evidence of cognitive impairment? No    Asprin use counseling:Taking ASA appropriately as indicated    Age-appropriate Screening Schedule:  Refer to the list below for future screening recommendations based on patient's age, sex and/or medical conditions. Orders for these recommended tests are listed in the plan section. The patient has been provided with a written plan.    Health Maintenance   Topic Date Due   • LIPID PANEL  10/02/2020   • DXA SCAN  04/08/2021   • INFLUENZA VACCINE  10/01/2021   • MAMMOGRAM  04/12/2023   • TDAP/TD VACCINES (2 - Td or Tdap) 07/03/2023   • ZOSTER VACCINE  Completed          The following portions of the patient's history were reviewed and updated as appropriate: allergies, current medications, past family history, past medical history, past social history, past surgical history and problem list.    Outpatient Medications Prior to Visit   Medication Sig Dispense Refill   • ALPRAZolam (XANAX) 0.5 MG tablet Take 1 tablet by mouth 2 (Two) Times a Day As Needed for Anxiety. 30 tablet 0   • amLODIPine (NORVASC) 5 MG tablet TAKE 1 TABLET BY MOUTH DAILY 90 tablet 3   • Biotin 5000 MCG tablet      • buPROPion XL (WELLBUTRIN XL) 300 MG 24 hr tablet Take 1 tablet by mouth Daily. 90 tablet 3   • Calcium  Carbonate Antacid (TUMS PO) Take  by mouth As Needed.     • carvedilol (COREG) 12.5 MG tablet TAKE 1 TABLET BY MOUTH TWICE DAILY WITH FOOD 180 tablet 3   • Cholecalciferol (VITAMIN D3) 1000 UNITS capsule Take 2,500 mg by mouth Daily.     • diclofenac (VOLTAREN) 1 % gel gel Apply 4 g topically to the appropriate area as directed 4 (Four) Times a Day As Needed (pain). 100 g 0   • levothyroxine (SYNTHROID, LEVOTHROID) 88 MCG tablet TAKE 1 TABLET BY MOUTH DAILY 90 tablet 3   • mirtazapine (REMERON) 30 MG tablet TAKE 1 TABLET BY MOUTH EVERY NIGHT AT BEDTIME 90 tablet 3   • pantoprazole (PROTONIX) 40 MG EC tablet TAKE 1 TABLET BY MOUTH EVERY MORNING 30 MINUTES BEFORE BREAKFAST 30 tablet 1   • Simethicone (GAS-X PO) Take  by mouth As Needed.     • simvastatin (ZOCOR) 40 MG tablet Take 1 tablet by mouth every night at bedtime. 90 tablet 3   • trandolapril (MAVIK) 4 MG tablet Take 1 tablet by mouth Daily. 90 tablet 3   • vitamin B-12 (CYANOCOBALAMIN) 1000 MCG tablet Take 1 tablet by mouth daily. As directed     • Misc Natural Products (IMMUNE FORMULA PO) Take  by mouth.     • Turmeric, Curcuma Longa, powder Daily.       No facility-administered medications prior to visit.       Patient Active Problem List   Diagnosis   • Hypertension   • Hypothyroidism   • Osteoarthritis of knee   • Hyperlipidemia   • Arteriosclerotic vascular disease   • Diverticulosis of large intestine without hemorrhage   • Internal hemorrhoids   • Vascular ectasia of colon   • Depression with anxiety   • Abdominal pressure   • Fatty infiltration of liver   • Class 1 obesity due to excess calories without serious comorbidity with body mass index (BMI) of 32.0 to 32.9 in adult   • Personal history of colonic polyps   • SHERIDAN (dyspnea on exertion)       Advanced Care Planning:  ACP discussion was held with the patient during this visit. Patient does not have an advance directive, information provided.    Review of Systems   Constitutional: Negative.    HENT:  "Negative.    Eyes: Negative.    Respiratory: Negative.    Cardiovascular: Negative.    Gastrointestinal: Negative.    Endocrine: Negative.    Genitourinary: Negative.    Musculoskeletal: Negative.    Skin: Negative.    Allergic/Immunologic: Negative.    Neurological: Negative.    Hematological: Negative.    Psychiatric/Behavioral: Negative.        Compared to one year ago, the patient feels her physical health is the same.  Compared to one year ago, the patient feels her mental health is the same.    Reviewed chart for potential of high risk medication in the elderly: yes  Reviewed chart for potential of harmful drug interactions in the elderly:no    Objective         Vitals:    07/20/21 1711   BP: 140/90   Pulse: 76   Temp: 97.5 °F (36.4 °C)   SpO2: 96%   Weight: 89.4 kg (197 lb)   Height: 165.1 cm (65\")   PainSc: 2  Comment: knee pain       Body mass index is 32.78 kg/m².  Discussed the patient's BMI with her. The BMI is above average; BMI management plan is completed.    Physical Exam  Constitutional:       Appearance: She is well-developed. She is obese.   HENT:      Head: Normocephalic and atraumatic.      Right Ear: Tympanic membrane, ear canal and external ear normal.      Left Ear: Tympanic membrane, ear canal and external ear normal.      Nose: Nose normal.      Mouth/Throat:      Mouth: Mucous membranes are moist.      Pharynx: Oropharynx is clear.   Eyes:      Conjunctiva/sclera: Conjunctivae normal.      Pupils: Pupils are equal, round, and reactive to light.   Cardiovascular:      Rate and Rhythm: Normal rate and regular rhythm.      Heart sounds: Normal heart sounds.   Pulmonary:      Effort: Pulmonary effort is normal.      Breath sounds: Normal breath sounds.   Abdominal:      General: Bowel sounds are normal.      Palpations: Abdomen is soft.   Genitourinary:     Vagina: Normal.   Musculoskeletal:         General: Normal range of motion.      Cervical back: Normal range of motion and neck supple. "   Skin:     General: Skin is warm and dry.   Neurological:      General: No focal deficit present.      Mental Status: She is alert and oriented to person, place, and time.      Deep Tendon Reflexes: Reflexes are normal and symmetric.   Psychiatric:         Mood and Affect: Mood normal.         Behavior: Behavior normal.         Thought Content: Thought content normal.         Judgment: Judgment normal.               Assessment/Plan   Medicare Risks and Personalized Health Plan  CMS Preventative Services Quick Reference  Advance Directive Discussion    The above risks/problems have been discussed with the patient.  Pertinent information has been shared with the patient in the After Visit Summary.  Follow up plans and orders are seen below in the Assessment/Plan Section.    Diagnoses and all orders for this visit:    1. Essential hypertension (Primary) cont med  -     TSH    2. Mixed hyperlipidemia cont med  -     CK  -     Comprehensive Metabolic Panel  -     Lipid Panel  -     Hemoglobin A1c    3. SHERIDAN (dyspnea on exertion) watch follow up     4. Arteriosclerotic vascular disease  -     aspirin (aspirin) 81 MG EC tablet; Take 1 tablet by mouth Daily.  Dispense: 90 tablet; Refill: 3    5. Other specified hypothyroidism cont med    6. Class 1 obesity due to excess calories without serious comorbidity with body mass index (BMI) of 32.0 to 32.9 in adult    7. Fatty infiltration of liver     8. Depression with anxiety cont med    9. Abdominal pressure    10. Primary osteoarthritis of left knee    11. Postmenopause  -     DEXA Bone Density Axial; Future    12. Vitamin D deficiency  -     Vitamin D 25 Hydroxy    13. Abnormal finding of blood chemistry, unspecified   -     Hemoglobin A1c      Follow Up:  No follow-ups on file.     An After Visit Summary and PPPS were given to the patient.       Below is to historical records for reference only:  Gastritis, omeprazole continue medicine s/p EGD  Probable osteoarthritis of the  knee, follow ortho  Vitamin D low normal continue vitamin D3 1000 units daily,   pneumovax done shingrex, done, flu   Hyperlipidemia unable to afford crestor, lipitor muscle pain, on zocor 40,  colon 4/7/16 due patient to see GI 8/2021

## 2021-08-08 DIAGNOSIS — R14.2 BURPING: Chronic | ICD-10-CM

## 2021-08-08 DIAGNOSIS — R10.9 ABDOMINAL PRESSURE: Chronic | ICD-10-CM

## 2021-08-09 RX ORDER — PANTOPRAZOLE SODIUM 40 MG/1
TABLET, DELAYED RELEASE ORAL
Qty: 30 TABLET | Refills: 3 | Status: SHIPPED | OUTPATIENT
Start: 2021-08-09 | End: 2021-08-11 | Stop reason: DRUGHIGH

## 2021-08-11 ENCOUNTER — OFFICE VISIT (OUTPATIENT)
Dept: GASTROENTEROLOGY | Facility: CLINIC | Age: 76
End: 2021-08-11

## 2021-08-11 VITALS
WEIGHT: 203 LBS | HEIGHT: 65 IN | HEART RATE: 77 BPM | DIASTOLIC BLOOD PRESSURE: 76 MMHG | TEMPERATURE: 97.3 F | BODY MASS INDEX: 33.82 KG/M2 | SYSTOLIC BLOOD PRESSURE: 147 MMHG | RESPIRATION RATE: 18 BRPM

## 2021-08-11 DIAGNOSIS — E66.09 CLASS 1 OBESITY DUE TO EXCESS CALORIES WITHOUT SERIOUS COMORBIDITY WITH BODY MASS INDEX (BMI) OF 33.0 TO 33.9 IN ADULT: Chronic | ICD-10-CM

## 2021-08-11 DIAGNOSIS — Z86.010 PERSONAL HISTORY OF COLONIC POLYPS: Primary | ICD-10-CM

## 2021-08-11 DIAGNOSIS — R10.9 ABDOMINAL PRESSURE: ICD-10-CM

## 2021-08-11 DIAGNOSIS — R14.2 BURPING: ICD-10-CM

## 2021-08-11 DIAGNOSIS — K76.0 FATTY INFILTRATION OF LIVER: ICD-10-CM

## 2021-08-11 PROCEDURE — 99214 OFFICE O/P EST MOD 30 MIN: CPT | Performed by: NURSE PRACTITIONER

## 2021-08-11 RX ORDER — PANTOPRAZOLE SODIUM 20 MG/1
TABLET, DELAYED RELEASE ORAL
Qty: 30 TABLET | Refills: 3 | Status: SHIPPED | OUTPATIENT
Start: 2021-08-11 | End: 2021-11-16

## 2021-08-11 RX ORDER — SODIUM CHLORIDE 9 MG/ML
70 INJECTION, SOLUTION INTRAVENOUS CONTINUOUS PRN
Status: CANCELLED | OUTPATIENT
Start: 2021-08-11

## 2021-08-11 RX ORDER — MAGNESIUM CARB/ALUMINUM HYDROX 105-160MG
TABLET,CHEWABLE ORAL
Qty: 296 ML | Refills: 0 | Status: SHIPPED | OUTPATIENT
Start: 2021-08-11 | End: 2021-11-17 | Stop reason: HOSPADM

## 2021-08-11 RX ORDER — BISACODYL 5 MG/1
TABLET, DELAYED RELEASE ORAL
Qty: 4 TABLET | Refills: 0 | Status: SHIPPED | OUTPATIENT
Start: 2021-08-11 | End: 2021-11-17 | Stop reason: HOSPADM

## 2021-08-11 RX ORDER — POLYETHYLENE GLYCOL 3350 17 G/17G
POWDER, FOR SOLUTION ORAL
Qty: 238 G | Refills: 0 | Status: SHIPPED | OUTPATIENT
Start: 2021-08-11 | End: 2021-11-17 | Stop reason: HOSPADM

## 2021-08-11 NOTE — PROGRESS NOTES
"     Follow Up Note     Date: 2021   Patient Name: Deya Hernandez  MRN: [id number]  : 1945     Primary Care Provider: Santi Dalal MD     Chief Complaint   Patient presents with   • Follow-up     History of present illness:   2021  Deya Hernandez is a 76 y.o. female who is here today for follow up.    She has been feeling much better. She has found that eating smaller amounts of food has made her feel better. No constipation, diarrhea or rectal bleeding.     Interval History:  2021  She has been feeling better. She continues to have intermittent abdominal pressure, but no pain. No constipation or diarrhea. No rectal bleeding. No nausea or vomiting. She has not lost weight as she has not been tracking food intake recently. She does eat Activia yogurt on a regular basis but has not noticed improvement in her symptoms.     10/12/2020  She has been feeling much better. Diarrhea resolved.     2020  Deya Hernandez is a 75 y.o. female who is here today for follow up for abdominal pressure. The patient has been taking Pantoprazole and had some improvement of her symptoms, but she has continued to not feel well in general. She has a difficult time explaining how she feels, states she \"just doesn't feel good\". She continues to have abdominal pressure and bloating. Frequently has gas. She has had a few episodes of loose stool since her last visit, but nothing on a regular basis. She cannot link it to anything particular she has eaten.     2020  For the past year or so, the patient will have periumbilical abdominal pain and bloating. The pain is described as a pressure in her abdomen and is very mild. The pressure may last a few days, then \"go away\" for a few days or even a month or so. Burping or passing gas improves the pressure. The patient denies nausea or vomiting. There is no history of heartburn or reflux. She had taken Pantoprazole in the past for similar symptoms, but she " stopped it over 3 years ago.     The patient has had a few episodes of diarrhea over the past 2 weeks or so. She is not sure if it was something she had eaten. No rectal bleeding.      Last colonoscopy was 4/7/2016 with small polyps (1 tubular adenoma without dysplasia), diverticulosis, and internal hemorrhoids.     Subjective      Past Medical History:   Diagnosis Date   • Abdominal bloating    • Abnormal EKG    • Anxiety and depression 2004   • Colon cancer screening    • Colon polyp 04/2016   • Disease of thyroid gland    • Dyspnea    • Elbow pain, left    • Fatigue    • Fatty infiltration of liver 10/12/2020   • Gastritis    • GERD (gastroesophageal reflux disease)    • H/O mammogram    • H/O sinusitis    • H/O: pneumonia    • Heartburn    • Hypertension    • Plantar fasciitis    • Sebaceous cyst    • Sinusitis      Past Surgical History:   Procedure Laterality Date   • COLONOSCOPY  04/07/2016   • TUBAL ABDOMINAL LIGATION     • UPPER GASTROINTESTINAL ENDOSCOPY  03/2016     Family History   Problem Relation Age of Onset   • Heart attack Other    • Arthritis Other    • Cancer Other    • Diabetes Other    • Hypertension Other    • Stroke Other    • Cancer Mother    • Breast cancer Mother 70   • Emphysema Father    • Colon polyps Neg Hx    • Esophageal cancer Neg Hx    • Irritable bowel syndrome Neg Hx    • Liver cancer Neg Hx    • Liver disease Neg Hx    • Stomach cancer Neg Hx    • Colon cancer Neg Hx    • Ovarian cancer Neg Hx      Social History     Socioeconomic History   • Marital status:      Spouse name: Not on file   • Number of children: Not on file   • Years of education: Not on file   • Highest education level: Not on file   Tobacco Use   • Smoking status: Former Smoker   • Smokeless tobacco: Never Used   • Tobacco comment: Quit in 2015   Vaping Use   • Vaping Use: Never used   Substance and Sexual Activity   • Alcohol use: Yes     Comment: social   • Drug use: No   • Sexual activity: Not  Currently       Current Outpatient Medications:   •  ALPRAZolam (XANAX) 0.5 MG tablet, Take 1 tablet by mouth 2 (Two) Times a Day As Needed for Anxiety., Disp: 30 tablet, Rfl: 0  •  amLODIPine (NORVASC) 5 MG tablet, TAKE 1 TABLET BY MOUTH DAILY, Disp: 90 tablet, Rfl: 3  •  aspirin (aspirin) 81 MG EC tablet, Take 1 tablet by mouth Daily., Disp: 90 tablet, Rfl: 3  •  buPROPion XL (WELLBUTRIN XL) 300 MG 24 hr tablet, Take 1 tablet by mouth Daily., Disp: 90 tablet, Rfl: 3  •  Calcium Carbonate Antacid (TUMS PO), Take  by mouth As Needed., Disp: , Rfl:   •  carvedilol (COREG) 12.5 MG tablet, TAKE 1 TABLET BY MOUTH TWICE DAILY WITH FOOD, Disp: 180 tablet, Rfl: 3  •  Cholecalciferol (VITAMIN D3) 1000 UNITS capsule, Take 2,500 mg by mouth Daily., Disp: , Rfl:   •  diclofenac (VOLTAREN) 1 % gel gel, Apply 4 g topically to the appropriate area as directed 4 (Four) Times a Day As Needed (pain)., Disp: 100 g, Rfl: 0  •  levothyroxine (SYNTHROID, LEVOTHROID) 88 MCG tablet, TAKE 1 TABLET BY MOUTH DAILY, Disp: 90 tablet, Rfl: 3  •  mirtazapine (REMERON) 30 MG tablet, TAKE 1 TABLET BY MOUTH EVERY NIGHT AT BEDTIME, Disp: 90 tablet, Rfl: 3  •  Simethicone (GAS-X PO), Take  by mouth As Needed., Disp: , Rfl:   •  simvastatin (ZOCOR) 40 MG tablet, Take 1 tablet by mouth every night at bedtime., Disp: 90 tablet, Rfl: 3  •  trandolapril (MAVIK) 4 MG tablet, Take 1 tablet by mouth Daily., Disp: 90 tablet, Rfl: 3  •  bisacodyl (DULCOLAX) 5 MG EC tablet, Take as directed for colon prep, Disp: 4 tablet, Rfl: 0  •  magnesium citrate 1.745 GM/30ML solution solution, Use as directed for colonoscopy prep., Disp: 296 mL, Rfl: 0  •  pantoprazole (PROTONIX) 20 MG EC tablet, 1 po in the am 30 minutes before breakfast., Disp: 30 tablet, Rfl: 3  •  polyethylene glycol (MiraLax) 17 GM/SCOOP powder, Take as directed for colonoscopy prep, Disp: 238 g, Rfl: 0     No Known Allergies     The following portions of the patient's history were reviewed and  "updated as appropriate: allergies, current medications, past family history, past medical history, past social history, past surgical history and problem list.  Objective     Physical Exam  Vitals and nursing note reviewed.   Constitutional:       General: She is not in acute distress.     Appearance: Normal appearance. She is well-developed. She is not diaphoretic.   HENT:      Head: Normocephalic and atraumatic.      Right Ear: Hearing and external ear normal.      Left Ear: Hearing and external ear normal.      Nose: Nose normal.      Mouth/Throat:      Mouth: Mucous membranes are not pale, not dry and not cyanotic.   Eyes:      General: Lids are normal.         Right eye: No discharge.         Left eye: No discharge.      Conjunctiva/sclera: Conjunctivae normal.   Neck:      Trachea: Trachea normal.   Cardiovascular:      Rate and Rhythm: Normal rate and regular rhythm.      Heart sounds: Normal heart sounds.   Pulmonary:      Effort: Pulmonary effort is normal. No respiratory distress.      Breath sounds: Normal breath sounds.   Chest:      Chest wall: No tenderness.   Abdominal:      General: Bowel sounds are normal. There is no distension.      Palpations: Abdomen is soft. There is no mass.      Tenderness: There is no abdominal tenderness. There is no guarding or rebound.      Hernia: No hernia is present.   Musculoskeletal:      Cervical back: No edema.   Skin:     General: Skin is warm and dry.      Coloration: Skin is not pale.      Findings: No rash.   Neurological:      Mental Status: She is alert and oriented to person, place, and time.      Cranial Nerves: No cranial nerve deficit.   Psychiatric:         Mood and Affect: Mood normal.         Speech: Speech normal.         Behavior: Behavior is cooperative.       Vitals:    08/11/21 1602   BP: 147/76   Pulse: 77   Resp: 18   Temp: 97.3 °F (36.3 °C)   Weight: 92.1 kg (203 lb)   Height: 165.1 cm (65\")     Results Review:   I reviewed the patient's new " clinical results.    No visits with results within 90 Day(s) from this visit.   Latest known visit with results is:   Lab on 12/31/2020   Component Date Value Ref Range Status   • SARS-CoV-2 ROSIO 12/31/2020 Not Detected  Not Detected Final      Ct Abdomen Pelvis With Contrast    Result Date: 9/28/2020  No evidence of acute intra-abdominal process.   This report was finalized on 9/28/2020 12:05 PM by Itzel Mendez M.D..     EGD dated 3/28/2016 reveals erythematous distal esophagitis. No Theodore's esophagus. Erythematous erosive gastritis. Erythematous bulbar duodenitis. Tiny sliding hiatal hernia. Second portion duodenum biopsies revealed preserved villous architecture with no significant histopathologic change. No parasites noted. No villous blunting or increased intraepithelial lymphocytes suggestive of celiac disease. Gastric antrum body biopsies revealed reactive gastritis with focal activity. No definitive H pylori-like organisms noted on routine staining. No intestinal metaplasia or dysplasia identified.     Colonoscopy dated 4/7/2016 reveals scant vascular ectasia within the transverse colon. Small colon polyps. Scant early diverticular change in the left colon. Internal hemorrhoids and hypertrophic anal papilla. Rectal polyps ×5 revealed hyperplastic polyp. Multiple fragments of rectal mucosa showing prolapse changes. Cecum: Biopsy reveals no pathologic alterations, mucosal lymphoid aggregate. Hepatic flexure polyp ×1 reveals tubular adenoma without high-grade dysplasia.    Assessment / Plan      1. Personal history of colonic polyps  Colonoscopy in 2016 with 1 tubular adenoma removed, no dysplasia. She was recommended to have colonoscopy for surveillance in 5 years at that time. There is no family history of colon cancer.   Colonoscopy for surveillance.     - Case Request; Standing  - Implement Anesthesia Orders Day of Procedure; Standing  - Obtain Informed Consent; Standing  - Verify Bowel Prep Was  Successful; Standing  - Oxygen Therapy- Nasal Cannula; 2 LPM; Titrate for SPO2: equal to or greater than, 90%; Standing  - sodium chloride 0.9 % infusion  - Case Request  - polyethylene glycol (MiraLax) 17 GM/SCOOP powder; Take as directed for colonoscopy prep  Dispense: 238 g; Refill: 0  - bisacodyl (DULCOLAX) 5 MG EC tablet; Take as directed for colon prep  Dispense: 4 tablet; Refill: 0  - magnesium citrate 1.745 GM/30ML solution solution; Use as directed for colonoscopy prep.  Dispense: 296 mL; Refill: 0    2. Abdominal pressure  3. Burping  She has felt much better. She has changed her diet and is eating small meals throughout the day and avoiding large meals, which has helped. No reflux. No difficulty swallowing. No nausea or vomiting. No history of melena. EGD in 2016 with no Theodore's, negative H. Pylori, no celiac.  Decrease Pantoprazole to 20 mg daily, then decrease to as needed.  Continue to eat very small meals throughout the day, avoid large meals.  Will monitor for now.    - pantoprazole (PROTONIX) 20 MG EC tablet; 1 po in the am 30 minutes before breakfast.  Dispense: 30 tablet; Refill: 3    4/5/2021  Patient denies abdominal pain, nausea or vomiting, diarrhea or constipation.  There is no history of rectal bleeding.  She has been eating Activia yogurt on a regular basis as she thought this would improve her symptoms. Patient was not aware Activia was dairy.  She did change to lactose-free milk. Suspect lactose intolerance.  Avoid all dairy, including Activia yogurt. May eat dairy free yogurt, such as almond, soy or coconut milk based yogurt.      10/12/2020  Significantly improved with PPI daily. CT scan with fatty liver, otherwise unremarkable. TSH normal. Negative celiac panel.  Continue PPI    4. Fatty infiltration of liver  5. Class 1 obesity due to excess calories without serious comorbidity with body mass index (BMI) of 33.0 to 33.9 in adult  BMI 33.8.   Labs have been ordered by PCP. She has  not lost weight since her last visit, but she is trying.  High fiber, low fat diet with liberal water intake.   Advised to exercise 30 minutes 4-5 days per week.  Advised to lose 15-20 pounds in the next 6-12 months.    4/5/2021  BMI 33.8. No history of elevated liver enzymes. Suspect NAFLD.  High fiber, low fat diet with liberal water intake.   Advised to exercise 30 minutes 3-4 days per week.  Advised to lose 15-20 pounds in the next 6-12 months.  Patient has labs ordered by PCP. Further evaluation if liver enzymes are elevated.     10/12/2020  Incidental finding of fatty infiltration of liver noted on recent CT scan. There is no history of liver disease in the past. There is no history of elevated liver enzymes.  The patient admits she has not been watching her diet since her  passed and COVID started.   High fiber, low fat diet with liberal water intake.   Advised to exercise 30 minutes 3-4 days per week.  Advised to lose 15-20 pounds in the next 6-12 months.    Patient Instructions   Antireflux measures: Avoid fried, fatty foods, alcohol, chocolate, coffee, tea,  soft drinks, peppermint and spearmint, spicy foods, tomatoes and tomato based foods, onion based foods, and smoking.   Other antireflux measures include weight reduction if overweight, avoiding tight clothing around the abdomen, elevating the head of the bed 6 inches with blocks under the head board, and don't drink or eat before going to bed and avoid lying down immediately after meals.   Pantoprazole 20 mg 1 po daily in the am 30 minutes before breakfast.   Recommended to take Levothyroxine first in the am upon waking, wait 30 minutes, then take Pantoprazole 20 mg, wait 30 minutes, then eat breakfast and take other medications.  High fiber, low fat diet with liberal water intake.   Advised to exercise 30 minutes 4-5 days per week.  Advised to lose 15-20 pounds in the next 6-12 months.   Colonoscopy: Description of the procedure, risks,  benefits, alternatives and options, including nonoperative options, were discussed with the patient in detail. The patient understands and wishes to proceed.    Cedrick Chowdhury, APRN  8/11/2021    Please note that portions of this note may have been completed with a voice recognition program. Efforts were made to edit the dictations, but occasionally words are mistranscribed.

## 2021-08-11 NOTE — PATIENT INSTRUCTIONS
Antireflux measures: Avoid fried, fatty foods, alcohol, chocolate, coffee, tea,  soft drinks, peppermint and spearmint, spicy foods, tomatoes and tomato based foods, onion based foods, and smoking.   Other antireflux measures include weight reduction if overweight, avoiding tight clothing around the abdomen, elevating the head of the bed 6 inches with blocks under the head board, and don't drink or eat before going to bed and avoid lying down immediately after meals.   Pantoprazole 20 mg 1 po daily in the am 30 minutes before breakfast.   Recommended to take Levothyroxine first in the am upon waking, wait 30 minutes, then take Pantoprazole 20 mg, wait 30 minutes, then eat breakfast and take other medications.  High fiber, low fat diet with liberal water intake.   Advised to exercise 30 minutes 4-5 days per week.  Advised to lose 15-20 pounds in the next 6-12 months.   Colonoscopy: Description of the procedure, risks, benefits, alternatives and options, including nonoperative options, were discussed with the patient in detail. The patient understands and wishes to proceed.

## 2021-08-25 DIAGNOSIS — M17.12 PRIMARY OSTEOARTHRITIS OF LEFT KNEE: ICD-10-CM

## 2021-08-26 RX ORDER — HYALURONATE SODIUM 16.8MG/2ML
SYRINGE (ML) INTRAARTICULAR
Qty: 6 ML | Refills: 0 | OUTPATIENT
Start: 2021-08-26

## 2021-08-27 ENCOUNTER — TELEPHONE (OUTPATIENT)
Dept: INTERNAL MEDICINE | Facility: CLINIC | Age: 76
End: 2021-08-27

## 2021-08-27 ENCOUNTER — TRANSCRIBE ORDERS (OUTPATIENT)
Dept: ADMINISTRATIVE | Facility: HOSPITAL | Age: 76
End: 2021-08-27

## 2021-08-27 DIAGNOSIS — Z13.9 SCREENING DUE: Primary | ICD-10-CM

## 2021-08-27 NOTE — TELEPHONE ENCOUNTER
STEPHANIE CALLED IN AND STATED THAT GREG HAD A DEXA BONE DENSITY SCAN SCHEDULED FOR Monday 8/30/21 BUT THAT SHE DID NOT HAVE SIGNED ORDERS FOR THE PATIENT. SHE ASKED FOR SIGNED ORDERS TO BE FAXED TO [phone] OR SHE WOULD HAVE TO CANCEL THE SCAN FOR THE PATIENT. THE REFERRAL IS STILL LISTED AS PENDING REVIEW.

## 2021-08-28 NOTE — TELEPHONE ENCOUNTER
Order in Epic from 7/20/2021, referral closed because patient could not be reached.    New order placed 8/27/2021 by (JOSE R HOPKINS)

## 2021-08-30 ENCOUNTER — APPOINTMENT (OUTPATIENT)
Dept: BONE DENSITY | Facility: HOSPITAL | Age: 76
End: 2021-08-30

## 2021-08-30 DIAGNOSIS — Z13.9 SCREENING DUE: ICD-10-CM

## 2021-08-30 PROCEDURE — 77080 DXA BONE DENSITY AXIAL: CPT

## 2021-09-07 RX ORDER — SIMVASTATIN 40 MG
40 TABLET ORAL
Qty: 90 TABLET | Refills: 3 | OUTPATIENT
Start: 2021-09-07

## 2021-09-20 RX ORDER — LEVOTHYROXINE SODIUM 88 UG/1
88 TABLET ORAL DAILY
Qty: 90 TABLET | Refills: 3 | Status: SHIPPED | OUTPATIENT
Start: 2021-09-20 | End: 2021-09-21

## 2021-09-21 DIAGNOSIS — E03.9 HYPOTHYROIDISM, UNSPECIFIED TYPE: Primary | ICD-10-CM

## 2021-09-21 LAB
25(OH)D3+25(OH)D2 SERPL-MCNC: 52.4 NG/ML (ref 30–100)
ALBUMIN SERPL-MCNC: 4.5 G/DL (ref 3.5–5.2)
ALBUMIN/GLOB SERPL: 2.6 G/DL
ALP SERPL-CCNC: 66 U/L (ref 39–117)
ALT SERPL-CCNC: 13 U/L (ref 1–33)
AST SERPL-CCNC: 14 U/L (ref 1–32)
BILIRUB SERPL-MCNC: 0.2 MG/DL (ref 0–1.2)
BUN SERPL-MCNC: 8 MG/DL (ref 8–23)
BUN/CREAT SERPL: 9 (ref 7–25)
CALCIUM SERPL-MCNC: 9.6 MG/DL (ref 8.6–10.5)
CHLORIDE SERPL-SCNC: 102 MMOL/L (ref 98–107)
CHOLEST SERPL-MCNC: 209 MG/DL (ref 0–200)
CK SERPL-CCNC: 74 U/L (ref 20–180)
CO2 SERPL-SCNC: 26.3 MMOL/L (ref 22–29)
CREAT SERPL-MCNC: 0.89 MG/DL (ref 0.57–1)
GLOBULIN SER CALC-MCNC: 1.7 GM/DL
GLUCOSE SERPL-MCNC: 86 MG/DL (ref 65–99)
HBA1C MFR BLD: 5.4 % (ref 4.8–5.6)
HDLC SERPL-MCNC: 72 MG/DL (ref 40–60)
LDLC SERPL CALC-MCNC: 102 MG/DL (ref 0–100)
POTASSIUM SERPL-SCNC: 3.9 MMOL/L (ref 3.5–5.2)
PROT SERPL-MCNC: 6.2 G/DL (ref 6–8.5)
SODIUM SERPL-SCNC: 139 MMOL/L (ref 136–145)
TRIGL SERPL-MCNC: 209 MG/DL (ref 0–150)
TSH SERPL DL<=0.005 MIU/L-ACNC: 5.39 UIU/ML (ref 0.27–4.2)
VIT B12 SERPL-MCNC: >2000 PG/ML (ref 211–946)
VLDLC SERPL CALC-MCNC: 35 MG/DL (ref 5–40)

## 2021-09-21 RX ORDER — LEVOTHYROXINE SODIUM 0.1 MG/1
100 TABLET ORAL DAILY
Qty: 30 TABLET | Refills: 1 | Status: SHIPPED | OUTPATIENT
Start: 2021-09-21 | End: 2021-10-23

## 2021-10-14 RX ORDER — BUPROPION HYDROCHLORIDE 300 MG/1
300 TABLET ORAL DAILY
Qty: 90 TABLET | Refills: 3 | Status: SHIPPED | OUTPATIENT
Start: 2021-10-14 | End: 2022-10-05

## 2021-10-14 RX ORDER — TRANDOLAPRIL TABLETS 4 MG/1
4 TABLET ORAL DAILY
Qty: 90 TABLET | Refills: 3 | Status: SHIPPED | OUTPATIENT
Start: 2021-10-14 | End: 2022-10-24 | Stop reason: SDUPTHER

## 2021-10-14 NOTE — TELEPHONE ENCOUNTER
Rx Refill Note  Requested Prescriptions     Pending Prescriptions Disp Refills   • trandolapril (MAVIK) 4 MG tablet [Pharmacy Med Name: TRANDOLAPRIL 4MG TABLETS] 90 tablet 3     Sig: TAKE 1 TABLET BY MOUTH DAILY   • buPROPion XL (WELLBUTRIN XL) 300 MG 24 hr tablet [Pharmacy Med Name: BUPROPION XL 300MG TABLETS] 90 tablet 3     Sig: TAKE 1 TABLET BY MOUTH DAILY      Last office visit with prescribing clinician: 7/20/2021      Next office visit with prescribing clinician: 1/20/2022            Amara Serra MA  10/14/21, 10:44 EDT

## 2021-10-16 RX ORDER — CARVEDILOL 12.5 MG/1
TABLET ORAL
Qty: 180 TABLET | Refills: 3 | OUTPATIENT
Start: 2021-10-16

## 2021-10-23 RX ORDER — LEVOTHYROXINE SODIUM 0.1 MG/1
100 TABLET ORAL DAILY
Qty: 30 TABLET | Refills: 1 | Status: SHIPPED | OUTPATIENT
Start: 2021-10-23 | End: 2021-11-29 | Stop reason: SDUPTHER

## 2021-10-23 NOTE — TELEPHONE ENCOUNTER
Rx Refill Note  Requested Prescriptions     Pending Prescriptions Disp Refills   • levothyroxine (SYNTHROID, LEVOTHROID) 100 MCG tablet [Pharmacy Med Name: LEVOTHYROXINE 0.100MG (100MCG) TAB] 30 tablet 1     Sig: TAKE 1 TABLET BY MOUTH DAILY      Last office visit with prescribing clinician: 7/20/2021      Next office visit with prescribing clinician: 1/20/2022            Amara Serra MA  10/23/21, 09:51 EDT       Last lab 09/21/2021   TSH:: 5.390  Active labs still needing collecting    Please advise.

## 2021-11-16 DIAGNOSIS — R10.9 ABDOMINAL PRESSURE: ICD-10-CM

## 2021-11-16 DIAGNOSIS — R14.2 BURPING: ICD-10-CM

## 2021-11-16 RX ORDER — PANTOPRAZOLE SODIUM 20 MG/1
TABLET, DELAYED RELEASE ORAL
Qty: 30 TABLET | Refills: 3 | Status: SHIPPED | OUTPATIENT
Start: 2021-11-16 | End: 2022-02-01 | Stop reason: SDUPTHER

## 2021-11-17 ENCOUNTER — ANESTHESIA EVENT (OUTPATIENT)
Dept: GASTROENTEROLOGY | Facility: HOSPITAL | Age: 76
End: 2021-11-17

## 2021-11-17 ENCOUNTER — ANESTHESIA (OUTPATIENT)
Dept: GASTROENTEROLOGY | Facility: HOSPITAL | Age: 76
End: 2021-11-17

## 2021-11-17 ENCOUNTER — HOSPITAL ENCOUNTER (OUTPATIENT)
Facility: HOSPITAL | Age: 76
Setting detail: HOSPITAL OUTPATIENT SURGERY
Discharge: HOME | End: 2021-11-17
Attending: INTERNAL MEDICINE | Admitting: INTERNAL MEDICINE

## 2021-11-17 VITALS
BODY MASS INDEX: 29.99 KG/M2 | SYSTOLIC BLOOD PRESSURE: 161 MMHG | RESPIRATION RATE: 18 BRPM | DIASTOLIC BLOOD PRESSURE: 82 MMHG | HEIGHT: 65 IN | OXYGEN SATURATION: 99 % | TEMPERATURE: 98.2 F | WEIGHT: 180 LBS | HEART RATE: 69 BPM

## 2021-11-17 DIAGNOSIS — Z86.010 PERSONAL HISTORY OF COLONIC POLYPS: ICD-10-CM

## 2021-11-17 PROCEDURE — 25010000002 PROPOFOL 200 MG/20ML EMULSION: Performed by: NURSE ANESTHETIST, CERTIFIED REGISTERED

## 2021-11-17 PROCEDURE — G0105 COLORECTAL SCRN; HI RISK IND: HCPCS | Performed by: INTERNAL MEDICINE

## 2021-11-17 RX ORDER — LIDOCAINE HYDROCHLORIDE 20 MG/ML
INJECTION, SOLUTION INTRAVENOUS AS NEEDED
Status: DISCONTINUED | OUTPATIENT
Start: 2021-11-17 | End: 2021-11-17 | Stop reason: SURG

## 2021-11-17 RX ORDER — SODIUM CHLORIDE 9 MG/ML
70 INJECTION, SOLUTION INTRAVENOUS CONTINUOUS PRN
Status: DISCONTINUED | OUTPATIENT
Start: 2021-11-17 | End: 2021-11-17 | Stop reason: HOSPADM

## 2021-11-17 RX ORDER — PROPOFOL 10 MG/ML
INJECTION, EMULSION INTRAVENOUS AS NEEDED
Status: DISCONTINUED | OUTPATIENT
Start: 2021-11-17 | End: 2021-11-17 | Stop reason: SURG

## 2021-11-17 RX ORDER — SIMETHICONE 20 MG/.3ML
EMULSION ORAL AS NEEDED
Status: DISCONTINUED | OUTPATIENT
Start: 2021-11-17 | End: 2021-11-17 | Stop reason: HOSPADM

## 2021-11-17 RX ADMIN — PROPOFOL 50 MG: 10 INJECTION, EMULSION INTRAVENOUS at 12:05

## 2021-11-17 RX ADMIN — LIDOCAINE HYDROCHLORIDE 60 MG: 20 INJECTION, SOLUTION INTRAVENOUS at 11:51

## 2021-11-17 RX ADMIN — PROPOFOL 50 MG: 10 INJECTION, EMULSION INTRAVENOUS at 12:09

## 2021-11-17 RX ADMIN — PROPOFOL 50 MG: 10 INJECTION, EMULSION INTRAVENOUS at 12:00

## 2021-11-17 RX ADMIN — PROPOFOL 50 MG: 10 INJECTION, EMULSION INTRAVENOUS at 11:51

## 2021-11-17 RX ADMIN — PROPOFOL 50 MG: 10 INJECTION, EMULSION INTRAVENOUS at 12:14

## 2021-11-17 RX ADMIN — PROPOFOL 50 MG: 10 INJECTION, EMULSION INTRAVENOUS at 11:56

## 2021-11-17 RX ADMIN — PROPOFOL 50 MG: 10 INJECTION, EMULSION INTRAVENOUS at 11:58

## 2021-11-17 RX ADMIN — SODIUM CHLORIDE 70 ML/HR: 9 INJECTION, SOLUTION INTRAVENOUS at 09:32

## 2021-11-17 NOTE — H&P
[facility]  HISTORY AND PHYSICAL    Patient Name: Deya Hernandez  : 1945  MRN: [id number]    Chief Complaint:   For surveillance colonoscopy    History Of Presenting Illness:    Personal history of colon polyp     Past Medical History:   Diagnosis Date   • Abdominal bloating    • Abnormal EKG    • Ankle fracture    • Anxiety and depression    • Colon cancer screening    • Colon polyp 2016   • Disease of thyroid gland    • Dyspnea    • Elbow pain, left    • Elevated cholesterol    • Fatigue    • Fatty infiltration of liver 10/12/2020   • Gastritis    • GERD (gastroesophageal reflux disease)    • H/O echocardiogram Unknown    Normal per pt.   • H/O exercise stress test 2016    Normal result per pt.   • H/O mammogram    • H/O sinusitis    • H/O: pneumonia    • Heart palpitations    • Heartburn    • Hemorrhoid    • History of Holter monitoring     Found to be normal per pt.   • Hypertension    • Plantar fasciitis    • PONV (postoperative nausea and vomiting)     Years ago with tubal   • Sebaceous cyst    • Sinusitis    • Skin cancer        Past Surgical History:   Procedure Laterality Date   • CARDIAC CATHETERIZATION      Negative per pt.   • COLONOSCOPY  2016   • COLONOSCOPY W/ POLYPECTOMY     • ENDOSCOPY     • SKIN BIOPSY     • TUBAL ABDOMINAL LIGATION     • UPPER GASTROINTESTINAL ENDOSCOPY  2016       Social History     Socioeconomic History   • Marital status:    Tobacco Use   • Smoking status: Former Smoker   • Smokeless tobacco: Never Used   • Tobacco comment: Quit in    Vaping Use   • Vaping Use: Never used   Substance and Sexual Activity   • Alcohol use: Yes     Comment: social   • Drug use: No   • Sexual activity: Defer       Family History   Problem Relation Age of Onset   • Heart attack Other    • Arthritis Other    • Cancer Other    • Diabetes Other    • Hypertension Other    • Stroke Other    • Cancer Mother    • Breast cancer Mother  70   • Emphysema Father    • Colon polyps Neg Hx    • Esophageal cancer Neg Hx    • Irritable bowel syndrome Neg Hx    • Liver cancer Neg Hx    • Liver disease Neg Hx    • Stomach cancer Neg Hx    • Colon cancer Neg Hx    • Ovarian cancer Neg Hx        Prior to Admission Medications:  Medications Prior to Admission   Medication Sig Dispense Refill Last Dose   • amLODIPine (NORVASC) 5 MG tablet TAKE 1 TABLET BY MOUTH DAILY 90 tablet 3 11/16/2021 at 2100   • aspirin (aspirin) 81 MG EC tablet Take 1 tablet by mouth Daily. 90 tablet 3 Past Week at Unknown time   • bisacodyl (DULCOLAX) 5 MG EC tablet Take as directed for colon prep 4 tablet 0 11/16/2021 at Unknown time   • buPROPion XL (WELLBUTRIN XL) 300 MG 24 hr tablet TAKE 1 TABLET BY MOUTH DAILY 90 tablet 3 11/16/2021 at 0900   • Calcium Carbonate Antacid (TUMS PO) Take  by mouth As Needed.   Past Week at Unknown time   • carvedilol (COREG) 12.5 MG tablet TAKE 1 TABLET BY MOUTH TWICE DAILY WITH FOOD (Patient taking differently: Take 12.5 mg by mouth 2 (Two) Times a Day With Meals.) 180 tablet 3 11/16/2021 at 2100   • Cholecalciferol (VITAMIN D3) 1000 UNITS capsule Take 2,500 mg by mouth Daily.   11/16/2021 at 2100   • Cyanocobalamin (VITAMIN B-12 ER PO) Take  by mouth.   11/16/2021 at 0900   • diclofenac (VOLTAREN) 1 % gel gel Apply 4 g topically to the appropriate area as directed 4 (Four) Times a Day As Needed (pain). 100 g 0 Past Week at Unknown time   • levothyroxine (SYNTHROID, LEVOTHROID) 100 MCG tablet TAKE 1 TABLET BY MOUTH DAILY 30 tablet 1 11/16/2021 at 0900   • magnesium citrate 1.745 GM/30ML solution solution Use as directed for colonoscopy prep. 296 mL 0 11/16/2021 at Unknown time   • mirtazapine (REMERON) 30 MG tablet TAKE 1 TABLET BY MOUTH EVERY NIGHT AT BEDTIME (Patient taking differently: Take 30 mg by mouth Every Night. TAKE 1 TABLET BY MOUTH EVERY NIGHT AT BEDTIME) 90 tablet 3 11/15/2021 at Unknown time   • pantoprazole (PROTONIX) 20 MG EC tablet  TAKE 1 TABLET BY MOUTH IN THE MORNING 30 MINUTES BEFORE BREAKFAST 30 tablet 3 11/16/2021 at 0900   • Simethicone (GAS-X PO) Take  by mouth As Needed.      • simvastatin (ZOCOR) 40 MG tablet Take 1 tablet by mouth every night at bedtime. 90 tablet 3 11/16/2021 at 2100   • trandolapril (MAVIK) 4 MG tablet TAKE 1 TABLET BY MOUTH DAILY 90 tablet 3 11/16/2021 at 0900   • ALPRAZolam (XANAX) 0.5 MG tablet Take 1 tablet by mouth 2 (Two) Times a Day As Needed for Anxiety. 30 tablet 0 More than a month at Unknown time   • polyethylene glycol (MiraLax) 17 GM/SCOOP powder Take as directed for colonoscopy prep 238 g 0        Allergies:  No Known Allergies     Vitals: Temp:  [96.9 °F (36.1 °C)] 96.9 °F (36.1 °C)  Heart Rate:  [95] 95  Resp:  [18] 18  BP: (168)/(97) 168/97    Review Of Systems:  Constitutional:  Negative for chills, fever, and unexpected weight change.  Respiratory:  Negative for cough, chest tightness, shortness of breath, and wheezing.  Cardiovascular:  Negative for chest pain, palpitations, and leg swelling.  Gastrointestinal:  Negative for abdominal distention, abdominal pain, Nausea, vomiting.  Neurological:  Negative for Weakness, numbness, and headaches.     Physical Exam:    General Appearance:  Alert, cooperative, in no acute distress.   Lungs:   Clear to auscultation, respirations regular, even and                 unlabored.   Heart:  Regular rhythm and normal rate.   Abdomen:   Normal bowel sounds, no masses, no organomegaly. Soft, non-tender, non-distended   Neurologic: Alert and oriented x 3. Moves all four limbs equally       Plan: COLONOSCOPY (N/A)     Kimmie Chapa MD  11/17/2021

## 2021-11-17 NOTE — ANESTHESIA PREPROCEDURE EVALUATION
Anesthesia Evaluation     Patient summary reviewed and Nursing notes reviewed   history of anesthetic complications: PONV  NPO Solid Status: > 8 hours  NPO Liquid Status: > 8 hours           Airway   Mallampati: II  TM distance: >3 FB  Neck ROM: full  Possible difficult intubation  Dental      Pulmonary    (+) a smoker Former, COPD, shortness of breath, sleep apnea ( ? [place], obese, snores), decreased breath sounds,   Cardiovascular     (+) hypertension, dysrhythmias, SHERIDAN, hyperlipidemia,       Neuro/Psych  GI/Hepatic/Renal/Endo    (+) obesity, morbid obesity, GERD,  liver disease fatty liver disease, thyroid problem hypothyroidism    Musculoskeletal     Abdominal   (+) obese,    Substance History   (+) alcohol use,      OB/GYN          Other   arthritis,    history of cancer                    Anesthesia Plan    ASA 3   (Risks and benefits discussed including risk of aspiration, recall and dental damage. All patient questions answered.    Will continue with plan of care.)  intravenous induction     Anesthetic plan, all risks, benefits, and alternatives have been provided, discussed and informed consent has been obtained with: patient.

## 2021-11-17 NOTE — DISCHARGE INSTRUCTIONS
Rest today  No pushing,pulling,tugging,heavy lifting, or strenuous activity   No major decision making,driving,or drinking alcoholic beverages for 24 hours due to the sedation you received  Always use good hand hygiene/washing technique  No driving on pain medication.    To assist you in voiding:  Drink plenty of fluids  Listen to running water while attempting to void.    If you are unable to urinate and you have an uncomfortable urge to void or it has been   6 hours since you were discharged, return to the Emergency Room.    - Discharge patient to home (ambulatory).   - High fiber diet.   - Continue present medications.   - Await pathology results.   - Repeat colonoscopy in 7- 10 years for surveillance.   - Return to GI office in 8 weeks.  
154.94

## 2021-11-17 NOTE — ANESTHESIA POSTPROCEDURE EVALUATION
Patient: Deya Hernandez    Procedure Summary     Date: 11/17/21 Room / Location: [facility] ENDOSCOPY 1 / [facility] ENDOSCOPY    Anesthesia Start: 1151 Anesthesia Stop: 1222    Procedure: COLONOSCOPY (N/A Anus) Diagnosis:       Personal history of colonic polyps      (Personal history of colonic polyps [Z86.010])    Surgeons: Kimmie Chapa MD Provider: Ezio Chen CRNA    Anesthesia Type: Not recorded ASA Status: 3          Anesthesia Type: No value filed.    Vitals  No vitals data found for the desired time range.          Post Anesthesia Care and Evaluation    Patient location during evaluation: bedside  Patient participation: complete - patient participated  Level of consciousness: awake and alert  Pain score: 0  Pain management: adequate  Airway patency: patent  Anesthetic complications: No anesthetic complications  PONV Status: none  Cardiovascular status: acceptable  Respiratory status: acceptable  Hydration status: acceptable

## 2021-11-29 DIAGNOSIS — I70.90 ARTERIOSCLEROTIC VASCULAR DISEASE: ICD-10-CM

## 2021-11-29 RX ORDER — ASPIRIN 81 MG/1
81 TABLET ORAL DAILY
Qty: 90 TABLET | Refills: 3 | Status: SHIPPED | OUTPATIENT
Start: 2021-11-29 | End: 2022-09-20

## 2021-11-29 RX ORDER — LEVOTHYROXINE SODIUM 0.1 MG/1
100 TABLET ORAL DAILY
Qty: 30 TABLET | Refills: 1 | Status: SHIPPED | OUTPATIENT
Start: 2021-11-29 | End: 2022-04-05

## 2021-12-18 RX ORDER — AMLODIPINE BESYLATE 5 MG/1
5 TABLET ORAL DAILY
Qty: 90 TABLET | Refills: 3 | Status: SHIPPED | OUTPATIENT
Start: 2021-12-18 | End: 2022-03-25 | Stop reason: SDUPTHER

## 2021-12-18 RX ORDER — SIMVASTATIN 40 MG
40 TABLET ORAL
Qty: 90 TABLET | Refills: 3 | Status: SHIPPED | OUTPATIENT
Start: 2021-12-18 | End: 2022-12-19

## 2022-02-01 DIAGNOSIS — R10.9 ABDOMINAL PRESSURE: ICD-10-CM

## 2022-02-01 DIAGNOSIS — R14.2 BURPING: ICD-10-CM

## 2022-02-01 RX ORDER — PANTOPRAZOLE SODIUM 20 MG/1
TABLET, DELAYED RELEASE ORAL
Qty: 30 TABLET | Refills: 5 | Status: SHIPPED | OUTPATIENT
Start: 2022-02-01 | End: 2022-06-14 | Stop reason: SDUPTHER

## 2022-02-14 RX ORDER — CARVEDILOL 12.5 MG/1
12.5 TABLET ORAL 2 TIMES DAILY WITH MEALS
Qty: 180 TABLET | Refills: 3 | Status: SHIPPED | OUTPATIENT
Start: 2022-02-14 | End: 2022-12-19

## 2022-03-03 ENCOUNTER — PATIENT MESSAGE (OUTPATIENT)
Dept: INTERNAL MEDICINE | Facility: CLINIC | Age: 77
End: 2022-03-03

## 2022-03-03 ENCOUNTER — APPOINTMENT (OUTPATIENT)
Dept: GENERAL RADIOLOGY | Facility: HOSPITAL | Age: 77
End: 2022-03-03

## 2022-03-03 ENCOUNTER — HOSPITAL ENCOUNTER (EMERGENCY)
Facility: HOSPITAL | Age: 77
Discharge: HOME | End: 2022-03-03
Attending: EMERGENCY MEDICINE | Admitting: EMERGENCY MEDICINE

## 2022-03-03 ENCOUNTER — APPOINTMENT (OUTPATIENT)
Dept: CT IMAGING | Facility: HOSPITAL | Age: 77
End: 2022-03-03

## 2022-03-03 VITALS
RESPIRATION RATE: 18 BRPM | DIASTOLIC BLOOD PRESSURE: 81 MMHG | HEIGHT: 65 IN | OXYGEN SATURATION: 98 % | HEART RATE: 65 BPM | TEMPERATURE: 98 F | SYSTOLIC BLOOD PRESSURE: 151 MMHG | BODY MASS INDEX: 30.82 KG/M2 | WEIGHT: 185 LBS

## 2022-03-03 DIAGNOSIS — R07.9 CHEST PAIN, UNSPECIFIED TYPE: Primary | ICD-10-CM

## 2022-03-03 LAB
ALBUMIN SERPL-MCNC: 4.5 G/DL (ref 3.5–5.2)
ALBUMIN/GLOB SERPL: 1.8 G/DL
ALP SERPL-CCNC: 67 U/L (ref 39–117)
ALT SERPL W P-5'-P-CCNC: 15 U/L (ref 1–33)
ANION GAP SERPL CALCULATED.3IONS-SCNC: 12.9 MMOL/L (ref 5–15)
AST SERPL-CCNC: 21 U/L (ref 1–32)
BASOPHILS # BLD AUTO: 0.05 10*3/MM3 (ref 0–0.2)
BASOPHILS NFR BLD AUTO: 0.7 % (ref 0–1.5)
BILIRUB SERPL-MCNC: 0.3 MG/DL (ref 0–1.2)
BUN SERPL-MCNC: 9 MG/DL (ref 8–23)
BUN/CREAT SERPL: 10.8 (ref 7–25)
CALCIUM SPEC-SCNC: 9.6 MG/DL (ref 8.6–10.5)
CHLORIDE SERPL-SCNC: 100 MMOL/L (ref 98–107)
CO2 SERPL-SCNC: 23.1 MMOL/L (ref 22–29)
CREAT SERPL-MCNC: 0.83 MG/DL (ref 0.57–1)
DEPRECATED RDW RBC AUTO: 39.6 FL (ref 37–54)
EGFRCR SERPLBLD CKD-EPI 2021: 74.1 ML/MIN/1.73
EOSINOPHIL # BLD AUTO: 0.12 10*3/MM3 (ref 0–0.4)
EOSINOPHIL NFR BLD AUTO: 1.8 % (ref 0.3–6.2)
ERYTHROCYTE [DISTWIDTH] IN BLOOD BY AUTOMATED COUNT: 12 % (ref 12.3–15.4)
GLOBULIN UR ELPH-MCNC: 2.5 GM/DL
GLUCOSE SERPL-MCNC: 102 MG/DL (ref 65–99)
HCT VFR BLD AUTO: 39.4 % (ref 34–46.6)
HGB BLD-MCNC: 14.2 G/DL (ref 12–15.9)
HOLD SPECIMEN: NORMAL
HOLD SPECIMEN: NORMAL
IMM GRANULOCYTES # BLD AUTO: 0.02 10*3/MM3 (ref 0–0.05)
IMM GRANULOCYTES NFR BLD AUTO: 0.3 % (ref 0–0.5)
LYMPHOCYTES # BLD AUTO: 2.12 10*3/MM3 (ref 0.7–3.1)
LYMPHOCYTES NFR BLD AUTO: 31.6 % (ref 19.6–45.3)
MCH RBC QN AUTO: 32.3 PG (ref 26.6–33)
MCHC RBC AUTO-ENTMCNC: 36 G/DL (ref 31.5–35.7)
MCV RBC AUTO: 89.7 FL (ref 79–97)
MONOCYTES # BLD AUTO: 0.42 10*3/MM3 (ref 0.1–0.9)
MONOCYTES NFR BLD AUTO: 6.3 % (ref 5–12)
NEUTROPHILS NFR BLD AUTO: 3.98 10*3/MM3 (ref 1.7–7)
NEUTROPHILS NFR BLD AUTO: 59.3 % (ref 42.7–76)
NRBC BLD AUTO-RTO: 0 /100 WBC (ref 0–0.2)
NT-PROBNP SERPL-MCNC: 169.5 PG/ML (ref 0–900)
PLATELET # BLD AUTO: 349 10*3/MM3 (ref 140–450)
PMV BLD AUTO: 8.8 FL (ref 6–12)
POTASSIUM SERPL-SCNC: 4 MMOL/L (ref 3.5–5.2)
PROT SERPL-MCNC: 7 G/DL (ref 6–8.5)
RBC # BLD AUTO: 4.39 10*6/MM3 (ref 3.77–5.28)
SODIUM SERPL-SCNC: 136 MMOL/L (ref 136–145)
TROPONIN T SERPL-MCNC: <0.01 NG/ML (ref 0–0.03)
WBC NRBC COR # BLD: 6.71 10*3/MM3 (ref 3.4–10.8)
WHOLE BLOOD HOLD SPECIMEN: NORMAL
WHOLE BLOOD HOLD SPECIMEN: NORMAL

## 2022-03-03 PROCEDURE — 71045 X-RAY EXAM CHEST 1 VIEW: CPT

## 2022-03-03 PROCEDURE — 80053 COMPREHEN METABOLIC PANEL: CPT | Performed by: EMERGENCY MEDICINE

## 2022-03-03 PROCEDURE — 85025 COMPLETE CBC W/AUTO DIFF WBC: CPT | Performed by: EMERGENCY MEDICINE

## 2022-03-03 PROCEDURE — 99284 EMERGENCY DEPT VISIT MOD MDM: CPT

## 2022-03-03 PROCEDURE — 93005 ELECTROCARDIOGRAM TRACING: CPT | Performed by: EMERGENCY MEDICINE

## 2022-03-03 PROCEDURE — 84484 ASSAY OF TROPONIN QUANT: CPT | Performed by: PHYSICIAN ASSISTANT

## 2022-03-03 PROCEDURE — 25010000002 IOPAMIDOL 61 % SOLUTION: Performed by: EMERGENCY MEDICINE

## 2022-03-03 PROCEDURE — 83880 ASSAY OF NATRIURETIC PEPTIDE: CPT | Performed by: PHYSICIAN ASSISTANT

## 2022-03-03 PROCEDURE — 71275 CT ANGIOGRAPHY CHEST: CPT

## 2022-03-03 RX ORDER — SODIUM CHLORIDE 0.9 % (FLUSH) 0.9 %
10 SYRINGE (ML) INJECTION AS NEEDED
Status: DISCONTINUED | OUTPATIENT
Start: 2022-03-03 | End: 2022-03-03 | Stop reason: HOSPADM

## 2022-03-03 RX ADMIN — IOPAMIDOL 100 ML: 612 INJECTION, SOLUTION INTRAVENOUS at 19:08

## 2022-03-03 NOTE — ED PROVIDER NOTES
Subjective   This is a 76-year-old female with a past medical history of hypertension, heart palpitations, GERD, and hyperlipidemia who presents today with concerns of chest heaviness and associated dizziness.  Her symptoms began yesterday at 7 PM while at home. She states that initially she felt a sensation of chest heaviness. She had never experienced a sensation like this before.  Following her chest heaviness she had a brief of episode of dizziness, where she felt like she was off balance. She states that these periods of dizziness come sporadically and have increased over the last month.  They are not associated with activity, rest or positional changes.  She also states that over the last 2 months she has headaches are abnormal for her but are typically treated with Tylenol.  She also complains of periodic shortness of breath that she believes may be associated with indigestion.  She denies nausea, vomiting, vision changes, numbness, paresthesias, and difficulty swallowing.       History provided by:  Patient   used: No        Review of Systems   Constitutional: Positive for chills.   Eyes: Negative for visual disturbance.   Neurological: Positive for dizziness.   All other systems reviewed and are negative.      Past Medical History:   Diagnosis Date   • Abdominal bloating    • Abnormal EKG    • Ankle fracture    • Anxiety and depression 2004   • Colon cancer screening    • Colon polyp 04/2016   • Disease of thyroid gland    • Dyspnea    • Elbow pain, left    • Elevated cholesterol    • Fatigue    • Fatty infiltration of liver 10/12/2020   • Gastritis    • GERD (gastroesophageal reflux disease)    • H/O echocardiogram Unknown    Normal per pt.   • H/O exercise stress test 2016    Normal result per pt.   • H/O mammogram    • H/O sinusitis    • H/O: pneumonia    • Heart palpitations 2014   • Heartburn    • Hemorrhoid    • History of Holter monitoring 2014    Found to be normal per pt.   •  Hypertension    • Plantar fasciitis    • PONV (postoperative nausea and vomiting)     Years ago with tubal   • Sebaceous cyst    • Sinusitis    • Skin cancer        No Known Allergies    Past Surgical History:   Procedure Laterality Date   • CARDIAC CATHETERIZATION  2003    Negative per pt.   • COLONOSCOPY  04/07/2016   • COLONOSCOPY N/A 11/17/2021    Procedure: COLONOSCOPY;  Surgeon: Kimmie Chapa MD;  Location: [facility] ENDOSCOPY;  Service: Gastroenterology;  Laterality: N/A;   • COLONOSCOPY W/ POLYPECTOMY     • ENDOSCOPY  2016   • SKIN BIOPSY     • TUBAL ABDOMINAL LIGATION     • UPPER GASTROINTESTINAL ENDOSCOPY  03/2016       Family History   Problem Relation Age of Onset   • Heart attack Other    • Arthritis Other    • Cancer Other    • Diabetes Other    • Hypertension Other    • Stroke Other    • Cancer Mother    • Breast cancer Mother 70   • Emphysema Father    • Colon polyps Neg Hx    • Esophageal cancer Neg Hx    • Irritable bowel syndrome Neg Hx    • Liver cancer Neg Hx    • Liver disease Neg Hx    • Stomach cancer Neg Hx    • Colon cancer Neg Hx    • Ovarian cancer Neg Hx        Social History     Socioeconomic History   • Marital status:    Tobacco Use   • Smoking status: Former Smoker   • Smokeless tobacco: Never Used   • Tobacco comment: Quit in 2015   Vaping Use   • Vaping Use: Never used   Substance and Sexual Activity   • Alcohol use: Yes     Comment: social   • Drug use: No   • Sexual activity: Defer           Objective   Physical Exam  Vitals and nursing note reviewed.   Constitutional:       Appearance: She is well-developed.   Cardiovascular:      Rate and Rhythm: Normal rate and regular rhythm.   Pulmonary:      Effort: Pulmonary effort is normal.      Breath sounds: Normal breath sounds.   Abdominal:      General: Bowel sounds are normal.      Palpations: Abdomen is soft.   Musculoskeletal:         General: Normal range of motion.      Cervical back: Normal range of motion and  neck supple.   Skin:     General: Skin is warm and dry.   Neurological:      Mental Status: She is alert and oriented to person, place, and time.      Deep Tendon Reflexes: Reflexes are normal and symmetric.         Procedures           ED Course  ED Course as of 03/03/22 2017   Thu Mar 03, 2022   1642 EKG interpreted by me.  Sinus rhythm.  Rate of 62.  Nonspecific Q wave.  Nonspecific ST segment changes.  Abnormal EKG [CG]      ED Course User Index  [CG] Ivan Walker, DO                                                 MDM  Number of Diagnoses or Management Options  Chest pain, unspecified type: new and requires workup     Amount and/or Complexity of Data Reviewed  Clinical lab tests: reviewed  Tests in the radiology section of CPT®: reviewed  Independent visualization of images, tracings, or specimens: yes    Risk of Complications, Morbidity, and/or Mortality  Presenting problems: moderate  Diagnostic procedures: low  Management options: moderate  General comments: 76-year-old female with chest pain greater than 12 hours, described as a heaviness.  Does have a history of hypertension hyperlipidemia.  Previous chemical stress test 1 year prior, EKG unchanged from previous EKGs, troponin negative, CT scan negative for PE or other abnormalities, she is remained stable while in the ED can safely be discharged with close follow-up.        Final diagnoses:   Chest pain, unspecified type       ED Disposition  ED Disposition     ED Disposition Condition Comment    Discharge Stable           [facility]  [address] [facility] [address] [phone]    If symptoms worsen         Medication List      Changed    mirtazapine 30 MG tablet  Commonly known as: REMERON  TAKE 1 TABLET BY MOUTH EVERY NIGHT AT BEDTIME  What changed:   · when to take this  · additional instructions             Ravin Choe Jr., TRINIDAD  03/03/22 2018

## 2022-03-25 RX ORDER — AMLODIPINE BESYLATE 5 MG/1
5 TABLET ORAL DAILY
Qty: 90 TABLET | Refills: 3 | Status: SHIPPED | OUTPATIENT
Start: 2022-03-25 | End: 2023-01-07 | Stop reason: SDUPTHER

## 2022-04-05 RX ORDER — LEVOTHYROXINE SODIUM 0.1 MG/1
100 TABLET ORAL DAILY
Qty: 30 TABLET | Refills: 0 | Status: SHIPPED | OUTPATIENT
Start: 2022-04-05 | End: 2022-05-04 | Stop reason: SDUPTHER

## 2022-04-05 RX ORDER — LEVOTHYROXINE SODIUM 0.1 MG/1
TABLET ORAL
Qty: 90 TABLET | OUTPATIENT
Start: 2022-04-05

## 2022-05-04 ENCOUNTER — OFFICE VISIT (OUTPATIENT)
Dept: INTERNAL MEDICINE | Facility: CLINIC | Age: 77
End: 2022-05-04

## 2022-05-04 VITALS
BODY MASS INDEX: 33.82 KG/M2 | HEIGHT: 65 IN | DIASTOLIC BLOOD PRESSURE: 86 MMHG | SYSTOLIC BLOOD PRESSURE: 140 MMHG | TEMPERATURE: 97.3 F | WEIGHT: 203 LBS | HEART RATE: 91 BPM | OXYGEN SATURATION: 97 %

## 2022-05-04 DIAGNOSIS — E03.8 OTHER SPECIFIED HYPOTHYROIDISM: ICD-10-CM

## 2022-05-04 DIAGNOSIS — F41.8 DEPRESSION WITH ANXIETY: ICD-10-CM

## 2022-05-04 DIAGNOSIS — E78.2 MIXED HYPERLIPIDEMIA: ICD-10-CM

## 2022-05-04 DIAGNOSIS — E55.9 VITAMIN D DEFICIENCY: ICD-10-CM

## 2022-05-04 DIAGNOSIS — I10 PRIMARY HYPERTENSION: Primary | ICD-10-CM

## 2022-05-04 PROBLEM — R10.9 ABDOMINAL PRESSURE: Chronic | Status: RESOLVED | Noted: 2020-07-22 | Resolved: 2022-05-04

## 2022-05-04 PROCEDURE — 99214 OFFICE O/P EST MOD 30 MIN: CPT | Performed by: INTERNAL MEDICINE

## 2022-05-04 RX ORDER — LEVOTHYROXINE SODIUM 0.1 MG/1
100 TABLET ORAL DAILY
Qty: 30 TABLET | Refills: 0 | Status: SHIPPED | OUTPATIENT
Start: 2022-05-04 | End: 2022-06-04

## 2022-05-04 RX ORDER — LEVOTHYROXINE SODIUM 0.1 MG/1
TABLET ORAL
Qty: 90 TABLET | OUTPATIENT
Start: 2022-05-04

## 2022-05-04 NOTE — PROGRESS NOTES
Subjective   Deya Hernandez is a 76 y.o. female.     Chief Complaint   Patient presents with   • Follow-up   • Hypertension   • Hyperlipidemia   • Annual Exam       History of Present Illness   Patient here for follow-up. Weight is still elevated. Depression anxiety stable medication. Blood pressure slightly elevated. Hyperlipidemia stable medication. Hypothyroidism stable medication. Vitamin D stable supplement.    Current Outpatient Medications:   •  ALPRAZolam (XANAX) 0.5 MG tablet, Take 1 tablet by mouth 2 (Two) Times a Day As Needed for Anxiety., Disp: 30 tablet, Rfl: 0  •  amLODIPine (NORVASC) 5 MG tablet, Take 1 tablet by mouth Daily., Disp: 90 tablet, Rfl: 3  •  aspirin (aspirin) 81 MG EC tablet, Take 1 tablet by mouth Daily., Disp: 90 tablet, Rfl: 3  •  buPROPion XL (WELLBUTRIN XL) 300 MG 24 hr tablet, TAKE 1 TABLET BY MOUTH DAILY, Disp: 90 tablet, Rfl: 3  •  Calcium Carbonate Antacid (TUMS PO), Take  by mouth As Needed., Disp: , Rfl:   •  carvedilol (COREG) 12.5 MG tablet, Take 1 tablet by mouth 2 (Two) Times a Day With Meals., Disp: 180 tablet, Rfl: 3  •  Cholecalciferol (VITAMIN D3) 1000 UNITS capsule, Take 2,500 mg by mouth Daily., Disp: , Rfl:   •  Cyanocobalamin (VITAMIN B-12 ER PO), Take  by mouth., Disp: , Rfl:   •  diclofenac (VOLTAREN) 1 % gel gel, Apply 4 g topically to the appropriate area as directed 4 (Four) Times a Day As Needed (pain)., Disp: 100 g, Rfl: 0  •  levothyroxine (SYNTHROID, LEVOTHROID) 100 MCG tablet, Take 1 tablet by mouth Daily. Appointment needed for additional refills, Disp: 30 tablet, Rfl: 0  •  mirtazapine (REMERON) 30 MG tablet, TAKE 1 TABLET BY MOUTH EVERY NIGHT AT BEDTIME (Patient taking differently: Take 30 mg by mouth Every Night. TAKE 1 TABLET BY MOUTH EVERY NIGHT AT BEDTIME), Disp: 90 tablet, Rfl: 3  •  pantoprazole (PROTONIX) 20 MG EC tablet, TAKE 1 TABLET BY MOUTH IN THE MORNING 30 MINUTES BEFORE BREAKFAST, Disp: 30 tablet, Rfl: 5  •  Simethicone (GAS-X PO), Take   by mouth As Needed., Disp: , Rfl:   •  simvastatin (ZOCOR) 40 MG tablet, Take 1 tablet by mouth every night at bedtime., Disp: 90 tablet, Rfl: 3  •  trandolapril (MAVIK) 4 MG tablet, TAKE 1 TABLET BY MOUTH DAILY, Disp: 90 tablet, Rfl: 3    The following portions of the patient's history were reviewed and updated as appropriate: allergies, current medications, past family history, past medical history, past social history, past surgical history and problem list.    Review of Systems   Constitutional: Negative.    Respiratory: Negative.    Cardiovascular: Negative.    Gastrointestinal: Negative.    Musculoskeletal: Negative.    Skin: Negative.    Neurological: Negative.    Psychiatric/Behavioral: Negative.        Objective   Physical Exam  Cardiovascular:      Rate and Rhythm: Normal rate and regular rhythm.      Heart sounds: Normal heart sounds.   Pulmonary:      Effort: Pulmonary effort is normal.      Breath sounds: Normal breath sounds.   Abdominal:      General: Bowel sounds are normal.   Musculoskeletal:      Cervical back: Neck supple.   Skin:     General: Skin is warm.   Neurological:      Mental Status: She is alert and oriented to person, place, and time.         All tests have been reviewed.    Assessment/Plan   Diagnoses and all orders for this visit:    Primary hypertension continue medication  -     CBC & Differential  -     Comprehensive Metabolic Panel    Mixed hyperlipidemia continue medication check lab  -     Lipid Panel  -     CK    Other specified hypothyroidism continue medication  -     levothyroxine (SYNTHROID, LEVOTHROID) 100 MCG tablet; Take 1 tablet by mouth Daily. Appointment needed for additional refills  -     TSH    Depression with anxiety continue medication    Vitamin D deficiency  -     Vitamin D 25 Hydroxy    Physical in July . blood tests in June           Answers for HPI/ROS submitted by the patient on 5/3/2022  Please describe your symptoms.: I don’t have symptoms.  Have you had  these symptoms before?: No  How long have you been having these symptoms?: Greater than 2 weeks  Please list any medications you are currently taking for this condition.: Does not apply.  I had to check one of the above options before I could continue.  Please describe any probable cause for these symptoms. : Does not apply.  What is the primary reason for your visit?: Other

## 2022-05-05 DIAGNOSIS — E03.8 OTHER SPECIFIED HYPOTHYROIDISM: ICD-10-CM

## 2022-05-05 RX ORDER — LEVOTHYROXINE SODIUM 0.1 MG/1
TABLET ORAL
Qty: 30 TABLET | Refills: 0 | OUTPATIENT
Start: 2022-05-05

## 2022-06-04 DIAGNOSIS — E03.8 OTHER SPECIFIED HYPOTHYROIDISM: ICD-10-CM

## 2022-06-04 RX ORDER — LEVOTHYROXINE SODIUM 0.1 MG/1
TABLET ORAL
Qty: 30 TABLET | Refills: 0 | Status: SHIPPED | OUTPATIENT
Start: 2022-06-04 | End: 2022-06-04

## 2022-06-04 RX ORDER — LEVOTHYROXINE SODIUM 0.1 MG/1
TABLET ORAL
Qty: 90 TABLET | Refills: 0 | Status: SHIPPED | OUTPATIENT
Start: 2022-06-04 | End: 2022-09-09 | Stop reason: SDUPTHER

## 2022-06-14 DIAGNOSIS — R10.9 ABDOMINAL PRESSURE: ICD-10-CM

## 2022-06-14 DIAGNOSIS — R14.2 BURPING: ICD-10-CM

## 2022-06-15 RX ORDER — PANTOPRAZOLE SODIUM 20 MG/1
TABLET, DELAYED RELEASE ORAL
Qty: 30 TABLET | Refills: 5 | Status: SHIPPED | OUTPATIENT
Start: 2022-06-15 | End: 2022-12-28 | Stop reason: SDUPTHER

## 2022-07-04 DIAGNOSIS — F41.8 DEPRESSION WITH ANXIETY: ICD-10-CM

## 2022-07-05 NOTE — TELEPHONE ENCOUNTER
Rx Refill Note  Requested Prescriptions     Pending Prescriptions Disp Refills   • mirtazapine (REMERON) 30 MG tablet [Pharmacy Med Name: MIRTAZAPINE 30MG TABLETS] 90 tablet 3     Sig: TAKE 1 TABLET BY MOUTH EVERY NIGHT AT BEDTIME      Last office visit with prescribing clinician: 5/4/2022      Next office visit with prescribing clinician: 7/26/2022            Amara Serra MA  07/05/22, 12:13 EDT

## 2022-07-06 RX ORDER — MIRTAZAPINE 30 MG/1
TABLET, FILM COATED ORAL
Qty: 90 TABLET | Refills: 3 | Status: SHIPPED | OUTPATIENT
Start: 2022-07-06

## 2022-07-26 ENCOUNTER — OFFICE VISIT (OUTPATIENT)
Dept: INTERNAL MEDICINE | Facility: CLINIC | Age: 77
End: 2022-07-26

## 2022-07-26 VITALS
SYSTOLIC BLOOD PRESSURE: 142 MMHG | DIASTOLIC BLOOD PRESSURE: 90 MMHG | TEMPERATURE: 96.9 F | HEIGHT: 65 IN | BODY MASS INDEX: 34.32 KG/M2 | HEART RATE: 76 BPM | OXYGEN SATURATION: 99 % | WEIGHT: 206 LBS

## 2022-07-26 DIAGNOSIS — R06.09 DOE (DYSPNEA ON EXERTION): ICD-10-CM

## 2022-07-26 DIAGNOSIS — F41.8 DEPRESSION WITH ANXIETY: ICD-10-CM

## 2022-07-26 DIAGNOSIS — Z79.899 CONTROLLED SUBSTANCE AGREEMENT SIGNED: Primary | ICD-10-CM

## 2022-07-26 DIAGNOSIS — E78.2 MIXED HYPERLIPIDEMIA: ICD-10-CM

## 2022-07-26 DIAGNOSIS — M17.12 PRIMARY OSTEOARTHRITIS OF LEFT KNEE: ICD-10-CM

## 2022-07-26 DIAGNOSIS — E55.9 VITAMIN D DEFICIENCY, UNSPECIFIED: ICD-10-CM

## 2022-07-26 DIAGNOSIS — Z00.00 WELLNESS EXAMINATION: ICD-10-CM

## 2022-07-26 DIAGNOSIS — E03.8 OTHER SPECIFIED HYPOTHYROIDISM: ICD-10-CM

## 2022-07-26 DIAGNOSIS — I10 PRIMARY HYPERTENSION: ICD-10-CM

## 2022-07-26 DIAGNOSIS — E66.09 CLASS 1 OBESITY DUE TO EXCESS CALORIES WITHOUT SERIOUS COMORBIDITY WITH BODY MASS INDEX (BMI) OF 33.0 TO 33.9 IN ADULT: ICD-10-CM

## 2022-07-26 DIAGNOSIS — I70.90 ARTERIOSCLEROTIC VASCULAR DISEASE: ICD-10-CM

## 2022-07-26 DIAGNOSIS — K76.0 FATTY INFILTRATION OF LIVER: ICD-10-CM

## 2022-07-26 PROCEDURE — G0439 PPPS, SUBSEQ VISIT: HCPCS | Performed by: INTERNAL MEDICINE

## 2022-07-26 PROCEDURE — 1170F FXNL STATUS ASSESSED: CPT | Performed by: INTERNAL MEDICINE

## 2022-07-26 PROCEDURE — 1159F MED LIST DOCD IN RCRD: CPT | Performed by: INTERNAL MEDICINE

## 2022-07-26 RX ORDER — HYDROCHLOROTHIAZIDE 25 MG/1
25 TABLET ORAL DAILY
Qty: 30 TABLET | Refills: 1 | Status: SHIPPED | OUTPATIENT
Start: 2022-07-26 | End: 2022-07-26

## 2022-07-26 RX ORDER — HYDROCHLOROTHIAZIDE 25 MG/1
25 TABLET ORAL DAILY
Qty: 90 TABLET | Refills: 0 | Status: SHIPPED | OUTPATIENT
Start: 2022-07-26 | End: 2022-10-24 | Stop reason: SDUPTHER

## 2022-07-26 NOTE — TELEPHONE ENCOUNTER
Rx Refill Note  Requested Prescriptions     Pending Prescriptions Disp Refills   • hydroCHLOROthiazide (HYDRODIURIL) 25 MG tablet [Pharmacy Med Name: HYDROCHLOROTHIAZIDE 25MG TABLETS] 90 tablet      Sig: TAKE 1 TABLET BY MOUTH DAILY      Last office visit with prescribing clinician: 7/26/2022      Next office visit with prescribing clinician: 8/24/2022            RAJWINDER MAGANA MA  07/26/22, 15:39 EDT

## 2022-07-26 NOTE — PROGRESS NOTES
The ABCs of the Annual Wellness Visit  Subsequent Medicare Wellness Visit    Chief Complaint   Patient presents with   • Medicare Wellness-subsequent      Subjective    History of Present Illness:  Deya Hernandez is a 77 y.o. female who presents for a Subsequent Medicare Wellness Visit.    The following portions of the patient's history were reviewed and   updated as appropriate: allergies, current medications, past family history, past medical history, past social history, past surgical history and problem list.    Compared to one year ago, the patient feels her physical   health is the same.    Compared to one year ago, the patient feels her mental   health is the same.    Recent Hospitalizations:  She was not admitted to the hospital during the last year.       Current Medical Providers:  Patient Care Team:  Santi Dalal MD as PCP - General (Internal Medicine)    Outpatient Medications Prior to Visit   Medication Sig Dispense Refill   • ALPRAZolam (XANAX) 0.5 MG tablet Take 1 tablet by mouth 2 (Two) Times a Day As Needed for Anxiety. 30 tablet 0   • amLODIPine (NORVASC) 5 MG tablet Take 1 tablet by mouth Daily. 90 tablet 3   • aspirin (aspirin) 81 MG EC tablet Take 1 tablet by mouth Daily. 90 tablet 3   • buPROPion XL (WELLBUTRIN XL) 300 MG 24 hr tablet TAKE 1 TABLET BY MOUTH DAILY 90 tablet 3   • Calcium Carbonate Antacid (TUMS PO) Take  by mouth As Needed.     • carvedilol (COREG) 12.5 MG tablet Take 1 tablet by mouth 2 (Two) Times a Day With Meals. 180 tablet 3   • Cholecalciferol (VITAMIN D3) 1000 UNITS capsule Take 2,500 mg by mouth Daily.     • Cyanocobalamin (VITAMIN B-12 ER PO) Take  by mouth.     • diclofenac (VOLTAREN) 1 % gel gel Apply 4 g topically to the appropriate area as directed 4 (Four) Times a Day As Needed (pain). 100 g 0   • levothyroxine (SYNTHROID, LEVOTHROID) 100 MCG tablet TAKE 1 TABLET BY MOUTH DAILY 90 tablet 0   • mirtazapine (REMERON) 30 MG tablet TAKE 1 TABLET BY MOUTH EVERY  NIGHT AT BEDTIME 90 tablet 3   • pantoprazole (PROTONIX) 20 MG EC tablet TAKE 1 TABLET BY MOUTH IN THE MORNING 30 MINUTES BEFORE BREAKFAST 30 tablet 5   • Simethicone (GAS-X PO) Take  by mouth As Needed.     • simvastatin (ZOCOR) 40 MG tablet Take 1 tablet by mouth every night at bedtime. 90 tablet 3   • trandolapril (MAVIK) 4 MG tablet TAKE 1 TABLET BY MOUTH DAILY 90 tablet 3     No facility-administered medications prior to visit.       No opioid medication identified on active medication list. I have reviewed chart for other potential  high risk medication/s and harmful drug interactions in the elderly.          Aspirin is on active medication list. Aspirin use is indicated based on review of current medical condition/s. Pros and cons of this therapy have been discussed today. Benefits of this medication outweigh potential harm.  Patient has been encouraged to continue taking this medication.  .      Patient Active Problem List   Diagnosis   • Hypertension   • Hypothyroidism   • Osteoarthritis of knee   • Hyperlipidemia   • Arteriosclerotic vascular disease   • Diverticulosis of large intestine without hemorrhage   • Internal hemorrhoids   • Vascular ectasia of colon   • Depression with anxiety   • Fatty infiltration of liver   • Class 1 obesity due to excess calories without serious comorbidity with body mass index (BMI) of 33.0 to 33.9 in adult   • Personal history of colonic polyps   • SHERIDAN (dyspnea on exertion)   • Controlled substance agreement signed     Advance Care Planning  Advance Directive is not on file.  ACP discussion was held with the patient during this visit. Patient does not have an advance directive, information provided.    Review of Systems   Constitutional: Negative.    HENT: Negative.    Eyes: Negative.    Respiratory: Negative.    Cardiovascular: Negative.    Gastrointestinal: Negative.    Endocrine: Negative.    Genitourinary: Negative.    Musculoskeletal: Negative.    Skin: Negative.   "  Allergic/Immunologic: Negative.    Neurological: Negative.    Hematological: Negative.    Psychiatric/Behavioral: Negative.         Objective    Vitals:    07/26/22 1401   BP: 142/90   Pulse: 76   Temp: 96.9 °F (36.1 °C)   SpO2: 99%   Weight: 93.4 kg (206 lb)   Height: 165.1 cm (65\")   PainSc: 0-No pain     Estimated body mass index is 34.28 kg/m² as calculated from the following:    Height as of this encounter: 165.1 cm (65\").    Weight as of this encounter: 93.4 kg (206 lb).    BMI is >= 30 and <35. (Class 1 Obesity). The following options were offered after discussion;: exercise counseling/recommendations and nutrition counseling/recommendations      Does the patient have evidence of cognitive impairment? No    Physical Exam  Constitutional:       Appearance: Normal appearance. She is well-developed. She is obese.   HENT:      Head: Normocephalic and atraumatic.      Right Ear: Tympanic membrane, ear canal and external ear normal.      Left Ear: Tympanic membrane, ear canal and external ear normal.      Nose: Nose normal.      Mouth/Throat:      Mouth: Mucous membranes are moist.      Pharynx: Oropharynx is clear.   Eyes:      Conjunctiva/sclera: Conjunctivae normal.      Pupils: Pupils are equal, round, and reactive to light.   Cardiovascular:      Rate and Rhythm: Normal rate and regular rhythm.      Heart sounds: Normal heart sounds.   Pulmonary:      Effort: Pulmonary effort is normal.      Breath sounds: Normal breath sounds.   Abdominal:      General: Bowel sounds are normal.      Palpations: Abdomen is soft.   Genitourinary:     Vagina: Normal.   Musculoskeletal:         General: Normal range of motion.      Cervical back: Normal range of motion and neck supple.   Skin:     General: Skin is warm and dry.   Neurological:      Mental Status: She is alert and oriented to person, place, and time.      Deep Tendon Reflexes: Reflexes are normal and symmetric.   Psychiatric:         Mood and Affect: Mood " normal.         Behavior: Behavior normal.         Thought Content: Thought content normal.         Judgment: Judgment normal.                 HEALTH RISK ASSESSMENT    Smoking Status:  Social History     Tobacco Use   Smoking Status Former Smoker   Smokeless Tobacco Never Used   Tobacco Comment    Quit in 2015     Alcohol Consumption:  Social History     Substance and Sexual Activity   Alcohol Use Yes    Comment: social     Fall Risk Screen:    LINDAADI Fall Risk Assessment was completed, and patient is at LOW risk for falls.Assessment completed on:7/26/2022    Depression Screening:  PHQ-2/PHQ-9 Depression Screening 7/26/2022   Retired PHQ-9 Total Score -   Retired Total Score -   Little Interest or Pleasure in Doing Things 1-->several days   Feeling Down, Depressed or Hopeless 0-->not at all   PHQ-9: Brief Depression Severity Measure Score 1       Health Habits and Functional and Cognitive Screening:  Functional & Cognitive Status 7/26/2022   Do you have difficulty preparing food and eating? No   Do you have difficulty bathing yourself, getting dressed or grooming yourself? No   Do you have difficulty using the toilet? No   Do you have difficulty moving around from place to place? No   Do you have trouble with steps or getting out of a bed or a chair? No   Current Diet Well Balanced Diet   Dental Exam Up to date        Dental Exam Comment 03/2022   Eye Exam Up to date        Eye Exam Comment 07/2022   Exercise (times per week) 0 times per week        Exercise Frequency Comment -   Current Exercises Include No Regular Exercise   Current Exercise Activities Include -   Do you need help using the phone?  No   Are you deaf or do you have serious difficulty hearing?  No   Do you need help with transportation? No   Do you need help shopping? No   Do you need help preparing meals?  No   Do you need help with housework?  No   Do you need help with laundry? No   Do you need help taking your medications? No   Do you need help  managing money? No   Do you ever drive or ride in a car without wearing a seat belt? No   Have you felt unusual stress, anger or loneliness in the last month? Yes   Who do you live with? Alone   If you need help, do you have trouble finding someone available to you? No   Have you been bothered in the last four weeks by sexual problems? No   Do you have difficulty concentrating, remembering or making decisions? Yes       Age-appropriate Screening Schedule:  Refer to the list below for future screening recommendations based on patient's age, sex and/or medical conditions. Orders for these recommended tests are listed in the plan section. The patient has been provided with a written plan.    Health Maintenance   Topic Date Due   • LIPID PANEL  09/21/2022   • INFLUENZA VACCINE  10/01/2022   • MAMMOGRAM  04/12/2023   • TDAP/TD VACCINES (2 - Td or Tdap) 07/03/2023   • DXA SCAN  08/30/2023   • ZOSTER VACCINE  Completed              Assessment & Plan   CMS Preventative Services Quick Reference  Risk Factors Identified During Encounter  Obesity/Overweight   The above risks/problems have been discussed with the patient.  Follow up actions/plans if indicated are seen below in the Assessment/Plan Section.  Pertinent information has been shared with the patient in the After Visit Summary.    Diagnoses and all orders for this visit:    1. Controlled substance agreement signed (Primary)  -     Compliance Drug Analysis, Ur - Urine, Clean Catch    2. Primary hypertension  -     hydroCHLOROthiazide (HYDRODIURIL) 25 MG tablet; Take 1 tablet by mouth Daily.  Dispense: 30 tablet; Refill: 1    3. Mixed hyperlipidemia    4. Other specified hypothyroidism    5. SHERIDAN (dyspnea on exertion)    6. Arteriosclerotic vascular disease    7. Class 1 obesity due to excess calories without serious comorbidity with body mass index (BMI) of 33.0 to 33.9 in adult    8. Fatty infiltration of liver    9. Depression with anxiety    10. Primary  osteoarthritis of left knee        Follow Up:   No follow-ups on file.     An After Visit Summary and PPPS were made available to the patient.     Below is to historical records for reference only:  Gastritis, omeprazole continue medicine s/p EGD  Probable osteoarthritis of the knee, follow ortho  Vitamin D low normal continue vitamin D3 1000 units daily,   pneumovax done shingrex, done, flu   Hyperlipidemia unable to afford crestor, lipitor muscle pain, on zocor 40,

## 2022-07-26 NOTE — PROGRESS NOTES
Subjective   Deya Hernandez is a 77 y.o. female and is here for a comprehensive physical exam.     Do you take any herbs or supplements that were not prescribed by a doctor? no  Are you taking calcium supplements? no  Are you taking aspirin daily? no      The following portions of the patient's history were reviewed and updated as appropriate: allergies, current medications, past family history, past medical history, past social history, past surgical history and problem list.      Review of Systems   Constitutional: Negative.    HENT: Negative.    Eyes: Negative.    Respiratory: Negative.    Cardiovascular: Negative.    Gastrointestinal: Negative.    Endocrine: Negative.    Genitourinary: Negative.    Musculoskeletal: Negative.    Skin: Negative.    Allergic/Immunologic: Negative.    Neurological: Negative.    Hematological: Negative.    Psychiatric/Behavioral: Negative.          Physical Exam  Constitutional:       Appearance: Normal appearance. She is well-developed. She is obese.   HENT:      Head: Normocephalic and atraumatic.      Right Ear: Tympanic membrane, ear canal and external ear normal.      Left Ear: Tympanic membrane, ear canal and external ear normal.      Nose: Nose normal.      Mouth/Throat:      Mouth: Mucous membranes are moist.      Pharynx: Oropharynx is clear.   Eyes:      Conjunctiva/sclera: Conjunctivae normal.      Pupils: Pupils are equal, round, and reactive to light.   Cardiovascular:      Rate and Rhythm: Normal rate and regular rhythm.      Heart sounds: Normal heart sounds.   Pulmonary:      Effort: Pulmonary effort is normal.      Breath sounds: Normal breath sounds.   Abdominal:      General: Bowel sounds are normal.      Palpations: Abdomen is soft.   Genitourinary:     Vagina: Normal.   Musculoskeletal:         General: Normal range of motion.      Cervical back: Normal range of motion and neck supple.   Skin:     General: Skin is warm and dry.   Neurological:      Mental  Status: She is alert and oriented to person, place, and time.      Deep Tendon Reflexes: Reflexes are normal and symmetric.   Psychiatric:         Mood and Affect: Mood normal.         Behavior: Behavior normal.         Thought Content: Thought content normal.         Judgment: Judgment normal.         All  tests have been reviewed.    Assessment & Plan          1. Patient Counseling:  --Nutrition: Stressed importance of moderation in sodium/caffeine intake, saturated fat and cholesterol, caloric balance, sufficient intake of fresh fruits, vegetables, fiber, calcium and iron.  --Exercise: Stressed the importance of regular exercise.   --Injury prevention: Discussed safety belts, safety helmets, smoke detector, smoking near bedding or upholstery.   --Dental health: Discussed importance of regular tooth brushing, flossing, and dental visits.  --Immunizations reviewed.  --Discussed benefits of screening colonoscopy.  --After hours service discussed with patient    2. Discussed the patient's BMI with her.    Body mass index is 34.28 kg/m².  Body mass index is 34.28 kg/m².  BMI is >= 30 and <35. (Class 1 Obesity). The following options were offered after discussion;: exercise counseling/recommendations and nutrition counseling/recommendations

## 2022-08-03 ENCOUNTER — HOSPITAL ENCOUNTER (EMERGENCY)
Facility: HOSPITAL | Age: 77
Discharge: HOME | End: 2022-08-03
Attending: EMERGENCY MEDICINE | Admitting: EMERGENCY MEDICINE

## 2022-08-03 ENCOUNTER — APPOINTMENT (OUTPATIENT)
Dept: GENERAL RADIOLOGY | Facility: HOSPITAL | Age: 77
End: 2022-08-03

## 2022-08-03 VITALS
WEIGHT: 209 LBS | TEMPERATURE: 98 F | OXYGEN SATURATION: 100 % | DIASTOLIC BLOOD PRESSURE: 93 MMHG | SYSTOLIC BLOOD PRESSURE: 145 MMHG | HEIGHT: 65 IN | HEART RATE: 59 BPM | RESPIRATION RATE: 20 BRPM | BODY MASS INDEX: 34.82 KG/M2

## 2022-08-03 DIAGNOSIS — R07.9 CHEST PAIN, UNSPECIFIED TYPE: Primary | ICD-10-CM

## 2022-08-03 LAB
ALBUMIN SERPL-MCNC: 4.7 G/DL (ref 3.5–5.2)
ALBUMIN/GLOB SERPL: 2 G/DL
ALP SERPL-CCNC: 61 U/L (ref 39–117)
ALT SERPL W P-5'-P-CCNC: 18 U/L (ref 1–33)
ANION GAP SERPL CALCULATED.3IONS-SCNC: 13.8 MMOL/L (ref 5–15)
AST SERPL-CCNC: 22 U/L (ref 1–32)
BASOPHILS # BLD AUTO: 0.04 10*3/MM3 (ref 0–0.2)
BASOPHILS NFR BLD AUTO: 0.6 % (ref 0–1.5)
BILIRUB SERPL-MCNC: 0.4 MG/DL (ref 0–1.2)
BUN SERPL-MCNC: 15 MG/DL (ref 8–23)
BUN/CREAT SERPL: 18.3 (ref 7–25)
CALCIUM SPEC-SCNC: 9.7 MG/DL (ref 8.6–10.5)
CHLORIDE SERPL-SCNC: 90 MMOL/L (ref 98–107)
CO2 SERPL-SCNC: 24.2 MMOL/L (ref 22–29)
CREAT SERPL-MCNC: 0.82 MG/DL (ref 0.57–1)
DEPRECATED RDW RBC AUTO: 38.4 FL (ref 37–54)
EGFRCR SERPLBLD CKD-EPI 2021: 74.7 ML/MIN/1.73
EOSINOPHIL # BLD AUTO: 0.08 10*3/MM3 (ref 0–0.4)
EOSINOPHIL NFR BLD AUTO: 1.2 % (ref 0.3–6.2)
ERYTHROCYTE [DISTWIDTH] IN BLOOD BY AUTOMATED COUNT: 11.9 % (ref 12.3–15.4)
GLOBULIN UR ELPH-MCNC: 2.4 GM/DL
GLUCOSE SERPL-MCNC: 106 MG/DL (ref 65–99)
HCT VFR BLD AUTO: 37.5 % (ref 34–46.6)
HGB BLD-MCNC: 13.9 G/DL (ref 12–15.9)
HOLD SPECIMEN: NORMAL
HOLD SPECIMEN: NORMAL
IMM GRANULOCYTES # BLD AUTO: 0.02 10*3/MM3 (ref 0–0.05)
IMM GRANULOCYTES NFR BLD AUTO: 0.3 % (ref 0–0.5)
LYMPHOCYTES # BLD AUTO: 1.99 10*3/MM3 (ref 0.7–3.1)
LYMPHOCYTES NFR BLD AUTO: 28.9 % (ref 19.6–45.3)
MCH RBC QN AUTO: 32.9 PG (ref 26.6–33)
MCHC RBC AUTO-ENTMCNC: 37.1 G/DL (ref 31.5–35.7)
MCV RBC AUTO: 88.9 FL (ref 79–97)
MONOCYTES # BLD AUTO: 0.63 10*3/MM3 (ref 0.1–0.9)
MONOCYTES NFR BLD AUTO: 9.1 % (ref 5–12)
NEUTROPHILS NFR BLD AUTO: 4.13 10*3/MM3 (ref 1.7–7)
NEUTROPHILS NFR BLD AUTO: 59.9 % (ref 42.7–76)
NRBC BLD AUTO-RTO: 0 /100 WBC (ref 0–0.2)
NT-PROBNP SERPL-MCNC: 66.4 PG/ML (ref 0–1800)
PLATELET # BLD AUTO: 325 10*3/MM3 (ref 140–450)
PMV BLD AUTO: 8.9 FL (ref 6–12)
POTASSIUM SERPL-SCNC: 3.6 MMOL/L (ref 3.5–5.2)
PROT SERPL-MCNC: 7.1 G/DL (ref 6–8.5)
RBC # BLD AUTO: 4.22 10*6/MM3 (ref 3.77–5.28)
SODIUM SERPL-SCNC: 128 MMOL/L (ref 136–145)
TROPONIN T SERPL-MCNC: <0.01 NG/ML (ref 0–0.03)
WBC NRBC COR # BLD: 6.89 10*3/MM3 (ref 3.4–10.8)
WHOLE BLOOD HOLD COAG: NORMAL
WHOLE BLOOD HOLD SPECIMEN: NORMAL

## 2022-08-03 PROCEDURE — 83880 ASSAY OF NATRIURETIC PEPTIDE: CPT | Performed by: EMERGENCY MEDICINE

## 2022-08-03 PROCEDURE — 85025 COMPLETE CBC W/AUTO DIFF WBC: CPT | Performed by: EMERGENCY MEDICINE

## 2022-08-03 PROCEDURE — 71045 X-RAY EXAM CHEST 1 VIEW: CPT

## 2022-08-03 PROCEDURE — 80053 COMPREHEN METABOLIC PANEL: CPT | Performed by: EMERGENCY MEDICINE

## 2022-08-03 PROCEDURE — 84484 ASSAY OF TROPONIN QUANT: CPT | Performed by: EMERGENCY MEDICINE

## 2022-08-03 PROCEDURE — 99283 EMERGENCY DEPT VISIT LOW MDM: CPT

## 2022-08-03 PROCEDURE — 99284 EMERGENCY DEPT VISIT MOD MDM: CPT

## 2022-08-03 RX ORDER — SODIUM CHLORIDE 0.9 % (FLUSH) 0.9 %
10 SYRINGE (ML) INJECTION AS NEEDED
Status: DISCONTINUED | OUTPATIENT
Start: 2022-08-03 | End: 2022-08-03 | Stop reason: HOSPADM

## 2022-08-03 RX ORDER — ASPIRIN 325 MG
325 TABLET ORAL ONCE
Status: DISCONTINUED | OUTPATIENT
Start: 2022-08-03 | End: 2022-08-03 | Stop reason: HOSPADM

## 2022-08-03 NOTE — ED TRIAGE NOTES
Pt states she was having twinges of chest pain that lasted for 2 hours. Pt states her pcp told her to come to the emergency department and have an evaluation. Pt denies pain at this time, and states she feels fine today.

## 2022-08-03 NOTE — ED NOTES
Was called by Maryanne about patient light was on. Called charge nurse to check on patient due to with other patient's and unable to leave

## 2022-08-03 NOTE — ED PROVIDER NOTES
TRIAGE CHIEF COMPLAINT:     Nursing and triage notes reviewed    Chief Complaint   Patient presents with   • Chest Pain      HPI: Deya Hernandez is a 77 y.o. female who presents to the emergency department complaining of chest pain.  Patient states last night she woke up with what she describes as a stinging pain in the center of her chest.  She states symptoms would last a few seconds and then come back every few minutes.  She states that total amount of time the pain lasted was approximately 2-1/2 hours.  She has had no symptoms since that time.  She states she had a small amount of shortness of breath associated with this but states she felt anxious.  She denied any pain with breathing.  She states she talked her primary care physician who told her to come to the ER to be evaluated.  Patient does have a history of hypertension, high cholesterol.    REVIEW OF SYSTEMS: All other systems reviewed and are negative     PAST MEDICAL HISTORY:   Past Medical History:   Diagnosis Date   • Abdominal bloating    • Abnormal EKG    • Ankle fracture    • Anxiety and depression 2004   • Colon cancer screening    • Colon polyp 04/2016   • Disease of thyroid gland    • Dyspnea    • Elbow pain, left    • Elevated cholesterol    • Fatigue    • Fatty infiltration of liver 10/12/2020   • Gastritis    • GERD (gastroesophageal reflux disease)    • H/O echocardiogram Unknown    Normal per pt.   • H/O exercise stress test 2016    Normal result per pt.   • H/O mammogram    • H/O sinusitis    • H/O: pneumonia    • Heart palpitations 2014   • Heartburn    • Hemorrhoid    • History of Holter monitoring 2014    Found to be normal per pt.   • Hypertension    • Plantar fasciitis    • PONV (postoperative nausea and vomiting)     Years ago with tubal   • Sebaceous cyst    • Sinusitis    • Skin cancer         FAMILY HISTORY:   Family History   Problem Relation Age of Onset   • Heart attack Other    • Arthritis Other    • Cancer Other    •  Diabetes Other    • Hypertension Other    • Stroke Other    • Cancer Mother    • Breast cancer Mother 70   • Emphysema Father    • Colon polyps Neg Hx    • Esophageal cancer Neg Hx    • Irritable bowel syndrome Neg Hx    • Liver cancer Neg Hx    • Liver disease Neg Hx    • Stomach cancer Neg Hx    • Colon cancer Neg Hx    • Ovarian cancer Neg Hx         SOCIAL HISTORY:   Social History     Socioeconomic History   • Marital status:    Tobacco Use   • Smoking status: Former Smoker   • Smokeless tobacco: Never Used   • Tobacco comment: Quit in 2015   Vaping Use   • Vaping Use: Never used   Substance and Sexual Activity   • Alcohol use: Yes     Comment: social   • Drug use: No   • Sexual activity: Defer        SURGICAL HISTORY:   Past Surgical History:   Procedure Laterality Date   • CARDIAC CATHETERIZATION  2003    Negative per pt.   • COLONOSCOPY  04/07/2016   • COLONOSCOPY N/A 11/17/2021    Procedure: COLONOSCOPY;  Surgeon: Kimmie Chapa MD;  Location: [facility] ENDOSCOPY;  Service: Gastroenterology;  Laterality: N/A;   • COLONOSCOPY W/ POLYPECTOMY     • ENDOSCOPY  2016   • SKIN BIOPSY     • TUBAL ABDOMINAL LIGATION     • UPPER GASTROINTESTINAL ENDOSCOPY  03/2016        CURRENT MEDICATIONS:      Medication List      ASK your doctor about these medications    ALPRAZolam 0.5 MG tablet  Commonly known as: XANAX  Take 1 tablet by mouth 2 (Two) Times a Day As Needed for Anxiety.     amLODIPine 5 MG tablet  Commonly known as: NORVASC  Take 1 tablet by mouth Daily.     aspirin 81 MG EC tablet  Take 1 tablet by mouth Daily.     buPROPion  MG 24 hr tablet  Commonly known as: WELLBUTRIN XL  TAKE 1 TABLET BY MOUTH DAILY     carvedilol 12.5 MG tablet  Commonly known as: COREG  Take 1 tablet by mouth 2 (Two) Times a Day With Meals.     diclofenac 1 % gel gel  Commonly known as: VOLTAREN  Apply 4 g topically to the appropriate area as directed 4 (Four) Times a Day As Needed (pain).     GAS-X PO      hydroCHLOROthiazide 25 MG tablet  Commonly known as: HYDRODIURIL  TAKE 1 TABLET BY MOUTH DAILY     levothyroxine 100 MCG tablet  Commonly known as: SYNTHROID, LEVOTHROID  TAKE 1 TABLET BY MOUTH DAILY     mirtazapine 30 MG tablet  Commonly known as: REMERON  TAKE 1 TABLET BY MOUTH EVERY NIGHT AT BEDTIME     pantoprazole 20 MG EC tablet  Commonly known as: PROTONIX  TAKE 1 TABLET BY MOUTH IN THE MORNING 30 MINUTES BEFORE BREAKFAST     simvastatin 40 MG tablet  Commonly known as: ZOCOR  Take 1 tablet by mouth every night at bedtime.     trandolapril 4 MG tablet  Commonly known as: MAVIK  TAKE 1 TABLET BY MOUTH DAILY     TUMS PO     VITAMIN B-12 ER PO     Vitamin D3 25 MCG (1000 UT) capsule             ALLERGIES: Patient has no known allergies.     PHYSICAL EXAM:   VITAL SIGNS:   Vitals:    08/03/22 1316   BP: 137/83   Pulse: 75   Resp: 20   Temp: 98 °F (36.7 °C)   SpO2: 97%      CONSTITUTIONAL: Awake, oriented, appears nontoxic   HENT: Atraumatic, normocephalic, oral mucosa pink and moist, airway patent. Nares patent without drainage. External ears normal.   EYES: Conjunctivae clear  NECK: Trachea midline   CARDIOVASCULAR: Normal heart rate, Normal rhythm, No murmurs, rubs, gallops   PULMONARY/CHEST: Clear to auscultation, no rhonchi, wheezes, or rales. Symmetrical breath sounds.  ABDOMINAL: Nondistended, soft, nontender - no rebound or guarding.  NEUROLOGIC: Nonfocal, moving all four extremities, no gross sensory or motor deficits.   EXTREMITIES: No clubbing, cyanosis, or edema   SKIN: Warm, Dry, No erythema, No rash     ED COURSE / MEDICAL DECISION MAKING:   Deya Hernandez is a 77 y.o. female who presents to the emergency department for evaluation of chest pain.  Patient is nondistressed on arrival, vital signs are stable.  Physical examination is largely unremarkable.  She is currently asymptomatic.    EKG on arrival interpreted by me reveals sinus rhythm with rate of 68 bpm.  There is low QRS voltage but no  obvious acute ischemic findings.  EKG unchanged when compared to previous.  This is an atypical appearing EKG.    Chest x-ray per radiology interpretation does not reveal any obvious acute process.    Laboratory testing is largely unremarkable including cardiac enzymes and white blood cell count and electrolytes.  Sodium is mildly low at 128.  proBNP within the normal range.    Patient observed several hours with no symptoms in the emergency department.    Patient has a calculated heart score of 3.    Patient states she is leaves for vacation tomorrow morning.  She would prefer to follow-up as an outpatient unless symptoms change or worsen.  Given she has been asymptomatic her entire stay I think this is reasonable.  Return precautions were discussed.    DECISION TO DISCHARGE/ADMIT: see ED care timeline     FINAL IMPRESSION:   1 --chest pain  2 --   3 --     Electronically signed by: Nya Malloy MD, 8/3/2022 14:29 Nya Lopez MD  08/03/22 [id number]

## 2022-08-04 ENCOUNTER — HOSPITAL ENCOUNTER (EMERGENCY)
Facility: HOSPITAL | Age: 77
Discharge: HOME | End: 2022-08-04
Attending: EMERGENCY MEDICINE | Admitting: EMERGENCY MEDICINE

## 2022-08-04 ENCOUNTER — APPOINTMENT (OUTPATIENT)
Dept: GENERAL RADIOLOGY | Facility: HOSPITAL | Age: 77
End: 2022-08-04

## 2022-08-04 VITALS
DIASTOLIC BLOOD PRESSURE: 70 MMHG | HEART RATE: 64 BPM | SYSTOLIC BLOOD PRESSURE: 146 MMHG | WEIGHT: 215 LBS | OXYGEN SATURATION: 97 % | RESPIRATION RATE: 18 BRPM | TEMPERATURE: 97.4 F | HEIGHT: 65 IN | BODY MASS INDEX: 35.82 KG/M2

## 2022-08-04 DIAGNOSIS — R07.9 CHEST PAIN, UNSPECIFIED TYPE: Primary | ICD-10-CM

## 2022-08-04 DIAGNOSIS — R00.2 PALPITATIONS: Primary | ICD-10-CM

## 2022-08-04 LAB
ALBUMIN SERPL-MCNC: 4.6 G/DL (ref 3.5–5.2)
ALBUMIN/GLOB SERPL: 1.9 G/DL
ALP SERPL-CCNC: 59 U/L (ref 39–117)
ALT SERPL W P-5'-P-CCNC: 16 U/L (ref 1–33)
ANION GAP SERPL CALCULATED.3IONS-SCNC: 14.5 MMOL/L (ref 5–15)
AST SERPL-CCNC: 20 U/L (ref 1–32)
BASOPHILS # BLD AUTO: 0.04 10*3/MM3 (ref 0–0.2)
BASOPHILS NFR BLD AUTO: 0.6 % (ref 0–1.5)
BILIRUB SERPL-MCNC: 0.5 MG/DL (ref 0–1.2)
BUN SERPL-MCNC: 12 MG/DL (ref 8–23)
BUN/CREAT SERPL: 15 (ref 7–25)
CALCIUM SPEC-SCNC: 9.5 MG/DL (ref 8.6–10.5)
CHLORIDE SERPL-SCNC: 91 MMOL/L (ref 98–107)
CO2 SERPL-SCNC: 24.5 MMOL/L (ref 22–29)
CREAT SERPL-MCNC: 0.8 MG/DL (ref 0.57–1)
DEPRECATED RDW RBC AUTO: 38.4 FL (ref 37–54)
EGFRCR SERPLBLD CKD-EPI 2021: 76.9 ML/MIN/1.73
EOSINOPHIL # BLD AUTO: 0.13 10*3/MM3 (ref 0–0.4)
EOSINOPHIL NFR BLD AUTO: 2.1 % (ref 0.3–6.2)
ERYTHROCYTE [DISTWIDTH] IN BLOOD BY AUTOMATED COUNT: 11.9 % (ref 12.3–15.4)
GLOBULIN UR ELPH-MCNC: 2.4 GM/DL
GLUCOSE SERPL-MCNC: 113 MG/DL (ref 65–99)
HCT VFR BLD AUTO: 38.5 % (ref 34–46.6)
HGB BLD-MCNC: 14.2 G/DL (ref 12–15.9)
HOLD SPECIMEN: NORMAL
HOLD SPECIMEN: NORMAL
IMM GRANULOCYTES # BLD AUTO: 0.01 10*3/MM3 (ref 0–0.05)
IMM GRANULOCYTES NFR BLD AUTO: 0.2 % (ref 0–0.5)
LYMPHOCYTES # BLD AUTO: 1.91 10*3/MM3 (ref 0.7–3.1)
LYMPHOCYTES NFR BLD AUTO: 30.7 % (ref 19.6–45.3)
MCH RBC QN AUTO: 32.6 PG (ref 26.6–33)
MCHC RBC AUTO-ENTMCNC: 36.9 G/DL (ref 31.5–35.7)
MCV RBC AUTO: 88.3 FL (ref 79–97)
MONOCYTES # BLD AUTO: 0.7 10*3/MM3 (ref 0.1–0.9)
MONOCYTES NFR BLD AUTO: 11.3 % (ref 5–12)
NEUTROPHILS NFR BLD AUTO: 3.43 10*3/MM3 (ref 1.7–7)
NEUTROPHILS NFR BLD AUTO: 55.1 % (ref 42.7–76)
NRBC BLD AUTO-RTO: 0 /100 WBC (ref 0–0.2)
PLATELET # BLD AUTO: 335 10*3/MM3 (ref 140–450)
PMV BLD AUTO: 8.8 FL (ref 6–12)
POTASSIUM SERPL-SCNC: 3.2 MMOL/L (ref 3.5–5.2)
PROT SERPL-MCNC: 7 G/DL (ref 6–8.5)
RBC # BLD AUTO: 4.36 10*6/MM3 (ref 3.77–5.28)
SODIUM SERPL-SCNC: 130 MMOL/L (ref 136–145)
TROPONIN T SERPL-MCNC: <0.01 NG/ML (ref 0–0.03)
WBC NRBC COR # BLD: 6.22 10*3/MM3 (ref 3.4–10.8)
WHOLE BLOOD HOLD COAG: NORMAL
WHOLE BLOOD HOLD SPECIMEN: NORMAL

## 2022-08-04 PROCEDURE — 71045 X-RAY EXAM CHEST 1 VIEW: CPT

## 2022-08-04 PROCEDURE — 84484 ASSAY OF TROPONIN QUANT: CPT

## 2022-08-04 PROCEDURE — 80053 COMPREHEN METABOLIC PANEL: CPT

## 2022-08-04 PROCEDURE — 99283 EMERGENCY DEPT VISIT LOW MDM: CPT

## 2022-08-04 PROCEDURE — 93005 ELECTROCARDIOGRAM TRACING: CPT

## 2022-08-04 PROCEDURE — 85025 COMPLETE CBC W/AUTO DIFF WBC: CPT

## 2022-08-04 RX ORDER — ASPIRIN 81 MG/1
243 TABLET, CHEWABLE ORAL ONCE
Status: COMPLETED | OUTPATIENT
Start: 2022-08-04 | End: 2022-08-04

## 2022-08-04 RX ORDER — SODIUM CHLORIDE 0.9 % (FLUSH) 0.9 %
10 SYRINGE (ML) INJECTION AS NEEDED
Status: DISCONTINUED | OUTPATIENT
Start: 2022-08-04 | End: 2022-08-04 | Stop reason: HOSPADM

## 2022-08-04 RX ORDER — POTASSIUM CHLORIDE 750 MG/1
20 CAPSULE, EXTENDED RELEASE ORAL ONCE
Status: COMPLETED | OUTPATIENT
Start: 2022-08-04 | End: 2022-08-04

## 2022-08-04 RX ADMIN — POTASSIUM CHLORIDE 20 MEQ: 10 CAPSULE, COATED, EXTENDED RELEASE ORAL at 10:04

## 2022-08-04 RX ADMIN — ASPIRIN 243 MG: 81 TABLET, CHEWABLE ORAL at 08:42

## 2022-08-04 NOTE — ED PROVIDER NOTES
"Subjective   77-year-old female presents to the emergency department with intermittent chest pain, she describes it as sharp in nature and lasting a few seconds at a time.  Seen in the emergency department yesterday with similar symptoms, the pain had started the night before and was also intermittent.  She states nothing makes it better or worse, and she cannot relate it to anything specific.  She states the pain is nonradiating.  She denies any history of coronary disease and reports that she had a heart cath 19 years ago and was told she had \"an irregular rhythm\" she has not had any intervention for it and she is currently only on blood pressure medicine.  She sees Dr. Sheets is her cardiologist but missed her last visit in January.  The pain is not related to exertion, exertion does not make it worse rest does not make it better, she actually woke up having intermittent pain.      History provided by:  Patient   used: No        Review of Systems   Cardiovascular: Positive for chest pain.   All other systems reviewed and are negative.      Past Medical History:   Diagnosis Date   • Abdominal bloating    • Abnormal EKG    • Ankle fracture    • Anxiety and depression 2004   • Colon cancer screening    • Colon polyp 04/2016   • Disease of thyroid gland    • Dyspnea    • Elbow pain, left    • Elevated cholesterol    • Fatigue    • Fatty infiltration of liver 10/12/2020   • Gastritis    • GERD (gastroesophageal reflux disease)    • H/O echocardiogram Unknown    Normal per pt.   • H/O exercise stress test 2016    Normal result per pt.   • H/O mammogram    • H/O sinusitis    • H/O: pneumonia    • Heart palpitations 2014   • Heartburn    • Hemorrhoid    • History of Holter monitoring 2014    Found to be normal per pt.   • Hypertension    • Plantar fasciitis    • PONV (postoperative nausea and vomiting)     Years ago with tubal   • Sebaceous cyst    • Sinusitis    • Skin cancer        No Known " Allergies    Past Surgical History:   Procedure Laterality Date   • CARDIAC CATHETERIZATION  2003    Negative per pt.   • COLONOSCOPY  04/07/2016   • COLONOSCOPY N/A 11/17/2021    Procedure: COLONOSCOPY;  Surgeon: Kimmie Chapa MD;  Location: [facility] ENDOSCOPY;  Service: Gastroenterology;  Laterality: N/A;   • COLONOSCOPY W/ POLYPECTOMY     • ENDOSCOPY  2016   • SKIN BIOPSY     • TUBAL ABDOMINAL LIGATION     • UPPER GASTROINTESTINAL ENDOSCOPY  03/2016       Family History   Problem Relation Age of Onset   • Heart attack Other    • Arthritis Other    • Cancer Other    • Diabetes Other    • Hypertension Other    • Stroke Other    • Cancer Mother    • Breast cancer Mother 70   • Emphysema Father    • Colon polyps Neg Hx    • Esophageal cancer Neg Hx    • Irritable bowel syndrome Neg Hx    • Liver cancer Neg Hx    • Liver disease Neg Hx    • Stomach cancer Neg Hx    • Colon cancer Neg Hx    • Ovarian cancer Neg Hx        Social History     Socioeconomic History   • Marital status:    Tobacco Use   • Smoking status: Former Smoker   • Smokeless tobacco: Never Used   • Tobacco comment: Quit in 2015   Vaping Use   • Vaping Use: Never used   Substance and Sexual Activity   • Alcohol use: Yes     Comment: social   • Drug use: No   • Sexual activity: Defer           Objective   Physical Exam  Vitals and nursing note reviewed.   Constitutional:       Appearance: She is well-developed.   Cardiovascular:      Rate and Rhythm: Normal rate and regular rhythm.   Pulmonary:      Effort: Pulmonary effort is normal.      Breath sounds: Normal breath sounds.   Abdominal:      General: Bowel sounds are normal.      Palpations: Abdomen is soft.   Musculoskeletal:         General: Normal range of motion.      Cervical back: Normal range of motion and neck supple.   Skin:     General: Skin is warm and dry.   Neurological:      Mental Status: She is alert and oriented to person, place, and time.      Deep Tendon Reflexes:  Reflexes are normal and symmetric.         Procedures           ED Course  ED Course as of 08/04/22 0949   Thu Aug 04, 2022   0822 EKG interpreted by me reveals sinus rhythm with a rate of 66 bpm.  There is low QRS voltage in chest leads.  No obvious acute ischemic findings.  Unchanged when compared to previous.  This is an atypical appearing EKG. [TB]   0946 Discussed with Dr. Roberts, he is very familiar with the patient, he is recommended a Holter monitor in the past.  Patient also had a recent heart cath that was normal, he states the patient can come to the office tomorrow for Holter monitor. [CS]      ED Course User Index  [CS] Ravin Choe Jr., PA-C  [TB] Nya Malloy MD                                           MDM  Number of Diagnoses or Management Options  Chest pain, unspecified type: new and requires workup     Amount and/or Complexity of Data Reviewed  Clinical lab tests: reviewed  Tests in the radiology section of CPT®: reviewed  Tests in the medicine section of CPT®: reviewed  Decide to obtain previous medical records or to obtain history from someone other than the patient: yes    Risk of Complications, Morbidity, and/or Mortality  Presenting problems: moderate  Diagnostic procedures: low  Management options: moderate    Patient Progress  Patient progress: stable      Final diagnoses:   Chest pain, unspecified type       ED Disposition  ED Disposition     ED Disposition   Discharge    Condition   Stable    Comment   --             Timbo Roberts  follow tomorrow at noon for holter monitor             Medication List      No changes were made to your prescriptions during this visit.          Ravin Choe Jr., PA-C  08/04/22 0949

## 2022-08-04 NOTE — PROGRESS NOTES
Dr. Roberts spoke with the ER at [facility]- 1 week epatch to placed in the [place] office tomorrow. Will set her up for a f/u appt in 3-4 weeks in the [place] office, as she did not come for her January appt

## 2022-08-23 LAB
25(OH)D3+25(OH)D2 SERPL-MCNC: 65.6 NG/ML (ref 30–100)
ALBUMIN SERPL-MCNC: 4.4 G/DL (ref 3.5–5.2)
ALBUMIN/GLOB SERPL: 2.3 G/DL
ALP SERPL-CCNC: 61 U/L (ref 39–117)
ALT SERPL-CCNC: 15 U/L (ref 1–33)
AST SERPL-CCNC: 15 U/L (ref 1–32)
BASOPHILS # BLD AUTO: 0.05 10*3/MM3 (ref 0–0.2)
BASOPHILS NFR BLD AUTO: 0.7 % (ref 0–1.5)
BILIRUB SERPL-MCNC: 0.3 MG/DL (ref 0–1.2)
BUN SERPL-MCNC: 9 MG/DL (ref 8–23)
BUN/CREAT SERPL: 10.6 (ref 7–25)
CALCIUM SERPL-MCNC: 9.3 MG/DL (ref 8.6–10.5)
CHLORIDE SERPL-SCNC: 92 MMOL/L (ref 98–107)
CHOLEST SERPL-MCNC: 209 MG/DL (ref 0–200)
CO2 SERPL-SCNC: 29.2 MMOL/L (ref 22–29)
CREAT SERPL-MCNC: 0.85 MG/DL (ref 0.57–1)
EGFRCR-CYS SERPLBLD CKD-EPI 2021: 71.5 ML/MIN/1.73
EOSINOPHIL # BLD AUTO: 0.19 10*3/MM3 (ref 0–0.4)
EOSINOPHIL NFR BLD AUTO: 2.7 % (ref 0.3–6.2)
ERYTHROCYTE [DISTWIDTH] IN BLOOD BY AUTOMATED COUNT: 12.3 % (ref 12.3–15.4)
GLOBULIN SER CALC-MCNC: 1.9 GM/DL
GLUCOSE SERPL-MCNC: 88 MG/DL (ref 65–99)
HCT VFR BLD AUTO: 40.2 % (ref 34–46.6)
HDLC SERPL-MCNC: 76 MG/DL (ref 40–60)
HGB BLD-MCNC: 14.1 G/DL (ref 12–15.9)
IMM GRANULOCYTES # BLD AUTO: 0.02 10*3/MM3 (ref 0–0.05)
IMM GRANULOCYTES NFR BLD AUTO: 0.3 % (ref 0–0.5)
LDLC SERPL CALC-MCNC: 117 MG/DL (ref 0–100)
LYMPHOCYTES # BLD AUTO: 2.54 10*3/MM3 (ref 0.7–3.1)
LYMPHOCYTES NFR BLD AUTO: 36.5 % (ref 19.6–45.3)
MCH RBC QN AUTO: 32.6 PG (ref 26.6–33)
MCHC RBC AUTO-ENTMCNC: 35.1 G/DL (ref 31.5–35.7)
MCV RBC AUTO: 93.1 FL (ref 79–97)
MONOCYTES # BLD AUTO: 0.61 10*3/MM3 (ref 0.1–0.9)
MONOCYTES NFR BLD AUTO: 8.8 % (ref 5–12)
NEUTROPHILS # BLD AUTO: 3.54 10*3/MM3 (ref 1.7–7)
NEUTROPHILS NFR BLD AUTO: 51 % (ref 42.7–76)
NRBC BLD AUTO-RTO: 0 /100 WBC (ref 0–0.2)
PLATELET # BLD AUTO: 406 10*3/MM3 (ref 140–450)
POTASSIUM SERPL-SCNC: 3.9 MMOL/L (ref 3.5–5.2)
PROT SERPL-MCNC: 6.3 G/DL (ref 6–8.5)
RBC # BLD AUTO: 4.32 10*6/MM3 (ref 3.77–5.28)
SODIUM SERPL-SCNC: 132 MMOL/L (ref 136–145)
TRIGL SERPL-MCNC: 90 MG/DL (ref 0–150)
TSH SERPL DL<=0.005 MIU/L-ACNC: 3.19 UIU/ML (ref 0.27–4.2)
VIT B12 SERPL-MCNC: 1149 PG/ML (ref 211–946)
VLDLC SERPL CALC-MCNC: 16 MG/DL (ref 5–40)
WBC # BLD AUTO: 6.95 10*3/MM3 (ref 3.4–10.8)

## 2022-08-24 ENCOUNTER — OFFICE VISIT (OUTPATIENT)
Dept: INTERNAL MEDICINE | Facility: CLINIC | Age: 77
End: 2022-08-24

## 2022-08-24 VITALS
DIASTOLIC BLOOD PRESSURE: 76 MMHG | TEMPERATURE: 97.1 F | WEIGHT: 205 LBS | SYSTOLIC BLOOD PRESSURE: 124 MMHG | HEIGHT: 65 IN | BODY MASS INDEX: 34.16 KG/M2 | HEART RATE: 70 BPM | OXYGEN SATURATION: 98 %

## 2022-08-24 DIAGNOSIS — I10 PRIMARY HYPERTENSION: Primary | ICD-10-CM

## 2022-08-24 DIAGNOSIS — E87.1 HYPONATREMIA: ICD-10-CM

## 2022-08-24 DIAGNOSIS — E03.8 OTHER SPECIFIED HYPOTHYROIDISM: ICD-10-CM

## 2022-08-24 DIAGNOSIS — E78.2 MIXED HYPERLIPIDEMIA: ICD-10-CM

## 2022-08-24 DIAGNOSIS — R07.89 OTHER CHEST PAIN: ICD-10-CM

## 2022-08-24 PROCEDURE — 99214 OFFICE O/P EST MOD 30 MIN: CPT | Performed by: INTERNAL MEDICINE

## 2022-08-24 NOTE — PROGRESS NOTES
Subjective   Deya Hernandez is a 77 y.o. female.     Chief Complaint   Patient presents with   • Hypertension   • Hyperlipidemia   • Hypothyroidism     3 week f/u after blood work results   • Chest Pain     PT WAS DISCHARGED 8-3-22 AND 8-4-22 AT [facility].   PT STATES PAIN WAS LIGHT AND INTERMITTENT.        History of Present Illness   patient complaints recent chest pain at home in the evening resting aching nature lasted for several hours patient went to emergency room EKG unremarkable. Stress test two years ago unremarkable denies any dizziness palpitation short of breath together with chest pain. No radiation pain. Blood pressure stable medication. Hyperlipidemia stable medication. hypothyroidism stable medication. Sodium slightly decreased patient is all hydrochlorothiazide also not eating any salt.    Current Outpatient Medications:   •  ALPRAZolam (XANAX) 0.5 MG tablet, Take 1 tablet by mouth 2 (Two) Times a Day As Needed for Anxiety., Disp: 30 tablet, Rfl: 0  •  amLODIPine (NORVASC) 5 MG tablet, Take 1 tablet by mouth Daily., Disp: 90 tablet, Rfl: 3  •  aspirin (aspirin) 81 MG EC tablet, Take 1 tablet by mouth Daily., Disp: 90 tablet, Rfl: 3  •  buPROPion XL (WELLBUTRIN XL) 300 MG 24 hr tablet, TAKE 1 TABLET BY MOUTH DAILY, Disp: 90 tablet, Rfl: 3  •  Calcium Carbonate Antacid (TUMS PO), Take  by mouth As Needed., Disp: , Rfl:   •  carvedilol (COREG) 12.5 MG tablet, Take 1 tablet by mouth 2 (Two) Times a Day With Meals., Disp: 180 tablet, Rfl: 3  •  Cholecalciferol (VITAMIN D3) 1000 UNITS capsule, Take 2,500 mg by mouth Daily., Disp: , Rfl:   •  Cyanocobalamin (VITAMIN B-12 ER PO), Take  by mouth., Disp: , Rfl:   •  diclofenac (VOLTAREN) 1 % gel gel, Apply 4 g topically to the appropriate area as directed 4 (Four) Times a Day As Needed (pain)., Disp: 100 g, Rfl: 0  •  hydroCHLOROthiazide (HYDRODIURIL) 25 MG tablet, TAKE 1 TABLET BY MOUTH DAILY, Disp: 90 tablet, Rfl: 0  •  levothyroxine (SYNTHROID, LEVOTHROID)  100 MCG tablet, TAKE 1 TABLET BY MOUTH DAILY, Disp: 90 tablet, Rfl: 0  •  mirtazapine (REMERON) 30 MG tablet, TAKE 1 TABLET BY MOUTH EVERY NIGHT AT BEDTIME, Disp: 90 tablet, Rfl: 3  •  pantoprazole (PROTONIX) 20 MG EC tablet, TAKE 1 TABLET BY MOUTH IN THE MORNING 30 MINUTES BEFORE BREAKFAST, Disp: 30 tablet, Rfl: 5  •  Simethicone (GAS-X PO), Take  by mouth As Needed., Disp: , Rfl:   •  simvastatin (ZOCOR) 40 MG tablet, Take 1 tablet by mouth every night at bedtime., Disp: 90 tablet, Rfl: 3  •  trandolapril (MAVIK) 4 MG tablet, TAKE 1 TABLET BY MOUTH DAILY, Disp: 90 tablet, Rfl: 3    The following portions of the patient's history were reviewed and updated as appropriate: allergies, current medications, past family history, past medical history, past social history, past surgical history and problem list.    Review of Systems   Constitutional: Negative.    Respiratory: Negative.    Cardiovascular: Negative.    Gastrointestinal: Negative.    Musculoskeletal: Negative.    Skin: Negative.    Neurological: Negative.    Psychiatric/Behavioral: Negative.        Objective   Physical Exam  Cardiovascular:      Rate and Rhythm: Normal rate and regular rhythm.      Heart sounds: Normal heart sounds.   Pulmonary:      Effort: Pulmonary effort is normal.      Breath sounds: Normal breath sounds.   Abdominal:      General: Bowel sounds are normal.   Musculoskeletal:      Cervical back: Neck supple.   Skin:     General: Skin is warm.   Neurological:      Mental Status: She is alert and oriented to person, place, and time.         All tests have been reviewed.    Assessment & Plan   Diagnoses and all orders for this visit:    Primary hypertension continue medication    Mixed hyperlipidemia continue medication     hypothyroidism stable levothyroxine continue medication    Hyponatremia encourage patient to use more salt check lab in one month  -     Basic Metabolic Panel    Other chest pain referred to cardiology and schedule stress  test  -     Stress Test With Myocardial Perfusion One Day; Future    Six months follow-up      If chest pain again go to emergency room                Answers for HPI/ROS submitted by the patient on 8/24/2022  What is the primary reason for your visit?: High Blood Pressure

## 2022-08-27 LAB — DRUGS UR: NORMAL

## 2022-08-31 ENCOUNTER — OFFICE VISIT (OUTPATIENT)
Dept: CARDIOLOGY | Facility: CLINIC | Age: 77
End: 2022-08-31

## 2022-08-31 VITALS
WEIGHT: 205 LBS | SYSTOLIC BLOOD PRESSURE: 122 MMHG | DIASTOLIC BLOOD PRESSURE: 84 MMHG | BODY MASS INDEX: 34.16 KG/M2 | OXYGEN SATURATION: 99 % | HEIGHT: 65 IN | HEART RATE: 76 BPM

## 2022-08-31 DIAGNOSIS — I10 PRIMARY HYPERTENSION: ICD-10-CM

## 2022-08-31 DIAGNOSIS — I20.8 OTHER FORMS OF ANGINA PECTORIS: Primary | ICD-10-CM

## 2022-08-31 DIAGNOSIS — R00.2 PALPITATIONS: ICD-10-CM

## 2022-08-31 DIAGNOSIS — E78.2 MIXED HYPERLIPIDEMIA: ICD-10-CM

## 2022-08-31 PROCEDURE — 99214 OFFICE O/P EST MOD 30 MIN: CPT | Performed by: INTERNAL MEDICINE

## 2022-08-31 NOTE — PROGRESS NOTES
[facility]  Office Progress Note  Deya Hernandez  1945  [phone]      Visit Date: 8/31/2022     PCP: Santi Dalal MD  [address] [place] [id number]    IDENTIFICATION: A 77 y.o. female     Chief Complaint   Patient presents with   • Palpitations     Follow up       PROBLEM LIST:   1. 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 4/16 EKG stress abnormal due to withdrawal of beta-blockade with tachycardia and hypertension  2. 1/2021 Marie scan Cardiolite WNL EF  90%  3. 3/22 CTA chest no PE/Ao wnl  2. Hypertension presumed essential  3. Dyslipidemia   1. on statin therapy  2. 4/18 227/196/66/122  4. Palpitations/SVT  1. ED visit 5/18. W/u benign. Holter recommended.  2. Holter 5/18 nonsustained SVT  3. 8/22 7 day holter 49/66/123 <1% pac/pvc  5. Reformed nicotine addiction 2016  6. Exogenous obesity  7. Hypothyroidism    Allergies  No Known Allergies    Current Medications    Current Outpatient Medications:   •  ALPRAZolam (XANAX) 0.5 MG tablet, Take 1 tablet by mouth 2 (Two) Times a Day As Needed for Anxiety. (Patient taking differently: Take 0.5 mg by mouth As Needed for Anxiety.), Disp: 30 tablet, Rfl: 0  •  amLODIPine (NORVASC) 5 MG tablet, Take 1 tablet by mouth Daily., Disp: 90 tablet, Rfl: 3  •  aspirin (aspirin) 81 MG EC tablet, Take 1 tablet by mouth Daily., Disp: 90 tablet, Rfl: 3  •  buPROPion XL (WELLBUTRIN XL) 300 MG 24 hr tablet, TAKE 1 TABLET BY MOUTH DAILY, Disp: 90 tablet, Rfl: 3  •  Calcium Carbonate Antacid (TUMS PO), Take  by mouth As Needed., Disp: , Rfl:   •  carvedilol (COREG) 12.5 MG tablet, Take 1 tablet by mouth 2 (Two) Times a Day With Meals., Disp: 180 tablet, Rfl: 3  •  Cholecalciferol (VITAMIN D3) 1000 UNITS capsule, Take 2,500 mg by mouth Daily., Disp: , Rfl:   •  diclofenac (VOLTAREN) 1 % gel gel, Apply 4 g topically to the appropriate area as directed 4 (Four) Times a Day As Needed (pain)., Disp: 100 g, Rfl: 0  •  hydroCHLOROthiazide  "(HYDRODIURIL) 25 MG tablet, TAKE 1 TABLET BY MOUTH DAILY, Disp: 90 tablet, Rfl: 0  •  levothyroxine (SYNTHROID, LEVOTHROID) 100 MCG tablet, TAKE 1 TABLET BY MOUTH DAILY, Disp: 90 tablet, Rfl: 0  •  mirtazapine (REMERON) 30 MG tablet, TAKE 1 TABLET BY MOUTH EVERY NIGHT AT BEDTIME, Disp: 90 tablet, Rfl: 3  •  pantoprazole (PROTONIX) 20 MG EC tablet, TAKE 1 TABLET BY MOUTH IN THE MORNING 30 MINUTES BEFORE BREAKFAST, Disp: 30 tablet, Rfl: 5  •  Simethicone (GAS-X PO), Take  by mouth As Needed., Disp: , Rfl:   •  simvastatin (ZOCOR) 40 MG tablet, Take 1 tablet by mouth every night at bedtime., Disp: 90 tablet, Rfl: 3  •  trandolapril (MAVIK) 4 MG tablet, TAKE 1 TABLET BY MOUTH DAILY, Disp: 90 tablet, Rfl: 3      History of Present Illness   Pt here for follow up.  Was seen emergency department atypical chest pain 3 weeks prior.  She has had stuttering atypical chest symptoms typically at rest.  He is [facility] emergency department she been treated and released without actionable findings.  She cannot state if there is any correlation however it may be postprandial in the evenings.  On 1 occasion her discomfort did go away when she was given IV potassium in the emergency department.      OBJECTIVE:  Vitals:    08/31/22 1333   BP: 122/84   BP Location: Right arm   Patient Position: Sitting   Pulse: 76   SpO2: 99%   Weight: 93 kg (205 lb)   Height: 165.1 cm (65\")     Physical Exam  Vitals and nursing note reviewed.   Constitutional:       General: She is not in acute distress.     Appearance: She is well-developed.   Cardiovascular:      Rate and Rhythm: Normal rate and regular rhythm.  No extrasystoles are present.     Pulses: Intact distal pulses.           Radial pulses are 2+ on the right side and 2+ on the left side.        Dorsalis pedis pulses are 2+ on the right side and 2+ on the left side.        Posterior tibial pulses are 2+ on the right side and 2+ on the left side.      Heart sounds: Normal heart sounds. No " murmur heard.  Pulmonary:      Effort: Pulmonary effort is normal.      Breath sounds: Normal breath sounds. No wheezing or rales.   Abdominal:      General: Bowel sounds are normal.      Palpations: Abdomen is soft.      Tenderness: There is no abdominal tenderness. There is no guarding.   Musculoskeletal:         General: No tenderness.   Skin:     General: Skin is warm and dry.      Findings: No rash.   Neurological:      Mental Status: She is alert and oriented to person, place, and time.         Diagnostic Data:  Procedures      ASSESSMENT:   Diagnosis Plan   1. Other forms of angina pectoris (HCC)     2. Primary hypertension     3. Mixed hyperlipidemia     4. Palpitations         PLAN:  1. Chest pain atypical with remote stress testing benign.  Has another stress test ordered per Dr. Dalal.  I have asked her to, as a trial utilize Maalox or Mylanta with discomfort  2. Palpitations much improved now accommodating higher potassium intake.  Nl lvef  3. HTn controlled on mavik/coreg/amlod  4. Mixed hyperlipidemia- cont statin      Santi Dalal MD, thank you for referring Ms. Hernandez for evaluation.  I have forwarded my electronically generated recommendations to you for review.  Please do not hesitate to call with any questions.      Timbo Roberts MD, FACC

## 2022-09-09 DIAGNOSIS — E03.8 OTHER SPECIFIED HYPOTHYROIDISM: ICD-10-CM

## 2022-09-09 DIAGNOSIS — F41.9 ANXIETY: Primary | ICD-10-CM

## 2022-09-09 NOTE — TELEPHONE ENCOUNTER
Rx Refill Note  Requested Prescriptions     Pending Prescriptions Disp Refills   • ALPRAZolam (XANAX) 0.5 MG tablet 30 tablet 0     Sig: Take 1 tablet by mouth 2 (Two) Times a Day As Needed for Anxiety.   • levothyroxine (SYNTHROID, LEVOTHROID) 100 MCG tablet 90 tablet 0     Sig: Take 1 tablet by mouth Daily.      Last office visit with prescribing clinician: 8/24/2022      Next office visit with prescribing clinician: 2/27/2023            RAJWINDER MAGANA MA  09/09/22, 18:17 EDT

## 2022-09-11 RX ORDER — ALPRAZOLAM 0.5 MG/1
0.5 TABLET ORAL 2 TIMES DAILY PRN
Qty: 30 TABLET | Refills: 0 | Status: SHIPPED | OUTPATIENT
Start: 2022-09-11

## 2022-09-11 RX ORDER — LEVOTHYROXINE SODIUM 0.1 MG/1
100 TABLET ORAL DAILY
Qty: 90 TABLET | Refills: 0 | Status: SHIPPED | OUTPATIENT
Start: 2022-09-11 | End: 2022-12-19

## 2022-09-14 ENCOUNTER — APPOINTMENT (OUTPATIENT)
Dept: NUCLEAR MEDICINE | Facility: HOSPITAL | Age: 77
End: 2022-09-14

## 2022-09-20 DIAGNOSIS — I70.90 ARTERIOSCLEROTIC VASCULAR DISEASE: ICD-10-CM

## 2022-09-20 RX ORDER — ASPIRIN 81 MG/1
TABLET, COATED ORAL
Qty: 90 TABLET | Refills: 3 | Status: SHIPPED | OUTPATIENT
Start: 2022-09-20

## 2022-09-20 NOTE — TELEPHONE ENCOUNTER
Rx Refill Note  Requested Prescriptions     Pending Prescriptions Disp Refills   • Aspirin Low Dose 81 MG EC tablet [Pharmacy Med Name: ASPIRIN 81MG EC LOW DOSE TABLETS] 90 tablet 3     Sig: TAKE ONE TABLET BY MOUTH DAILY      Last office visit with prescribing clinician: 8/24/2022      Next office visit with prescribing clinician: 2/27/2023            Amara Serra MA  09/20/22, 08:21 EDT

## 2022-10-05 RX ORDER — BUPROPION HYDROCHLORIDE 300 MG/1
300 TABLET ORAL DAILY
Qty: 90 TABLET | Refills: 3 | Status: SHIPPED | OUTPATIENT
Start: 2022-10-05

## 2022-10-05 NOTE — TELEPHONE ENCOUNTER
Rx Refill Note  Requested Prescriptions     Pending Prescriptions Disp Refills   • buPROPion XL (WELLBUTRIN XL) 300 MG 24 hr tablet [Pharmacy Med Name: BUPROPION XL 300MG TABLETS] 90 tablet 3     Sig: TAKE 1 TABLET BY MOUTH DAILY      Last office visit with prescribing clinician: 8/24/2022      Next office visit with prescribing clinician: 2/27/2023            Cass Hoffmann LPN  10/05/22, 16:58 EDT

## 2022-10-24 DIAGNOSIS — I10 PRIMARY HYPERTENSION: ICD-10-CM

## 2022-10-26 RX ORDER — HYDROCHLOROTHIAZIDE 25 MG/1
25 TABLET ORAL DAILY
Qty: 90 TABLET | Refills: 3 | Status: SHIPPED | OUTPATIENT
Start: 2022-10-26

## 2022-10-26 RX ORDER — TRANDOLAPRIL TABLETS 4 MG/1
4 TABLET ORAL DAILY
Qty: 90 TABLET | Refills: 3 | Status: SHIPPED | OUTPATIENT
Start: 2022-10-26

## 2022-10-26 NOTE — TELEPHONE ENCOUNTER
Rx Refill Note  Requested Prescriptions     Pending Prescriptions Disp Refills   • trandolapril (MAVIK) 4 MG tablet 90 tablet 3     Sig: Take 1 tablet by mouth Daily.   • hydroCHLOROthiazide (HYDRODIURIL) 25 MG tablet 90 tablet 0     Sig: Take 1 tablet by mouth Daily.      Last office visit with prescribing clinician: 8/24/2022      Next office visit with prescribing clinician: 2/27/2023            Steph Padron MA  10/26/22, 13:12 EDT

## 2022-11-08 ENCOUNTER — OFFICE VISIT (OUTPATIENT)
Dept: GASTROENTEROLOGY | Facility: CLINIC | Age: 77
End: 2022-11-08

## 2022-11-08 VITALS
WEIGHT: 210 LBS | DIASTOLIC BLOOD PRESSURE: 80 MMHG | HEIGHT: 65 IN | BODY MASS INDEX: 34.99 KG/M2 | SYSTOLIC BLOOD PRESSURE: 128 MMHG

## 2022-11-08 DIAGNOSIS — R14.2 BURPING: ICD-10-CM

## 2022-11-08 DIAGNOSIS — K76.0 FATTY INFILTRATION OF LIVER: ICD-10-CM

## 2022-11-08 DIAGNOSIS — R14.0 BLOATING: Primary | ICD-10-CM

## 2022-11-08 DIAGNOSIS — R10.9 ABDOMINAL PRESSURE: ICD-10-CM

## 2022-11-08 DIAGNOSIS — Z12.11 ENCOUNTER FOR SCREENING FOR MALIGNANT NEOPLASM OF COLON: ICD-10-CM

## 2022-11-08 DIAGNOSIS — E66.09 CLASS 1 OBESITY DUE TO EXCESS CALORIES WITHOUT SERIOUS COMORBIDITY WITH BODY MASS INDEX (BMI) OF 34.0 TO 34.9 IN ADULT: ICD-10-CM

## 2022-11-08 PROCEDURE — 99214 OFFICE O/P EST MOD 30 MIN: CPT | Performed by: NURSE PRACTITIONER

## 2022-11-08 RX ORDER — MAG HYDROX/ALUMINUM HYD/SIMETH 200-200-20
SUSPENSION, ORAL (FINAL DOSE FORM) ORAL EVERY 6 HOURS PRN
COMMUNITY

## 2022-11-08 RX ORDER — SODIUM CHLORIDE 9 MG/ML
70 INJECTION, SOLUTION INTRAVENOUS CONTINUOUS PRN
Status: CANCELLED | OUTPATIENT
Start: 2022-11-08

## 2022-11-08 NOTE — PATIENT INSTRUCTIONS
Antireflux measures: Avoid fried, fatty foods, alcohol, chocolate, coffee, tea,  soft drinks, peppermint and spearmint, spicy foods, tomatoes and tomato based foods, onion based foods, and smoking.  Other antireflux measures include weight reduction if overweight, avoiding tight clothing around the abdomen, elevating the head of the bed 6 inches with blocks under the head board, and don't drink or eat before going to bed and avoid lying down immediately after meals.  Pantoprazole 20 mg 1 po daily in the am 30 minutes before breakfast.  Recommended to take Levothyroxine first in the am upon waking, wait 30 minutes, then take Pantoprazole 20 mg, wait 30 minutes, then eat breakfast and take other medications.   Gas-X as needed.  The patient should eat 4-5 very small meals throughout the day. Avoid large meals.  It is recommended to eat a softer diet. Meats are best consumed ground. Fruits and vegetables are best consumed cooked or steamed and then mashed.    Low FODMAP diet - avoid all dairy. May use lactose free/dairy free alternatives. Avoid cruciferous vegetables.  Avoid all sugar free candies/gums/etc.  Low fiber, low fat diet with liberal water intake. May use soluble fiber.  Advised to exercise 30 minutes 4-5 days per week.   Advised to lose 20 pounds in the next 6-12 months.   Colonoscopy for screening in 10 years, 2031.  CT Abdomen and pelvis  Upper endoscopy-EGD: The indications, technique, alternatives and potential risk and complications were discussed with the patient including but not limited to bleeding, perforations, missing lesions and anesthetic complications. The patient understands and wishes to proceed with the procedure and has given their verbal consent. Written patient education information was given to the patient.   The patient will call if they have further questions before procedure.      Low-FODMAP Eating Plan  FODMAP stands for fermentable oligosaccharides, disaccharides, monosaccharides,  and polyols. These are sugars that are hard for some people to digest. A low-FODMAP eating plan may help some people who have irritable bowel syndrome (IBS) and certain other bowel (intestinal) diseases to manage their symptoms.  This meal plan can be complicated to follow. Work with a diet and nutrition specialist (dietitian) to make a low-FODMAP eating plan that is right for you. A dietitian can help make sure that you get enough nutrition from this diet.  What are tips for following this plan?  Reading food labels  Check labels for hidden FODMAPs such as:  High-fructose syrup.  Honey.  Agave.  Natural fruit flavors.  Onion or garlic powder.  Choose low-FODMAP foods that contain 3-4 grams of fiber per serving.  Check food labels for serving sizes. Eat only one serving at a time to make sure FODMAP levels stay low.  Shopping  Shop with a list of foods that are recommended on this diet and make a meal plan.  Meal planning  Follow a low-FODMAP eating plan for up to 6 weeks, or as told by your health care provider or dietitian.  To follow the eating plan:  Eliminate high-FODMAP foods from your diet completely. Choose only low-FODMAP foods to eat. You will do this for 2-6 weeks.  Gradually reintroduce high-FODMAP foods into your diet one at a time. Most people should wait a few days before introducing the next new high-FODMAP food into their meal plan. Your dietitian can recommend how quickly you may reintroduce foods.  Keep a daily record of what and how much you eat and drink. Make note of any symptoms that you have after eating.  Review your daily record with a dietitian regularly to identify which foods you can eat and which foods you should avoid.  General tips  Drink enough fluid each day to keep your urine pale yellow.  Avoid processed foods. These often have added sugar and may be high in FODMAPs.  Avoid most dairy products, whole grains, and sweeteners.  Work with a dietitian to make sure you get enough fiber  "in your diet.  Avoid high FODMAP foods at meals to manage symptoms.    Recommended foods  Fruits  Bananas, oranges, tangerines, gaurav, limes, blueberries, raspberries, strawberries, grapes, cantaloupe, honeydew melon, kiwi, papaya, passion fruit, and pineapple. Limited amounts of dried cranberries, banana chips, and shredded coconut.  Vegetables  Eggplant, zucchini, cucumber, peppers, green beans, bean sprouts, lettuce, arugula, kale, Swiss chard, spinach, edgard greens, bok quan, summer squash, potato, and tomato. Limited amounts of corn, carrot, and sweet potato. Green parts of scallions.  Grains  Gluten-free grains, such as rice, oats, buckwheat, quinoa, corn, polenta, and millet. Gluten-free pasta, bread, or cereal. Rice noodles. Corn tortillas.  Meats and other proteins  Unseasoned beef, pork, poultry, or fish. Eggs. Kc. Tofu (firm) and tempeh. Limited amounts of nuts and seeds, such as almonds, walnuts, brazil nuts, pecans, peanuts, nut butters, pumpkin seeds, grecia seeds, and sunflower seeds.  Dairy  Lactose-free milk, yogurt, and kefir. Lactose-free cottage cheese and ice cream. Non-dairy milks, such as almond, coconut, hemp, and rice milk. Non-dairy yogurt. Limited amounts of goat cheese, brie, mozzarella, parmesan, swiss, and other hard cheeses.  Fats and oils  Butter-free spreads. Vegetable oils, such as olive, canola, and sunflower oil.  Seasoning and other foods  Artificial sweeteners with names that do not end in \"ol,\" such as aspartame, saccharine, and stevia. Maple syrup, white table sugar, raw sugar, brown sugar, and molasses. Mayonnaise, soy sauce, and tamari. Fresh basil, coriander, parsley, rosemary, and thyme.  Beverages  Water and mineral water. Sugar-sweetened soft drinks. Small amounts of orange juice or cranberry juice. Black and green tea. Most dry dayami. Coffee.  The items listed above may not be a complete list of foods and beverages you can eat. Contact a dietitian for more " information.    Foods to avoid  Fruits  Fresh, dried, and juiced forms of apple, pear, watermelon, peach, plum, cherries, apricots, blackberries, boysenberries, figs, nectarines, and anayeli. Avocado.  Vegetables  Chicory root, artichoke, asparagus, cabbage, snow peas, [place] sprouts, broccoli, sugar snap peas, mushrooms, celery, and cauliflower. Onions, garlic, leeks, and the white part of scallions.  Grains  Wheat, including kamut, durum, and semolina. Barley and bulgur. Couscous. Wheat-based cereals. Wheat noodles, bread, crackers, and pastries.  Meats and other proteins  Fried or fatty meat. Sausage. Cashews and pistachios. Soybeans, baked beans, black beans, chickpeas, kidney beans, jennifer beans, navy beans, lentils, black-eyed peas, and split peas.  Dairy  Milk, yogurt, ice cream, and soft cheese. Cream and sour cream. Milk-based sauces. Custard. Buttermilk. Soy milk.  Seasoning and other foods  Any sugar-free gum or candy. Foods that contain artificial sweeteners such as sorbitol, mannitol, isomalt, or xylitol. Foods that contain honey, high-fructose corn syrup, or agave. Bouillon, vegetable stock, beef stock, and chicken stock. Garlic and onion powder. Condiments made with onion, such as hummus, chutney, pickles, relish, salad dressing, and salsa. Tomato paste.  Beverages  Chicory-based drinks. Coffee substitutes. Chamomile tea. Fennel tea. Sweet or fortified dayami such as port or danitza. Diet soft drinks made with isomalt, mannitol, maltitol, sorbitol, or xylitol. Apple, pear, and anayeli juice. Juices with high-fructose corn syrup.  The items listed above may not be a complete list of foods and beverages you should avoid. Contact a dietitian for more information.  Summary  FODMAP stands for fermentable oligosaccharides, disaccharides, monosaccharides, and polyols. These are sugars that are hard for some people to digest.  A low-FODMAP eating plan is a short-term diet that helps to ease symptoms of certain  bowel diseases.  The eating plan usually lasts up to 6 weeks. After that, high-FODMAP foods are reintroduced gradually and one at a time. This can help you find out which foods may be causing symptoms.  A low-FODMAP eating plan can be complicated. It is best to work with a dietitian who has experience with this type of plan.  This information is not intended to replace advice given to you by your health care provider. Make sure you discuss any questions you have with your health care provider.  Document Revised: 05/06/2021 Document Reviewed: 05/06/2021  [employer]vier Patient Education © 2022 [employer]

## 2022-11-08 NOTE — PROGRESS NOTES
"     Follow Up Note     Date: 2022   Patient Name: Deya Hernandez  MRN: [id number]  : 1945     Primary Care Provider: Santi Dalal MD     Chief Complaint   Patient presents with   • Follow-up   • Gas   • Bloated     History of present illness:   2022  Deya Hernandez is a 77 y.o. female who is here today for follow up for bloating and gas. She has continued to have symptoms that are very bothersome and worrisome for her. She has tried eliminating dairy and watching her diet, but continues to have gas and bloating. She has upper abdominal pressure that is worse after eating. She is constantly burping. She has been taking low dose PPI, which helped in the past, but it is not helping at this time. She had a few episodes of loose stool yesterday, but denies diarrhea on a regular basis. Denies GI bleeding.     Interval History:  2021  Deya Hernandez is a 76 y.o. female who is here today for follow up. She has been feeling much better. She has found that eating smaller amounts of food has made her feel better. No constipation, diarrhea or rectal bleeding.      2020  For the past year or so, the patient will have periumbilical abdominal pain and bloating. The pain is described as a pressure in her abdomen and is very mild. The pressure may last a few days, then \"go away\" for a few days or even a month or so. Burping or passing gas improves the pressure. The patient denies nausea or vomiting. There is no history of heartburn or reflux. She had taken Pantoprazole in the past for similar symptoms, but she stopped it over 3 years ago.     The patient has had a few episodes of diarrhea over the past 2 weeks or so. She is not sure if it was something she had eaten. No rectal bleeding.      Last colonoscopy was 2016 with small polyps (1 tubular adenoma without dysplasia), diverticulosis, and internal hemorrhoids.     Subjective      Past Medical History:   Diagnosis Date   • Abdominal " bloating    • Abnormal EKG    • Ankle fracture    • Anxiety and depression 2004   • Colon cancer screening    • Colon polyp 04/2016   • COVID    • Disease of thyroid gland    • Diverticulitis of colon    • Dyspnea    • Elbow pain, left    • Elevated cholesterol    • Fatigue    • Fatty infiltration of liver 10/12/2020   • Fatty liver [id number]   • Gastritis    • GERD (gastroesophageal reflux disease)    • H/O echocardiogram Unknown    Normal per pt.   • H/O exercise stress test 2016    Normal result per pt.   • H/O mammogram    • H/O sinusitis    • H/O: pneumonia    • Heart palpitations 2014   • Heartburn    • Hemorrhoid    • History of Holter monitoring 2014    Found to be normal per pt.   • Hypertension    • Plantar fasciitis    • PONV (postoperative nausea and vomiting)     Years ago with tubal   • Sebaceous cyst    • Sinusitis    • Skin cancer      Past Surgical History:   Procedure Laterality Date   • CARDIAC CATHETERIZATION  2003    Negative per pt.   • COLONOSCOPY  04/07/2016   • COLONOSCOPY N/A 11/17/2021    Procedure: COLONOSCOPY;  Surgeon: Kimmie Chapa MD;  Location: [facility] ENDOSCOPY;  Service: Gastroenterology;  Laterality: N/A;   • COLONOSCOPY W/ POLYPECTOMY     • ENDOSCOPY  2016   • SKIN BIOPSY     • TUBAL ABDOMINAL LIGATION     • UPPER GASTROINTESTINAL ENDOSCOPY  03/2016     Family History   Problem Relation Age of Onset   • Heart attack Other    • Arthritis Other    • Cancer Other    • Diabetes Other    • Hypertension Other    • Stroke Other    • Cancer Mother    • Breast cancer Mother 70   • Heart disease Mother    • Emphysema Father    • Colon polyps Neg Hx    • Esophageal cancer Neg Hx    • Irritable bowel syndrome Neg Hx    • Liver cancer Neg Hx    • Liver disease Neg Hx    • Stomach cancer Neg Hx    • Colon cancer Neg Hx    • Ovarian cancer Neg Hx      Social History     Socioeconomic History   • Marital status:    Tobacco Use   • Smoking status: Former     Packs/day: 0.25      Years: 15.00     Pack years: 3.75     Types: Cigarettes   • Smokeless tobacco: Never   • Tobacco comments:     Quit in 2015   Vaping Use   • Vaping Use: Never used   Substance and Sexual Activity   • Alcohol use: Yes     Alcohol/week: 3.0 standard drinks     Types: 1 Glasses of wine, 2 Cans of beer per week     Comment: social   • Drug use: No   • Sexual activity: Not Currently       Current Outpatient Medications:   •  ALPRAZolam (XANAX) 0.5 MG tablet, Take 1 tablet by mouth 2 (Two) Times a Day As Needed for Anxiety., Disp: 30 tablet, Rfl: 0  •  aluminum-magnesium hydroxide-simethicone (Mylanta) 200-200-20 MG/5ML suspension, Take  by mouth Every 6 (Six) Hours As Needed for Indigestion or Heartburn., Disp: , Rfl:   •  amLODIPine (NORVASC) 5 MG tablet, Take 1 tablet by mouth Daily., Disp: 90 tablet, Rfl: 3  •  Aspirin Low Dose 81 MG EC tablet, TAKE ONE TABLET BY MOUTH DAILY, Disp: 90 tablet, Rfl: 3  •  buPROPion XL (WELLBUTRIN XL) 300 MG 24 hr tablet, TAKE 1 TABLET BY MOUTH DAILY, Disp: 90 tablet, Rfl: 3  •  Calcium Carbonate Antacid (TUMS PO), Take  by mouth As Needed., Disp: , Rfl:   •  carvedilol (COREG) 12.5 MG tablet, Take 1 tablet by mouth 2 (Two) Times a Day With Meals., Disp: 180 tablet, Rfl: 3  •  Cholecalciferol (VITAMIN D3) 1000 UNITS capsule, Take 2,500 mg by mouth Daily., Disp: , Rfl:   •  diclofenac (VOLTAREN) 1 % gel gel, Apply 4 g topically to the appropriate area as directed 4 (Four) Times a Day As Needed (pain)., Disp: 100 g, Rfl: 0  •  hydroCHLOROthiazide (HYDRODIURIL) 25 MG tablet, Take 1 tablet by mouth Daily., Disp: 90 tablet, Rfl: 3  •  levothyroxine (SYNTHROID, LEVOTHROID) 100 MCG tablet, Take 1 tablet by mouth Daily., Disp: 90 tablet, Rfl: 0  •  mirtazapine (REMERON) 30 MG tablet, TAKE 1 TABLET BY MOUTH EVERY NIGHT AT BEDTIME, Disp: 90 tablet, Rfl: 3  •  pantoprazole (PROTONIX) 20 MG EC tablet, TAKE 1 TABLET BY MOUTH IN THE MORNING 30 MINUTES BEFORE BREAKFAST, Disp: 30 tablet, Rfl: 5  •   "Simethicone (GAS-X PO), Take  by mouth As Needed., Disp: , Rfl:   •  simvastatin (ZOCOR) 40 MG tablet, Take 1 tablet by mouth every night at bedtime., Disp: 90 tablet, Rfl: 3  •  trandolapril (MAVIK) 4 MG tablet, Take 1 tablet by mouth Daily., Disp: 90 tablet, Rfl: 3     No Known Allergies     The following portions of the patient's history were reviewed and updated as appropriate: allergies, current medications, past family history, past medical history, past social history, past surgical history and problem list.  Objective     Physical Exam  Vitals and nursing note reviewed.   Constitutional:       General: She is not in acute distress.     Appearance: Normal appearance. She is well-developed.   HENT:      Head: Normocephalic and atraumatic.      Mouth/Throat:      Mouth: Mucous membranes are not pale, not dry and not cyanotic.   Eyes:      General: Lids are normal.   Neck:      Trachea: Trachea normal.   Cardiovascular:      Rate and Rhythm: Normal rate.   Pulmonary:      Effort: Pulmonary effort is normal. No respiratory distress.      Breath sounds: Normal breath sounds.   Abdominal:      Tenderness: There is no abdominal tenderness.   Skin:     General: Skin is warm and dry.   Neurological:      Mental Status: She is alert and oriented to person, place, and time.   Psychiatric:         Mood and Affect: Mood normal.         Speech: Speech normal.         Behavior: Behavior normal. Behavior is cooperative.       Vitals:    11/08/22 1118   BP: 128/80   Weight: 95.3 kg (210 lb)   Height: 165.1 cm (65\")     Body mass index is 34.95 kg/m².     Results Review:   I reviewed the patient's new clinical results.    No visits with results within 90 Day(s) from this visit.   Latest known visit with results is:   Admission on 08/04/2022, Discharged on 08/04/2022   Component Date Value Ref Range Status   • Glucose 08/04/2022 113 (H)  65 - 99 mg/dL Final   • BUN 08/04/2022 12  8 - 23 mg/dL Final   • Creatinine 08/04/2022 " 0.80  0.57 - 1.00 mg/dL Final   • Sodium 08/04/2022 130 (L)  136 - 145 mmol/L Final   • Potassium 08/04/2022 3.2 (L)  3.5 - 5.2 mmol/L Final   • Chloride 08/04/2022 91 (L)  98 - 107 mmol/L Final   • CO2 08/04/2022 24.5  22.0 - 29.0 mmol/L Final   • Calcium 08/04/2022 9.5  8.6 - 10.5 mg/dL Final   • Total Protein 08/04/2022 7.0  6.0 - 8.5 g/dL Final   • Albumin 08/04/2022 4.60  3.50 - 5.20 g/dL Final   • ALT (SGPT) 08/04/2022 16  1 - 33 U/L Final   • AST (SGOT) 08/04/2022 20  1 - 32 U/L Final   • Alkaline Phosphatase 08/04/2022 59  39 - 117 U/L Final   • Total Bilirubin 08/04/2022 0.5  0.0 - 1.2 mg/dL Final   • Globulin 08/04/2022 2.4  gm/dL Final   • A/G Ratio 08/04/2022 1.9  g/dL Final   • BUN/Creatinine Ratio 08/04/2022 15.0  7.0 - 25.0 Final   • Anion Gap 08/04/2022 14.5  5.0 - 15.0 mmol/L Final   • eGFR 08/04/2022 76.9  >60.0 mL/min/1.73 Final    National Kidney Foundation and American Society of Nephrology (ASN) Task Force recommended calculation based on the Chronic Kidney Disease Epidemiology Collaboration (CKD-EPI) equation refit without adjustment for race.   • Troponin T 08/04/2022 <0.010  0.000 - 0.030 ng/mL Final   • Extra Tube 08/04/2022 Hold for add-ons.   Final    Auto resulted.   • Extra Tube 08/04/2022 hold for add-on   Final    Auto resulted   • Extra Tube 08/04/2022 Hold for add-ons.   Final    Auto resulted.   • Extra Tube 08/04/2022 Hold for add-ons.   Final    Auto resulted   • WBC 08/04/2022 6.22  3.40 - 10.80 10*3/mm3 Final   • RBC 08/04/2022 4.36  3.77 - 5.28 10*6/mm3 Final   • Hemoglobin 08/04/2022 14.2  12.0 - 15.9 g/dL Final   • Hematocrit 08/04/2022 38.5  34.0 - 46.6 % Final   • MCV 08/04/2022 88.3  79.0 - 97.0 fL Final   • MCH 08/04/2022 32.6  26.6 - 33.0 pg Final   • MCHC 08/04/2022 36.9 (H)  31.5 - 35.7 g/dL Final   • RDW 08/04/2022 11.9 (L)  12.3 - 15.4 % Final   • RDW-SD 08/04/2022 38.4  37.0 - 54.0 fl Final   • MPV 08/04/2022 8.8  6.0 - 12.0 fL Final   • Platelets 08/04/2022 335   140 - 450 10*3/mm3 Final   • Neutrophil % 08/04/2022 55.1  42.7 - 76.0 % Final   • Lymphocyte % 08/04/2022 30.7  19.6 - 45.3 % Final   • Monocyte % 08/04/2022 11.3  5.0 - 12.0 % Final   • Eosinophil % 08/04/2022 2.1  0.3 - 6.2 % Final   • Basophil % 08/04/2022 0.6  0.0 - 1.5 % Final   • Immature Grans % 08/04/2022 0.2  0.0 - 0.5 % Final   • Neutrophils, Absolute 08/04/2022 3.43  1.70 - 7.00 10*3/mm3 Final   • Lymphocytes, Absolute 08/04/2022 1.91  0.70 - 3.10 10*3/mm3 Final   • Monocytes, Absolute 08/04/2022 0.70  0.10 - 0.90 10*3/mm3 Final   • Eosinophils, Absolute 08/04/2022 0.13  0.00 - 0.40 10*3/mm3 Final   • Basophils, Absolute 08/04/2022 0.04  0.00 - 0.20 10*3/mm3 Final   • Immature Grans, Absolute 08/04/2022 0.01  0.00 - 0.05 10*3/mm3 Final   • nRBC 08/04/2022 0.0  0.0 - 0.2 /100 WBC Final      Colonoscopy dated 11/17/2021 per Dr. Chapa  - Diverticulosis in the sigmoid colon.  - Anal papilla(e) were hypertrophied.  - No specimens collected.    Assessment / Plan      1. Bloating  2. Burping  3. Abdominal pressure  She has a history of bloating, burping and upper abdominal pressure for the past few years that has gradually worsened. The symptoms have become very bothersome and worrisome for her. She has tried changing her diet but it does not help. She is taking low dose PPI, which helped in the past, but it is not helping at this time. Basic labs unremarkable. TSH normal.   Anti-reflux measures  Low FODMAP diet - avoid dairy/cruciferous vegetables  May use Gas-X as needed.  Avoid sugar free gums/candies.  CTAP to rule out solid organ pathology.   EGD for further evaluation.    - CT Abdomen Pelvis With Contrast; Future  - Case Request    4. Fatty infiltration of liver  5. Class 1 obesity due to excess calories without serious comorbidity with body mass index (BMI) of 34.0 to 34.9 in adult  BMI 34.95  No history of elevated liver enzymes. Fatty liver noted on imaging in the past.  Advised low fat diet,  exercise and weight reduction.     6. Encounter for screening for malignant neoplasm of colon  Colonoscopy unremarkable, no polyps removed, no specimens collected. No family history of colon cancer.   Colonoscopy for screening in 10 years, 2031.     Patient Instructions   1. Antireflux measures: Avoid fried, fatty foods, alcohol, chocolate, coffee, tea,  soft drinks, peppermint and spearmint, spicy foods, tomatoes and tomato based foods, onion based foods, and smoking.  2. Other antireflux measures include weight reduction if overweight, avoiding tight clothing around the abdomen, elevating the head of the bed 6 inches with blocks under the head board, and don't drink or eat before going to bed and avoid lying down immediately after meals.  3. Pantoprazole 20 mg 1 po daily in the am 30 minutes before breakfast.  4. Recommended to take Levothyroxine first in the am upon waking, wait 30 minutes, then take Pantoprazole 20 mg, wait 30 minutes, then eat breakfast and take other medications.   5. Gas-X as needed.  6. The patient should eat 4-5 very small meals throughout the day. Avoid large meals.  7. It is recommended to eat a softer diet. Meats are best consumed ground. Fruits and vegetables are best consumed cooked or steamed and then mashed.    8. Low FODMAP diet - avoid all dairy. May use lactose free/dairy free alternatives. Avoid cruciferous vegetables.  9. Avoid all sugar free candies/gums/etc.  10. Low fiber, low fat diet with liberal water intake. May use soluble fiber.  11. Advised to exercise 30 minutes 4-5 days per week.   12. Advised to lose 20 pounds in the next 6-12 months.   13. Colonoscopy for screening in 10 years, 2031.  14. CT Abdomen and pelvis  15. Upper endoscopy-EGD: The indications, technique, alternatives and potential risk and complications were discussed with the patient including but not limited to bleeding, perforations, missing lesions and anesthetic complications. The patient understands  and wishes to proceed with the procedure and has given their verbal consent. Written patient education information was given to the patient.   16. The patient will call if they have further questions before procedure.      Low-FODMAP Eating Plan  FODMAP stands for fermentable oligosaccharides, disaccharides, monosaccharides, and polyols. These are sugars that are hard for some people to digest. A low-FODMAP eating plan may help some people who have irritable bowel syndrome (IBS) and certain other bowel (intestinal) diseases to manage their symptoms.  This meal plan can be complicated to follow. Work with a diet and nutrition specialist (dietitian) to make a low-FODMAP eating plan that is right for you. A dietitian can help make sure that you get enough nutrition from this diet.  What are tips for following this plan?  Reading food labels  1. Check labels for hidden FODMAPs such as:  ? High-fructose syrup.  ? Honey.  ? Agave.  ? Natural fruit flavors.  ? Onion or garlic powder.  2. Choose low-FODMAP foods that contain 3-4 grams of fiber per serving.  3. Check food labels for serving sizes. Eat only one serving at a time to make sure FODMAP levels stay low.  Shopping  • Shop with a list of foods that are recommended on this diet and make a meal plan.  Meal planning  1. Follow a low-FODMAP eating plan for up to 6 weeks, or as told by your health care provider or dietitian.  2. To follow the eating plan:  1. Eliminate high-FODMAP foods from your diet completely. Choose only low-FODMAP foods to eat. You will do this for 2-6 weeks.  2. Gradually reintroduce high-FODMAP foods into your diet one at a time. Most people should wait a few days before introducing the next new high-FODMAP food into their meal plan. Your dietitian can recommend how quickly you may reintroduce foods.  3. Keep a daily record of what and how much you eat and drink. Make note of any symptoms that you have after eating.  4. Review your daily record  "with a dietitian regularly to identify which foods you can eat and which foods you should avoid.  General tips  • Drink enough fluid each day to keep your urine pale yellow.  • Avoid processed foods. These often have added sugar and may be high in FODMAPs.  • Avoid most dairy products, whole grains, and sweeteners.  • Work with a dietitian to make sure you get enough fiber in your diet.  • Avoid high FODMAP foods at meals to manage symptoms.    Recommended foods  Fruits  Bananas, oranges, tangerines, gaurav, limes, blueberries, raspberries, strawberries, grapes, cantaloupe, honeydew melon, kiwi, papaya, passion fruit, and pineapple. Limited amounts of dried cranberries, banana chips, and shredded coconut.  Vegetables  Eggplant, zucchini, cucumber, peppers, green beans, bean sprouts, lettuce, arugula, kale, Swiss chard, spinach, edgard greens, bok quan, summer squash, potato, and tomato. Limited amounts of corn, carrot, and sweet potato. Green parts of scallions.  Grains  Gluten-free grains, such as rice, oats, buckwheat, quinoa, corn, polenta, and millet. Gluten-free pasta, bread, or cereal. Rice noodles. Corn tortillas.  Meats and other proteins  Unseasoned beef, pork, poultry, or fish. Eggs. Kc. Tofu (firm) and tempeh. Limited amounts of nuts and seeds, such as almonds, walnuts, brazil nuts, pecans, peanuts, nut butters, pumpkin seeds, grecia seeds, and sunflower seeds.  Dairy  Lactose-free milk, yogurt, and kefir. Lactose-free cottage cheese and ice cream. Non-dairy milks, such as almond, coconut, hemp, and rice milk. Non-dairy yogurt. Limited amounts of goat cheese, brie, mozzarella, parmesan, swiss, and other hard cheeses.  Fats and oils  Butter-free spreads. Vegetable oils, such as olive, canola, and sunflower oil.  Seasoning and other foods  Artificial sweeteners with names that do not end in \"ol,\" such as aspartame, saccharine, and stevia. Maple syrup, white table sugar, raw sugar, brown sugar, and " molasses. Mayonnaise, soy sauce, and tamari. Fresh basil, coriander, parsley, rosemary, and thyme.  Beverages  Water and mineral water. Sugar-sweetened soft drinks. Small amounts of orange juice or cranberry juice. Black and green tea. Most dry dayami. Coffee.  The items listed above may not be a complete list of foods and beverages you can eat. Contact a dietitian for more information.    Foods to avoid  Fruits  Fresh, dried, and juiced forms of apple, pear, watermelon, peach, plum, cherries, apricots, blackberries, boysenberries, figs, nectarines, and anayeli. Avocado.  Vegetables  Chicory root, artichoke, asparagus, cabbage, snow peas, Bernard sprouts, broccoli, sugar snap peas, mushrooms, celery, and cauliflower. Onions, garlic, leeks, and the white part of scallions.  Grains  Wheat, including kamut, durum, and semolina. Barley and bulgur. Couscous. Wheat-based cereals. Wheat noodles, bread, crackers, and pastries.  Meats and other proteins  Fried or fatty meat. Sausage. Cashews and pistachios. Soybeans, baked beans, black beans, chickpeas, kidney beans, jennifer beans, navy beans, lentils, black-eyed peas, and split peas.  Dairy  Milk, yogurt, ice cream, and soft cheese. Cream and sour cream. Milk-based sauces. Custard. Buttermilk. Soy milk.  Seasoning and other foods  Any sugar-free gum or candy. Foods that contain artificial sweeteners such as sorbitol, mannitol, isomalt, or xylitol. Foods that contain honey, high-fructose corn syrup, or agave. Bouillon, vegetable stock, beef stock, and chicken stock. Garlic and onion powder. Condiments made with onion, such as hummus, chutney, pickles, relish, salad dressing, and salsa. Tomato paste.  Beverages  Chicory-based drinks. Coffee substitutes. Chamomile tea. Fennel tea. Sweet or fortified dayami such as port or danitza. Diet soft drinks made with isomalt, mannitol, maltitol, sorbitol, or xylitol. Apple, pear, and anayeli juice. Juices with high-fructose corn syrup.  The  items listed above may not be a complete list of foods and beverages you should avoid. Contact a dietitian for more information.  Summary  • FODMAP stands for fermentable oligosaccharides, disaccharides, monosaccharides, and polyols. These are sugars that are hard for some people to digest.  • A low-FODMAP eating plan is a short-term diet that helps to ease symptoms of certain bowel diseases.  • The eating plan usually lasts up to 6 weeks. After that, high-FODMAP foods are reintroduced gradually and one at a time. This can help you find out which foods may be causing symptoms.  • A low-FODMAP eating plan can be complicated. It is best to work with a dietitian who has experience with this type of plan.  This information is not intended to replace advice given to you by your health care provider. Make sure you discuss any questions you have with your health care provider.  Document Revised: 05/06/2021 Document Reviewed: 05/06/2021  [employer] Patient Education © 2022 [employer]    Cedrick Chowdhury, TERESA  11/8/2022    Please note that portions of this note were completed with a voice recognition program.

## 2022-11-09 PROBLEM — R10.9 ABDOMINAL PRESSURE: Status: ACTIVE | Noted: 2022-11-09

## 2022-12-06 ENCOUNTER — HOSPITAL ENCOUNTER (OUTPATIENT)
Facility: HOSPITAL | Age: 77
Setting detail: HOSPITAL OUTPATIENT SURGERY
Discharge: HOME | End: 2022-12-06
Attending: INTERNAL MEDICINE | Admitting: INTERNAL MEDICINE

## 2022-12-06 ENCOUNTER — ANESTHESIA EVENT (OUTPATIENT)
Dept: GASTROENTEROLOGY | Facility: HOSPITAL | Age: 77
End: 2022-12-06

## 2022-12-06 ENCOUNTER — ANESTHESIA (OUTPATIENT)
Dept: GASTROENTEROLOGY | Facility: HOSPITAL | Age: 77
End: 2022-12-06

## 2022-12-06 VITALS
WEIGHT: 185 LBS | OXYGEN SATURATION: 96 % | BODY MASS INDEX: 31.58 KG/M2 | SYSTOLIC BLOOD PRESSURE: 144 MMHG | HEIGHT: 64 IN | RESPIRATION RATE: 20 BRPM | HEART RATE: 75 BPM | TEMPERATURE: 97.4 F | DIASTOLIC BLOOD PRESSURE: 78 MMHG

## 2022-12-06 DIAGNOSIS — R14.0 BLOATING: ICD-10-CM

## 2022-12-06 DIAGNOSIS — R10.9 ABDOMINAL PRESSURE: ICD-10-CM

## 2022-12-06 PROCEDURE — 88305 TISSUE EXAM BY PATHOLOGIST: CPT

## 2022-12-06 PROCEDURE — 43239 EGD BIOPSY SINGLE/MULTIPLE: CPT | Performed by: INTERNAL MEDICINE

## 2022-12-06 PROCEDURE — 25010000002 PROPOFOL 1000 MG/100ML EMULSION: Performed by: NURSE ANESTHETIST, CERTIFIED REGISTERED

## 2022-12-06 RX ORDER — PROPOFOL 10 MG/ML
INJECTION, EMULSION INTRAVENOUS AS NEEDED
Status: DISCONTINUED | OUTPATIENT
Start: 2022-12-06 | End: 2022-12-06 | Stop reason: SURG

## 2022-12-06 RX ORDER — SODIUM CHLORIDE 9 MG/ML
70 INJECTION, SOLUTION INTRAVENOUS CONTINUOUS PRN
Status: DISCONTINUED | OUTPATIENT
Start: 2022-12-06 | End: 2022-12-06 | Stop reason: HOSPADM

## 2022-12-06 RX ORDER — LIDOCAINE HYDROCHLORIDE 20 MG/ML
INJECTION, SOLUTION INTRAVENOUS AS NEEDED
Status: DISCONTINUED | OUTPATIENT
Start: 2022-12-06 | End: 2022-12-06 | Stop reason: SURG

## 2022-12-06 RX ADMIN — SODIUM CHLORIDE 70 ML/HR: 9 INJECTION, SOLUTION INTRAVENOUS at 07:55

## 2022-12-06 RX ADMIN — PROPOFOL 100 MG: 10 INJECTION, EMULSION INTRAVENOUS at 08:10

## 2022-12-06 RX ADMIN — LIDOCAINE HYDROCHLORIDE 60 MG: 20 INJECTION, SOLUTION INTRAVENOUS at 08:10

## 2022-12-06 NOTE — ANESTHESIA POSTPROCEDURE EVALUATION
Patient: Deya Hernandez    Procedure Summary     Date: 12/06/22 Room / Location: [facility] ENDOSCOPY 2 / [facility] ENDOSCOPY    Anesthesia Start: 0809 Anesthesia Stop: 0820    Procedure: ESOPHAGOGASTRODUODENOSCOPY WITH BIOPSY (Esophagus) Diagnosis:       Bloating      Abdominal pressure      (Bloating [R14.0])      (Abdominal pressure [R10.9])    Surgeons: Kimmie Chapa MD Provider: Jorge Cuevas CRNA    Anesthesia Type: MAC ASA Status: 3          Anesthesia Type: MAC    Vitals  No vitals data found for the desired time range.          Post Anesthesia Care and Evaluation    Patient location during evaluation: bedside  Patient participation: complete - patient participated  Level of consciousness: awake  Pain score: 0  Pain management: adequate    Airway patency: patent  Anesthetic complications: No anesthetic complications  PONV Status: controlled  Cardiovascular status: acceptable and stable  Respiratory status: acceptable and room air  Hydration status: acceptable    Comments: Vital signs as noted in nursing documentation as per protocol

## 2022-12-06 NOTE — H&P
[facility]  HISTORY AND PHYSICAL    Patient Name: Deya Hernandez  : 1945  MRN: [id number]    Chief Complaint:   For EGD    History Of Presenting Illness:    Dyspepsia  Bloating      Past Medical History:   Diagnosis Date   • Abdominal bloating    • Abnormal EKG    • Ankle fracture    • Anxiety and depression    • Colon cancer screening    • Colon polyp 2016   • COVID    • Disease of thyroid gland    • Diverticulitis of colon    • Dyspnea    • Elbow pain, left    • Elevated cholesterol    • Fatigue    • Fatty infiltration of liver 10/12/2020   • Fatty liver [id number]   • Gastritis    • GERD (gastroesophageal reflux disease)    • H/O echocardiogram Unknown    Normal per pt.   • H/O exercise stress test 2016    Normal result per pt.   • H/O mammogram    • H/O sinusitis    • H/O: pneumonia    • Heart palpitations    • Heartburn    • Hemorrhoid    • History of Holter monitoring     Found to be normal per pt.   • Hypertension    • Plantar fasciitis    • PONV (postoperative nausea and vomiting)     Years ago with tubal   • Sebaceous cyst    • Sinusitis    • Skin cancer        Past Surgical History:   Procedure Laterality Date   • CARDIAC CATHETERIZATION      Negative per pt.   • COLONOSCOPY  2016   • COLONOSCOPY N/A 2021    Procedure: COLONOSCOPY;  Surgeon: Kimmie Chapa MD;  Location: [facility] ENDOSCOPY;  Service: Gastroenterology;  Laterality: N/A;   • COLONOSCOPY W/ POLYPECTOMY     • ENDOSCOPY     • SKIN BIOPSY     • TUBAL ABDOMINAL LIGATION     • UPPER GASTROINTESTINAL ENDOSCOPY  2016       Social History     Socioeconomic History   • Marital status:    Tobacco Use   • Smoking status: Former     Packs/day: 0.25     Years: 15.00     Pack years: 3.75     Types: Cigarettes   • Smokeless tobacco: Never   • Tobacco comments:     Quit in    Vaping Use   • Vaping Use: Never used   Substance and Sexual Activity   • Alcohol use: Yes      Alcohol/week: 3.0 standard drinks     Types: 1 Glasses of wine, 2 Cans of beer per week     Comment: social   • Drug use: No   • Sexual activity: Not Currently       Family History   Problem Relation Age of Onset   • Heart attack Other    • Arthritis Other    • Cancer Other    • Diabetes Other    • Hypertension Other    • Stroke Other    • Cancer Mother    • Breast cancer Mother 70   • Heart disease Mother    • Emphysema Father    • Colon polyps Neg Hx    • Esophageal cancer Neg Hx    • Irritable bowel syndrome Neg Hx    • Liver cancer Neg Hx    • Liver disease Neg Hx    • Stomach cancer Neg Hx    • Colon cancer Neg Hx    • Ovarian cancer Neg Hx        Prior to Admission Medications:  Medications Prior to Admission   Medication Sig Dispense Refill Last Dose   • ALPRAZolam (XANAX) 0.5 MG tablet Take 1 tablet by mouth 2 (Two) Times a Day As Needed for Anxiety. 30 tablet 0 Past Week   • aluminum-magnesium hydroxide-simethicone (Mylanta) 200-200-20 MG/5ML suspension Take  by mouth Every 6 (Six) Hours As Needed for Indigestion or Heartburn.   Past Week   • amLODIPine (NORVASC) 5 MG tablet Take 1 tablet by mouth Daily. 90 tablet 3 12/5/2022 at 0900   • Aspirin Low Dose 81 MG EC tablet TAKE ONE TABLET BY MOUTH DAILY 90 tablet 3 11/30/2022   • buPROPion XL (WELLBUTRIN XL) 300 MG 24 hr tablet TAKE 1 TABLET BY MOUTH DAILY 90 tablet 3 12/5/2022 at 0900   • Calcium Carbonate Antacid (TUMS PO) Take  by mouth As Needed.   12/5/2022 at 0900   • carvedilol (COREG) 12.5 MG tablet Take 1 tablet by mouth 2 (Two) Times a Day With Meals. 180 tablet 3 12/5/2022 at 2130   • Cholecalciferol (VITAMIN D3) 1000 UNITS capsule Take 2,500 mg by mouth Daily.   12/5/2022 at 0900   • diclofenac (VOLTAREN) 1 % gel gel Apply 4 g topically to the appropriate area as directed 4 (Four) Times a Day As Needed (pain). 100 g 0 Past Week   • hydroCHLOROthiazide (HYDRODIURIL) 25 MG tablet Take 1 tablet by mouth Daily. 90 tablet 3 12/5/2022 at 0900   •  levothyroxine (SYNTHROID, LEVOTHROID) 100 MCG tablet Take 1 tablet by mouth Daily. 90 tablet 0 12/5/2022 at 0900   • mirtazapine (REMERON) 30 MG tablet TAKE 1 TABLET BY MOUTH EVERY NIGHT AT BEDTIME 90 tablet 3 12/5/2022 at 2359   • pantoprazole (PROTONIX) 20 MG EC tablet TAKE 1 TABLET BY MOUTH IN THE MORNING 30 MINUTES BEFORE BREAKFAST 30 tablet 5 12/5/2022 at 0900   • Simethicone (GAS-X PO) Take  by mouth As Needed.   Past Week   • simvastatin (ZOCOR) 40 MG tablet Take 1 tablet by mouth every night at bedtime. 90 tablet 3 12/5/2022 at 2359   • trandolapril (MAVIK) 4 MG tablet Take 1 tablet by mouth Daily. 90 tablet 3 12/5/2022 at 0900       Allergies:  No Known Allergies     Vitals: Temp:  [98.4 °F (36.9 °C)] 98.4 °F (36.9 °C)  Heart Rate:  [85] 85  Resp:  [18] 18  BP: (155)/(92) 155/92    Review Of Systems:  Constitutional:  Negative for chills, fever, and unexpected weight change.  Respiratory:  Negative for cough, chest tightness, shortness of breath, and wheezing.  Cardiovascular:  Negative for chest pain, palpitations, and leg swelling.  Gastrointestinal:  Negative for abdominal distention, abdominal pain, Nausea, vomiting.  Neurological:  Negative for Weakness, numbness, and headaches.     Physical Exam:    General Appearance:  Alert, cooperative, in no acute distress.   Lungs:   Clear to auscultation, respirations regular, even and                 unlabored.   Heart:  Regular rhythm and normal rate.   Abdomen:   Normal bowel sounds, no masses, no organomegaly. Soft, non-tender, non-distended   Neurologic: Alert and oriented x 3. Moves all four limbs equally       Plan: ESOPHAGOGASTRODUODENOSCOPY (N/A)     Kimmie Chapa MD  12/6/2022

## 2022-12-06 NOTE — DISCHARGE INSTRUCTIONS
Rest today  No pushing,pulling,tugging,heavy lifting, or strenuous activity   No major decision making,driving,or drinking alcoholic beverages for 24 hours due to the sedation you received  Always use good hand hygiene/washing technique  No driving on pain medication.     To assist you in voiding:  Drink plenty of fluids  Listen to running water while attempting to void.    If you are unable to urinate and you have an uncomfortable urge to void or it has been   6 hours since you were discharged, return to the Emergency Room.    - Discharge patient to home (ambulatory).   - Low fiber small meals  - Continue present medications.   - Trial of rifaximin 550mg po TID x 2 week   - To start on Fdgard 1 cap 1/2 hour before meals BID after 2 weeks of rifaximin therapy  - Await pathology results.   - Return to my office in 8 weeks.

## 2022-12-06 NOTE — ANESTHESIA PREPROCEDURE EVALUATION
Anesthesia Evaluation     Patient summary reviewed and Nursing notes reviewed   history of anesthetic complications: PONV  NPO Solid Status: > 8 hours  NPO Liquid Status: > 8 hours           Airway   Mallampati: II  TM distance: >3 FB  Neck ROM: full  Possible difficult intubation  Dental      Pulmonary    (+) a smoker Former, COPD, shortness of breath, sleep apnea ( ? whitney, obese, snores), decreased breath sounds,   Cardiovascular     (+) hypertension, dysrhythmias, SHERIDAN, hyperlipidemia,       Neuro/Psych  GI/Hepatic/Renal/Endo    (+) obesity, morbid obesity, GERD,  liver disease fatty liver disease, thyroid problem hypothyroidism    Musculoskeletal     Abdominal   (+) obese,    Substance History   (+) alcohol use,      OB/GYN          Other   arthritis,    history of cancer                      Anesthesia Plan    ASA 3     MAC     (Risks and benefits discussed including risk of aspiration, recall and dental damage. All patient questions answered.    Will continue with plan of care.)  intravenous induction     Anesthetic plan, risks, benefits, and alternatives have been provided, discussed and informed consent has been obtained with: patient.  Pre-procedure education provided  Plan discussed with CRNA.

## 2022-12-07 LAB — REF LAB TEST METHOD: NORMAL

## 2022-12-08 ENCOUNTER — HOSPITAL ENCOUNTER (OUTPATIENT)
Dept: CT IMAGING | Facility: HOSPITAL | Age: 77
Discharge: HOME | End: 2022-12-08
Admitting: NURSE PRACTITIONER

## 2022-12-08 DIAGNOSIS — R14.0 BLOATING: ICD-10-CM

## 2022-12-08 DIAGNOSIS — R10.9 ABDOMINAL PRESSURE: ICD-10-CM

## 2022-12-08 PROCEDURE — 82565 ASSAY OF CREATININE: CPT

## 2022-12-08 PROCEDURE — 74177 CT ABD & PELVIS W/CONTRAST: CPT

## 2022-12-08 PROCEDURE — 25010000002 IOPAMIDOL 61 % SOLUTION: Performed by: NURSE PRACTITIONER

## 2022-12-08 RX ADMIN — IOPAMIDOL 100 ML: 612 INJECTION, SOLUTION INTRAVENOUS at 12:50

## 2022-12-12 LAB — CREAT BLDA-MCNC: 0.9 MG/DL (ref 0.6–1.3)

## 2022-12-17 DIAGNOSIS — E03.8 OTHER SPECIFIED HYPOTHYROIDISM: ICD-10-CM

## 2022-12-19 RX ORDER — CARVEDILOL 12.5 MG/1
TABLET ORAL
Qty: 180 TABLET | Refills: 3 | Status: SHIPPED | OUTPATIENT
Start: 2022-12-19

## 2022-12-19 RX ORDER — LEVOTHYROXINE SODIUM 0.1 MG/1
100 TABLET ORAL DAILY
Qty: 90 TABLET | Refills: 0 | Status: SHIPPED | OUTPATIENT
Start: 2022-12-19 | End: 2023-03-17

## 2022-12-19 RX ORDER — SIMVASTATIN 40 MG
40 TABLET ORAL
Qty: 90 TABLET | Refills: 3 | Status: SHIPPED | OUTPATIENT
Start: 2022-12-19

## 2022-12-19 NOTE — TELEPHONE ENCOUNTER
Rx Refill Note  Requested Prescriptions     Pending Prescriptions Disp Refills   • simvastatin (ZOCOR) 40 MG tablet [Pharmacy Med Name: SIMVASTATIN 40MG TABLETS] 90 tablet 3     Sig: TAKE 1 TABLET BY MOUTH EVERY NIGHT AT BEDTIME   • carvedilol (COREG) 12.5 MG tablet [Pharmacy Med Name: CARVEDILOL 12.5MG TABLETS] 180 tablet 3     Sig: TAKE 1 TABLET BY MOUTH TWICE DAILY WITH MEALS   • levothyroxine (SYNTHROID, LEVOTHROID) 100 MCG tablet [Pharmacy Med Name: LEVOTHYROXINE 0.100MG (100MCG) TAB] 90 tablet 0     Sig: TAKE 1 TABLET BY MOUTH DAILY      Last office visit with prescribing clinician: 8/24/2022   Last telemedicine visit with prescribing clinician: 2/27/2023   Next office visit with prescribing clinician: 2/27/2023                          Would you like a call back once the refill request has been completed: [] Yes [] No    If the office needs to give you a call back, can they leave a voicemail: [] Yes [] No    Leny Rodrigues MA  12/19/22, 10:22 EST

## 2022-12-28 DIAGNOSIS — R10.9 ABDOMINAL PRESSURE: ICD-10-CM

## 2022-12-28 DIAGNOSIS — R14.2 BURPING: ICD-10-CM

## 2022-12-29 RX ORDER — PANTOPRAZOLE SODIUM 20 MG/1
TABLET, DELAYED RELEASE ORAL
Qty: 90 TABLET | Refills: 1 | Status: SHIPPED | OUTPATIENT
Start: 2022-12-29

## 2023-01-10 RX ORDER — AMLODIPINE BESYLATE 5 MG/1
5 TABLET ORAL DAILY
Qty: 90 TABLET | Refills: 3 | Status: SHIPPED | OUTPATIENT
Start: 2023-01-10

## 2023-01-10 NOTE — TELEPHONE ENCOUNTER
Rx Refill Note  Requested Prescriptions     Pending Prescriptions Disp Refills   • amLODIPine (NORVASC) 5 MG tablet 90 tablet 3     Sig: Take 1 tablet by mouth Daily.      Last office visit with prescribing clinician: 8/24/2022   Last telemedicine visit with prescribing clinician: 2/27/2023   Next office visit with prescribing clinician: 2/27/2023       Leny Rodrigues MA  01/10/23, 12:39 EST

## 2023-02-03 ENCOUNTER — PRIOR AUTHORIZATION (OUTPATIENT)
Dept: GASTROENTEROLOGY | Facility: CLINIC | Age: 78
End: 2023-02-03
Payer: MEDICARE

## 2023-02-28 ENCOUNTER — PREP FOR SURGERY (OUTPATIENT)
Dept: OTHER | Facility: HOSPITAL | Age: 78
End: 2023-02-28
Payer: MEDICARE

## 2023-02-28 DIAGNOSIS — H25.12 NUCLEAR SCLEROTIC CATARACT OF LEFT EYE: Primary | ICD-10-CM

## 2023-02-28 RX ORDER — SODIUM CHLORIDE 0.9 % (FLUSH) 0.9 %
10 SYRINGE (ML) INJECTION EVERY 12 HOURS SCHEDULED
Status: CANCELLED | OUTPATIENT
Start: 2023-02-28

## 2023-02-28 RX ORDER — CYCLOPENT/TROPIC/PHEN/KETR/WAT 1%-1%-2.5%
1 DROPS (EA) OPHTHALMIC (EYE)
Status: CANCELLED | OUTPATIENT
Start: 2023-02-28 | End: 2023-02-28

## 2023-02-28 RX ORDER — TETRACAINE HYDROCHLORIDE 5 MG/ML
1 SOLUTION OPHTHALMIC SEE ADMIN INSTRUCTIONS
Status: CANCELLED | OUTPATIENT
Start: 2023-02-28

## 2023-02-28 RX ORDER — SODIUM CHLORIDE 0.9 % (FLUSH) 0.9 %
1-10 SYRINGE (ML) INJECTION AS NEEDED
Status: CANCELLED | OUTPATIENT
Start: 2023-02-28

## 2023-02-28 RX ORDER — PREDNISOLONE ACETATE 10 MG/ML
1 SUSPENSION/ DROPS OPHTHALMIC SEE ADMIN INSTRUCTIONS
Status: CANCELLED | OUTPATIENT
Start: 2023-02-28

## 2023-02-28 RX ORDER — SODIUM CHLORIDE 9 MG/ML
40 INJECTION, SOLUTION INTRAVENOUS AS NEEDED
Status: CANCELLED | OUTPATIENT
Start: 2023-02-28

## 2023-03-03 PROBLEM — H25.12 NUCLEAR SCLEROTIC CATARACT OF LEFT EYE: Status: ACTIVE | Noted: 2023-03-03

## 2023-03-08 NOTE — PRE-PROCEDURE INSTRUCTIONS
PAT phone history completed with pt for upcoming procedure on 3/9/23, with Dr. Lyon.     PAT PASS GIVEN/REVIEWED WITH PT.  VERBALIZED UNDERSTANDING OF THE FOLLOWING:  DO NOT EAT, DRINK, SMOKE, USE SMOKELESS TOBACCO OR CHEW GUM AFTER MIDNIGHT THE NIGHT BEFORE SURGERY.  THIS ALSO INCLUDES HARD CANDIES AND MINTS.    DO NOT SHAVE THE AREA TO BE OPERATED ON AT LEAST 48 HOURS PRIOR TO THE PROCEDURE.  DO NOT WEAR MAKE UP OR NAIL POLISH.  DO NOT LEAVE IN ANY PIERCING OR WEAR JEWELRY THE DAY OF SURGERY.      DO NOT USE ADHESIVES IF YOU WEAR DENTURES.    DO NOT WEAR EYE CONTACTS; BRING IN YOUR GLASSES.    ONLY TAKE MEDICATION THE MORNING OF YOUR PROCEDURE IF INSTRUCTED BY YOUR SURGEON WITH ENOUGH WATER TO SWALLOW THE MEDICATION.  IF YOUR SURGEON DID NOT SPECIFY WHICH MEDICATIONS TO TAKE, YOU WILL NEED TO CALL THEIR OFFICE FOR FURTHER INSTRUCTIONS AND DO AS THEY INSTRUCT.    LEAVE ANYTHING YOU CONSIDER VALUABLE AT HOME.    YOU WILL NEED TO ARRANGE FOR SOMEONE TO DRIVE YOU HOME AFTER SURGERY.  IT IS RECOMMENDED THAT YOU DO NOT DRIVE, WORK, DRINK ALCOHOL OR MAKE MAJOR DECISIONS FOR AT LEAST 24 HOURS AFTER YOUR PROCEDURE IS COMPLETE.      THE DAY OF YOUR PROCEDURE, BRING IN THE FOLLOWING IF APPLICABLE:   PICTURE ID AND INSURANCE/MEDICARE OR MEDICAID CARDS/ANY CO-PAY THAT MAY BE DUE   COPY OF ADVANCED DIRECTIVE/LIVING WILL/POWER OR    CPAP/BIPAP/INHALERS   SKIN PREP SHEET   YOUR PREADMISSION TESTING PASS (IF NOT A PHONE HISTORY)

## 2023-03-09 ENCOUNTER — ANESTHESIA EVENT (OUTPATIENT)
Dept: PERIOP | Facility: HOSPITAL | Age: 78
End: 2023-03-09
Payer: MEDICARE

## 2023-03-09 ENCOUNTER — ANESTHESIA (OUTPATIENT)
Dept: PERIOP | Facility: HOSPITAL | Age: 78
End: 2023-03-09
Payer: MEDICARE

## 2023-03-09 ENCOUNTER — HOSPITAL ENCOUNTER (OUTPATIENT)
Facility: HOSPITAL | Age: 78
Setting detail: HOSPITAL OUTPATIENT SURGERY
Discharge: HOME | End: 2023-03-09
Attending: OPHTHALMOLOGY | Admitting: OPHTHALMOLOGY
Payer: MEDICARE

## 2023-03-09 VITALS
DIASTOLIC BLOOD PRESSURE: 87 MMHG | SYSTOLIC BLOOD PRESSURE: 138 MMHG | OXYGEN SATURATION: 97 % | WEIGHT: 285 LBS | BODY MASS INDEX: 47.48 KG/M2 | TEMPERATURE: 97.2 F | HEIGHT: 65 IN | HEART RATE: 69 BPM | RESPIRATION RATE: 16 BRPM

## 2023-03-09 DIAGNOSIS — H25.12 NUCLEAR SCLEROTIC CATARACT OF LEFT EYE: ICD-10-CM

## 2023-03-09 PROCEDURE — V2632 POST CHMBR INTRAOCULAR LENS: HCPCS | Performed by: OPHTHALMOLOGY

## 2023-03-09 PROCEDURE — 25010000002 PROPOFOL 200 MG/20ML EMULSION: Performed by: NURSE ANESTHETIST, CERTIFIED REGISTERED

## 2023-03-09 PROCEDURE — 25010000002 MIDAZOLAM PER 1MG: Performed by: NURSE ANESTHETIST, CERTIFIED REGISTERED

## 2023-03-09 DEVICE — LENS MONOFOCAL IQ CC60WF170: Type: IMPLANTABLE DEVICE | Site: POSTERIOR CHAMBER | Status: FUNCTIONAL

## 2023-03-09 RX ORDER — LIDOCAINE HYDROCHLORIDE 40 MG/ML
INJECTION, SOLUTION RETROBULBAR; TOPICAL AS NEEDED
Status: DISCONTINUED | OUTPATIENT
Start: 2023-03-09 | End: 2023-03-09 | Stop reason: HOSPADM

## 2023-03-09 RX ORDER — SODIUM CHLORIDE 9 MG/ML
40 INJECTION, SOLUTION INTRAVENOUS AS NEEDED
Status: DISCONTINUED | OUTPATIENT
Start: 2023-03-09 | End: 2023-03-09 | Stop reason: HOSPADM

## 2023-03-09 RX ORDER — PREDNISOLONE ACETATE 10 MG/ML
1 SUSPENSION/ DROPS OPHTHALMIC SEE ADMIN INSTRUCTIONS
Status: DISCONTINUED | OUTPATIENT
Start: 2023-03-09 | End: 2023-03-09 | Stop reason: HOSPADM

## 2023-03-09 RX ORDER — ACETAZOLAMIDE 500 MG/1
500 CAPSULE, EXTENDED RELEASE ORAL ONCE
Status: COMPLETED | OUTPATIENT
Start: 2023-03-09 | End: 2023-03-09

## 2023-03-09 RX ORDER — SODIUM CHLORIDE, SODIUM LACTATE, POTASSIUM CHLORIDE, CALCIUM CHLORIDE 600; 310; 30; 20 MG/100ML; MG/100ML; MG/100ML; MG/100ML
1000 INJECTION, SOLUTION INTRAVENOUS CONTINUOUS
Status: DISCONTINUED | OUTPATIENT
Start: 2023-03-09 | End: 2023-03-09 | Stop reason: HOSPADM

## 2023-03-09 RX ORDER — DIFLUPREDNATE OPHTHALMIC 0.5 MG/ML
1 EMULSION OPHTHALMIC 4 TIMES DAILY
Start: 2023-03-09

## 2023-03-09 RX ORDER — BALANCED SALT SOLUTION 6.4; .75; .48; .3; 3.9; 1.7 MG/ML; MG/ML; MG/ML; MG/ML; MG/ML; MG/ML
SOLUTION OPHTHALMIC AS NEEDED
Status: DISCONTINUED | OUTPATIENT
Start: 2023-03-09 | End: 2023-03-09 | Stop reason: HOSPADM

## 2023-03-09 RX ORDER — LIDOCAINE HCL/PF 100 MG/5ML
SYRINGE (ML) INJECTION AS NEEDED
Status: DISCONTINUED | OUTPATIENT
Start: 2023-03-09 | End: 2023-03-09 | Stop reason: SURG

## 2023-03-09 RX ORDER — KETAMINE HCL IN NACL, ISO-OSM 100MG/10ML
SYRINGE (ML) INJECTION AS NEEDED
Status: DISCONTINUED | OUTPATIENT
Start: 2023-03-09 | End: 2023-03-09 | Stop reason: SURG

## 2023-03-09 RX ORDER — PROPOFOL 10 MG/ML
INJECTION, EMULSION INTRAVENOUS AS NEEDED
Status: DISCONTINUED | OUTPATIENT
Start: 2023-03-09 | End: 2023-03-09 | Stop reason: SURG

## 2023-03-09 RX ORDER — PREDNISOLONE ACETATE 10 MG/ML
SUSPENSION/ DROPS OPHTHALMIC AS NEEDED
Status: DISCONTINUED | OUTPATIENT
Start: 2023-03-09 | End: 2023-03-09 | Stop reason: HOSPADM

## 2023-03-09 RX ORDER — TETRACAINE HYDROCHLORIDE 5 MG/ML
1 SOLUTION OPHTHALMIC SEE ADMIN INSTRUCTIONS
Status: COMPLETED | OUTPATIENT
Start: 2023-03-09 | End: 2023-03-09

## 2023-03-09 RX ORDER — MIDAZOLAM HYDROCHLORIDE 2 MG/2ML
INJECTION, SOLUTION INTRAMUSCULAR; INTRAVENOUS AS NEEDED
Status: DISCONTINUED | OUTPATIENT
Start: 2023-03-09 | End: 2023-03-09 | Stop reason: SURG

## 2023-03-09 RX ORDER — TETRACAINE HYDROCHLORIDE 5 MG/ML
SOLUTION OPHTHALMIC AS NEEDED
Status: DISCONTINUED | OUTPATIENT
Start: 2023-03-09 | End: 2023-03-09 | Stop reason: HOSPADM

## 2023-03-09 RX ORDER — SODIUM CHLORIDE 0.9 % (FLUSH) 0.9 %
10 SYRINGE (ML) INJECTION EVERY 12 HOURS SCHEDULED
Status: DISCONTINUED | OUTPATIENT
Start: 2023-03-09 | End: 2023-03-09 | Stop reason: HOSPADM

## 2023-03-09 RX ORDER — SODIUM CHLORIDE 0.9 % (FLUSH) 0.9 %
1-10 SYRINGE (ML) INJECTION AS NEEDED
Status: DISCONTINUED | OUTPATIENT
Start: 2023-03-09 | End: 2023-03-09 | Stop reason: HOSPADM

## 2023-03-09 RX ORDER — CYCLOPENT/TROPIC/PHEN/KETR/WAT 1%-1%-2.5%
1 DROPS (EA) OPHTHALMIC (EYE)
Status: COMPLETED | OUTPATIENT
Start: 2023-03-09 | End: 2023-03-09

## 2023-03-09 RX ORDER — NEOMYCIN SULFATE, POLYMYXIN B SULFATE, AND DEXAMETHASONE 3.5; 10000; 1 MG/G; [USP'U]/G; MG/G
OINTMENT OPHTHALMIC AS NEEDED
Status: DISCONTINUED | OUTPATIENT
Start: 2023-03-09 | End: 2023-03-09 | Stop reason: HOSPADM

## 2023-03-09 RX ADMIN — Medication 50 MG: at 13:53

## 2023-03-09 RX ADMIN — Medication 10 MG: at 13:51

## 2023-03-09 RX ADMIN — Medication 1 DROP: at 13:22

## 2023-03-09 RX ADMIN — MIDAZOLAM HYDROCHLORIDE 2 MG: 1 INJECTION, SOLUTION INTRAMUSCULAR; INTRAVENOUS at 13:51

## 2023-03-09 RX ADMIN — TETRACAINE HYDROCHLORIDE 1 DROP: 5 SOLUTION OPHTHALMIC at 13:11

## 2023-03-09 RX ADMIN — ACETAZOLAMIDE 500 MG: 500 CAPSULE, EXTENDED RELEASE ORAL at 14:11

## 2023-03-09 RX ADMIN — Medication 1 DROP: at 13:17

## 2023-03-09 RX ADMIN — TETRACAINE HYDROCHLORIDE 1 DROP: 5 SOLUTION OPHTHALMIC at 13:10

## 2023-03-09 RX ADMIN — PROPOFOL 40 MG: 10 INJECTION, EMULSION INTRAVENOUS at 13:53

## 2023-03-09 RX ADMIN — SODIUM CHLORIDE, POTASSIUM CHLORIDE, SODIUM LACTATE AND CALCIUM CHLORIDE 1000 ML: 600; 310; 30; 20 INJECTION, SOLUTION INTRAVENOUS at 13:16

## 2023-03-09 RX ADMIN — Medication 1 DROP: at 13:12

## 2023-03-09 NOTE — OP NOTE
OPERATIVE NOTE    Date of Procedure: 3/9/2023  Patient Name: Deya Hernandez  Patient MRN: [id number]  YOB: 1945     Preoperative Diagnosis: Left nuclear sclerotic cataract.     Postoperative Diagnosis: Left nuclear sclerotic cataract.     Procedure Performed: Phacoemulsification with implantation of a  foldable posterior chamber intraocular lens, Left eye.     Surgeon: Dain Lyon MD     Anesthesia:  Monitored Anesthesia Care (MAC)      Brief History and Indication: The patient presents with a history of past progressive loss of vision.  Patient was diagnosed with cataract and requests removal for increased ability to read and see.     Operation Description: The patient was taken to the OR and prepped and draped in the usual sterile ophthalmic fashion. A lid speculum was placed in the eye.  A #75 blade was then used to make a stab incision two o’clock hours from the intended temporal clear cornea groove. The anterior chamber was then inflated with a Viscoelastic. A metal microkeratome blade was then used to enter the anterior chamber at the temporal clear cornea site. A three level tunnel incision was made. A curvilinear capsulorrhexis was then performed with a bent cystotome needle and capsulorrhexis forceps.  BSS on a 30 gauge bent cannula was used to hydro-dissect, and hydro-delineate the lens. Good fluid waves and hydro-delineation were noted. Phacoemulsification was then used to remove nuclear material without complications. The residual cortical and lenticular material was then removed with irrigation and aspiration. Viscoelastics were then used to inflate the bag in a soft shell technique. A PCIOL was injected into the bag. Post-implantation, there were no rents or tears in the bag and the lens was noted to be stable and centered. The residual Viscoelastic was then removed with irrigation and aspiration.  The wound was checked and found to be without leaks. Therefore a Polydex  ointment and one drop of Durezol eye drop was placed in the eye.     Implant Information:   Implant Name Type Inv. Item Serial No.  Lot No. LRB No. Used Action   LENS MONOFOCAL IQ [id number] - [id number] - [id number] Implant LENS MONOFOCAL IQ [id number] LANG NA Left 1 Implanted       Complications: None    Estimated Blood Loss:  Less than 1 cc.       []   Changed to complex procedure due to: []  hypermature, white cataract. Blue dye was used. []   small iris. A Malyugin Ring was used.    Discharge and Condition  The patient was transported to same day surgery in excellent condition and scheduled for follow-up tomorrow morning. The patient was given instructions on use of eye drops for the operative eye and was specifically instructed to call Dr. Lyon at his office or home for any nausea, vomiting, headache, decreased visual acuity, or pain, or if the patient had any general concerns.    Dain Lyon MD  3/9/2023

## 2023-03-09 NOTE — DISCHARGE INSTRUCTIONS
Post Operative Cataract Instructions     Left Eye  POST OPERATIVE INSTRUCTIONS  You have received anesthesia today. DO NOT drive, drink alcohol, sign legal documents.   After surgery, your eye will not hurt. It may feel scratchy, sticky or uncomfortable. Your eye will be sensitive to light.  Most people see better 1-3 days after the procedure, but it could take 3 weeks to get the full benefits and reach your visual potential. If your vision is blurry for a few days it is normal, and means you may have swelling outside or inside the eye. For some patients, a bubble is placed and there will be blurriness.   You should receive a post-op kit with tape and an eye shield. Wear the shield for the first 3 nights after surgery to keep you from rubbing the eye.  You may wear glasses or sunglasses during the day.  You must keep eye protected at all times.  Most people are able to return to their normal routine 1-3 days after surgery, however, due to the brain adjusting to your new vision you may have trouble judging distances and want to be careful when driving and going up and downstairs.   You can shower and wash your hair the day after surgery. Keep water, shampoo, hair spray and shaving lotion out of the eye, especially for the first week.   If eyes become stuck together after surgery you may soak with warm cloth.  During the first week, you should AVOID:   Rubbing or putting pressure on your eye.  Eye make-up, face cream or lotion, hair coloring or perms  Strenuous activities, such as running or lifting weights, as to avoid sweat from getting in the eye. Avoid swimming, hot tubs, fumes or xiao conditions.   Sleeping on operative side.  Excessive sneezing or coughing.  Hanging the head down for periods of time. Keep your head above your heart.    Some discomfort and blurred vision after surgery are normal, but if you have any unusual pain, swelling, bleeding or sudden decrease in vision, contact our office immediately.      Emergency assistance is available at any time by calling:    Dr.Mark Camron Lyon  [phone]    If unable to reach call [facility] @ [phone]    You have been prescribed an eye drop to use after surgery, please follow these instructions and bring eye drops and instructions with you to post op appointment:    Difluprednate (DUREZOL) 0.05 %. Shake well before use.    USE 1 DROP 4 (FOUR) TIMES A DAY FOR 1 WEEK THEN STARTING WEEK 2, ONLY USE 2 (TWO) TIMES A DAY UNTIL YOU RUN OUT OF DROPS.     PLACE A ALANNA IN THE DAY COLUMN EACH TIME YOU USE TO KEEP ON SCHEDULE    WEEK 1-USE 4  (FOUR) TIMES A DAY DAY 1  []   []    []   []     DAY 2  []   []    []   []  DAY 3  []   []    []   []  DAY 4  []   []    []   []  DAY 5  []   []    []   []  DAY 6  []   []    []   []  DAY 7  []   []    []   []      WEEK 2-USE 2 (TWO)  TIMES A DAY DAY 1  []   []  DAY 2  []   []  DAY 3  []   []  DAY 4  []   []  DAY 5  []   []  DAY 6  []   []  DAY 7  []   []      WEEK 3-USE 2 (TWO) TIMES A DAY DAY 1  []   []  DAY 2  []   []  DAY 3  []   []  DAY 4  []   []  DAY 5  []   []  DAY 6  []   []  DAY 7  []   []      WEEK 4-USE 2 (TWO)TIMES A DAY DAY 1  []   []  DAY 2  []   []  DAY 3  []   []  DAY 4  []   []  DAY 5  []   []  DAY 6  []   []  DAY 7  []   []

## 2023-03-09 NOTE — ANESTHESIA POSTPROCEDURE EVALUATION
Patient: Deya Hernandez    Procedure Summary     Date: 03/09/23 Room / Location: [facility] OR 1 /  [facility]    Anesthesia Start: 1348 Anesthesia Stop: 1400    Procedure: CATARACT PHACO EXTRACTION WITH INTRAOCULAR LENS IMPLANT LEFT (Left: Eye) Diagnosis:       Nuclear sclerotic cataract of left eye      (Nuclear sclerotic cataract of left eye [H25.12])    Surgeons: Dain Lyon MD Provider: Martha Tinoco CRNA    Anesthesia Type: MAC ASA Status: 3          Anesthesia Type: MAC    Vitals  Vitals Value Taken Time   /87 03/09/23 1422   Temp 97.2 °F (36.2 °C) 03/09/23 1406   Pulse 69 03/09/23 1422   Resp 16 03/09/23 1422   SpO2 97 % 03/09/23 1422           Post Anesthesia Care and Evaluation    Patient location during evaluation: PHASE II  Patient participation: complete - patient participated  Level of consciousness: awake and alert  Pain score: 0  Pain management: satisfactory to patient    Airway patency: patent  Anesthetic complications: No anesthetic complications  PONV Status: none  Cardiovascular status: acceptable and stable  Respiratory status: acceptable  Hydration status: acceptable    Comments: Vitals signs as noted in nursing documentation as per protocol.

## 2023-03-09 NOTE — H&P
[facility]         History and Physical    Patient Name: Deya Hernandez  MRN: [id number]  : 1945  Gender: female     HPI: Patient complaint of PPLOV Left eye diagnosed with cataract. Patient requests PHACO PCIOL for Increase of VA/ADL.    History:    Past Medical History:   Diagnosis Date   • Abdominal bloating    • Abnormal EKG    • Ankle fracture    • Anxiety and depression    • Colon cancer screening    • Colon polyp 2016   • COVID    • Disease of thyroid gland    • Diverticulitis of colon    • Dyspnea    • Elbow pain, left    • Elevated cholesterol    • Fatigue    • Fatty infiltration of liver 10/12/2020   • Fatty liver [id number]   • Gastritis    • GERD (gastroesophageal reflux disease)    • H/O echocardiogram Unknown    Normal per pt.   • H/O exercise stress test 2016    Normal result per pt.   • H/O mammogram    • H/O sinusitis    • H/O: pneumonia    • Heart palpitations    • Heartburn    • Hemorrhoid    • History of Holter monitoring     Found to be normal per pt.   • Hypertension    • Plantar fasciitis    • Sebaceous cyst    • Sinusitis    • Skin cancer        Past Surgical History:   Procedure Laterality Date   • CARDIAC CATHETERIZATION      Negative per pt.   • COLONOSCOPY  2016   • COLONOSCOPY N/A 2021    Procedure: COLONOSCOPY;  Surgeon: Kimmie Chapa MD;  Location: [facility] ENDOSCOPY;  Service: Gastroenterology;  Laterality: N/A;   • COLONOSCOPY W/ POLYPECTOMY     • ENDOSCOPY     • ENDOSCOPY N/A 2022    Procedure: ESOPHAGOGASTRODUODENOSCOPY WITH BIOPSY;  Surgeon: iKmmie Chapa MD;  Location: [facility] ENDOSCOPY;  Service: Gastroenterology;  Laterality: N/A;   • SKIN BIOPSY     • TUBAL ABDOMINAL LIGATION     • UPPER GASTROINTESTINAL ENDOSCOPY  2016       Social History     Socioeconomic History   • Marital status:    Tobacco Use   • Smoking status: Former     Packs/day: 0.25     Years: 15.00      Pack years: 3.75     Types: Cigarettes   • Smokeless tobacco: Never   • Tobacco comments:     Quit in 2015   Vaping Use   • Vaping Use: Never used   Substance and Sexual Activity   • Alcohol use: Yes     Alcohol/week: 3.0 standard drinks     Types: 1 Glasses of wine, 2 Cans of beer per week     Comment: social   • Drug use: No   • Sexual activity: Defer       Family History   Problem Relation Age of Onset   • Heart attack Other    • Arthritis Other    • Cancer Other    • Diabetes Other    • Hypertension Other    • Stroke Other    • Cancer Mother    • Breast cancer Mother 70   • Heart disease Mother    • Emphysema Father    • Colon polyps Neg Hx    • Esophageal cancer Neg Hx    • Irritable bowel syndrome Neg Hx    • Liver cancer Neg Hx    • Liver disease Neg Hx    • Stomach cancer Neg Hx    • Colon cancer Neg Hx    • Ovarian cancer Neg Hx        Prior to Admission Medications:  Medications Prior to Admission   Medication Sig Dispense Refill Last Dose   • ALPRAZolam (XANAX) 0.5 MG tablet Take 1 tablet by mouth 2 (Two) Times a Day As Needed for Anxiety. 30 tablet 0 3/8/2023   • aluminum-magnesium hydroxide-simethicone (Mylanta) 200-200-20 MG/5ML suspension Take  by mouth Every 6 (Six) Hours As Needed for Indigestion or Heartburn.   Past Month   • amLODIPine (NORVASC) 5 MG tablet Take 1 tablet by mouth Daily. 90 tablet 3 3/9/2023   • Aspirin Low Dose 81 MG EC tablet TAKE ONE TABLET BY MOUTH DAILY (Patient taking differently: 1 tablet Daily.) 90 tablet 3 3/8/2023   • buPROPion XL (WELLBUTRIN XL) 300 MG 24 hr tablet TAKE 1 TABLET BY MOUTH DAILY 90 tablet 3 3/8/2023   • Calcium Carbonate Antacid (TUMS PO) Take  by mouth As Needed.   Past Week   • carvedilol (COREG) 12.5 MG tablet TAKE 1 TABLET BY MOUTH TWICE DAILY WITH MEALS (Patient taking differently: Take 1 tablet by mouth 2 (Two) Times a Day With Meals.) 180 tablet 3 3/9/2023   • diclofenac (VOLTAREN) 1 % gel gel Apply 4 g topically to the appropriate area as directed  4 (Four) Times a Day As Needed (pain). 100 g 0    • hydroCHLOROthiazide (HYDRODIURIL) 25 MG tablet Take 1 tablet by mouth Daily. 90 tablet 3 3/8/2023   • levothyroxine (SYNTHROID, LEVOTHROID) 100 MCG tablet TAKE 1 TABLET BY MOUTH DAILY 90 tablet 0 3/8/2023   • mirtazapine (REMERON) 30 MG tablet TAKE 1 TABLET BY MOUTH EVERY NIGHT AT BEDTIME (Patient taking differently: Take 1 tablet by mouth Every Night. TAKE 1 TABLET BY MOUTH EVERY NIGHT AT BEDTIME) 90 tablet 3 3/8/2023   • pantoprazole (PROTONIX) 20 MG EC tablet TAKE 1 TABLET BY MOUTH IN THE MORNING 30 MINUTES BEFORE BREAKFAST (Patient taking differently: 1 tablet Daily. TAKE 1 TABLET BY MOUTH IN THE MORNING 30 MINUTES BEFORE BREAKFAST) 90 tablet 1 3/8/2023   • Simethicone (GAS-X PO) Take  by mouth As Needed.   Past Month   • simvastatin (ZOCOR) 40 MG tablet TAKE 1 TABLET BY MOUTH EVERY NIGHT AT BEDTIME 90 tablet 3 3/8/2023   • trandolapril (MAVIK) 4 MG tablet Take 1 tablet by mouth Daily. 90 tablet 3 3/8/2023   • riFAXIMin (XIFAXAN) 550 MG tablet Take 1 tablet by mouth 3 (Three) Times a Day. 42 tablet 0        Allergies:  No Known Allergies     Vitals: Temp:  [97.4 °F (36.3 °C)] 97.4 °F (36.3 °C)  Heart Rate:  [73] 73  Resp:  [18] 18  BP: (150)/(78) 150/78    Review of Systems:   Within Normal Limits Abnormal   HEENT [x]    []     Cardiovascular [x]   []     Gastrointestinal [x]   []     Genitourinary [x]   []     Neurologic [x]   []     Pulmonary [x]   []       Physical Exam:   Within Normal Limits Abnormal   HEENT [x]    []     Heart [x]   []     Lungs [x]   []     Abdomen [x]   []     Extremities [x]   []       Impression: Left nuclear sclerotic cataract.     Plan: CATARACT PHACO EXTRACTION WITH INTRAOCULAR LENS IMPLANT LEFT (Left)     Dain Lyon MD  3/9/2023

## 2023-03-09 NOTE — ANESTHESIA PREPROCEDURE EVALUATION
Anesthesia Evaluation     Patient summary reviewed and Nursing notes reviewed   history of anesthetic complications: PONV  NPO Solid Status: > 8 hours  NPO Liquid Status: > 8 hours           Airway   Mallampati: II  TM distance: >3 FB  Neck ROM: full  Possible difficult intubation  Dental      Pulmonary    (+) a smoker Former, COPD, shortness of breath, sleep apnea ( ? whitney, obese, snores), decreased breath sounds,   Cardiovascular   Exercise tolerance: good (4-7 METS)    ECG reviewed  PT is on anticoagulation therapy  Patient on routine beta blocker    (+) hypertension, dysrhythmias, SHERIDAN, hyperlipidemia,     ROS comment: 8/4/22 ekg - sinus rhythm    Neuro/Psych  (+) psychiatric history Anxiety and Depression,    GI/Hepatic/Renal/Endo    (+) obesity, morbid obesity, GERD,  liver disease fatty liver disease, thyroid problem hypothyroidism    Musculoskeletal     Abdominal   (+) obese,    Substance History   (+) alcohol use,      OB/GYN          Other   arthritis,    history of cancer (skin)                      Anesthesia Plan    ASA 3     MAC     (Risks and benefits discussed including risk of aspiration, recall and dental damage. All patient questions answered.    Will continue with plan of care.)  intravenous induction     Anesthetic plan, risks, benefits, and alternatives have been provided, discussed and informed consent has been obtained with: patient.  Pre-procedure education provided  Plan discussed with CRNA.

## 2023-03-15 ENCOUNTER — PREP FOR SURGERY (OUTPATIENT)
Dept: OTHER | Facility: HOSPITAL | Age: 78
End: 2023-03-15
Payer: MEDICARE

## 2023-03-15 DIAGNOSIS — H25.11 NUCLEAR SCLEROTIC CATARACT OF RIGHT EYE: Primary | ICD-10-CM

## 2023-03-15 RX ORDER — CYCLOPENT/TROPIC/PHEN/KETR/WAT 1%-1%-2.5%
1 DROPS (EA) OPHTHALMIC (EYE)
Status: CANCELLED | OUTPATIENT
Start: 2023-03-15 | End: 2023-03-15

## 2023-03-15 RX ORDER — SODIUM CHLORIDE 9 MG/ML
40 INJECTION, SOLUTION INTRAVENOUS AS NEEDED
Status: CANCELLED | OUTPATIENT
Start: 2023-03-15

## 2023-03-15 RX ORDER — TETRACAINE HYDROCHLORIDE 5 MG/ML
1 SOLUTION OPHTHALMIC SEE ADMIN INSTRUCTIONS
Status: CANCELLED | OUTPATIENT
Start: 2023-03-15

## 2023-03-15 RX ORDER — SODIUM CHLORIDE 0.9 % (FLUSH) 0.9 %
1-10 SYRINGE (ML) INJECTION AS NEEDED
Status: CANCELLED | OUTPATIENT
Start: 2023-03-15

## 2023-03-15 RX ORDER — PREDNISOLONE ACETATE 10 MG/ML
1 SUSPENSION/ DROPS OPHTHALMIC SEE ADMIN INSTRUCTIONS
Status: CANCELLED | OUTPATIENT
Start: 2023-03-15

## 2023-03-15 RX ORDER — SODIUM CHLORIDE 0.9 % (FLUSH) 0.9 %
10 SYRINGE (ML) INJECTION EVERY 12 HOURS SCHEDULED
Status: CANCELLED | OUTPATIENT
Start: 2023-03-15

## 2023-03-17 DIAGNOSIS — E03.8 OTHER SPECIFIED HYPOTHYROIDISM: ICD-10-CM

## 2023-03-17 RX ORDER — LEVOTHYROXINE SODIUM 0.1 MG/1
100 TABLET ORAL DAILY
Qty: 90 TABLET | Refills: 0 | Status: SHIPPED | OUTPATIENT
Start: 2023-03-17

## 2023-03-22 NOTE — PRE-PROCEDURE INSTRUCTIONS
PAT phone history completed with pt for upcoming procedure on 3/23/23 with Dr. Lyon.     PAT PASS GIVEN/REVIEWED WITH PT.  VERBALIZED UNDERSTANDING OF THE FOLLOWING:  DO NOT EAT, DRINK, SMOKE, USE SMOKELESS TOBACCO OR CHEW GUM AFTER MIDNIGHT THE NIGHT BEFORE SURGERY.  THIS ALSO INCLUDES HARD CANDIES AND MINTS.    DO NOT SHAVE THE AREA TO BE OPERATED ON AT LEAST 48 HOURS PRIOR TO THE PROCEDURE.  DO NOT WEAR MAKE UP OR NAIL POLISH.  DO NOT LEAVE IN ANY PIERCING OR WEAR JEWELRY THE DAY OF SURGERY.      DO NOT USE ADHESIVES IF YOU WEAR DENTURES.    DO NOT WEAR EYE CONTACTS; BRING IN YOUR GLASSES.    ONLY TAKE MEDICATION THE MORNING OF YOUR PROCEDURE IF INSTRUCTED BY YOUR SURGEON WITH ENOUGH WATER TO SWALLOW THE MEDICATION.  IF YOUR SURGEON DID NOT SPECIFY WHICH MEDICATIONS TO TAKE, YOU WILL NEED TO CALL THEIR OFFICE FOR FURTHER INSTRUCTIONS AND DO AS THEY INSTRUCT.    LEAVE ANYTHING YOU CONSIDER VALUABLE AT HOME.    YOU WILL NEED TO ARRANGE FOR SOMEONE TO DRIVE YOU HOME AFTER SURGERY.  IT IS RECOMMENDED THAT YOU DO NOT DRIVE, WORK, DRINK ALCOHOL OR MAKE MAJOR DECISIONS FOR AT LEAST 24 HOURS AFTER YOUR PROCEDURE IS COMPLETE.      THE DAY OF YOUR PROCEDURE, BRING IN THE FOLLOWING IF APPLICABLE:   PICTURE ID AND INSURANCE/MEDICARE OR MEDICAID CARDS/ANY CO-PAY THAT MAY BE DUE   COPY OF ADVANCED DIRECTIVE/LIVING WILL/POWER OR    CPAP/BIPAP/INHALERS   SKIN PREP SHEET   YOUR PREADMISSION TESTING PASS (IF NOT A PHONE HISTORY)

## 2023-03-23 ENCOUNTER — ANESTHESIA EVENT (OUTPATIENT)
Dept: PERIOP | Facility: HOSPITAL | Age: 78
End: 2023-03-23
Payer: MEDICARE

## 2023-03-23 ENCOUNTER — ANESTHESIA (OUTPATIENT)
Dept: PERIOP | Facility: HOSPITAL | Age: 78
End: 2023-03-23
Payer: MEDICARE

## 2023-03-23 ENCOUNTER — HOSPITAL ENCOUNTER (OUTPATIENT)
Facility: HOSPITAL | Age: 78
Setting detail: HOSPITAL OUTPATIENT SURGERY
Discharge: HOME | End: 2023-03-23
Attending: OPHTHALMOLOGY | Admitting: OPHTHALMOLOGY
Payer: MEDICARE

## 2023-03-23 VITALS
HEART RATE: 68 BPM | TEMPERATURE: 97.2 F | SYSTOLIC BLOOD PRESSURE: 139 MMHG | DIASTOLIC BLOOD PRESSURE: 80 MMHG | RESPIRATION RATE: 16 BRPM | OXYGEN SATURATION: 98 %

## 2023-03-23 DIAGNOSIS — H25.11 NUCLEAR SCLEROTIC CATARACT OF RIGHT EYE: ICD-10-CM

## 2023-03-23 PROCEDURE — V2632 POST CHMBR INTRAOCULAR LENS: HCPCS | Performed by: OPHTHALMOLOGY

## 2023-03-23 PROCEDURE — 25010000002 PROPOFOL 200 MG/20ML EMULSION: Performed by: NURSE ANESTHETIST, CERTIFIED REGISTERED

## 2023-03-23 DEVICE — LENS MONOFOCAL IQ CC60WF170: Type: IMPLANTABLE DEVICE | Site: POSTERIOR CHAMBER | Status: FUNCTIONAL

## 2023-03-23 RX ORDER — PROPOFOL 10 MG/ML
INJECTION, EMULSION INTRAVENOUS AS NEEDED
Status: DISCONTINUED | OUTPATIENT
Start: 2023-03-23 | End: 2023-03-23 | Stop reason: SURG

## 2023-03-23 RX ORDER — SODIUM CHLORIDE 0.9 % (FLUSH) 0.9 %
10 SYRINGE (ML) INJECTION EVERY 12 HOURS SCHEDULED
Status: DISCONTINUED | OUTPATIENT
Start: 2023-03-23 | End: 2023-03-23 | Stop reason: HOSPADM

## 2023-03-23 RX ORDER — SODIUM CHLORIDE, SODIUM LACTATE, POTASSIUM CHLORIDE, CALCIUM CHLORIDE 600; 310; 30; 20 MG/100ML; MG/100ML; MG/100ML; MG/100ML
1000 INJECTION, SOLUTION INTRAVENOUS CONTINUOUS
Status: DISCONTINUED | OUTPATIENT
Start: 2023-03-23 | End: 2023-03-23 | Stop reason: HOSPADM

## 2023-03-23 RX ORDER — ACETAZOLAMIDE 500 MG/1
500 CAPSULE, EXTENDED RELEASE ORAL ONCE
Status: COMPLETED | OUTPATIENT
Start: 2023-03-23 | End: 2023-03-23

## 2023-03-23 RX ORDER — NEOMYCIN SULFATE, POLYMYXIN B SULFATE, AND DEXAMETHASONE 3.5; 10000; 1 MG/G; [USP'U]/G; MG/G
OINTMENT OPHTHALMIC AS NEEDED
Status: DISCONTINUED | OUTPATIENT
Start: 2023-03-23 | End: 2023-03-23 | Stop reason: HOSPADM

## 2023-03-23 RX ORDER — BALANCED SALT SOLUTION 6.4; .75; .48; .3; 3.9; 1.7 MG/ML; MG/ML; MG/ML; MG/ML; MG/ML; MG/ML
SOLUTION OPHTHALMIC AS NEEDED
Status: DISCONTINUED | OUTPATIENT
Start: 2023-03-23 | End: 2023-03-23 | Stop reason: HOSPADM

## 2023-03-23 RX ORDER — SODIUM CHLORIDE 9 MG/ML
40 INJECTION, SOLUTION INTRAVENOUS AS NEEDED
Status: DISCONTINUED | OUTPATIENT
Start: 2023-03-23 | End: 2023-03-23 | Stop reason: HOSPADM

## 2023-03-23 RX ORDER — TETRACAINE HYDROCHLORIDE 5 MG/ML
1 SOLUTION OPHTHALMIC SEE ADMIN INSTRUCTIONS
Status: COMPLETED | OUTPATIENT
Start: 2023-03-23 | End: 2023-03-23

## 2023-03-23 RX ORDER — CYCLOPENT/TROPIC/PHEN/KETR/WAT 1%-1%-2.5%
1 DROPS (EA) OPHTHALMIC (EYE)
Status: COMPLETED | OUTPATIENT
Start: 2023-03-23 | End: 2023-03-23

## 2023-03-23 RX ORDER — PREDNISOLONE ACETATE 10 MG/ML
SUSPENSION/ DROPS OPHTHALMIC AS NEEDED
Status: DISCONTINUED | OUTPATIENT
Start: 2023-03-23 | End: 2023-03-23 | Stop reason: HOSPADM

## 2023-03-23 RX ORDER — KETAMINE HCL IN NACL, ISO-OSM 100MG/10ML
SYRINGE (ML) INJECTION AS NEEDED
Status: DISCONTINUED | OUTPATIENT
Start: 2023-03-23 | End: 2023-03-23 | Stop reason: SURG

## 2023-03-23 RX ORDER — PREDNISOLONE ACETATE 10 MG/ML
1 SUSPENSION/ DROPS OPHTHALMIC SEE ADMIN INSTRUCTIONS
Status: DISCONTINUED | OUTPATIENT
Start: 2023-03-23 | End: 2023-03-23 | Stop reason: HOSPADM

## 2023-03-23 RX ORDER — DIFLUPREDNATE OPHTHALMIC 0.5 MG/ML
1 EMULSION OPHTHALMIC 4 TIMES DAILY
Start: 2023-03-23

## 2023-03-23 RX ORDER — SODIUM CHLORIDE 0.9 % (FLUSH) 0.9 %
1-10 SYRINGE (ML) INJECTION AS NEEDED
Status: DISCONTINUED | OUTPATIENT
Start: 2023-03-23 | End: 2023-03-23 | Stop reason: HOSPADM

## 2023-03-23 RX ORDER — TETRACAINE HYDROCHLORIDE 5 MG/ML
SOLUTION OPHTHALMIC AS NEEDED
Status: DISCONTINUED | OUTPATIENT
Start: 2023-03-23 | End: 2023-03-23 | Stop reason: HOSPADM

## 2023-03-23 RX ORDER — LIDOCAINE HYDROCHLORIDE 40 MG/ML
INJECTION, SOLUTION RETROBULBAR; TOPICAL AS NEEDED
Status: DISCONTINUED | OUTPATIENT
Start: 2023-03-23 | End: 2023-03-23 | Stop reason: HOSPADM

## 2023-03-23 RX ADMIN — PROPOFOL 50 MG: 10 INJECTION, EMULSION INTRAVENOUS at 14:30

## 2023-03-23 RX ADMIN — TETRACAINE HYDROCHLORIDE 1 DROP: 5 SOLUTION OPHTHALMIC at 13:34

## 2023-03-23 RX ADMIN — ACETAZOLAMIDE 500 MG: 500 CAPSULE, EXTENDED RELEASE ORAL at 14:53

## 2023-03-23 RX ADMIN — Medication 1 DROP: at 13:46

## 2023-03-23 RX ADMIN — Medication 1 DROP: at 13:36

## 2023-03-23 RX ADMIN — Medication 1 DROP: at 13:41

## 2023-03-23 RX ADMIN — Medication 10 MG: at 14:30

## 2023-03-23 RX ADMIN — SODIUM CHLORIDE, POTASSIUM CHLORIDE, SODIUM LACTATE AND CALCIUM CHLORIDE 1000 ML: 600; 310; 30; 20 INJECTION, SOLUTION INTRAVENOUS at 13:40

## 2023-03-23 RX ADMIN — TETRACAINE HYDROCHLORIDE 1 DROP: 5 SOLUTION OPHTHALMIC at 13:35

## 2023-03-23 NOTE — H&P
[facility]         History and Physical    Patient Name: Deya Hernandez  MRN: [id number]  : 1945  Gender: female     HPI: Patient complaint of PPLOV Right eye diagnosed with cataract. Patient requests PHACO PCIOL for Increase of VA/ADL.    History:    Past Medical History:   Diagnosis Date   • Abdominal bloating    • Abnormal EKG    • Ankle fracture    • Anxiety and depression    • Colon cancer screening    • Colon polyp 2016   • COVID    • Disease of thyroid gland    • Diverticulitis of colon    • Dyspnea    • Elbow pain, left    • Elevated cholesterol    • Fatigue    • Fatty infiltration of liver 10/12/2020   • Fatty liver [id number]   • Gastritis    • GERD (gastroesophageal reflux disease)    • H/O echocardiogram Unknown    Normal per pt.   • H/O exercise stress test 2016    Normal result per pt.   • H/O mammogram    • H/O sinusitis    • H/O: pneumonia    • Heart palpitations    • Heartburn    • Hemorrhoid    • History of Holter monitoring     Found to be normal per pt.   • Hypertension    • Plantar fasciitis    • Sebaceous cyst    • Sinusitis    • Skin cancer        Past Surgical History:   Procedure Laterality Date   • CARDIAC CATHETERIZATION      Negative per pt.   • CATARACT EXTRACTION W/ INTRAOCULAR LENS IMPLANT Left 3/9/2023    Procedure: CATARACT PHACO EXTRACTION WITH INTRAOCULAR LENS IMPLANT LEFT;  Surgeon: Dain Lyon MD;  Location: [facility] OR;  Service: Ophthalmology;  Laterality: Left;   • COLONOSCOPY  2016   • COLONOSCOPY N/A 2021    Procedure: COLONOSCOPY;  Surgeon: Kimmie Chapa MD;  Location: [facility] ENDOSCOPY;  Service: Gastroenterology;  Laterality: N/A;   • COLONOSCOPY W/ POLYPECTOMY     • ENDOSCOPY     • ENDOSCOPY N/A 2022    Procedure: ESOPHAGOGASTRODUODENOSCOPY WITH BIOPSY;  Surgeon: Kimmie Chapa MD;  Location: [facility] ENDOSCOPY;  Service: Gastroenterology;  Laterality: N/A;   • SKIN  BIOPSY     • TUBAL ABDOMINAL LIGATION     • UPPER GASTROINTESTINAL ENDOSCOPY  03/2016       Social History     Socioeconomic History   • Marital status:    Tobacco Use   • Smoking status: Former     Packs/day: 0.25     Years: 15.00     Pack years: 3.75     Types: Cigarettes   • Smokeless tobacco: Never   • Tobacco comments:     Quit in 2015   Vaping Use   • Vaping Use: Never used   Substance and Sexual Activity   • Alcohol use: Yes     Alcohol/week: 3.0 standard drinks     Types: 1 Glasses of wine, 2 Cans of beer per week     Comment: social   • Drug use: No   • Sexual activity: Defer       Family History   Problem Relation Age of Onset   • Heart attack Other    • Arthritis Other    • Cancer Other    • Diabetes Other    • Hypertension Other    • Stroke Other    • Cancer Mother    • Breast cancer Mother 70   • Heart disease Mother    • Emphysema Father    • Colon polyps Neg Hx    • Esophageal cancer Neg Hx    • Irritable bowel syndrome Neg Hx    • Liver cancer Neg Hx    • Liver disease Neg Hx    • Stomach cancer Neg Hx    • Colon cancer Neg Hx    • Ovarian cancer Neg Hx        Prior to Admission Medications:  Medications Prior to Admission   Medication Sig Dispense Refill Last Dose   • ALPRAZolam (XANAX) 0.5 MG tablet Take 1 tablet by mouth 2 (Two) Times a Day As Needed for Anxiety. 30 tablet 0 3/22/2023   • aluminum-magnesium hydroxide-simethicone (Mylanta) 200-200-20 MG/5ML suspension Take  by mouth Every 6 (Six) Hours As Needed for Indigestion or Heartburn.   Past Week   • amLODIPine (NORVASC) 5 MG tablet Take 1 tablet by mouth Daily. 90 tablet 3 3/23/2023 at 0830   • Aspirin Low Dose 81 MG EC tablet TAKE ONE TABLET BY MOUTH DAILY (Patient taking differently: 1 tablet Daily.) 90 tablet 3 3/23/2023 at 0830   • buPROPion XL (WELLBUTRIN XL) 300 MG 24 hr tablet TAKE 1 TABLET BY MOUTH DAILY 90 tablet 3 3/22/2023   • Calcium Carbonate Antacid (TUMS PO) Take  by mouth As Needed.   Past Week   • carvedilol (COREG)  12.5 MG tablet TAKE 1 TABLET BY MOUTH TWICE DAILY WITH MEALS (Patient taking differently: Take 1 tablet by mouth 2 (Two) Times a Day With Meals.) 180 tablet 3 3/23/2023 at 0830   • diclofenac (VOLTAREN) 1 % gel gel Apply 4 g topically to the appropriate area as directed 4 (Four) Times a Day As Needed (pain). 100 g 0 Past Month   • difluprednate (DUREZOL) 0.05 % ophthalmic emulsion Administer 1 drop into the left eye 4 (Four) Times a Day. See admin instructions on AVS.   3/22/2023   • hydroCHLOROthiazide (HYDRODIURIL) 25 MG tablet Take 1 tablet by mouth Daily. 90 tablet 3 3/23/2023 at 0830   • levothyroxine (SYNTHROID, LEVOTHROID) 100 MCG tablet TAKE 1 TABLET BY MOUTH DAILY 90 tablet 0 3/22/2023   • mirtazapine (REMERON) 30 MG tablet TAKE 1 TABLET BY MOUTH EVERY NIGHT AT BEDTIME (Patient taking differently: Take 1 tablet by mouth Every Night. TAKE 1 TABLET BY MOUTH EVERY NIGHT AT BEDTIME) 90 tablet 3 3/22/2023   • pantoprazole (PROTONIX) 20 MG EC tablet TAKE 1 TABLET BY MOUTH IN THE MORNING 30 MINUTES BEFORE BREAKFAST (Patient taking differently: 1 tablet Daily. TAKE 1 TABLET BY MOUTH IN THE MORNING 30 MINUTES BEFORE BREAKFAST) 90 tablet 1 3/22/2023   • Simethicone (GAS-X PO) Take  by mouth As Needed.   Past Week   • simvastatin (ZOCOR) 40 MG tablet TAKE 1 TABLET BY MOUTH EVERY NIGHT AT BEDTIME 90 tablet 3 3/22/2023   • trandolapril (MAVIK) 4 MG tablet Take 1 tablet by mouth Daily. 90 tablet 3 3/22/2023       Allergies:  No Known Allergies     Vitals: Temp:  [98.2 °F (36.8 °C)] 98.2 °F (36.8 °C)  Heart Rate:  [72] 72  Resp:  [14] 14  BP: (139)/(77) 139/77    Review of Systems:   Within Normal Limits Abnormal   HEENT [x]    []     Cardiovascular [x]   []     Gastrointestinal [x]   []     Genitourinary [x]   []     Neurologic [x]   []     Pulmonary [x]   []       Physical Exam:   Within Normal Limits Abnormal   HEENT [x]    []     Heart [x]   []     Lungs [x]   []     Abdomen [x]   []     Extremities [x]   []        Impression: Right nuclear sclerotic cataract.     Plan: CATARACT PHACO EXTRACTION WITH INTRAOCULAR LENS IMPLANT RIGHT (Right)     Dain Lyon MD  3/23/2023

## 2023-03-23 NOTE — OP NOTE
OPERATIVE NOTE    Date of Procedure: 3/23/2023  Patient Name: Deya Hernandez  Patient MRN: [id number]  YOB: 1945     Preoperative Diagnosis: Right nuclear sclerotic cataract.     Postoperative Diagnosis: Right nuclear sclerotic cataract.     Procedure Performed: Phacoemulsification with implantation of a  foldable posterior chamber intraocular lens, Right eye.     Surgeon: Dain Lyon MD     Anesthesia:  Monitored Anesthesia Care (MAC)      Brief History and Indication: The patient presents with a history of past progressive loss of vision.  Patient was diagnosed with cataract and requests removal for increased ability to read and see.     Operation Description: The patient was taken to the OR and prepped and draped in the usual sterile ophthalmic fashion. A lid speculum was placed in the eye.  A #75 blade was then used to make a stab incision two o’clock hours from the intended temporal clear cornea groove. The anterior chamber was then inflated with a Viscoelastic. A metal microkeratome blade was then used to enter the anterior chamber at the temporal clear cornea site. A three level tunnel incision was made. A curvilinear capsulorrhexis was then performed with a bent cystotome needle and capsulorrhexis forceps.  BSS on a 30 gauge bent cannula was used to hydro-dissect, and hydro-delineate the lens. Good fluid waves and hydro-delineation were noted. Phacoemulsification was then used to remove nuclear material without complications. The residual cortical and lenticular material was then removed with irrigation and aspiration. Viscoelastics were then used to inflate the bag in a soft shell technique. A PCIOL was injected into the bag. Post-implantation, there were no rents or tears in the bag and the lens was noted to be stable and centered. The residual Viscoelastic was then removed with irrigation and aspiration.  The wound was checked and found to be without leaks. Therefore a Polydex  ointment and one drop of Durezol eye drop was placed in the eye.     Implant Information:   Implant Name Type Inv. Item Serial No.  Lot No. LRB No. Used Action   LENS MONOFOCAL IQ [id number] - [id number] - [id number] Implant LENS MONOFOCAL IQ [id number] [id number]  Right 1 Implanted       Complications: None    Estimated Blood Loss:  Less than 1 cc.       []   Changed to complex procedure due to: []  hypermature, white cataract. Blue dye was used. []   small iris. A Malyugin Ring was used.    Discharge and Condition  The patient was transported to same day surgery in excellent condition and scheduled for follow-up tomorrow morning. The patient was given instructions on use of eye drops for the operative eye and was specifically instructed to call Dr. Lyon at his office or home for any nausea, vomiting, headache, decreased visual acuity, or pain, or if the patient had any general concerns.    Dain Lyon MD  3/23/2023

## 2023-03-23 NOTE — ANESTHESIA POSTPROCEDURE EVALUATION
Patient: Deya Hernandez    Procedure Summary     Date: 03/23/23 Room / Location: [facility] OR 1 /  [facility]    Anesthesia Start: 1429 Anesthesia Stop: 1443    Procedure: CATARACT PHACO EXTRACTION WITH INTRAOCULAR LENS IMPLANT RIGHT (Right: Eye) Diagnosis:       Nuclear sclerotic cataract of right eye      (Nuclear sclerotic cataract of right eye [H25.11])    Surgeons: Dain Lyon MD Provider: Jorge Cuevas CRNA    Anesthesia Type: MAC ASA Status: 3          Anesthesia Type: MAC    Vitals  No vitals data found for the desired time range.          Post Anesthesia Care and Evaluation    Patient location during evaluation: bedside  Patient participation: complete - patient participated  Level of consciousness: awake  Pain score: 0  Pain management: adequate    Airway patency: patent  Anesthetic complications: No anesthetic complications  PONV Status: controlled  Cardiovascular status: acceptable and stable  Respiratory status: acceptable and room air  Hydration status: acceptable    Comments: Vital signs as noted in nursing documentation as per protocol

## 2023-03-23 NOTE — DISCHARGE INSTRUCTIONS
Post Operative Cataract Instructions     Right Eye  POST OPERATIVE INSTRUCTIONS  You have received anesthesia today. DO NOT drive, drink alcohol, sign legal documents.   After surgery, your eye will not hurt. It may feel scratchy, sticky or uncomfortable. Your eye will be sensitive to light.  Most people see better 1-3 days after the procedure, but it could take 3 weeks to get the full benefits and reach your visual potential. If your vision is blurry for a few days it is normal, and means you may have swelling outside or inside the eye. For some patients, a bubble is placed and there will be blurriness.   You should receive a post-op kit with tape and an eye shield. Wear the shield for the first 3 nights after surgery to keep you from rubbing the eye.  You may wear glasses or sunglasses during the day.  You must keep eye protected at all times.  Most people are able to return to their normal routine 1-3 days after surgery, however, due to the brain adjusting to your new vision you may have trouble judging distances and want to be careful when driving and going up and downstairs.   You can shower and wash your hair the day after surgery. Keep water, shampoo, hair spray and shaving lotion out of the eye, especially for the first week.   If eyes become stuck together after surgery you may soak with warm cloth.  During the first week, you should AVOID:   Rubbing or putting pressure on your eye.  Eye make-up, face cream or lotion, hair coloring or perms  Strenuous activities, such as running or lifting weights, as to avoid sweat from getting in the eye. Avoid swimming, hot tubs, fumes or xiao conditions.   Sleeping on operative side.  Excessive sneezing or coughing.  Hanging the head down for periods of time. Keep your head above your heart.    Some discomfort and blurred vision after surgery are normal, but if you have any unusual pain, swelling, bleeding or sudden decrease in vision, contact our office immediately.      Emergency assistance is available at any time by calling:    Dr.Mark Camron Lyon  [phone]    If unable to reach call [facility] @ [phone]    You have been prescribed an eye drop to use after surgery, please follow these instructions and bring eye drops and instructions with you to post op appointment:    Difluprednate (DUREZOL) 0.05 %. Shake well before use.    USE 1 DROP 4 (FOUR) TIMES A DAY FOR 1 WEEK THEN STARTING WEEK 2, ONLY USE 2 (TWO) TIMES A DAY UNTIL YOU RUN OUT OF DROPS.     PLACE A ALANNA IN THE DAY COLUMN EACH TIME YOU USE TO KEEP ON SCHEDULE    WEEK 1-USE 4  (FOUR) TIMES A DAY DAY 1  []   []    []   []     DAY 2  []   []    []   []  DAY 3  []   []    []   []  DAY 4  []   []    []   []  DAY 5  []   []    []   []  DAY 6  []   []    []   []  DAY 7  []   []    []   []      WEEK 2-USE 2 (TWO)  TIMES A DAY DAY 1  []   []  DAY 2  []   []  DAY 3  []   []  DAY 4  []   []  DAY 5  []   []  DAY 6  []   []  DAY 7  []   []      WEEK 3-USE 2 (TWO) TIMES A DAY DAY 1  []   []  DAY 2  []   []  DAY 3  []   []  DAY 4  []   []  DAY 5  []   []  DAY 6  []   []  DAY 7  []   []      WEEK 4-USE 2 (TWO)TIMES A DAY DAY 1  []   []  DAY 2  []   []  DAY 3  []   []  DAY 4  []   []  DAY 5  []   []  DAY 6  []   []  DAY 7  []   []       Please follow all post op instructions and follow up appointment time from your physician's office included in your discharge packet.  .  No pushing, pulling, tugging,  heavy lifting, or strenuous activity.  No major decision making, driving, or drinking alcoholic beverages for 24 hours. ( due to the medications you have  received)  Always use good hand hygiene/washing techniques.  NO driving while taking pain medications.    * if you have an incision:  Check your incision area every day for signs of infection.   Check for:  * more redness, swelling, or pain  *more fluid or blood  *warmth  *pus or bad smell To  assist you in voiding:  Drink plenty of fluids  Listen to running water while attempting to void.    If you are unable to urinate and you have an uncomfortable urge to void or it has been   6 hours since you were discharged, return to the Emergency Room

## 2023-06-15 DIAGNOSIS — E03.8 OTHER SPECIFIED HYPOTHYROIDISM: ICD-10-CM

## 2023-06-15 RX ORDER — LEVOTHYROXINE SODIUM 0.1 MG/1
100 TABLET ORAL DAILY
Qty: 60 TABLET | Refills: 0 | Status: SHIPPED | OUTPATIENT
Start: 2023-06-15 | End: 2023-06-16 | Stop reason: SDUPTHER

## 2023-06-15 NOTE — TELEPHONE ENCOUNTER
Rx Refill Note  Requested Prescriptions     Pending Prescriptions Disp Refills    levothyroxine (SYNTHROID, LEVOTHROID) 100 MCG tablet [Pharmacy Med Name: LEVOTHYROXINE 0.100MG (100MCG) TAB] 90 tablet 0     Sig: TAKE 1 TABLET BY MOUTH DAILY      Last office visit with prescribing clinician: 8/24/2022  Next office visit with prescribing clinician: Visit date not found    Called patient to schedule f/u but no answer left     TSH 8/22/23    Leny Rodrigues MA  06/15/23, 10:05 EDT

## 2023-06-15 NOTE — TELEPHONE ENCOUNTER
Caller: Deya Hernandez Fle    Relationship: Self    Best call back number: [phone]     What medication are you requesting: FOR UTI    What are your current symptoms: URGE TO PEE, NOT PRODUCING, PAINFUL URINATION    How long have you been experiencing symptoms: 3 DAYS     Have you had these symptoms before:    [] Yes  [] No    Have you been treated for these symptoms before:   [] Yes  [] No    If a prescription is needed, what is your preferred pharmacy and phone number: [id number] - [place] - [address] AT [facility] [address] - [phone]  - [phone]      Additional notes:  CALLING AN RX

## 2023-06-16 RX ORDER — LEVOTHYROXINE SODIUM 0.1 MG/1
100 TABLET ORAL DAILY
Qty: 90 TABLET | OUTPATIENT
Start: 2023-06-16

## 2023-06-16 RX ORDER — LEVOTHYROXINE SODIUM 0.1 MG/1
100 TABLET ORAL DAILY
Qty: 90 TABLET | Refills: 1 | Status: SHIPPED | OUTPATIENT
Start: 2023-06-16

## 2023-06-16 NOTE — TELEPHONE ENCOUNTER
Rx Refill Note  Requested Prescriptions     Refused Prescriptions Disp Refills    levothyroxine (SYNTHROID, LEVOTHROID) 100 MCG tablet [Pharmacy Med Name: LEVOTHYROXINE 0.100MG (100MCG) TAB] 90 tablet      Sig: TAKE 1 TABLET BY MOUTH DAILY     Refused By: LUIS CARRASQUILLO     Reason for Refusal: Request already responded to by other means (e.g. phone or fax)      Last office visit with prescribing clinician: 8/24/2022   Last telemedicine visit with prescribing clinician: Visit date not found   Next office visit with prescribing clinician: Visit date not found                         Would you like a call back once the refill request has been completed: [] Yes [] No    If the office needs to give you a call back, can they leave a voicemail: [] Yes [] No    Luis Carrasquillo MA  06/16/23, 10:01 EDT

## 2023-07-25 ENCOUNTER — OFFICE VISIT (OUTPATIENT)
Dept: INTERNAL MEDICINE | Facility: CLINIC | Age: 78
End: 2023-07-25
Payer: MEDICARE

## 2023-07-25 VITALS
HEART RATE: 70 BPM | DIASTOLIC BLOOD PRESSURE: 76 MMHG | HEIGHT: 65 IN | BODY MASS INDEX: 35.82 KG/M2 | WEIGHT: 215 LBS | OXYGEN SATURATION: 99 % | SYSTOLIC BLOOD PRESSURE: 126 MMHG | TEMPERATURE: 97.1 F

## 2023-07-25 DIAGNOSIS — E03.9 ACQUIRED HYPOTHYROIDISM: ICD-10-CM

## 2023-07-25 DIAGNOSIS — E87.1 HYPONATREMIA: ICD-10-CM

## 2023-07-25 DIAGNOSIS — K76.0 FATTY INFILTRATION OF LIVER: ICD-10-CM

## 2023-07-25 DIAGNOSIS — E78.2 MIXED HYPERLIPIDEMIA: ICD-10-CM

## 2023-07-25 DIAGNOSIS — I10 PRIMARY HYPERTENSION: Primary | ICD-10-CM

## 2023-07-25 DIAGNOSIS — F41.8 DEPRESSION WITH ANXIETY: ICD-10-CM

## 2023-07-25 DIAGNOSIS — E55.9 VITAMIN D DEFICIENCY, UNSPECIFIED: ICD-10-CM

## 2023-07-25 PROBLEM — Z00.00 WELLNESS EXAMINATION: Status: RESOLVED | Noted: 2022-07-26 | Resolved: 2023-07-25

## 2023-07-25 PROBLEM — R06.09 DOE (DYSPNEA ON EXERTION): Status: RESOLVED | Noted: 2021-01-18 | Resolved: 2023-07-25

## 2023-07-25 PROBLEM — R14.0 BLOATING: Status: RESOLVED | Noted: 2022-11-08 | Resolved: 2023-07-25

## 2023-07-25 PROBLEM — Z79.899 CONTROLLED SUBSTANCE AGREEMENT SIGNED: Status: RESOLVED | Noted: 2022-07-26 | Resolved: 2023-07-25

## 2023-07-25 PROBLEM — R07.89 OTHER CHEST PAIN: Status: RESOLVED | Noted: 2022-08-24 | Resolved: 2023-07-25

## 2023-07-25 PROBLEM — R10.9 ABDOMINAL PRESSURE: Status: RESOLVED | Noted: 2022-11-09 | Resolved: 2023-07-25

## 2023-07-25 PROBLEM — E66.811 CLASS 1 OBESITY DUE TO EXCESS CALORIES WITHOUT SERIOUS COMORBIDITY WITH BODY MASS INDEX (BMI) OF 33.0 TO 33.9 IN ADULT: Status: RESOLVED | Noted: 2020-10-12 | Resolved: 2023-07-25

## 2023-07-25 PROBLEM — E66.09 CLASS 1 OBESITY DUE TO EXCESS CALORIES WITHOUT SERIOUS COMORBIDITY WITH BODY MASS INDEX (BMI) OF 33.0 TO 33.9 IN ADULT: Status: RESOLVED | Noted: 2020-10-12 | Resolved: 2023-07-25

## 2023-07-25 PROCEDURE — 3074F SYST BP LT 130 MM HG: CPT | Performed by: INTERNAL MEDICINE

## 2023-07-25 PROCEDURE — 99214 OFFICE O/P EST MOD 30 MIN: CPT | Performed by: INTERNAL MEDICINE

## 2023-07-25 PROCEDURE — 3078F DIAST BP <80 MM HG: CPT | Performed by: INTERNAL MEDICINE

## 2023-07-25 RX ORDER — SEMAGLUTIDE 0.5 MG/.5ML
0.5 INJECTION, SOLUTION SUBCUTANEOUS
Qty: 2 ML | Refills: 2 | Status: SHIPPED | OUTPATIENT
Start: 2023-07-25

## 2023-07-25 NOTE — PROGRESS NOTES
Subjective   Deya Hernandez is a 78 y.o. female.     Chief Complaint   Patient presents with    Hypertension    Hyperlipidemia    Hypothyroidism       Hypertension    Hyperlipidemia  Exacerbating diseases include hypothyroidism.   Hypothyroidism  Patient here for follow-up.  Hyperlipidemia stable on medication.  Chest pain short of breath no more.  Hypertension stable on medication hypothyroidism stable on medication history of hyponatremia needs to be followed up.  Depression anxiety stable without medication patient self discontinued.  History of a low vitamin D.  Patient also gaining weight BMI 35.      Current Outpatient Medications:     ALPRAZolam (XANAX) 0.5 MG tablet, Take 1 tablet by mouth 2 (Two) Times a Day As Needed for Anxiety., Disp: 30 tablet, Rfl: 0    aluminum-magnesium hydroxide-simethicone (Mylanta) 200-200-20 MG/5ML suspension, Take  by mouth Every 6 (Six) Hours As Needed for Indigestion or Heartburn., Disp: , Rfl:     amLODIPine (NORVASC) 5 MG tablet, Take 1 tablet by mouth Daily., Disp: 90 tablet, Rfl: 3    Aspirin Low Dose 81 MG EC tablet, TAKE ONE TABLET BY MOUTH DAILY (Patient taking differently: 1 tablet Daily.), Disp: 90 tablet, Rfl: 3    buPROPion XL (WELLBUTRIN XL) 300 MG 24 hr tablet, TAKE 1 TABLET BY MOUTH DAILY, Disp: 90 tablet, Rfl: 3    Calcium Carbonate Antacid (TUMS PO), Take  by mouth As Needed., Disp: , Rfl:     carvedilol (COREG) 12.5 MG tablet, TAKE 1 TABLET BY MOUTH TWICE DAILY WITH MEALS (Patient taking differently: Take 1 tablet by mouth 2 (Two) Times a Day With Meals.), Disp: 180 tablet, Rfl: 3    diclofenac (VOLTAREN) 1 % gel gel, Apply 4 g topically to the appropriate area as directed 4 (Four) Times a Day As Needed (pain)., Disp: 100 g, Rfl: 0    hydroCHLOROthiazide (HYDRODIURIL) 25 MG tablet, Take 1 tablet by mouth Daily., Disp: 90 tablet, Rfl: 3    levothyroxine (SYNTHROID, LEVOTHROID) 100 MCG tablet, Take 1 tablet by mouth Daily., Disp: 90 tablet, Rfl: 1     Simethicone (GAS-X PO), Take  by mouth As Needed., Disp: , Rfl:     simvastatin (ZOCOR) 40 MG tablet, TAKE 1 TABLET BY MOUTH EVERY NIGHT AT BEDTIME, Disp: 90 tablet, Rfl: 3    trandolapril (MAVIK) 4 MG tablet, Take 1 tablet by mouth Daily., Disp: 90 tablet, Rfl: 3    Semaglutide-Weight Management (Wegovy) 0.5 MG/0.5ML solution auto-injector, Inject 0.5 mL under the skin into the appropriate area as directed Every 7 (Seven) Days., Disp: 2 mL, Rfl: 2    The following portions of the patient's history were reviewed and updated as appropriate: allergies, current medications, past family history, past medical history, past social history, past surgical history, and problem list.    Review of Systems   Constitutional: Negative.    Respiratory: Negative.     Cardiovascular: Negative.    Gastrointestinal: Negative.    Musculoskeletal: Negative.    Skin: Negative.    Neurological: Negative.    Psychiatric/Behavioral: Negative.       Objective   Physical Exam  Cardiovascular:      Rate and Rhythm: Normal rate and regular rhythm.      Heart sounds: Normal heart sounds.   Pulmonary:      Effort: Pulmonary effort is normal.      Breath sounds: Normal breath sounds.   Abdominal:      General: Bowel sounds are normal.   Musculoskeletal:      Cervical back: Neck supple.   Skin:     General: Skin is warm.   Neurological:      Mental Status: She is alert and oriented to person, place, and time.   Psychiatric:         Behavior: Behavior normal.       All tests have been reviewed.    Assessment & Plan   Diagnoses and all orders for this visit:    Primary hypertension stable on medication hydrochlorothiazide 25 mg daily continue check lab  -     CBC & Differential    Mixed hyperlipidemia continue medication good diet check lab  -     Comprehensive Metabolic Panel  -     Lipid Panel  -     CK    Acquired hypothyroidism continue medication  -     TSH    Fatty infiltration of liver check lab    Hyponatremia check lab    Depression with  anxiety able without medication continue to watch    Vitamin D deficiency, unspecified   -     Vitamin D,25-Hydroxy    Other orders  -     Semaglutide-Weight Management (Wegovy) 0.5 MG/0.5ML solution auto-injector; Inject 0.5 mL under the skin into the appropriate area as directed Every 7 (Seven) Days.    3 mo wellness          Answers submitted by the patient for this visit:  Other (Submitted on 7/21/2023)  Please describe your symptoms.: I think it’s a follow up, , No symptoms  Have you had these symptoms before?: No  How long have you been having these symptoms?: Greater than 2 weeks  Please list any medications you are currently taking for this condition.: I don’t have symptoms to discuss.  Therefore I can’t check “length of time having symptoms”.  I only checked  one in order to continue  Please describe any probable cause for these symptoms. : N/A  Primary Reason for Visit (Submitted on 7/21/2023)  What is the primary reason for your visit?: Other

## 2023-08-02 DIAGNOSIS — R11.0 NAUSEA: ICD-10-CM

## 2023-08-02 DIAGNOSIS — R10.9 ABDOMINAL PRESSURE: Primary | ICD-10-CM

## 2023-08-02 RX ORDER — PANTOPRAZOLE SODIUM 40 MG/1
TABLET, DELAYED RELEASE ORAL
Qty: 30 TABLET | Refills: 3 | Status: SHIPPED | OUTPATIENT
Start: 2023-08-02

## 2023-08-02 RX ORDER — ONDANSETRON 4 MG/1
4 TABLET, FILM COATED ORAL EVERY 8 HOURS PRN
Qty: 30 TABLET | Refills: 1 | Status: SHIPPED | OUTPATIENT
Start: 2023-08-02

## 2023-08-14 ENCOUNTER — TELEPHONE (OUTPATIENT)
Dept: GASTROENTEROLOGY | Facility: CLINIC | Age: 78
End: 2023-08-14
Payer: MEDICARE

## 2023-08-14 NOTE — TELEPHONE ENCOUNTER
Called patient and offered appointment with Dr. Chapa on 08/15/2023.  She states she is scheduled to see her PCP this week and will call back after that appointment to schedule.    ----- Message from TERESA Morin sent at 8/14/2023 11:00 AM EDT -----  Can you call her and schedule an office visit? She has messaged and wants to come in. I have advised her to go to the ER if her symptoms are severe or worsening, and advised her she can get in to see her PCP if she needs sooner than we have for a follow up appointment.  Thanks!

## 2023-08-16 ENCOUNTER — OFFICE VISIT (OUTPATIENT)
Dept: GASTROENTEROLOGY | Facility: CLINIC | Age: 78
End: 2023-08-16
Payer: MEDICARE

## 2023-08-16 VITALS
BODY MASS INDEX: 34.45 KG/M2 | WEIGHT: 207 LBS | OXYGEN SATURATION: 98 % | HEART RATE: 77 BPM | SYSTOLIC BLOOD PRESSURE: 128 MMHG | DIASTOLIC BLOOD PRESSURE: 82 MMHG

## 2023-08-16 DIAGNOSIS — K76.0 FATTY INFILTRATION OF LIVER: Chronic | ICD-10-CM

## 2023-08-16 DIAGNOSIS — R11.0 NAUSEA: Primary | Chronic | ICD-10-CM

## 2023-08-16 DIAGNOSIS — R10.9 ABDOMINAL PRESSURE: Chronic | ICD-10-CM

## 2023-08-16 DIAGNOSIS — R14.0 BLOATING: Chronic | ICD-10-CM

## 2023-08-16 DIAGNOSIS — K59.00 CONSTIPATION, UNSPECIFIED CONSTIPATION TYPE: Chronic | ICD-10-CM

## 2023-08-16 DIAGNOSIS — Z12.11 ENCOUNTER FOR SCREENING FOR MALIGNANT NEOPLASM OF COLON: ICD-10-CM

## 2023-08-16 DIAGNOSIS — K58.1 IRRITABLE BOWEL SYNDROME WITH CONSTIPATION: ICD-10-CM

## 2023-08-16 DIAGNOSIS — R14.2 BURPING: Chronic | ICD-10-CM

## 2023-08-16 DIAGNOSIS — E66.09 CLASS 1 OBESITY DUE TO EXCESS CALORIES WITHOUT SERIOUS COMORBIDITY WITH BODY MASS INDEX (BMI) OF 34.0 TO 34.9 IN ADULT: Chronic | ICD-10-CM

## 2023-08-16 PROCEDURE — 1160F RVW MEDS BY RX/DR IN RCRD: CPT | Performed by: NURSE PRACTITIONER

## 2023-08-16 PROCEDURE — 99214 OFFICE O/P EST MOD 30 MIN: CPT | Performed by: NURSE PRACTITIONER

## 2023-08-16 PROCEDURE — 1159F MED LIST DOCD IN RCRD: CPT | Performed by: NURSE PRACTITIONER

## 2023-08-16 PROCEDURE — 3079F DIAST BP 80-89 MM HG: CPT | Performed by: NURSE PRACTITIONER

## 2023-08-16 PROCEDURE — 3074F SYST BP LT 130 MM HG: CPT | Performed by: NURSE PRACTITIONER

## 2023-08-16 NOTE — PROGRESS NOTES
"     Follow Up Note     Date: 2023   Patient Name: Deya Hernandez  MRN: [id number]  : 1945     Primary Care Provider: Rosaline Roberson DO     Chief Complaint   Patient presents with    Follow-up    Nausea     History of present illness:   2023  Deya Hernandez is a 78 y.o. female who is here today for follow up for nausea. She drank 1/2 of a yvrose on  and since that time, she has had severe nausea. It comes and goes. Some days it is constant and lasts all day, other days she has a few hours when she doesn't have nausea. She feels she needs to vomit, but can't. She started taking the Pantoprazole 2 weeks and nausea may be a little bit better, but not significantly. Denies melena, hematemesis or hematochezia. She did not have a bowel movement for about 8 days after symptoms started. This is a change in bowel habits. Bowels are getting a little better now, she is having more regular bowel movements, but is not having very large stools. She has periumbilical abdominal discomfort, but no significant pain.      Interval History:  2022  Deya Hernandez is a 77 y.o. female who is here today for follow up for bloating and gas. She has continued to have symptoms that are very bothersome and worrisome for her. She has tried eliminating dairy and watching her diet, but continues to have gas and bloating. She has upper abdominal pressure that is worse after eating. She is constantly burping. She has been taking low dose PPI, which helped in the past, but it is not helping at this time. She had a few episodes of loose stool yesterday, but denies diarrhea on a regular basis. Denies GI bleeding.      2020  For the past year or so, the patient will have periumbilical abdominal pain and bloating. The pain is described as a pressure in her abdomen and is very mild. The pressure may last a few days, then \"go away\" for a few days or even a month or so. Burping or passing gas improves " the pressure. The patient denies nausea or vomiting. There is no history of heartburn or reflux. She had taken Pantoprazole in the past for similar symptoms, but she stopped it over 3 years ago.     The patient has had a few episodes of diarrhea over the past 2 weeks or so. She is not sure if it was something she had eaten. No rectal bleeding.      Last colonoscopy was 4/7/2016 with small polyps (1 tubular adenoma without dysplasia), diverticulosis, and internal hemorrhoids.     Subjective      Past Medical History:   Diagnosis Date    Abdominal bloating     Abnormal EKG     Ankle fracture     Anxiety and depression 2004    Colon cancer screening     Colon polyp 04/2016    COVID     Disease of thyroid gland     Diverticulitis of colon     Dyspnea     Elbow pain, left     Elevated cholesterol     Fatigue     Fatty infiltration of liver 10/12/2020    Fatty liver [id number]    Gastritis     GERD (gastroesophageal reflux disease)     H/O echocardiogram Unknown    Normal per pt.    H/O exercise stress test 2016    Normal result per pt.    H/O mammogram     H/O sinusitis     H/O: pneumonia     Heart palpitations 2014    Heartburn     Hemorrhoid     History of Holter monitoring 2014    Found to be normal per pt.    Hypertension     Plantar fasciitis     Sebaceous cyst     Sinusitis     Skin cancer      Past Surgical History:   Procedure Laterality Date    CARDIAC CATHETERIZATION  2003    Negative per pt.    CATARACT EXTRACTION W/ INTRAOCULAR LENS IMPLANT Left 3/9/2023    Procedure: CATARACT PHACO EXTRACTION WITH INTRAOCULAR LENS IMPLANT LEFT;  Surgeon: Dain Lyon MD;  Location: [facility] OR;  Service: Ophthalmology;  Laterality: Left;    CATARACT EXTRACTION W/ INTRAOCULAR LENS IMPLANT Right 3/23/2023    Procedure: CATARACT PHACO EXTRACTION WITH INTRAOCULAR LENS IMPLANT RIGHT;  Surgeon: Dain Lyon MD;  Location: [facility];  Service: Ophthalmology;  Laterality: Right;    COLONOSCOPY  04/07/2016    COLONOSCOPY  N/A 2021    Procedure: COLONOSCOPY;  Surgeon: Kimmie Chapa MD;  Location: [facility] ENDOSCOPY;  Service: Gastroenterology;  Laterality: N/A;    COLONOSCOPY W/ POLYPECTOMY      ENDOSCOPY      ENDOSCOPY N/A 2022    Procedure: ESOPHAGOGASTRODUODENOSCOPY WITH BIOPSY;  Surgeon: Kimmie Chapa MD;  Location: [facility] ENDOSCOPY;  Service: Gastroenterology;  Laterality: N/A;    SKIN BIOPSY      TUBAL ABDOMINAL LIGATION      UPPER GASTROINTESTINAL ENDOSCOPY  2016     Family History   Problem Relation Age of Onset    Heart attack Other     Arthritis Other     Cancer Other     Diabetes Other     Hypertension Other     Stroke Other     Cancer Mother     Breast cancer Mother 70    Heart disease Mother     Emphysema Father     Colon polyps Neg Hx     Esophageal cancer Neg Hx     Irritable bowel syndrome Neg Hx     Liver cancer Neg Hx     Liver disease Neg Hx     Stomach cancer Neg Hx     Colon cancer Neg Hx     Ovarian cancer Neg Hx      Social History     Socioeconomic History    Marital status:    Tobacco Use    Smoking status: Former     Packs/day: 0.25     Years: 15.00     Pack years: 3.75     Types: Cigarettes     Quit date:      Years since quittin.6    Smokeless tobacco: Never    Tobacco comments:     Quit in    Vaping Use    Vaping Use: Never used   Substance and Sexual Activity    Alcohol use: Yes     Alcohol/week: 3.0 standard drinks     Types: 1 Glasses of wine, 2 Cans of beer per week     Comment: social    Drug use: No    Sexual activity: Defer       Current Outpatient Medications:     ALPRAZolam (XANAX) 0.5 MG tablet, Take 1 tablet by mouth 2 (Two) Times a Day As Needed for Anxiety., Disp: 30 tablet, Rfl: 0    aluminum-magnesium hydroxide-simethicone (Mylanta) 200-200-20 MG/5ML suspension, Take  by mouth Every 6 (Six) Hours As Needed for Indigestion or Heartburn., Disp: , Rfl:     amLODIPine (NORVASC) 5 MG tablet, Take 1 tablet by mouth Daily., Disp: 90 tablet, Rfl:  3    Aspirin Low Dose 81 MG EC tablet, TAKE ONE TABLET BY MOUTH DAILY (Patient taking differently: 1 tablet Daily.), Disp: 90 tablet, Rfl: 3    buPROPion XL (WELLBUTRIN XL) 300 MG 24 hr tablet, TAKE 1 TABLET BY MOUTH DAILY, Disp: 90 tablet, Rfl: 3    Calcium Carbonate Antacid (TUMS PO), Take  by mouth As Needed., Disp: , Rfl:     carvedilol (COREG) 12.5 MG tablet, TAKE 1 TABLET BY MOUTH TWICE DAILY WITH MEALS (Patient taking differently: Take 1 tablet by mouth 2 (Two) Times a Day With Meals.), Disp: 180 tablet, Rfl: 3    diclofenac (VOLTAREN) 1 % gel gel, Apply 4 g topically to the appropriate area as directed 4 (Four) Times a Day As Needed (pain)., Disp: 100 g, Rfl: 0    hydroCHLOROthiazide (HYDRODIURIL) 25 MG tablet, Take 1 tablet by mouth Daily., Disp: 90 tablet, Rfl: 3    levothyroxine (SYNTHROID, LEVOTHROID) 100 MCG tablet, Take 1 tablet by mouth Daily., Disp: 90 tablet, Rfl: 1    ondansetron (ZOFRAN) 4 MG tablet, Take 1 tablet by mouth Every 8 (Eight) Hours As Needed for Nausea or Vomiting., Disp: 30 tablet, Rfl: 1    pantoprazole (PROTONIX) 40 MG EC tablet, 1 po daily in the am 30 minutes before breakfast, Disp: 30 tablet, Rfl: 3    Simethicone (GAS-X PO), Take  by mouth As Needed., Disp: , Rfl:     simvastatin (ZOCOR) 40 MG tablet, TAKE 1 TABLET BY MOUTH EVERY NIGHT AT BEDTIME, Disp: 90 tablet, Rfl: 3    trandolapril (MAVIK) 4 MG tablet, Take 1 tablet by mouth Daily., Disp: 90 tablet, Rfl: 3    No Known Allergies    The following portions of the patient's history were reviewed and updated as appropriate: allergies, current medications, past family history, past medical history, past social history, past surgical history and problem list.  Objective     Physical Exam  Vitals and nursing note reviewed.   Constitutional:       General: She is not in acute distress.     Appearance: Normal appearance. She is well-developed.   HENT:      Head: Normocephalic and atraumatic.      Mouth/Throat:      Mouth: Mucous  membranes are not pale, not dry and not cyanotic.   Eyes:      General: Lids are normal.   Neck:      Trachea: Trachea normal.   Cardiovascular:      Rate and Rhythm: Normal rate.   Pulmonary:      Effort: Pulmonary effort is normal. No respiratory distress.      Breath sounds: Normal breath sounds.   Abdominal:      General: Bowel sounds are normal.      Palpations: Abdomen is soft. There is no mass.      Tenderness: There is no abdominal tenderness.      Hernia: No hernia is present.   Skin:     General: Skin is warm and dry.   Neurological:      Mental Status: She is alert and oriented to person, place, and time.   Psychiatric:         Mood and Affect: Mood normal.         Speech: Speech normal.         Behavior: Behavior normal. Behavior is cooperative.     Vitals:    08/16/23 0829   BP: 128/82   Pulse: 77   SpO2: 98%   Weight: 93.9 kg (207 lb)     Body mass index is 34.45 kg/mý.     Results Review:   I reviewed the patient's new clinical results.    No visits with results within 90 Day(s) from this visit.   Latest known visit with results is:   Hospital Outpatient Visit on 12/08/2022   Component Date Value Ref Range Status    Creatinine 12/08/2022 0.90  0.60 - 1.30 mg/dL Final    Serial Number: [id number]:  [id number]      CTAP with contrast dated on 12/8/2022  1. No evidence of mass or ascites  2. No bowel obstruction    Colonoscopy dated 11/17/2021 per Dr. Chapa  - Diverticulosis in the sigmoid colon.  - Anal papilla(e) were hypertrophied.  - No specimens collected.    EGD dated 12/6/2022 per Dr. Chapa  - Normal oropharynx.  - Z-line regular, 35 cm from the incisors.  - 1 cm hiatal hernia.  - No gross lesions in the entire esophagus.  - Erythematous mucosa in the posterior wall of the stomach and antrum. Biopsied.  - Normal duodenal bulb, first portion of the duodenum, second portion of the duodenum and third portion of the duodenum. Biopsied.  - No upper GI pathology that could explain her symptoms  pending H pylori testing  - Patient appears to have functional GI disorder at baseline and Wellbutrin could worsen upper GI symptoms.  A.     DUODENUM, BIOPSY:   Duodenal type mucosa with no significant histopathologic abnormalities   B.     ANTRUM AND BODY, BIOPSY:   Gastric antral type mucosa with reactive (chemical) gastropathy   Negative for intestinal metaplasia or dysplasia   No Helicobacter pylori-like organisms seen    Assessment / Plan      1. Nausea  2. Constipation, unspecified constipation type  3. Abdominal pressure  4. Burping  5. Bloating  6. Irritable bowel syndrome with constipation-Suspected  For the past 3 weeks she has been having nausea that started after she had half of a yvrose.  She feels she needs to vomit, but cannot.  She started taking pantoprazole recently and nausea may be a little bit better but not significantly.  She had constipation that started at the same time, did not have a bowel movement for 8 days.  This has gradually improved.  She has some abdominal discomfort/pressure, but no significant pain.  She continues to have bloating and burping.  Denies any GI bleeding.  Abdomen nontender to palpation.  Celiac panel negative.  CTAP in December 2022 unremarkable.  Colonoscopy dated 11/17/2021 unremarkable.  EGD dated 12/6/2022 unremarkable, no upper GI pathology that can explain her symptoms.  Biopsies negative for celiac and H. pylori.  Suspect IBS-C/functional GI disorder.  Advised to eat a softer diet with 4-5 very small meals throughout the day.  Zofran as needed for nausea.  MiraLAX 17 g daily for constipation.  Metamucil daily.  Low FODMAP diet.  Gas-X as needed.  Labs  Abdominal ultrasound to rule out pancreatobiliary disease.    - CBC (No Diff); Future  - Comprehensive Metabolic Panel; Future  - TSH; Future  - Lipase; Future  - H. Pylori Breath Test - Breath, Lung; Future  - US Abdomen Complete; Future    7. Fatty infiltration of liver  8. Class 1 obesity due to excess  calories without serious comorbidity with body mass index (BMI) of 34.0 to 34.9 in adult  BMI 34.45  There is no history of elevated liver enzymes.  Fatty liver noted on imaging in the past, CTAP in December 2022 with solid abdominal organs unremarkable.  Recommend low-fat diet, exercise and weight reduction.    - US Abdomen Complete; Future    9. Encounter for screening for malignant neoplasm of colon  Colonoscopy 11/17/2021 unremarkable, no polyps removed, no specimens collected. No family history of colon cancer.   Colonoscopy for screening in 2031, or sooner if needed.     Patient Instructions   Antireflux measures: Avoid fried, fatty foods, alcohol, chocolate, coffee, tea,  soft drinks, peppermint and spearmint, spicy foods, tomatoes and tomato based foods, onion based foods, and smoking.  Other antireflux measures include weight reduction if overweight, avoiding tight clothing around the abdomen, elevating the head of the bed 6 inches with blocks under the head board, and don't drink or eat before going to bed and avoid lying down immediately after meals.  Pantoprazole 40 mg 1 po daily in the am 30 minutes before breakfast.  Recommended to take Levothyroxine first in the am upon waking, wait 30 minutes, then take Pantoprazole 40 mg, wait 30 minutes, then eat breakfast and take other medications.  Zofran 4 mg 1 po every 8 hours as needed for nausea.   The patient should eat 4-5 very small meals throughout the day. Avoid large meals.  It is recommended to eat a softer diet. Meats are best consumed ground. Fruits and vegetables are best consumed cooked or steamed and then mashed.   Low fiber, low fat diet with liberal water intake. May use soluble fiber.  Low FODMAP diet - avoid all dairy. May use lactose free/dairy free alternatives such as almond milk, rice milk, oat milk, etc.   Metamucil 1 packet/scoop daily.   Miralax 17 grams daily as needed for constipation.   Gas-X as needed.  Labs  H. Pylori breath  test-avoid eating or drinking for 1 hour prior to testing.   Abdominal ultrasound  Follow up: 3 months    Low-FODMAP Eating Plan    FODMAP stands for fermentable oligosaccharides, disaccharides, monosaccharides, and polyols. These are sugars that are hard for some people to digest. A low-FODMAP eating plan may help some people who have irritable bowel syndrome (IBS) and certain other bowel (intestinal) diseases to manage their symptoms.  This meal plan can be complicated to follow. Work with a diet and nutrition specialist (dietitian) to make a low-FODMAP eating plan that is right for you. A dietitian can help make sure that you get enough nutrition from this diet.  What are tips for following this plan?  Reading food labels  Check labels for hidden FODMAPs such as:  High-fructose syrup.  Honey.  Agave.  Natural fruit flavors.  Onion or garlic powder.  Choose low-FODMAP foods that contain 3-4 grams of fiber per serving.  Check food labels for serving sizes. Eat only one serving at a time to make sure FODMAP levels stay low.  Shopping  Shop with a list of foods that are recommended on this diet and make a meal plan.  Meal planning  Follow a low-FODMAP eating plan for up to 6 weeks, or as told by your health care provider or dietitian.  To follow the eating plan:  Eliminate high-FODMAP foods from your diet completely. Choose only low-FODMAP foods to eat. You will do this for 2-6 weeks.  Gradually reintroduce high-FODMAP foods into your diet one at a time. Most people should wait a few days before introducing the next new high-FODMAP food into their meal plan. Your dietitian can recommend how quickly you may reintroduce foods.  Keep a daily record of what and how much you eat and drink. Make note of any symptoms that you have after eating.  Review your daily record with a dietitian regularly to identify which foods you can eat and which foods you should avoid.  General tips  Drink enough fluid each day to keep your  "urine pale yellow.  Avoid processed foods. These often have added sugar and may be high in FODMAPs.  Avoid most dairy products, whole grains, and sweeteners.  Work with a dietitian to make sure you get enough fiber in your diet.  Avoid high FODMAP foods at meals to manage symptoms.    Recommended foods  Fruits  Bananas, oranges, tangerines, gaurav, limes, blueberries, raspberries, strawberries, grapes, cantaloupe, honeydew melon, kiwi, papaya, passion fruit, and pineapple. Limited amounts of dried cranberries, banana chips, and shredded coconut.  Vegetables  Eggplant, zucchini, cucumber, peppers, green beans, bean sprouts, lettuce, arugula, kale, Swiss chard, spinach, edgard greens, bok quan, summer squash, potato, and tomato. Limited amounts of corn, carrot, and sweet potato. Green parts of scallions.  Grains  Gluten-free grains, such as rice, oats, buckwheat, quinoa, corn, polenta, and millet. Gluten-free pasta, bread, or cereal. Rice noodles. Corn tortillas.  Meats and other proteins  Unseasoned beef, pork, poultry, or fish. Eggs. Kc. Tofu (firm) and tempeh. Limited amounts of nuts and seeds, such as almonds, walnuts, brazil nuts, pecans, peanuts, nut butters, pumpkin seeds, grecia seeds, and sunflower seeds.  Dairy  Lactose-free milk, yogurt, and kefir. Lactose-free cottage cheese and ice cream. Non-dairy milks, such as almond, coconut, hemp, and rice milk. Non-dairy yogurt. Limited amounts of goat cheese, brie, mozzarella, parmesan, swiss, and other hard cheeses.  Fats and oils  Butter-free spreads. Vegetable oils, such as olive, canola, and sunflower oil.  Seasoning and other foods  Artificial sweeteners with names that do not end in \"ol,\" such as aspartame, saccharine, and stevia. Maple syrup, white table sugar, raw sugar, brown sugar, and molasses. Mayonnaise, soy sauce, and tamari. Fresh basil, coriander, parsley, rosemary, and thyme.  Beverages  Water and mineral water. Sugar-sweetened soft drinks. Small " amounts of orange juice or cranberry juice. Black and green tea. Most dry dayami. Coffee.  The items listed above may not be a complete list of foods and beverages you can eat. Contact a dietitian for more information.    Foods to avoid  Fruits  Fresh, dried, and juiced forms of apple, pear, watermelon, peach, plum, cherries, apricots, blackberries, boysenberries, figs, nectarines, and anayeli. Avocado.  Vegetables  Chicory root, artichoke, asparagus, cabbage, snow peas, [place] sprouts, broccoli, sugar snap peas, mushrooms, celery, and cauliflower. Onions, garlic, leeks, and the white part of scallions.  Grains  Wheat, including kamut, durum, and semolina. Barley and bulgur. Couscous. Wheat-based cereals. Wheat noodles, bread, crackers, and pastries.  Meats and other proteins  Fried or fatty meat. Sausage. Cashews and pistachios. Soybeans, baked beans, black beans, chickpeas, kidney beans, jennifer beans, navy beans, lentils, black-eyed peas, and split peas.  Dairy  Milk, yogurt, ice cream, and soft cheese. Cream and sour cream. Milk-based sauces. Custard. Buttermilk. Soy milk.  Seasoning and other foods  Any sugar-free gum or candy. Foods that contain artificial sweeteners such as sorbitol, mannitol, isomalt, or xylitol. Foods that contain honey, high-fructose corn syrup, or agave. Bouillon, vegetable stock, beef stock, and chicken stock. Garlic and onion powder. Condiments made with onion, such as hummus, chutney, pickles, relish, salad dressing, and salsa. Tomato paste.  Beverages  Chicory-based drinks. Coffee substitutes. Chamomile tea. Fennel tea. Sweet or fortified dayami such as port or danitza. Diet soft drinks made with isomalt, mannitol, maltitol, sorbitol, or xylitol. Apple, pear, and anayeli juice. Juices with high-fructose corn syrup.  The items listed above may not be a complete list of foods and beverages you should avoid. Contact a dietitian for more information.    Summary  FODMAP stands for fermentable  oligosaccharides, disaccharides, monosaccharides, and polyols. These are sugars that are hard for some people to digest.  A low-FODMAP eating plan is a short-term diet that helps to ease symptoms of certain bowel diseases.  The eating plan usually lasts up to 6 weeks. After that, high-FODMAP foods are reintroduced gradually and one at a time. This can help you find out which foods may be causing symptoms.  A low-FODMAP eating plan can be complicated. It is best to work with a dietitian who has experience with this type of plan.  This information is not intended to replace advice given to you by your health care provider. Make sure you discuss any questions you have with your health care provider.  Document Revised: 05/06/2021 Document Reviewed: 05/06/2021  [employer] Education c 2023 [employer]    Cedrick Chowdhury, APRN  8/16/2023    Please note that portions of this note were completed with a voice recognition program.

## 2023-08-16 NOTE — PATIENT INSTRUCTIONS
Antireflux measures: Avoid fried, fatty foods, alcohol, chocolate, coffee, tea,  soft drinks, peppermint and spearmint, spicy foods, tomatoes and tomato based foods, onion based foods, and smoking.  Other antireflux measures include weight reduction if overweight, avoiding tight clothing around the abdomen, elevating the head of the bed 6 inches with blocks under the head board, and don't drink or eat before going to bed and avoid lying down immediately after meals.  Pantoprazole 40 mg 1 po daily in the am 30 minutes before breakfast.  Recommended to take Levothyroxine first in the am upon waking, wait 30 minutes, then take Pantoprazole 40 mg, wait 30 minutes, then eat breakfast and take other medications.  Zofran 4 mg 1 po every 8 hours as needed for nausea.   The patient should eat 4-5 very small meals throughout the day. Avoid large meals.  It is recommended to eat a softer diet. Meats are best consumed ground. Fruits and vegetables are best consumed cooked or steamed and then mashed.   Low fiber, low fat diet with liberal water intake. May use soluble fiber.  Low FODMAP diet - avoid all dairy. May use lactose free/dairy free alternatives such as almond milk, rice milk, oat milk, etc.   Metamucil 1 packet/scoop daily.   Miralax 17 grams daily as needed for constipation.   Gas-X as needed.  Labs  H. Pylori breath test-avoid eating or drinking for 1 hour prior to testing.   Abdominal ultrasound  Follow up: 3 months    Low-FODMAP Eating Plan    FODMAP stands for fermentable oligosaccharides, disaccharides, monosaccharides, and polyols. These are sugars that are hard for some people to digest. A low-FODMAP eating plan may help some people who have irritable bowel syndrome (IBS) and certain other bowel (intestinal) diseases to manage their symptoms.  This meal plan can be complicated to follow. Work with a diet and nutrition specialist (dietitian) to make a low-FODMAP eating plan that is right for you. A dietitian can  help make sure that you get enough nutrition from this diet.  What are tips for following this plan?  Reading food labels  Check labels for hidden FODMAPs such as:  High-fructose syrup.  Honey.  Agave.  Natural fruit flavors.  Onion or garlic powder.  Choose low-FODMAP foods that contain 3-4 grams of fiber per serving.  Check food labels for serving sizes. Eat only one serving at a time to make sure FODMAP levels stay low.  Shopping  Shop with a list of foods that are recommended on this diet and make a meal plan.  Meal planning  Follow a low-FODMAP eating plan for up to 6 weeks, or as told by your health care provider or dietitian.  To follow the eating plan:  Eliminate high-FODMAP foods from your diet completely. Choose only low-FODMAP foods to eat. You will do this for 2-6 weeks.  Gradually reintroduce high-FODMAP foods into your diet one at a time. Most people should wait a few days before introducing the next new high-FODMAP food into their meal plan. Your dietitian can recommend how quickly you may reintroduce foods.  Keep a daily record of what and how much you eat and drink. Make note of any symptoms that you have after eating.  Review your daily record with a dietitian regularly to identify which foods you can eat and which foods you should avoid.  General tips  Drink enough fluid each day to keep your urine pale yellow.  Avoid processed foods. These often have added sugar and may be high in FODMAPs.  Avoid most dairy products, whole grains, and sweeteners.  Work with a dietitian to make sure you get enough fiber in your diet.  Avoid high FODMAP foods at meals to manage symptoms.    Recommended foods  Fruits  Bananas, oranges, tangerines, gaurav, limes, blueberries, raspberries, strawberries, grapes, cantaloupe, honeydew melon, kiwi, papaya, passion fruit, and pineapple. Limited amounts of dried cranberries, banana chips, and shredded coconut.  Vegetables  Eggplant, zucchini, cucumber, peppers, green beans,  "bean sprouts, lettuce, arugula, kale, Swiss chard, spinach, edgard greens, bok quan, summer squash, potato, and tomato. Limited amounts of corn, carrot, and sweet potato. Green parts of scallions.  Grains  Gluten-free grains, such as rice, oats, buckwheat, quinoa, corn, polenta, and millet. Gluten-free pasta, bread, or cereal. Rice noodles. Corn tortillas.  Meats and other proteins  Unseasoned beef, pork, poultry, or fish. Eggs. Kc. Tofu (firm) and tempeh. Limited amounts of nuts and seeds, such as almonds, walnuts, brazil nuts, pecans, peanuts, nut butters, pumpkin seeds, grecia seeds, and sunflower seeds.  Dairy  Lactose-free milk, yogurt, and kefir. Lactose-free cottage cheese and ice cream. Non-dairy milks, such as almond, coconut, hemp, and rice milk. Non-dairy yogurt. Limited amounts of goat cheese, brie, mozzarella, parmesan, swiss, and other hard cheeses.  Fats and oils  Butter-free spreads. Vegetable oils, such as olive, canola, and sunflower oil.  Seasoning and other foods  Artificial sweeteners with names that do not end in \"ol,\" such as aspartame, saccharine, and stevia. Maple syrup, white table sugar, raw sugar, brown sugar, and molasses. Mayonnaise, soy sauce, and tamari. Fresh basil, coriander, parsley, rosemary, and thyme.  Beverages  Water and mineral water. Sugar-sweetened soft drinks. Small amounts of orange juice or cranberry juice. Black and green tea. Most dry dayami. Coffee.  The items listed above may not be a complete list of foods and beverages you can eat. Contact a dietitian for more information.    Foods to avoid  Fruits  Fresh, dried, and juiced forms of apple, pear, watermelon, peach, plum, cherries, apricots, blackberries, boysenberries, figs, nectarines, and anayeli. Avocado.  Vegetables  Chicory root, artichoke, asparagus, cabbage, snow peas, [place] sprouts, broccoli, sugar snap peas, mushrooms, celery, and cauliflower. Onions, garlic, leeks, and the white part of " scallions.  Grains  Wheat, including kamut, durum, and semolina. Barley and bulgur. Couscous. Wheat-based cereals. Wheat noodles, bread, crackers, and pastries.  Meats and other proteins  Fried or fatty meat. Sausage. Cashews and pistachios. Soybeans, baked beans, black beans, chickpeas, kidney beans, jennifer beans, navy beans, lentils, black-eyed peas, and split peas.  Dairy  Milk, yogurt, ice cream, and soft cheese. Cream and sour cream. Milk-based sauces. Custard. Buttermilk. Soy milk.  Seasoning and other foods  Any sugar-free gum or candy. Foods that contain artificial sweeteners such as sorbitol, mannitol, isomalt, or xylitol. Foods that contain honey, high-fructose corn syrup, or agave. Bouillon, vegetable stock, beef stock, and chicken stock. Garlic and onion powder. Condiments made with onion, such as hummus, chutney, pickles, relish, salad dressing, and salsa. Tomato paste.  Beverages  Chicory-based drinks. Coffee substitutes. Chamomile tea. Fennel tea. Sweet or fortified dayami such as port or danitza. Diet soft drinks made with isomalt, mannitol, maltitol, sorbitol, or xylitol. Apple, pear, and anayeli juice. Juices with high-fructose corn syrup.  The items listed above may not be a complete list of foods and beverages you should avoid. Contact a dietitian for more information.    Summary  FODMAP stands for fermentable oligosaccharides, disaccharides, monosaccharides, and polyols. These are sugars that are hard for some people to digest.  A low-FODMAP eating plan is a short-term diet that helps to ease symptoms of certain bowel diseases.  The eating plan usually lasts up to 6 weeks. After that, high-FODMAP foods are reintroduced gradually and one at a time. This can help you find out which foods may be causing symptoms.  A low-FODMAP eating plan can be complicated. It is best to work with a dietitian who has experience with this type of plan.  This information is not intended to replace advice given to you by  your health care provider. Make sure you discuss any questions you have with your health care provider.  Document Revised: 05/06/2021 Document Reviewed: 05/06/2021  Elsevier Patient Education c 2023 [employer]

## 2023-08-17 LAB
ERYTHROCYTE [DISTWIDTH] IN BLOOD BY AUTOMATED COUNT: 13.2 % (ref 12.3–15.4)
HCT VFR BLD AUTO: 42.2 % (ref 34–46.6)
HGB BLD-MCNC: 14.8 G/DL (ref 12–15.9)
LIPASE SERPL-CCNC: 31 U/L (ref 13–60)
MCH RBC QN AUTO: 31.8 PG (ref 26.6–33)
MCHC RBC AUTO-ENTMCNC: 35.1 G/DL (ref 31.5–35.7)
MCV RBC AUTO: 90.6 FL (ref 79–97)
PLATELET # BLD AUTO: 380 10*3/MM3 (ref 140–450)
RBC # BLD AUTO: 4.66 10*6/MM3 (ref 3.77–5.28)
WBC # BLD AUTO: 5.36 10*3/MM3 (ref 3.4–10.8)

## 2023-08-19 LAB
25(OH)D3+25(OH)D2 SERPL-MCNC: 54.9 NG/ML (ref 30–100)
ALBUMIN SERPL-MCNC: 4.6 G/DL (ref 3.5–5.2)
ALBUMIN/GLOB SERPL: 2.2 G/DL
ALP SERPL-CCNC: 53 U/L (ref 39–117)
ALT SERPL-CCNC: 19 U/L (ref 1–33)
AST SERPL-CCNC: 26 U/L (ref 1–32)
BASOPHILS # BLD AUTO: 0.02 10*3/MM3 (ref 0–0.2)
BASOPHILS NFR BLD AUTO: 0.4 % (ref 0–1.5)
BILIRUB SERPL-MCNC: 0.5 MG/DL (ref 0–1.2)
BUN SERPL-MCNC: 7 MG/DL (ref 8–23)
BUN/CREAT SERPL: 7.3 (ref 7–25)
CALCIUM SERPL-MCNC: 10.1 MG/DL (ref 8.6–10.5)
CHLORIDE SERPL-SCNC: 85 MMOL/L (ref 98–107)
CHOLEST SERPL-MCNC: 159 MG/DL (ref 0–200)
CK SERPL-CCNC: 128 U/L (ref 20–180)
CO2 SERPL-SCNC: 30.3 MMOL/L (ref 22–29)
CREAT SERPL-MCNC: 0.96 MG/DL (ref 0.57–1)
EGFRCR SERPLBLD CKD-EPI 2021: 61.4 ML/MIN/1.73
EOSINOPHIL # BLD AUTO: 0.15 10*3/MM3 (ref 0–0.4)
EOSINOPHIL NFR BLD AUTO: 2.9 % (ref 0.3–6.2)
ERYTHROCYTE [DISTWIDTH] IN BLOOD BY AUTOMATED COUNT: 13.2 % (ref 12.3–15.4)
GLOBULIN SER CALC-MCNC: 2.1 GM/DL
GLUCOSE SERPL-MCNC: 90 MG/DL (ref 65–99)
H. PYLORI BREATH COLLECTION: NORMAL
HCT VFR BLD AUTO: 41 % (ref 34–46.6)
HDLC SERPL-MCNC: 66 MG/DL (ref 40–60)
HGB BLD-MCNC: 14.6 G/DL (ref 12–15.9)
IMM GRANULOCYTES # BLD AUTO: 0.01 10*3/MM3 (ref 0–0.05)
IMM GRANULOCYTES NFR BLD AUTO: 0.2 % (ref 0–0.5)
LDLC SERPL CALC-MCNC: 76 MG/DL (ref 0–100)
LYMPHOCYTES # BLD AUTO: 1.48 10*3/MM3 (ref 0.7–3.1)
LYMPHOCYTES NFR BLD AUTO: 28.1 % (ref 19.6–45.3)
Lab: NORMAL
MCH RBC QN AUTO: 31.9 PG (ref 26.6–33)
MCHC RBC AUTO-ENTMCNC: 35.6 G/DL (ref 31.5–35.7)
MCV RBC AUTO: 89.7 FL (ref 79–97)
MONOCYTES # BLD AUTO: 0.52 10*3/MM3 (ref 0.1–0.9)
MONOCYTES NFR BLD AUTO: 9.9 % (ref 5–12)
NEUTROPHILS # BLD AUTO: 3.08 10*3/MM3 (ref 1.7–7)
NEUTROPHILS NFR BLD AUTO: 58.5 % (ref 42.7–76)
NRBC BLD AUTO-RTO: 0 /100 WBC (ref 0–0.2)
PLATELET # BLD AUTO: 365 10*3/MM3 (ref 140–450)
POTASSIUM SERPL-SCNC: 3.1 MMOL/L (ref 3.5–5.2)
PROT SERPL-MCNC: 6.7 G/DL (ref 6–8.5)
RBC # BLD AUTO: 4.57 10*6/MM3 (ref 3.77–5.28)
SODIUM SERPL-SCNC: 133 MMOL/L (ref 136–145)
TRIGL SERPL-MCNC: 92 MG/DL (ref 0–150)
TSH SERPL DL<=0.005 MIU/L-ACNC: 1.9 UIU/ML (ref 0.27–4.2)
UREA BREATH TEST QL: NEGATIVE
VLDLC SERPL CALC-MCNC: 17 MG/DL (ref 5–40)
WBC # BLD AUTO: 5.26 10*3/MM3 (ref 3.4–10.8)

## 2023-09-06 ENCOUNTER — TELEPHONE (OUTPATIENT)
Dept: INTERNAL MEDICINE | Facility: CLINIC | Age: 78
End: 2023-09-06

## 2023-09-06 NOTE — TELEPHONE ENCOUNTER
Caller: Deya Hernandez    Relationship to patient: Self    Best call back number: [phone]     Chief complaint: GI ISSUES, NAUSEA, CONSTIPATION, LACK OF APPETITE     Type of visit: SAME DAY / OFFICE VISIT     Requested date: ASAP       Additional notes: PATIENT STATED SHE HAS BEEN GOING THROUGH SEVERE PAIN FOR 2 MONTHS AND SHE IS MISERABLE. SHE HAS SEEN HER GASTROENTEROLOGIST AND SHE STILL DOESN'T KNOW WHY SHE IS EXPERIENCING THESE ISSUES. SHE IS REQUESTING TO SEE DR MARROQUIN OR SOMEONE IN THE OFFICE ASAP. SHE IS SUPPOSED TO GET AN ULTRASOUND 09/21/23 BUT WOULD LIKE TO GET IT DONE SOONER.    PLEASE ADVISE ID PATIENT CAN BE WORKED IN TODAY OR TOMORROW.

## 2023-09-07 ENCOUNTER — OFFICE VISIT (OUTPATIENT)
Dept: INTERNAL MEDICINE | Facility: CLINIC | Age: 78
End: 2023-09-07
Payer: MEDICARE

## 2023-09-07 VITALS
OXYGEN SATURATION: 96 % | BODY MASS INDEX: 34.99 KG/M2 | HEART RATE: 78 BPM | HEIGHT: 65 IN | WEIGHT: 210 LBS | TEMPERATURE: 98.1 F | SYSTOLIC BLOOD PRESSURE: 126 MMHG | DIASTOLIC BLOOD PRESSURE: 72 MMHG

## 2023-09-07 DIAGNOSIS — R07.0 THROAT DISCOMFORT: ICD-10-CM

## 2023-09-07 DIAGNOSIS — R11.0 CHRONIC NAUSEA: Primary | ICD-10-CM

## 2023-09-07 DIAGNOSIS — K21.9 GASTROESOPHAGEAL REFLUX DISEASE WITHOUT ESOPHAGITIS: ICD-10-CM

## 2023-09-07 PROCEDURE — 3078F DIAST BP <80 MM HG: CPT | Performed by: NURSE PRACTITIONER

## 2023-09-07 PROCEDURE — 1159F MED LIST DOCD IN RCRD: CPT | Performed by: NURSE PRACTITIONER

## 2023-09-07 PROCEDURE — 1160F RVW MEDS BY RX/DR IN RCRD: CPT | Performed by: NURSE PRACTITIONER

## 2023-09-07 PROCEDURE — 3074F SYST BP LT 130 MM HG: CPT | Performed by: NURSE PRACTITIONER

## 2023-09-07 PROCEDURE — 99214 OFFICE O/P EST MOD 30 MIN: CPT | Performed by: NURSE PRACTITIONER

## 2023-09-07 RX ORDER — FAMOTIDINE 20 MG/1
20 TABLET, FILM COATED ORAL NIGHTLY PRN
Qty: 30 TABLET | Refills: 1 | Status: SHIPPED | OUTPATIENT
Start: 2023-09-07 | End: 2023-09-08

## 2023-09-07 NOTE — PROGRESS NOTES
Office Visit      Patient Name: Deya Hernandez  : 1945   MRN: [id number]   Care Team: Patient Care Team:  Rosaline Roberson DO as PCP - General (Family Medicine)    Chief Complaint  GI Problem (Nausea ) and Loss Of Taste (Can only taste very salty or sweet/Lump in her throat)    Subjective     Subjective      Deya Hernandez presents to [facility] for acute on chronic GI complaints.     Here today for chronic GI complaints which have acutely worsened in the last 2 months.  She reports constant nausea, dry heaving, increased belching, bloating, throat discomfort-she describes this as a globus sensation and currently feels a lump when she is trying to swallow pills she adamantly denies that this is pain, constipation, and unpleasant taste in the mouth.  The unpleasant taste started after an H. pylori breath test, this test was negative.  She has followed up with her GI specialist regarding the symptoms, and abdominal ultrasound was ordered and scheduled for mid September.  She denies vomiting, unexpected weight loss, abdominal pain, diarrhea, blood in stool, fever, body aches, or chills.  She has tried alkaseltzer with relief of her symptoms.   Of note she had previously stopped pantoprazole and restarted about 4 weeks ago, she is taking as prescribed.  She has previously tried Pepcid as well.  She tells me today she is worried she is malnourished.  Subsequently she has started a multivitamin, a prebiotic, and a probiotic because of this.  She also has been taking some protein drinks when she can synthetase.  She tells me she has no problem keeping down and she drinks water most of the day.  She tells me she is starting to feel weak for the even contemplated going to the emergency room.    Objective     Objective   Vital Signs:   /72 (BP Location: Left arm, Patient Position: Sitting, Cuff Size: Adult)   Pulse 78   Temp 98.1 °F (36.7 °C) (Temporal)   Ht 165.1  "cm (65\")   Wt 95.3 kg (210 lb)   SpO2 96%   BMI 34.95 kg/m²     Physical Exam  Vitals and nursing note reviewed.   Constitutional:       General: She is not in acute distress.     Appearance: Normal appearance. She is obese. She is not ill-appearing or toxic-appearing.   Eyes:      Pupils: Pupils are equal, round, and reactive to light.   Neck:      Thyroid: No thyroid mass, thyromegaly or thyroid tenderness.   Cardiovascular:      Rate and Rhythm: Normal rate and regular rhythm.      Heart sounds: Normal heart sounds. No murmur heard.  Pulmonary:      Effort: Pulmonary effort is normal. No respiratory distress.      Breath sounds: Normal breath sounds. No wheezing.   Abdominal:      General: Bowel sounds are normal. There is no distension.      Palpations: Abdomen is soft.      Tenderness: There is no abdominal tenderness. There is no guarding.      Comments: Belching throughout exam     Musculoskeletal:      Cervical back: Neck supple.   Skin:     General: Skin is warm and dry.      Findings: No rash.   Neurological:      General: No focal deficit present.      Mental Status: She is alert and oriented to person, place, and time.   Psychiatric:         Mood and Affect: Mood normal.         Behavior: Behavior normal.        Assessment / Plan      Assessment & Plan   Problem List Items Addressed This Visit          Gastrointestinal Abdominal     Nausea - Primary     Other Visit Diagnoses       Throat discomfort        Relevant Orders    CBC No Differential    Comprehensive metabolic panel    Sedimentation rate, automated    Gastroesophageal reflux disease without esophagitis        Relevant Medications    famotidine (Pepcid) 20 MG tablet    Other Relevant Orders    CBC No Differential    Comprehensive metabolic panel    Sedimentation rate, automated    Symptoms described are uncontrolled GERD, discussed this at length with her today.  I do not feel any distinct thyroid mass or tenderness in upon further " investigation she actually does not have throat pain ultrasound of neck not be beneficial at this time.  Continue PPI and will add famotidine at night.  May need to consider changing agent to Prevacid?  Will defer to GI who is managing.  Ultrasound pending.  She already has a clear understanding of dietary restrictions and she has voiced that at today's visit, encouraged her to continue.  Continue protein supplement as needed, water, and can continue Zofran as needed for nausea.  She has had some hypokalemia but appears to be chronic but will check labs again.  I have advised if she worsens with her weakness that she be seen in the ED for possible fluid repletion, her vital signs are stable today and she is nontoxic in the office I do not feel ER work-up is warranted at this time.  Follow-up with GI.            Follow Up   Return if symptoms worsen or fail to improve.  Patient was given instructions and counseling regarding her condition or for health maintenance advice. Please see specific information pulled into the AVS if appropriate.     TERESA Garcia  [facility] [place]

## 2023-09-08 ENCOUNTER — LAB (OUTPATIENT)
Dept: LAB | Facility: HOSPITAL | Age: 78
End: 2023-09-08
Payer: MEDICARE

## 2023-09-08 ENCOUNTER — TELEPHONE (OUTPATIENT)
Dept: INTERNAL MEDICINE | Facility: CLINIC | Age: 78
End: 2023-09-08
Payer: MEDICARE

## 2023-09-08 DIAGNOSIS — R11.0 NAUSEA: Primary | ICD-10-CM

## 2023-09-08 DIAGNOSIS — D21.9 PARASITIC FIBROID: ICD-10-CM

## 2023-09-08 DIAGNOSIS — R07.0 THROAT PAIN: ICD-10-CM

## 2023-09-08 LAB
ALBUMIN SERPL-MCNC: 4.5 G/DL (ref 3.5–5.2)
ALBUMIN/GLOB SERPL: 1.9 G/DL
ALP SERPL-CCNC: 53 U/L (ref 39–117)
ALT SERPL W P-5'-P-CCNC: 24 U/L (ref 1–33)
ANION GAP SERPL CALCULATED.3IONS-SCNC: 16.3 MMOL/L (ref 5–15)
AST SERPL-CCNC: 24 U/L (ref 1–32)
BILIRUB SERPL-MCNC: 0.6 MG/DL (ref 0–1.2)
BUN SERPL-MCNC: 8 MG/DL (ref 8–23)
BUN/CREAT SERPL: 8.2 (ref 7–25)
CALCIUM SPEC-SCNC: 10 MG/DL (ref 8.6–10.5)
CHLORIDE SERPL-SCNC: 83 MMOL/L (ref 98–107)
CO2 SERPL-SCNC: 27.7 MMOL/L (ref 22–29)
CREAT SERPL-MCNC: 0.97 MG/DL (ref 0.57–1)
DEPRECATED RDW RBC AUTO: 36 FL (ref 37–54)
EGFRCR SERPLBLD CKD-EPI 2021: 60.7 ML/MIN/1.73
ERYTHROCYTE [DISTWIDTH] IN BLOOD BY AUTOMATED COUNT: 11.9 % (ref 12.3–15.4)
ERYTHROCYTE [SEDIMENTATION RATE] IN BLOOD: <1 MM/HR (ref 0–30)
GLOBULIN UR ELPH-MCNC: 2.4 GM/DL
GLUCOSE SERPL-MCNC: 106 MG/DL (ref 65–99)
HCT VFR BLD AUTO: 40.9 % (ref 34–46.6)
HGB BLD-MCNC: 15.2 G/DL (ref 12–15.9)
MCH RBC QN AUTO: 31.2 PG (ref 26.6–33)
MCHC RBC AUTO-ENTMCNC: 37.2 G/DL (ref 31.5–35.7)
MCV RBC AUTO: 84 FL (ref 79–97)
PLATELET # BLD AUTO: 349 10*3/MM3 (ref 140–450)
PMV BLD AUTO: 8.8 FL (ref 6–12)
POTASSIUM SERPL-SCNC: 3.8 MMOL/L (ref 3.5–5.2)
PROT SERPL-MCNC: 6.9 G/DL (ref 6–8.5)
RBC # BLD AUTO: 4.87 10*6/MM3 (ref 3.77–5.28)
SODIUM SERPL-SCNC: 127 MMOL/L (ref 136–145)
WBC NRBC COR # BLD: 5.4 10*3/MM3 (ref 3.4–10.8)

## 2023-09-08 PROCEDURE — 85652 RBC SED RATE AUTOMATED: CPT

## 2023-09-08 PROCEDURE — 36415 COLL VENOUS BLD VENIPUNCTURE: CPT | Performed by: NURSE PRACTITIONER

## 2023-09-08 PROCEDURE — 85027 COMPLETE CBC AUTOMATED: CPT | Performed by: NURSE PRACTITIONER

## 2023-09-08 PROCEDURE — 80053 COMPREHEN METABOLIC PANEL: CPT | Performed by: NURSE PRACTITIONER

## 2023-09-08 RX ORDER — FAMOTIDINE 20 MG/1
20 TABLET, FILM COATED ORAL NIGHTLY PRN
Qty: 90 TABLET | Refills: 0 | Status: SHIPPED | OUTPATIENT
Start: 2023-09-08

## 2023-09-09 ENCOUNTER — HOSPITAL ENCOUNTER (EMERGENCY)
Facility: HOSPITAL | Age: 78
Discharge: HOME | End: 2023-09-09
Attending: EMERGENCY MEDICINE
Payer: MEDICARE

## 2023-09-09 VITALS
RESPIRATION RATE: 18 BRPM | WEIGHT: 196.4 LBS | SYSTOLIC BLOOD PRESSURE: 150 MMHG | OXYGEN SATURATION: 99 % | DIASTOLIC BLOOD PRESSURE: 82 MMHG | HEART RATE: 82 BPM | HEIGHT: 65 IN | TEMPERATURE: 97.6 F | BODY MASS INDEX: 32.72 KG/M2

## 2023-09-09 DIAGNOSIS — E87.6 HYPOKALEMIA: ICD-10-CM

## 2023-09-09 DIAGNOSIS — E87.1 HYPONATREMIA: Primary | ICD-10-CM

## 2023-09-09 LAB
ALBUMIN SERPL-MCNC: 4.3 G/DL (ref 3.5–5.2)
ALBUMIN/GLOB SERPL: 1.8 G/DL
ALP SERPL-CCNC: 50 U/L (ref 39–117)
ALT SERPL W P-5'-P-CCNC: 21 U/L (ref 1–33)
ANION GAP SERPL CALCULATED.3IONS-SCNC: 16.8 MMOL/L (ref 5–15)
AST SERPL-CCNC: 24 U/L (ref 1–32)
BASOPHILS # BLD AUTO: 0.01 10*3/MM3 (ref 0–0.2)
BASOPHILS NFR BLD AUTO: 0.4 % (ref 0–1.5)
BILIRUB SERPL-MCNC: 0.5 MG/DL (ref 0–1.2)
BUN SERPL-MCNC: 9 MG/DL (ref 8–23)
BUN/CREAT SERPL: 10 (ref 7–25)
CALCIUM SPEC-SCNC: 9.3 MG/DL (ref 8.6–10.5)
CHLORIDE SERPL-SCNC: 80 MMOL/L (ref 98–107)
CO2 SERPL-SCNC: 26.2 MMOL/L (ref 22–29)
CREAT SERPL-MCNC: 0.9 MG/DL (ref 0.57–1)
DEPRECATED RDW RBC AUTO: 36.1 FL (ref 37–54)
EGFRCR SERPLBLD CKD-EPI 2021: 66.4 ML/MIN/1.73
EOSINOPHIL # BLD AUTO: 0.04 10*3/MM3 (ref 0–0.4)
EOSINOPHIL NFR BLD AUTO: 1.4 % (ref 0.3–6.2)
ERYTHROCYTE [DISTWIDTH] IN BLOOD BY AUTOMATED COUNT: 11.9 % (ref 12.3–15.4)
GLOBULIN UR ELPH-MCNC: 2.4 GM/DL
GLUCOSE BLDC GLUCOMTR-MCNC: 105 MG/DL (ref 70–130)
GLUCOSE SERPL-MCNC: 97 MG/DL (ref 65–99)
HCT VFR BLD AUTO: 36.3 % (ref 34–46.6)
HGB BLD-MCNC: 13.7 G/DL (ref 12–15.9)
IMM GRANULOCYTES # BLD AUTO: 0.01 10*3/MM3 (ref 0–0.05)
IMM GRANULOCYTES NFR BLD AUTO: 0.4 % (ref 0–0.5)
LYMPHOCYTES # BLD AUTO: 1.02 10*3/MM3 (ref 0.7–3.1)
LYMPHOCYTES NFR BLD AUTO: 36.4 % (ref 19.6–45.3)
MAGNESIUM SERPL-MCNC: 1.6 MG/DL (ref 1.6–2.4)
MCH RBC QN AUTO: 32 PG (ref 26.6–33)
MCHC RBC AUTO-ENTMCNC: 37.7 G/DL (ref 31.5–35.7)
MCV RBC AUTO: 84.8 FL (ref 79–97)
MONOCYTES # BLD AUTO: 0.36 10*3/MM3 (ref 0.1–0.9)
MONOCYTES NFR BLD AUTO: 12.9 % (ref 5–12)
NEUTROPHILS NFR BLD AUTO: 1.36 10*3/MM3 (ref 1.7–7)
NEUTROPHILS NFR BLD AUTO: 48.5 % (ref 42.7–76)
NRBC BLD AUTO-RTO: 0 /100 WBC (ref 0–0.2)
PLATELET # BLD AUTO: 147 10*3/MM3 (ref 140–450)
PMV BLD AUTO: 10.5 FL (ref 6–12)
POTASSIUM SERPL-SCNC: 3 MMOL/L (ref 3.5–5.2)
PROT SERPL-MCNC: 6.7 G/DL (ref 6–8.5)
RBC # BLD AUTO: 4.28 10*6/MM3 (ref 3.77–5.28)
SODIUM SERPL-SCNC: 123 MMOL/L (ref 136–145)
SODIUM UR-SCNC: 37 MMOL/L
WBC NRBC COR # BLD: 2.8 10*3/MM3 (ref 3.4–10.8)

## 2023-09-09 PROCEDURE — 80053 COMPREHEN METABOLIC PANEL: CPT | Performed by: EMERGENCY MEDICINE

## 2023-09-09 PROCEDURE — 85025 COMPLETE CBC W/AUTO DIFF WBC: CPT | Performed by: EMERGENCY MEDICINE

## 2023-09-09 PROCEDURE — 96375 TX/PRO/DX INJ NEW DRUG ADDON: CPT

## 2023-09-09 PROCEDURE — 83735 ASSAY OF MAGNESIUM: CPT | Performed by: EMERGENCY MEDICINE

## 2023-09-09 PROCEDURE — 84300 ASSAY OF URINE SODIUM: CPT | Performed by: INTERNAL MEDICINE

## 2023-09-09 PROCEDURE — 96365 THER/PROPH/DIAG IV INF INIT: CPT

## 2023-09-09 PROCEDURE — 83935 ASSAY OF URINE OSMOLALITY: CPT | Performed by: INTERNAL MEDICINE

## 2023-09-09 PROCEDURE — 25010000002 THIAMINE PER 100 MG: Performed by: EMERGENCY MEDICINE

## 2023-09-09 PROCEDURE — 83930 ASSAY OF BLOOD OSMOLALITY: CPT | Performed by: INTERNAL MEDICINE

## 2023-09-09 PROCEDURE — 99283 EMERGENCY DEPT VISIT LOW MDM: CPT

## 2023-09-09 PROCEDURE — 82570 ASSAY OF URINE CREATININE: CPT | Performed by: INTERNAL MEDICINE

## 2023-09-09 PROCEDURE — 82948 REAGENT STRIP/BLOOD GLUCOSE: CPT

## 2023-09-09 RX ORDER — POTASSIUM CHLORIDE 750 MG/1
40 CAPSULE, EXTENDED RELEASE ORAL ONCE
Status: COMPLETED | OUTPATIENT
Start: 2023-09-09 | End: 2023-09-09

## 2023-09-09 RX ORDER — SODIUM CHLORIDE 0.9 % (FLUSH) 0.9 %
10 SYRINGE (ML) INJECTION AS NEEDED
Status: DISCONTINUED | OUTPATIENT
Start: 2023-09-09 | End: 2023-09-10 | Stop reason: HOSPADM

## 2023-09-09 RX ORDER — POTASSIUM CHLORIDE 750 MG/1
10 TABLET, FILM COATED, EXTENDED RELEASE ORAL DAILY
Qty: 30 TABLET | Refills: 0 | Status: SHIPPED | OUTPATIENT
Start: 2023-09-09

## 2023-09-09 RX ORDER — THIAMINE HYDROCHLORIDE 100 MG/ML
200 INJECTION, SOLUTION INTRAMUSCULAR; INTRAVENOUS ONCE
Status: COMPLETED | OUTPATIENT
Start: 2023-09-09 | End: 2023-09-09

## 2023-09-09 RX ADMIN — FOLIC ACID 1 MG: 5 INJECTION, SOLUTION INTRAMUSCULAR; INTRAVENOUS; SUBCUTANEOUS at 20:52

## 2023-09-09 RX ADMIN — THIAMINE HYDROCHLORIDE 200 MG: 200 INJECTION, SOLUTION INTRAMUSCULAR; INTRAVENOUS at 20:43

## 2023-09-09 RX ADMIN — POTASSIUM CHLORIDE 40 MEQ: 10 CAPSULE, COATED, EXTENDED RELEASE ORAL at 22:18

## 2023-09-09 RX ADMIN — SODIUM CHLORIDE 1000 ML: 9 INJECTION, SOLUTION INTRAVENOUS at 20:05

## 2023-09-10 LAB
CREAT UR-MCNC: 36.1 MG/DL
OSMOLALITY SERPL: 259 MOSM/KG (ref 280–301)
OSMOLALITY UR: 210 MOSM/KG

## 2023-09-12 DIAGNOSIS — E87.1 HYPONATREMIA: Primary | ICD-10-CM

## 2023-09-13 DIAGNOSIS — I10 PRIMARY HYPERTENSION: ICD-10-CM

## 2023-09-13 DIAGNOSIS — I70.90 ARTERIOSCLEROTIC VASCULAR DISEASE: ICD-10-CM

## 2023-09-13 RX ORDER — TRANDOLAPRIL TABLETS 4 MG/1
4 TABLET ORAL DAILY
Qty: 90 TABLET | Refills: 3 | OUTPATIENT
Start: 2023-09-13

## 2023-09-13 RX ORDER — ASPIRIN 81 MG/1
TABLET, COATED ORAL
Qty: 90 TABLET | Refills: 3 | Status: SHIPPED | OUTPATIENT
Start: 2023-09-13

## 2023-09-13 RX ORDER — BUPROPION HYDROCHLORIDE 300 MG/1
300 TABLET ORAL DAILY
Qty: 90 TABLET | Refills: 3 | Status: SHIPPED | OUTPATIENT
Start: 2023-09-13

## 2023-09-13 RX ORDER — HYDROCHLOROTHIAZIDE 25 MG/1
25 TABLET ORAL DAILY
Qty: 90 TABLET | Refills: 3 | OUTPATIENT
Start: 2023-09-13

## 2023-09-14 NOTE — ED PROVIDER NOTES
Subjective  History of Present Illness:    Chief Complaint: 78-year-old female presents with generalized weakness nausea lightheaded, poor p.o. intake Patient reports she has had GI referral with endoscopies, blood work imaging, ongoing issue for the last 2 months without definitive diagnosis  Patient had recent outpatient labs and advised to decrease her free water intake by PCP due to electrolyte derangement    Nurses Notes reviewed and agree, including vitals, allergies, social history and prior medical history.     REVIEW OF SYSTEMS: All systems reviewed and not pertinent unless noted.  Review of Systems      Positive for: Generalized weakness nausea lightheaded poor p.o. intake, nausea with eating, anorexia    Negative for: Fever flank pain GI bleeding    Past Medical History:   Diagnosis Date    Abdominal bloating     Abnormal EKG     Ankle fracture     Anxiety and depression 2004    Colon cancer screening     Colon polyp 04/2016    COVID     Disease of thyroid gland     Diverticulitis of colon     Dyspnea     Elbow pain, left     Elevated cholesterol     Fatigue     Fatty infiltration of liver 10/12/2020    Fatty liver [id number]    Gastritis     GERD (gastroesophageal reflux disease)     H/O echocardiogram Unknown    Normal per pt.    H/O exercise stress test 2016    Normal result per pt.    H/O mammogram     H/O sinusitis     H/O: pneumonia     Heart palpitations 2014    Heartburn     Hemorrhoid     History of Holter monitoring 2014    Found to be normal per pt.    Hypertension     Plantar fasciitis     Sebaceous cyst     Sinusitis     Skin cancer        Allergies:    Patient has no known allergies.      Past Surgical History:   Procedure Laterality Date    CARDIAC CATHETERIZATION  2003    Negative per pt.    CATARACT EXTRACTION W/ INTRAOCULAR LENS IMPLANT Left 3/9/2023    Procedure: CATARACT PHACO EXTRACTION WITH INTRAOCULAR LENS IMPLANT LEFT;  Surgeon: Dain Lyon MD;  Location: [facility] OR;   Service: Ophthalmology;  Laterality: Left;    CATARACT EXTRACTION W/ INTRAOCULAR LENS IMPLANT Right 3/23/2023    Procedure: CATARACT PHACO EXTRACTION WITH INTRAOCULAR LENS IMPLANT RIGHT;  Surgeon: Dain Lyon MD;  Location: [facility] OR;  Service: Ophthalmology;  Laterality: Right;    COLONOSCOPY  2016    COLONOSCOPY N/A 2021    Procedure: COLONOSCOPY;  Surgeon: Kimmie Chapa MD;  Location: [facility] ENDOSCOPY;  Service: Gastroenterology;  Laterality: N/A;    COLONOSCOPY W/ POLYPECTOMY      ENDOSCOPY      ENDOSCOPY N/A 2022    Procedure: ESOPHAGOGASTRODUODENOSCOPY WITH BIOPSY;  Surgeon: Kimmie Chapa MD;  Location: [facility] ENDOSCOPY;  Service: Gastroenterology;  Laterality: N/A;    SKIN BIOPSY      TUBAL ABDOMINAL LIGATION      UPPER GASTROINTESTINAL ENDOSCOPY  2016         Social History     Socioeconomic History    Marital status:    Tobacco Use    Smoking status: Former     Packs/day: 0.25     Years: 15.00     Pack years: 3.75     Types: Cigarettes     Quit date:      Years since quittin.7     Passive exposure: Never    Smokeless tobacco: Never    Tobacco comments:     Quit in    Vaping Use    Vaping Use: Never used   Substance and Sexual Activity    Alcohol use: Yes     Alcohol/week: 3.0 standard drinks     Types: 1 Glasses of wine, 2 Cans of beer per week     Comment: social    Drug use: No    Sexual activity: Defer         Family History   Problem Relation Age of Onset    Heart attack Other     Arthritis Other     Cancer Other     Diabetes Other     Hypertension Other     Stroke Other     Cancer Mother     Breast cancer Mother 70    Heart disease Mother     Emphysema Father     Colon polyps Neg Hx     Esophageal cancer Neg Hx     Irritable bowel syndrome Neg Hx     Liver cancer Neg Hx     Liver disease Neg Hx     Stomach cancer Neg Hx     Colon cancer Neg Hx     Ovarian cancer Neg Hx        Objective  Physical Exam:  /82   Pulse 82   Temp  "97.6 °F (36.4 °C) (Oral)   Resp 18   Ht 165.1 cm (65\")   Wt 89.1 kg (196 lb 6.4 oz)   SpO2 99%   BMI 32.68 kg/m²      Physical Exam    CONSTITUTIONAL: Well developed, healthy-appearing nontoxic 78-year-old female,  in no acute distress.  VITAL SIGNS: per nursing, reviewed and noted  SKIN: exposed skin with no rashes, ulcerations or petechiae  EYES: Grossly EOMI, no icterus  ENT: Normal voice.  Moist mucous membranes   RESPIRATORY:  No increased work of breathing. No retractions.   CARDIOVASCULAR:   Extremities pink and warm.  Good cap refill to extremities.   GI: Abdomen without distention   MUSCULOSKELETAL: Age-appropriate bulk and tone, moves all 4 extremities  NEUROLOGIC: Alert, oriented x 3. No gross deficits. GCS 15.   PSYCH: appropriate affect.  : no bladder tenderness or distention, no CVA tenderness    Procedures    ED Course:    Lab Results (last 24 hours)       ** No results found for the last 24 hours. **             No radiology results from the last 24 hrs     MDM     Amount and/or Complexity of Data Reviewed  Clinical lab tests: reviewed             Medical Decision Making:    Initial impression of presenting illness: Generalized weakness nausea lightheaded poor p.o. intake anorexia.  Patient concerned that she is not getting enough nutrients.  Requesting fluids.    DDX: includes but is not limited to: Fever GI bleeding abdominal pain         Patient arrives healthy-appearing nontoxic 78-year-old female with vitals interpreted by myself.     Pertinent features from physical exam: No acute distress.    Initial diagnostic plan: CBC CMP magnesium    Results from initial plan were reviewed and interpreted by me revealing mild leukopenia no anemia, sodium 123 potassium 3, added random urine creatinine, osmolality, urine sodium, serum osmolality,    Diagnostic information from other sources: Family member at the bedside    Interventions / Re-evaluation: Banana bag, oral potassium " replacement    Results/clinical rationale were discussed with patient family member    Consultations/Discussion of results with other physicians: Dr. Tsang, will will admit,    Disposition plan: Discussed potential admission versus home treatment, patient elected for home treatment with close follow-up.  Deferred imaging.  Return precautions discussed.  -----    Prescription potassium, counseled on  free water restriction restriction,  Final diagnoses:   Hyponatremia   Hypokalemia            Nick Orellana, DO  09/14/23 0405

## 2023-09-18 ENCOUNTER — OFFICE VISIT (OUTPATIENT)
Dept: INTERNAL MEDICINE | Facility: CLINIC | Age: 78
End: 2023-09-18
Payer: MEDICARE

## 2023-09-18 VITALS
OXYGEN SATURATION: 95 % | BODY MASS INDEX: 33.02 KG/M2 | HEART RATE: 70 BPM | SYSTOLIC BLOOD PRESSURE: 122 MMHG | WEIGHT: 198.2 LBS | TEMPERATURE: 97.1 F | HEIGHT: 65 IN | DIASTOLIC BLOOD PRESSURE: 88 MMHG

## 2023-09-18 DIAGNOSIS — R10.9 ABDOMINAL PRESSURE: ICD-10-CM

## 2023-09-18 DIAGNOSIS — R14.0 BLOATING: Primary | ICD-10-CM

## 2023-09-18 DIAGNOSIS — R11.0 NAUSEA: ICD-10-CM

## 2023-09-18 DIAGNOSIS — R14.2 BURPING: ICD-10-CM

## 2023-09-18 PROCEDURE — 3079F DIAST BP 80-89 MM HG: CPT | Performed by: STUDENT IN AN ORGANIZED HEALTH CARE EDUCATION/TRAINING PROGRAM

## 2023-09-18 PROCEDURE — 99213 OFFICE O/P EST LOW 20 MIN: CPT | Performed by: STUDENT IN AN ORGANIZED HEALTH CARE EDUCATION/TRAINING PROGRAM

## 2023-09-18 PROCEDURE — 3074F SYST BP LT 130 MM HG: CPT | Performed by: STUDENT IN AN ORGANIZED HEALTH CARE EDUCATION/TRAINING PROGRAM

## 2023-09-18 RX ORDER — METOCLOPRAMIDE 10 MG/1
10 TABLET ORAL 4 TIMES DAILY PRN
Qty: 120 TABLET | Refills: 0 | Status: SHIPPED | OUTPATIENT
Start: 2023-09-18

## 2023-09-18 NOTE — PROGRESS NOTES
Subjective   Deya Hernandez is a 78 y.o. female.     History of Present Illness  Patient presents with complaints of constant nausea.  States she always feels like her stomach is full even though she has not eaten.  Very decreased appetite.  Weight loss.  Abdominal bloating and pressure.  Intermittent constipation.  .  H. pylori was negative.  Has appointment with GI set up in December.  States she has felt some better since starting the Pepcid, however symptoms are still very present.  States that she feels like everything is sitting on her stomach and not passing through.  Has been trying to cut out dairy and gluten however she states that the gluten-free food tastes very bad since having that H. pylori test and has not been able to stick to eating it.    The following portions of the patient's history were reviewed and updated as appropriate: allergies, current medications, past family history, past medical history, past social history, past surgical history, and problem list.    Review of Systems   All other systems reviewed and are negative.    Objective   Physical Exam  Vitals and nursing note reviewed.   Constitutional:       Appearance: Normal appearance.   HENT:      Head: Normocephalic and atraumatic.      Right Ear: External ear normal.      Left Ear: External ear normal.      Nose: Nose normal.      Mouth/Throat:      Mouth: Mucous membranes are moist.      Pharynx: Oropharynx is clear. No oropharyngeal exudate or posterior oropharyngeal erythema.   Eyes:      Extraocular Movements: Extraocular movements intact.      Conjunctiva/sclera: Conjunctivae normal.      Pupils: Pupils are equal, round, and reactive to light.   Cardiovascular:      Rate and Rhythm: Normal rate and regular rhythm.      Pulses: Normal pulses.      Heart sounds: Normal heart sounds.   Pulmonary:      Effort: Pulmonary effort is normal.      Breath sounds: Normal breath sounds.   Abdominal:      General: Abdomen is flat. Bowel  sounds are normal.      Palpations: Abdomen is soft.   Musculoskeletal:         General: Normal range of motion.      Cervical back: Normal range of motion.   Skin:     General: Skin is warm.      Capillary Refill: Capillary refill takes less than 2 seconds.   Neurological:      General: No focal deficit present.      Mental Status: She is alert and oriented to person, place, and time. Mental status is at baseline.   Psychiatric:         Mood and Affect: Mood normal.         Behavior: Behavior normal.         Thought Content: Thought content normal.         Judgment: Judgment normal.       Assessment & Plan   Diagnoses and all orders for this visit:    1. Bloating (Primary)  -     metoclopramide (Reglan) 10 MG tablet; Take 1 tablet by mouth 4 (Four) Times a Day As Needed (before meals).  Dispense: 120 tablet; Refill: 0    2. Nausea  -     metoclopramide (Reglan) 10 MG tablet; Take 1 tablet by mouth 4 (Four) Times a Day As Needed (before meals).  Dispense: 120 tablet; Refill: 0    3. Abdominal pressure  -     metoclopramide (Reglan) 10 MG tablet; Take 1 tablet by mouth 4 (Four) Times a Day As Needed (before meals).  Dispense: 120 tablet; Refill: 0    4. Burping  -     metoclopramide (Reglan) 10 MG tablet; Take 1 tablet by mouth 4 (Four) Times a Day As Needed (before meals).  Dispense: 120 tablet; Refill: 0    Start Reglan before meals.  Counseled to take about 30 minutes before she eats to help with possible gastroparesis and to help her keep food down  Keep follow-up with GI  Ultrasound is scheduled for Thursday we will be awaiting results  Continue monitoring diet, not eating any high fat or spicy foods.  Continue drinking electrolyte rich robles.  Labs 3 days ago are back to normal.  Sodium and potassium levels were within normal range.

## 2023-09-20 RX ORDER — TRANDOLAPRIL TABLETS 4 MG/1
4 TABLET ORAL DAILY
Qty: 90 TABLET | Refills: 1 | Status: SHIPPED | OUTPATIENT
Start: 2023-09-20

## 2023-09-21 ENCOUNTER — HOSPITAL ENCOUNTER (OUTPATIENT)
Dept: ULTRASOUND IMAGING | Facility: HOSPITAL | Age: 78
Discharge: HOME | End: 2023-09-21
Admitting: NURSE PRACTITIONER
Payer: MEDICARE

## 2023-09-21 DIAGNOSIS — K76.0 FATTY INFILTRATION OF LIVER: Chronic | ICD-10-CM

## 2023-09-21 DIAGNOSIS — K59.00 CONSTIPATION, UNSPECIFIED CONSTIPATION TYPE: Chronic | ICD-10-CM

## 2023-09-21 DIAGNOSIS — R11.0 NAUSEA: Chronic | ICD-10-CM

## 2023-09-21 PROCEDURE — 76700 US EXAM ABDOM COMPLETE: CPT

## 2023-10-20 ENCOUNTER — OFFICE VISIT (OUTPATIENT)
Dept: INTERNAL MEDICINE | Facility: CLINIC | Age: 78
End: 2023-10-20
Payer: MEDICARE

## 2023-10-20 VITALS
OXYGEN SATURATION: 100 % | WEIGHT: 200.4 LBS | TEMPERATURE: 97.1 F | DIASTOLIC BLOOD PRESSURE: 72 MMHG | BODY MASS INDEX: 33.39 KG/M2 | SYSTOLIC BLOOD PRESSURE: 122 MMHG | HEIGHT: 65 IN | HEART RATE: 72 BPM

## 2023-10-20 DIAGNOSIS — Z23 NEED FOR INFLUENZA VACCINATION: ICD-10-CM

## 2023-10-20 DIAGNOSIS — Z00.00 MEDICARE ANNUAL WELLNESS VISIT, SUBSEQUENT: ICD-10-CM

## 2023-10-20 DIAGNOSIS — Z78.0 ASYMPTOMATIC MENOPAUSE: Primary | ICD-10-CM

## 2023-10-20 NOTE — PROGRESS NOTES
The ABCs of the Annual Wellness Visit  Subsequent Medicare Wellness Visit    Subjective      Deya Hernandez is a 78 y.o. female who presents for a Subsequent Medicare Wellness Visit.    The following portions of the patient's history were reviewed and   updated as appropriate: allergies, current medications, past family history, past medical history, past social history, past surgical history, and problem list.    Compared to one year ago, the patient feels her physical   health is the same.    Compared to one year ago, the patient feels her mental   health is the same.    Recent Hospitalizations:  She was not admitted to the hospital during the last year.       Current Medical Providers:  Patient Care Team:  Rosaline Roberson,  as PCP - General (Family Medicine)    Outpatient Medications Prior to Visit   Medication Sig Dispense Refill    ALPRAZolam (XANAX) 0.5 MG tablet Take 1 tablet by mouth 2 (Two) Times a Day As Needed for Anxiety. 30 tablet 0    aluminum-magnesium hydroxide-simethicone (Mylanta) 200-200-20 MG/5ML suspension Take  by mouth Every 6 (Six) Hours As Needed for Indigestion or Heartburn.      amLODIPine (NORVASC) 5 MG tablet Take 1 tablet by mouth Daily. 90 tablet 3    Aspirin Low Dose 81 MG EC tablet TAKE 1 TABLET BY MOUTH DAILY 90 tablet 3    buPROPion XL (WELLBUTRIN XL) 300 MG 24 hr tablet TAKE 1 TABLET BY MOUTH DAILY 90 tablet 3    Calcium Carbonate Antacid (TUMS PO) Take  by mouth As Needed.      carvedilol (COREG) 12.5 MG tablet TAKE 1 TABLET BY MOUTH TWICE DAILY WITH MEALS (Patient taking differently: Take 1 tablet by mouth 2 (Two) Times a Day With Meals.) 180 tablet 3    diclofenac (VOLTAREN) 1 % gel gel Apply 4 g topically to the appropriate area as directed 4 (Four) Times a Day As Needed (pain). 100 g 0    famotidine (PEPCID) 20 MG tablet TAKE 1 TABLET BY MOUTH AT NIGHT AS NEEDED FOR INDIGESTION 90 tablet 0    levothyroxine (SYNTHROID, LEVOTHROID) 100 MCG tablet Take 1 tablet by  mouth Daily. 90 tablet 1    ondansetron (ZOFRAN) 4 MG tablet Take 1 tablet by mouth Every 8 (Eight) Hours As Needed for Nausea or Vomiting. 30 tablet 1    pantoprazole (PROTONIX) 40 MG EC tablet 1 po daily in the am 30 minutes before breakfast 30 tablet 3    potassium chloride 10 MEQ CR tablet Take 1 tablet by mouth Daily. 30 tablet 0    Simethicone (GAS-X PO) Take  by mouth As Needed.      simvastatin (ZOCOR) 40 MG tablet TAKE 1 TABLET BY MOUTH EVERY NIGHT AT BEDTIME 90 tablet 3    trandolapril (MAVIK) 4 MG tablet Take 1 tablet by mouth Daily. 90 tablet 1    metoclopramide (Reglan) 10 MG tablet Take 1 tablet by mouth 4 (Four) Times a Day As Needed (before meals). 120 tablet 0     No facility-administered medications prior to visit.       No opioid medication identified on active medication list. I have reviewed chart for other potential  high risk medication/s and harmful drug interactions in the elderly.        Aspirin is on active medication list. Aspirin use is indicated based on review of current medical condition/s. Pros and cons of this therapy have been discussed today. Benefits of this medication outweigh potential harm.  Patient has been encouraged to continue taking this medication.  .      Patient Active Problem List   Diagnosis    Hypertension    Hypothyroidism    Osteoarthritis of knee    Hyperlipidemia    Arteriosclerotic vascular disease    Burping    Diverticulosis of large intestine without hemorrhage    Internal hemorrhoids    Vascular ectasia of colon    Depression with anxiety    Abdominal pressure    Fatty infiltration of liver    Class 1 obesity due to excess calories without serious comorbidity with body mass index (BMI) of 34.0 to 34.9 in adult    Personal history of colonic polyps    Vitamin D deficiency, unspecified     Hyponatremia    Bloating    Nuclear sclerotic cataract of left eye    Nausea    Constipation     Advance Care Planning   Advance Care Planning     Advance Directive is not  "on file.  ACP discussion was held with the patient during this visit. Patient does not have an advance directive, information provided.     Objective    Vitals:    10/20/23 1428   BP: 122/72   Pulse: 72   Temp: 97.1 °F (36.2 °C)   TempSrc: Infrared   SpO2: 100%   Weight: 90.9 kg (200 lb 6.4 oz)   Height: 165.1 cm (65\")     Estimated body mass index is 33.35 kg/m² as calculated from the following:    Height as of this encounter: 165.1 cm (65\").    Weight as of this encounter: 90.9 kg (200 lb 6.4 oz).    BMI is >= 30 and <35. (Class 1 Obesity). The following options were offered after discussion;: exercise counseling/recommendations and nutrition counseling/recommendations      Does the patient have evidence of cognitive impairment?   No    Lab Results   Component Value Date    CHLPL 159 2023    TRIG 92 2023    HDL 66 (H) 2023    LDL 76 2023    VLDL 17 2023          HEALTH RISK ASSESSMENT    Smoking Status:  Social History     Tobacco Use   Smoking Status Former    Packs/day: 0.25    Years: 15.00    Additional pack years: 0.00    Total pack years: 3.75    Types: Cigarettes    Quit date:     Years since quittin.8    Passive exposure: Never   Smokeless Tobacco Never   Tobacco Comments    Never a heavy smoker     Alcohol Consumption:  Social History     Substance and Sexual Activity   Alcohol Use Yes    Alcohol/week: 2.0 standard drinks of alcohol    Types: 1 Glasses of wine, 1 Cans of beer per week    Comment: social     Fall Risk Screen:    STEADI Fall Risk Assessment was completed, and patient is at LOW risk for falls.Assessment completed on:10/20/2023    Depression Screening:      10/20/2023     2:25 PM   PHQ-2/PHQ-9 Depression Screening   Little Interest or Pleasure in Doing Things 0-->not at all   Feeling Down, Depressed or Hopeless 0-->not at all   PHQ-9: Brief Depression Severity Measure Score 0       Health Habits and Functional and Cognitive Screening:      10/20/2023     " 2:26 PM   Functional & Cognitive Status   Do you have difficulty preparing food and eating? No   Do you have difficulty bathing yourself, getting dressed or grooming yourself? No   Do you have difficulty using the toilet? No   Do you have difficulty moving around from place to place? No   Do you have trouble with steps or getting out of a bed or a chair? No   Current Diet Well Balanced Diet   Dental Exam Up to date   Eye Exam Not up to date   Exercise (times per week) 0 times per week   Current Exercises Include No Regular Exercise   Do you need help using the phone?  No   Are you deaf or do you have serious difficulty hearing?  No   Do you need help to go to places out of walking distance? No   Do you need help shopping? No   Do you need help preparing meals?  No   Do you need help with housework?  No   Do you need help with laundry? No   Do you need help taking your medications? No   Do you need help managing money? No   Do you ever drive or ride in a car without wearing a seat belt? No   Have you felt unusual stress, anger or loneliness in the last month? No   Who do you live with? Alone   If you need help, do you have trouble finding someone available to you? No   Have you been bothered in the last four weeks by sexual problems? No   Do you have difficulty concentrating, remembering or making decisions? No       Age-appropriate Screening Schedule:  Refer to the list below for future screening recommendations based on patient's age, sex and/or medical conditions. Orders for these recommended tests are listed in the plan section. The patient has been provided with a written plan.    Health Maintenance   Topic Date Due    BMI FOLLOWUP  Never done    ANNUAL WELLNESS VISIT  07/26/2023    INFLUENZA VACCINE  08/01/2023    DXA SCAN  08/30/2023    TDAP/TD VACCINES (2 - Td or Tdap) 10/20/2024 (Originally 7/3/2023)    COVID-19 Vaccine (4 - 2023-24 season) 12/12/2112 (Originally 9/1/2023)    LIPID PANEL  08/18/2024     COLORECTAL CANCER SCREENING  11/17/2031    HEPATITIS C SCREENING  Completed    Pneumococcal Vaccine 65+  Completed    ZOSTER VACCINE  Completed                  CMS Preventative Services Quick Reference  Risk Factors Identified During Encounter:    Immunizations Discussed/Encouraged: Influenza and COVID19  Dental Screening Recommended  Vision Screening Recommended    The above risks/problems have been discussed with the patient.  Pertinent information has been shared with the patient in the After Visit Summary.    Diagnoses and all orders for this visit:    1. Asymptomatic menopause (Primary)  -     DEXA Bone Density Axial    2. Need for influenza vaccination  -     Fluzone High-Dose 65+yrs (2341-6482)    3. Medicare annual wellness visit, subsequent  -     Comprehensive Metabolic Panel        Follow Up:   Next Medicare Wellness visit to be scheduled in 1 year.      An After Visit Summary and PPPS were made available to the patient.

## 2023-10-21 LAB
ALBUMIN SERPL-MCNC: 4.5 G/DL (ref 3.5–5.2)
ALBUMIN/GLOB SERPL: 2.5 G/DL
ALP SERPL-CCNC: 46 U/L (ref 39–117)
ALT SERPL-CCNC: 15 U/L (ref 1–33)
AST SERPL-CCNC: 19 U/L (ref 1–32)
BILIRUB SERPL-MCNC: 0.4 MG/DL (ref 0–1.2)
BUN SERPL-MCNC: 9 MG/DL (ref 8–23)
BUN/CREAT SERPL: 10 (ref 7–25)
CALCIUM SERPL-MCNC: 9.6 MG/DL (ref 8.6–10.5)
CHLORIDE SERPL-SCNC: 101 MMOL/L (ref 98–107)
CO2 SERPL-SCNC: 27.7 MMOL/L (ref 22–29)
CREAT SERPL-MCNC: 0.9 MG/DL (ref 0.57–1)
EGFRCR SERPLBLD CKD-EPI 2021: 66.4 ML/MIN/1.73
GLOBULIN SER CALC-MCNC: 1.8 GM/DL
GLUCOSE SERPL-MCNC: 76 MG/DL (ref 65–99)
POTASSIUM SERPL-SCNC: 3.8 MMOL/L (ref 3.5–5.2)
PROT SERPL-MCNC: 6.3 G/DL (ref 6–8.5)
SODIUM SERPL-SCNC: 138 MMOL/L (ref 136–145)

## 2023-10-31 DIAGNOSIS — R10.9 ABDOMINAL PRESSURE: ICD-10-CM

## 2023-10-31 RX ORDER — PANTOPRAZOLE SODIUM 40 MG/1
TABLET, DELAYED RELEASE ORAL
Qty: 30 TABLET | Refills: 3 | Status: SHIPPED | OUTPATIENT
Start: 2023-10-31

## 2023-11-03 ENCOUNTER — APPOINTMENT (OUTPATIENT)
Dept: BONE DENSITY | Facility: HOSPITAL | Age: 78
End: 2023-11-03
Payer: MEDICARE

## 2023-11-03 PROCEDURE — 77080 DXA BONE DENSITY AXIAL: CPT

## 2023-12-12 ENCOUNTER — OFFICE VISIT (OUTPATIENT)
Dept: GASTROENTEROLOGY | Facility: CLINIC | Age: 78
End: 2023-12-12
Payer: MEDICARE

## 2023-12-12 VITALS
OXYGEN SATURATION: 99 % | BODY MASS INDEX: 33.66 KG/M2 | HEIGHT: 65 IN | DIASTOLIC BLOOD PRESSURE: 80 MMHG | HEART RATE: 70 BPM | SYSTOLIC BLOOD PRESSURE: 122 MMHG | RESPIRATION RATE: 16 BRPM | WEIGHT: 202 LBS

## 2023-12-12 DIAGNOSIS — K59.00 CONSTIPATION, UNSPECIFIED CONSTIPATION TYPE: Chronic | ICD-10-CM

## 2023-12-12 DIAGNOSIS — R10.9 ABDOMINAL PRESSURE: Chronic | ICD-10-CM

## 2023-12-12 DIAGNOSIS — R11.0 NAUSEA: Primary | Chronic | ICD-10-CM

## 2023-12-12 DIAGNOSIS — R14.2 BURPING: Chronic | ICD-10-CM

## 2023-12-12 DIAGNOSIS — R14.0 BLOATING: Chronic | ICD-10-CM

## 2023-12-12 DIAGNOSIS — K58.1 IRRITABLE BOWEL SYNDROME WITH CONSTIPATION: Chronic | ICD-10-CM

## 2023-12-12 PROCEDURE — 99214 OFFICE O/P EST MOD 30 MIN: CPT | Performed by: NURSE PRACTITIONER

## 2023-12-12 PROCEDURE — 1160F RVW MEDS BY RX/DR IN RCRD: CPT | Performed by: NURSE PRACTITIONER

## 2023-12-12 PROCEDURE — 1159F MED LIST DOCD IN RCRD: CPT | Performed by: NURSE PRACTITIONER

## 2023-12-12 PROCEDURE — 3074F SYST BP LT 130 MM HG: CPT | Performed by: NURSE PRACTITIONER

## 2023-12-12 PROCEDURE — 3079F DIAST BP 80-89 MM HG: CPT | Performed by: NURSE PRACTITIONER

## 2023-12-12 RX ORDER — CARVEDILOL 12.5 MG/1
TABLET ORAL
Qty: 180 TABLET | Refills: 3 | Status: SHIPPED | OUTPATIENT
Start: 2023-12-12

## 2023-12-12 RX ORDER — PANTOPRAZOLE SODIUM 40 MG/1
TABLET, DELAYED RELEASE ORAL
Qty: 90 TABLET | Refills: 1 | Status: SHIPPED | OUTPATIENT
Start: 2023-12-12

## 2023-12-12 RX ORDER — SIMVASTATIN 40 MG
40 TABLET ORAL
Qty: 90 TABLET | Refills: 3 | Status: SHIPPED | OUTPATIENT
Start: 2023-12-12

## 2023-12-12 NOTE — PATIENT INSTRUCTIONS
Antireflux measures: Avoid fried, fatty foods, alcohol, chocolate, coffee, tea,  soft drinks, peppermint and spearmint, spicy foods, tomatoes and tomato based foods, onion based foods, and smoking.  Other antireflux measures include weight reduction if overweight, avoiding tight clothing around the abdomen, elevating the head of the bed 6 inches with blocks under the head board, and don't drink or eat before going to bed and avoid lying down immediately after meals.  Pantoprazole 40 mg 1 po daily in the am 30 minutes before breakfast.  Recommended to take Levothyroxine first in the am upon waking, wait 30 minutes, then take Pantoprazole 40 mg, wait 30 minutes, then eat breakfast and take other medications.  Zofran 4 mg 1 po every 8 hours as needed for nausea.   The patient should eat 4-5 very small meals throughout the day. Avoid large meals.  It is recommended to eat a softer diet. Meats are best consumed ground. Fruits and vegetables are best consumed cooked or steamed and then mashed.   Low fiber, low fat diet with liberal water intake. May use soluble fiber.  Low FODMAP diet - avoid all dairy. May use lactose free/dairy free alternatives such as almond milk, rice milk, oat milk, etc.   Metamucil 1 packet/scoop daily or fiber gummies 2-4 per day.   Miralax 17 grams daily as needed for constipation.   Gas-X as needed.  Colonoscopy for screening in 2031.  Follow up: 6 months     Low-FODMAP Eating Plan    FODMAP stands for fermentable oligosaccharides, disaccharides, monosaccharides, and polyols. These are sugars that are hard for some people to digest. A low-FODMAP eating plan may help some people who have irritable bowel syndrome (IBS) and certain other bowel (intestinal) diseases to manage their symptoms.  This meal plan can be complicated to follow. Work with a diet and nutrition specialist (dietitian) to make a low-FODMAP eating plan that is right for you. A dietitian can help make sure that you get enough  nutrition from this diet.  What are tips for following this plan?  Reading food labels  Check labels for hidden FODMAPs such as:  High-fructose syrup.  Honey.  Agave.  Natural fruit flavors.  Onion or garlic powder.  Choose low-FODMAP foods that contain 3-4 grams of fiber per serving.  Check food labels for serving sizes. Eat only one serving at a time to make sure FODMAP levels stay low.  Shopping  Shop with a list of foods that are recommended on this diet and make a meal plan.  Meal planning  Follow a low-FODMAP eating plan for up to 6 weeks, or as told by your health care provider or dietitian.  To follow the eating plan:  Eliminate high-FODMAP foods from your diet completely. Choose only low-FODMAP foods to eat. You will do this for 2-6 weeks.  Gradually reintroduce high-FODMAP foods into your diet one at a time. Most people should wait a few days before introducing the next new high-FODMAP food into their meal plan. Your dietitian can recommend how quickly you may reintroduce foods.  Keep a daily record of what and how much you eat and drink. Make note of any symptoms that you have after eating.  Review your daily record with a dietitian regularly to identify which foods you can eat and which foods you should avoid.  General tips  Drink enough fluid each day to keep your urine pale yellow.  Avoid processed foods. These often have added sugar and may be high in FODMAPs.  Avoid most dairy products, whole grains, and sweeteners.  Work with a dietitian to make sure you get enough fiber in your diet.  Avoid high FODMAP foods at meals to manage symptoms.     Recommended foods  Fruits  Bananas, oranges, tangerines, gaurav, limes, blueberries, raspberries, strawberries, grapes, cantaloupe, honeydew melon, kiwi, papaya, passion fruit, and pineapple. Limited amounts of dried cranberries, banana chips, and shredded coconut.  Vegetables  Eggplant, zucchini, cucumber, peppers, green beans, bean sprouts, lettuce, arugula,  "kale, Swiss chard, spinach, edgard greens, bok quan, summer squash, potato, and tomato. Limited amounts of corn, carrot, and sweet potato. Green parts of scallions.  Grains  Gluten-free grains, such as rice, oats, buckwheat, quinoa, corn, polenta, and millet. Gluten-free pasta, bread, or cereal. Rice noodles. Corn tortillas.  Meats and other proteins  Unseasoned beef, pork, poultry, or fish. Eggs. Kc. Tofu (firm) and tempeh. Limited amounts of nuts and seeds, such as almonds, walnuts, brazil nuts, pecans, peanuts, nut butters, pumpkin seeds, grecia seeds, and sunflower seeds.  Dairy  Lactose-free milk, yogurt, and kefir. Lactose-free cottage cheese and ice cream. Non-dairy milks, such as almond, coconut, hemp, and rice milk. Non-dairy yogurt. Limited amounts of goat cheese, brie, mozzarella, parmesan, swiss, and other hard cheeses.  Fats and oils  Butter-free spreads. Vegetable oils, such as olive, canola, and sunflower oil.  Seasoning and other foods  Artificial sweeteners with names that do not end in \"ol,\" such as aspartame, saccharine, and stevia. Maple syrup, white table sugar, raw sugar, brown sugar, and molasses. Mayonnaise, soy sauce, and tamari. Fresh basil, coriander, parsley, rosemary, and thyme.  Beverages  Water and mineral water. Sugar-sweetened soft drinks. Small amounts of orange juice or cranberry juice. Black and green tea. Most dry dayami. Coffee.  The items listed above may not be a complete list of foods and beverages you can eat. Contact a dietitian for more information.     Foods to avoid  Fruits  Fresh, dried, and juiced forms of apple, pear, watermelon, peach, plum, cherries, apricots, blackberries, boysenberries, figs, nectarines, and anayeli. Avocado.  Vegetables  Chicory root, artichoke, asparagus, cabbage, snow peas, [place] sprouts, broccoli, sugar snap peas, mushrooms, celery, and cauliflower. Onions, garlic, leeks, and the white part of scallions.  Grains  Wheat, including kamut, " durum, and semolina. Barley and bulgur. Couscous. Wheat-based cereals. Wheat noodles, bread, crackers, and pastries.  Meats and other proteins  Fried or fatty meat. Sausage. Cashews and pistachios. Soybeans, baked beans, black beans, chickpeas, kidney beans, jennifer beans, navy beans, lentils, black-eyed peas, and split peas.  Dairy  Milk, yogurt, ice cream, and soft cheese. Cream and sour cream. Milk-based sauces. Custard. Buttermilk. Soy milk.  Seasoning and other foods  Any sugar-free gum or candy. Foods that contain artificial sweeteners such as sorbitol, mannitol, isomalt, or xylitol. Foods that contain honey, high-fructose corn syrup, or agave. Bouillon, vegetable stock, beef stock, and chicken stock. Garlic and onion powder. Condiments made with onion, such as hummus, chutney, pickles, relish, salad dressing, and salsa. Tomato paste.  Beverages  Chicory-based drinks. Coffee substitutes. Chamomile tea. Fennel tea. Sweet or fortified dayami such as port or danitza. Diet soft drinks made with isomalt, mannitol, maltitol, sorbitol, or xylitol. Apple, pear, and anayeli juice. Juices with high-fructose corn syrup.  The items listed above may not be a complete list of foods and beverages you should avoid. Contact a dietitian for more information.     Summary  FODMAP stands for fermentable oligosaccharides, disaccharides, monosaccharides, and polyols. These are sugars that are hard for some people to digest.  A low-FODMAP eating plan is a short-term diet that helps to ease symptoms of certain bowel diseases.  The eating plan usually lasts up to 6 weeks. After that, high-FODMAP foods are reintroduced gradually and one at a time. This can help you find out which foods may be causing symptoms.  A low-FODMAP eating plan can be complicated. It is best to work with a dietitian who has experience with this type of plan.  This information is not intended to replace advice given to you by your health care provider. Make sure you  discuss any questions you have with your health care provider.  Document Revised: 05/06/2021 Document Reviewed: 05/06/2021  [employer] Patient Education © 2023 [employer]

## 2023-12-12 NOTE — PROGRESS NOTES
"     Follow Up Note     Date: 2023   Patient Name: Deya Hernandez  MRN: [id number]  : 1945     Primary Care Provider: Rosaline Roberson DO     Chief Complaint   Patient presents with    Nausea     History of present illness:   2023  Deya Hernandez is a 78 y.o. female who is here today for follow up for nausea. She has been feeling much better since her last visit. She is taking Pantoprazole 40 mg daily and it helps. Bowel movements are more regular. Denies any GI bleeding.     Interval History:  2023  Deya Hernandez is a 78 y.o. female who is here today for follow up for nausea. She drank 1/2 of a yvrose on  and since that time, she has had severe nausea. It comes and goes. Some days it is constant and lasts all day, other days she has a few hours when she doesn't have nausea. She feels she needs to vomit, but can't. She started taking the Pantoprazole 2 weeks and nausea may be a little bit better, but not significantly. Denies melena, hematemesis or hematochezia. She did not have a bowel movement for about 8 days after symptoms started. This is a change in bowel habits. Bowels are getting a little better now, she is having more regular bowel movements, but is not having very large stools. She has periumbilical abdominal discomfort, but no significant pain.       2020  For the past year or so, the patient will have periumbilical abdominal pain and bloating. The pain is described as a pressure in her abdomen and is very mild. The pressure may last a few days, then \"go away\" for a few days or even a month or so. Burping or passing gas improves the pressure. The patient denies nausea or vomiting. There is no history of heartburn or reflux. She had taken Pantoprazole in the past for similar symptoms, but she stopped it over 3 years ago.     The patient has had a few episodes of diarrhea over the past 2 weeks or so. She is not sure if it was something she had eaten. " No rectal bleeding.      Last colonoscopy was 4/7/2016 with small polyps (1 tubular adenoma without dysplasia), diverticulosis, and internal hemorrhoids.     Subjective      Past Medical History:   Diagnosis Date    Abdominal bloating     Abnormal EKG     Ankle fracture     Anxiety and depression 2004    Cataract Removed Mar.,23    Colon cancer screening     Colon polyp 04/2016    COVID     Disease of thyroid gland     Diverticulitis of colon     Dyspnea     Elbow pain, left     Elevated cholesterol     Fatigue     Fatty infiltration of liver 10/12/2020    Fatty liver [id number]    Gastritis     GERD (gastroesophageal reflux disease)     H/O echocardiogram Unknown    Normal per pt.    H/O exercise stress test 2016    Normal result per pt.    H/O mammogram     H/O sinusitis     H/O: pneumonia     Heart palpitations 2014    Heartburn     Hemorrhoid     History of Holter monitoring 2014    Found to be normal per pt.    Hypertension     Plantar fasciitis     Sebaceous cyst     Sinusitis     Skin cancer      Past Surgical History:   Procedure Laterality Date    CARDIAC CATHETERIZATION  2003    Negative per pt.    CATARACT EXTRACTION W/ INTRAOCULAR LENS IMPLANT Left 3/9/2023    Procedure: CATARACT PHACO EXTRACTION WITH INTRAOCULAR LENS IMPLANT LEFT;  Surgeon: Dain Lyon MD;  Location: [facility] OR;  Service: Ophthalmology;  Laterality: Left;    CATARACT EXTRACTION W/ INTRAOCULAR LENS IMPLANT Right 3/23/2023    Procedure: CATARACT PHACO EXTRACTION WITH INTRAOCULAR LENS IMPLANT RIGHT;  Surgeon: Dain Lyon MD;  Location: [facility] OR;  Service: Ophthalmology;  Laterality: Right;    COLONOSCOPY  04/07/2016    COLONOSCOPY N/A 11/17/2021    Procedure: COLONOSCOPY;  Surgeon: Kimmie Chapa MD;  Location: [facility] ENDOSCOPY;  Service: Gastroenterology;  Laterality: N/A;    COLONOSCOPY W/ POLYPECTOMY      ENDOSCOPY  2016    ENDOSCOPY N/A 12/06/2022    Procedure: ESOPHAGOGASTRODUODENOSCOPY WITH BIOPSY;  Surgeon:  Kimmie Chapa MD;  Location: [facility] ENDOSCOPY;  Service: Gastroenterology;  Laterality: N/A;    SKIN BIOPSY      TUBAL ABDOMINAL LIGATION      UPPER GASTROINTESTINAL ENDOSCOPY  2016     Family History   Problem Relation Age of Onset    Heart attack Other     Arthritis Other     Cancer Other     Diabetes Other     Hypertension Other     Stroke Other     Cancer Mother     Breast cancer Mother 70    Heart disease Mother     Emphysema Father     Thyroid disease Sister     Colon polyps Neg Hx     Esophageal cancer Neg Hx     Irritable bowel syndrome Neg Hx     Liver cancer Neg Hx     Liver disease Neg Hx     Stomach cancer Neg Hx     Colon cancer Neg Hx     Ovarian cancer Neg Hx      Social History     Socioeconomic History    Marital status:    Tobacco Use    Smoking status: Former     Packs/day: 0.25     Years: 15.00     Additional pack years: 0.00     Total pack years: 3.75     Types: Cigarettes     Quit date:      Years since quittin.9     Passive exposure: Never    Smokeless tobacco: Never    Tobacco comments:     Never a heavy smoker   Vaping Use    Vaping Use: Never used   Substance and Sexual Activity    Alcohol use: Yes     Alcohol/week: 2.0 standard drinks of alcohol     Types: 1 Glasses of wine, 1 Cans of beer per week     Comment: social    Drug use: No    Sexual activity: Not Currently       Current Outpatient Medications:     ALPRAZolam (XANAX) 0.5 MG tablet, Take 1 tablet by mouth 2 (Two) Times a Day As Needed for Anxiety., Disp: 30 tablet, Rfl: 0    amLODIPine (NORVASC) 5 MG tablet, Take 1 tablet by mouth Daily., Disp: 90 tablet, Rfl: 3    Aspirin Low Dose 81 MG EC tablet, TAKE 1 TABLET BY MOUTH DAILY, Disp: 90 tablet, Rfl: 3    buPROPion XL (WELLBUTRIN XL) 300 MG 24 hr tablet, TAKE 1 TABLET BY MOUTH DAILY (Patient taking differently: Take 150 mg by mouth Daily. Pt, takes half), Disp: 90 tablet, Rfl: 3    carvedilol (COREG) 12.5 MG tablet, TAKE 1 TABLET BY MOUTH TWICE DAILY WITH  MEALS, Disp: 180 tablet, Rfl: 3    diclofenac (VOLTAREN) 1 % gel gel, Apply 4 g topically to the appropriate area as directed 4 (Four) Times a Day As Needed (pain)., Disp: 100 g, Rfl: 0    levothyroxine (SYNTHROID, LEVOTHROID) 100 MCG tablet, Take 1 tablet by mouth Daily., Disp: 90 tablet, Rfl: 1    pantoprazole (PROTONIX) 40 MG EC tablet, TAKE 1 TABLET BY MOUTH EVERY MORNING 30 MINUTES BEFORE BREAKFAST, Disp: 90 tablet, Rfl: 1    Simethicone (GAS-X PO), Take  by mouth As Needed., Disp: , Rfl:     simvastatin (ZOCOR) 40 MG tablet, TAKE 1 TABLET BY MOUTH EVERY NIGHT AT BEDTIME, Disp: 90 tablet, Rfl: 3    trandolapril (MAVIK) 4 MG tablet, Take 1 tablet by mouth Daily., Disp: 90 tablet, Rfl: 1    Calcium Carbonate Antacid (TUMS PO), Take  by mouth As Needed., Disp: , Rfl:     ondansetron (ZOFRAN) 4 MG tablet, Take 1 tablet by mouth Every 8 (Eight) Hours As Needed for Nausea or Vomiting. (Patient not taking: Reported on 12/12/2023), Disp: 30 tablet, Rfl: 1    No Known Allergies    The following portions of the patient's history were reviewed and updated as appropriate: allergies, current medications, past family history, past medical history, past social history, past surgical history and problem list.  Objective     Physical Exam  Vitals and nursing note reviewed.   Constitutional:       General: She is not in acute distress.     Appearance: Normal appearance. She is well-developed.   HENT:      Head: Normocephalic and atraumatic.      Mouth/Throat:      Mouth: Mucous membranes are not pale, not dry and not cyanotic.   Eyes:      General: Lids are normal.   Neck:      Trachea: Trachea normal.   Cardiovascular:      Rate and Rhythm: Normal rate.   Pulmonary:      Effort: Pulmonary effort is normal. No respiratory distress.      Breath sounds: Normal breath sounds.   Abdominal:      Tenderness: There is no abdominal tenderness.   Skin:     General: Skin is warm and dry.   Neurological:      Mental Status: She is alert  "and oriented to person, place, and time.   Psychiatric:         Mood and Affect: Mood normal.         Speech: Speech normal.         Behavior: Behavior normal. Behavior is cooperative.       Vitals:    12/12/23 1456   BP: 122/80   Pulse: 70   Resp: 16   SpO2: 99%   Weight: 91.6 kg (202 lb)   Height: 165.1 cm (65\")     Body mass index is 33.61 kg/m².     Results Review:   I reviewed the patient's new clinical results.    Office Visit on 10/20/2023   Component Date Value Ref Range Status    Glucose 10/20/2023 76  65 - 99 mg/dL Final    BUN 10/20/2023 9  8 - 23 mg/dL Final    Creatinine 10/20/2023 0.90  0.57 - 1.00 mg/dL Final    EGFR Result 10/20/2023 66.4  >60.0 mL/min/1.73 Final    BUN/Creatinine Ratio 10/20/2023 10.0  7.0 - 25.0 Final    Sodium 10/20/2023 138  136 - 145 mmol/L Final    Potassium 10/20/2023 3.8  3.5 - 5.2 mmol/L Final    Chloride 10/20/2023 101  98 - 107 mmol/L Final    Total CO2 10/20/2023 27.7  22.0 - 29.0 mmol/L Final    Calcium 10/20/2023 9.6  8.6 - 10.5 mg/dL Final    Total Protein 10/20/2023 6.3  6.0 - 8.5 g/dL Final    Albumin 10/20/2023 4.5  3.5 - 5.2 g/dL Final    Globulin 10/20/2023 1.8  gm/dL Final    A/G Ratio 10/20/2023 2.5  g/dL Final    Total Bilirubin 10/20/2023 0.4  0.0 - 1.2 mg/dL Final    Alkaline Phosphatase 10/20/2023 46  39 - 117 U/L Final    AST (SGOT) 10/20/2023 19  1 - 32 U/L Final    ALT (SGPT) 10/20/2023 15  1 - 33 U/L Final   Results Encounter on 09/17/2023   Component Date Value Ref Range Status    Glucose 09/15/2023 92  65 - 99 mg/dL Final    BUN 09/15/2023 9  8 - 23 mg/dL Final    Creatinine 09/15/2023 0.94  0.57 - 1.00 mg/dL Final    EGFR Result 09/15/2023 63.0  >60.0 mL/min/1.73 Final    BUN/Creatinine Ratio 09/15/2023 9.6  7.0 - 25.0 Final    Sodium 09/15/2023 137  136 - 145 mmol/L Final    Potassium 09/15/2023 4.4  3.5 - 5.2 mmol/L Final    Chloride 09/15/2023 96 (L)  98 - 107 mmol/L Final    Total CO2 09/15/2023 26.9  22.0 - 29.0 mmol/L Final    Calcium 09/15/2023 " 9.9  8.6 - 10.5 mg/dL Final    Total Protein 09/15/2023 6.7  6.0 - 8.5 g/dL Final    Albumin 09/15/2023 4.4  3.5 - 5.2 g/dL Final    Globulin 09/15/2023 2.3  gm/dL Final    A/G Ratio 09/15/2023 1.9  g/dL Final    Total Bilirubin 09/15/2023 0.4  0.0 - 1.2 mg/dL Final    Alkaline Phosphatase 09/15/2023 49  39 - 117 U/L Final    AST (SGOT) 09/15/2023 21  1 - 32 U/L Final    ALT (SGPT) 09/15/2023 25  1 - 33 U/L Final      Comprehensive Metabolic Panel (08/18/2023 09:52)  CBC & Differential (08/18/2023 09:52)  TSH (08/18/2023 09:52)  H. Pylori Breath Test - , (08/18/2023 10:05)    CTAP with contrast dated on 12/8/2022  1. No evidence of mass or ascites  2. No bowel obstruction    US Abdomen Complete     Result Date: 9/22/2023  Normal ultrasound of the abdomen.         Colonoscopy dated 11/17/2021 per Dr. Chapa  - Diverticulosis in the sigmoid colon.  - Anal papilla(e) were hypertrophied.  - No specimens collected.     EGD dated 12/6/2022 per Dr. Chapa  - Normal oropharynx.  - Z-line regular, 35 cm from the incisors.  - 1 cm hiatal hernia.  - No gross lesions in the entire esophagus.  - Erythematous mucosa in the posterior wall of the stomach and antrum. Biopsied.  - Normal duodenal bulb, first portion of the duodenum, second portion of the duodenum and third portion of the duodenum. Biopsied.  - No upper GI pathology that could explain her symptoms pending H pylori testing  - Patient appears to have functional GI disorder at baseline and Wellbutrin could worsen upper GI symptoms.  A.     DUODENUM, BIOPSY:   Duodenal type mucosa with no significant histopathologic abnormalities   B.     ANTRUM AND BODY, BIOPSY:   Gastric antral type mucosa with reactive (chemical) gastropathy   Negative for intestinal metaplasia or dysplasia   No Helicobacter pylori-like organisms seen    Assessment / Plan      1. Nausea  2. Gastroesophageal reflux disease without esophagitis  3. Irritable bowel syndrome with constipation  She has  been feeling much better since her last visit. Bowel habits more regular. Denies any GI bleeding. TSH normal. Celiac panel negative. H. Pylori breath test negative. Abdominal ultrasound 9/21/2023 unremarkable. CTAP in December 2022 unremarkable.  Colonoscopy dated 11/17/2021 unremarkable.  EGD dated 12/6/2022 unremarkable, no upper GI pathology that can explain her symptoms.  Biopsies negative for celiac and H. pylori.  Suspect IBS-C/functional GI disorder.   Anti-reflux measures.   Continue Pantoprazole 40 mg daily for now.   Zofran as needed for nausea.   Metamucil daily.   Low FODMAP diet.   Gas-X and Zofran as needed.    - pantoprazole (PROTONIX) 40 MG EC tablet; TAKE 1 TABLET BY MOUTH EVERY MORNING 30 MINUTES BEFORE BREAKFAST  Dispense: 90 tablet; Refill: 1    4. Fatty infiltration of liver  5. Class 1 obesity due to excess calories without serious comorbidity with body mass index (BMI) of 34.0 to 34.9 in adult  BMI 34.45  There is no history of elevated liver enzymes.  Fatty liver noted on imaging in the past, CTAP in December 2022 with solid abdominal organs unremarkable. Abdominal ultrasound 9/21/2023 with liver unremarkable.   Recommend low-fat diet, exercise and weight reduction.     6. Encounter for screening for malignant neoplasm of colon  Colonoscopy 11/17/2021 unremarkable, no polyps removed, no specimens collected. No family history of colon cancer.   Colonoscopy for screening in 2031, or sooner if needed.     Patient Instructions   Antireflux measures: Avoid fried, fatty foods, alcohol, chocolate, coffee, tea,  soft drinks, peppermint and spearmint, spicy foods, tomatoes and tomato based foods, onion based foods, and smoking.  Other antireflux measures include weight reduction if overweight, avoiding tight clothing around the abdomen, elevating the head of the bed 6 inches with blocks under the head board, and don't drink or eat before going to bed and avoid lying down immediately after  meals.  Pantoprazole 40 mg 1 po daily in the am 30 minutes before breakfast.  Recommended to take Levothyroxine first in the am upon waking, wait 30 minutes, then take Pantoprazole 40 mg, wait 30 minutes, then eat breakfast and take other medications.  Zofran 4 mg 1 po every 8 hours as needed for nausea.   The patient should eat 4-5 very small meals throughout the day. Avoid large meals.  It is recommended to eat a softer diet. Meats are best consumed ground. Fruits and vegetables are best consumed cooked or steamed and then mashed.   Low fiber, low fat diet with liberal water intake. May use soluble fiber.  Low FODMAP diet - avoid all dairy. May use lactose free/dairy free alternatives such as almond milk, rice milk, oat milk, etc.   Metamucil 1 packet/scoop daily or fiber gummies 2-4 per day.   Miralax 17 grams daily as needed for constipation.   Gas-X as needed.  Colonoscopy for screening in 2031.  Follow up: 6 months     Low-FODMAP Eating Plan    FODMAP stands for fermentable oligosaccharides, disaccharides, monosaccharides, and polyols. These are sugars that are hard for some people to digest. A low-FODMAP eating plan may help some people who have irritable bowel syndrome (IBS) and certain other bowel (intestinal) diseases to manage their symptoms.  This meal plan can be complicated to follow. Work with a diet and nutrition specialist (dietitian) to make a low-FODMAP eating plan that is right for you. A dietitian can help make sure that you get enough nutrition from this diet.  What are tips for following this plan?  Reading food labels  Check labels for hidden FODMAPs such as:  High-fructose syrup.  Honey.  Agave.  Natural fruit flavors.  Onion or garlic powder.  Choose low-FODMAP foods that contain 3-4 grams of fiber per serving.  Check food labels for serving sizes. Eat only one serving at a time to make sure FODMAP levels stay low.  Shopping  Shop with a list of foods that are recommended on this diet and  make a meal plan.  Meal planning  Follow a low-FODMAP eating plan for up to 6 weeks, or as told by your health care provider or dietitian.  To follow the eating plan:  Eliminate high-FODMAP foods from your diet completely. Choose only low-FODMAP foods to eat. You will do this for 2-6 weeks.  Gradually reintroduce high-FODMAP foods into your diet one at a time. Most people should wait a few days before introducing the next new high-FODMAP food into their meal plan. Your dietitian can recommend how quickly you may reintroduce foods.  Keep a daily record of what and how much you eat and drink. Make note of any symptoms that you have after eating.  Review your daily record with a dietitian regularly to identify which foods you can eat and which foods you should avoid.  General tips  Drink enough fluid each day to keep your urine pale yellow.  Avoid processed foods. These often have added sugar and may be high in FODMAPs.  Avoid most dairy products, whole grains, and sweeteners.  Work with a dietitian to make sure you get enough fiber in your diet.  Avoid high FODMAP foods at meals to manage symptoms.     Recommended foods  Fruits  Bananas, oranges, tangerines, gaurav, limes, blueberries, raspberries, strawberries, grapes, cantaloupe, honeydew melon, kiwi, papaya, passion fruit, and pineapple. Limited amounts of dried cranberries, banana chips, and shredded coconut.  Vegetables  Eggplant, zucchini, cucumber, peppers, green beans, bean sprouts, lettuce, arugula, kale, Swiss chard, spinach, edgard greens, bok quan, summer squash, potato, and tomato. Limited amounts of corn, carrot, and sweet potato. Green parts of scallions.  Grains  Gluten-free grains, such as rice, oats, buckwheat, quinoa, corn, polenta, and millet. Gluten-free pasta, bread, or cereal. Rice noodles. Corn tortillas.  Meats and other proteins  Unseasoned beef, pork, poultry, or fish. Eggs. Kc. Tofu (firm) and tempeh. Limited amounts of nuts and seeds,  "such as almonds, walnuts, brazil nuts, pecans, peanuts, nut butters, pumpkin seeds, grecia seeds, and sunflower seeds.  Dairy  Lactose-free milk, yogurt, and kefir. Lactose-free cottage cheese and ice cream. Non-dairy milks, such as almond, coconut, hemp, and rice milk. Non-dairy yogurt. Limited amounts of goat cheese, brie, mozzarella, parmesan, swiss, and other hard cheeses.  Fats and oils  Butter-free spreads. Vegetable oils, such as olive, canola, and sunflower oil.  Seasoning and other foods  Artificial sweeteners with names that do not end in \"ol,\" such as aspartame, saccharine, and stevia. Maple syrup, white table sugar, raw sugar, brown sugar, and molasses. Mayonnaise, soy sauce, and tamari. Fresh basil, coriander, parsley, rosemary, and thyme.  Beverages  Water and mineral water. Sugar-sweetened soft drinks. Small amounts of orange juice or cranberry juice. Black and green tea. Most dry dayami. Coffee.  The items listed above may not be a complete list of foods and beverages you can eat. Contact a dietitian for more information.     Foods to avoid  Fruits  Fresh, dried, and juiced forms of apple, pear, watermelon, peach, plum, cherries, apricots, blackberries, boysenberries, figs, nectarines, and anayeli. Avocado.  Vegetables  Chicory root, artichoke, asparagus, cabbage, snow peas, [place] sprouts, broccoli, sugar snap peas, mushrooms, celery, and cauliflower. Onions, garlic, leeks, and the white part of scallions.  Grains  Wheat, including kamut, durum, and semolina. Barley and bulgur. Couscous. Wheat-based cereals. Wheat noodles, bread, crackers, and pastries.  Meats and other proteins  Fried or fatty meat. Sausage. Cashews and pistachios. Soybeans, baked beans, black beans, chickpeas, kidney beans, jennifer beans, navy beans, lentils, black-eyed peas, and split peas.  Dairy  Milk, yogurt, ice cream, and soft cheese. Cream and sour cream. Milk-based sauces. Custard. Buttermilk. Soy milk.  Seasoning and other " foods  Any sugar-free gum or candy. Foods that contain artificial sweeteners such as sorbitol, mannitol, isomalt, or xylitol. Foods that contain honey, high-fructose corn syrup, or agave. Bouillon, vegetable stock, beef stock, and chicken stock. Garlic and onion powder. Condiments made with onion, such as hummus, chutney, pickles, relish, salad dressing, and salsa. Tomato paste.  Beverages  Chicory-based drinks. Coffee substitutes. Chamomile tea. Fennel tea. Sweet or fortified dayami such as port or danitza. Diet soft drinks made with isomalt, mannitol, maltitol, sorbitol, or xylitol. Apple, pear, and anayeli juice. Juices with high-fructose corn syrup.  The items listed above may not be a complete list of foods and beverages you should avoid. Contact a dietitian for more information.     Summary  FODMAP stands for fermentable oligosaccharides, disaccharides, monosaccharides, and polyols. These are sugars that are hard for some people to digest.  A low-FODMAP eating plan is a short-term diet that helps to ease symptoms of certain bowel diseases.  The eating plan usually lasts up to 6 weeks. After that, high-FODMAP foods are reintroduced gradually and one at a time. This can help you find out which foods may be causing symptoms.  A low-FODMAP eating plan can be complicated. It is best to work with a dietitian who has experience with this type of plan.  This information is not intended to replace advice given to you by your health care provider. Make sure you discuss any questions you have with your health care provider.  Document Revised: 05/06/2021 Document Reviewed: 05/06/2021  [employer] Patient Education © 2023 [employer]     TERESA Palencia  12/12/2023    Please note that portions of this note were completed with a voice recognition program.

## 2023-12-12 NOTE — TELEPHONE ENCOUNTER
Rx Refill Note  Requested Prescriptions     Pending Prescriptions Disp Refills    carvedilol (COREG) 12.5 MG tablet [Pharmacy Med Name: CARVEDILOL 12.5MG TABLETS] 180 tablet 3     Sig: TAKE 1 TABLET BY MOUTH TWICE DAILY WITH MEALS    simvastatin (ZOCOR) 40 MG tablet [Pharmacy Med Name: SIMVASTATIN 40MG TABLETS] 90 tablet 3     Sig: TAKE 1 TABLET BY MOUTH EVERY NIGHT AT BEDTIME      Last office visit with prescribing clinician: 10/20/2023   Last telemedicine visit with prescribing clinician: Visit date not found   Next office visit with prescribing clinician: Visit date not found                         Would you like a call back once the refill request has been completed: [] Yes [] No    If the office needs to give you a call back, can they leave a voicemail: [] Yes [] No    Lorin Rich MA  12/12/23, 09:23 EST

## 2024-01-10 ENCOUNTER — PATIENT MESSAGE (OUTPATIENT)
Dept: INTERNAL MEDICINE | Facility: CLINIC | Age: 79
End: 2024-01-10
Payer: MEDICARE

## 2024-01-10 NOTE — TELEPHONE ENCOUNTER
From: Deya Hernandez  To: Rosaline Roberson  Sent: 1/10/2024 5:20 PM EST  Subject: Prescription refills    I am out of 2 medications that were prescribed by Dr. Dalal, my former primary care physician. [facility]. Need refills on both:    AMLODIPINE BESYLATE 5MG tablets  HYDROCHLOROTHIAZIDE 25MG tablets      Thank you,  Deya Hernandez

## 2024-01-10 NOTE — TELEPHONE ENCOUNTER
I have pended the amlodipine on this encounter.  I see where she did take HCTZ, but looks like discontinued.    Are you wiling to fill??

## 2024-01-11 RX ORDER — AMLODIPINE BESYLATE 5 MG/1
5 TABLET ORAL DAILY
Qty: 90 TABLET | Refills: 3 | Status: SHIPPED | OUTPATIENT
Start: 2024-01-11

## 2024-03-11 DIAGNOSIS — E03.8 OTHER SPECIFIED HYPOTHYROIDISM: ICD-10-CM

## 2024-03-11 RX ORDER — LEVOTHYROXINE SODIUM 0.1 MG/1
100 TABLET ORAL DAILY
Qty: 90 TABLET | Refills: 1 | Status: SHIPPED | OUTPATIENT
Start: 2024-03-11

## 2024-03-11 RX ORDER — TRANDOLAPRIL TABLETS 4 MG/1
4 TABLET ORAL DAILY
Qty: 90 TABLET | Refills: 1 | Status: SHIPPED | OUTPATIENT
Start: 2024-03-11

## 2024-03-11 RX ORDER — HYDROCHLOROTHIAZIDE 25 MG/1
25 TABLET ORAL DAILY
Qty: 90 TABLET | Refills: 1 | Status: SHIPPED | OUTPATIENT
Start: 2024-03-11

## 2024-05-29 DIAGNOSIS — R11.0 NAUSEA: ICD-10-CM

## 2024-05-30 RX ORDER — ONDANSETRON 4 MG/1
4 TABLET, FILM COATED ORAL EVERY 8 HOURS PRN
Qty: 30 TABLET | Refills: 1 | Status: SHIPPED | OUTPATIENT
Start: 2024-05-30

## 2024-06-11 ENCOUNTER — OFFICE VISIT (OUTPATIENT)
Dept: INTERNAL MEDICINE | Facility: CLINIC | Age: 79
End: 2024-06-11
Payer: MEDICARE

## 2024-06-11 VITALS
HEART RATE: 65 BPM | HEIGHT: 65 IN | SYSTOLIC BLOOD PRESSURE: 144 MMHG | DIASTOLIC BLOOD PRESSURE: 86 MMHG | TEMPERATURE: 98.5 F | OXYGEN SATURATION: 99 % | BODY MASS INDEX: 33.15 KG/M2 | WEIGHT: 199 LBS

## 2024-06-11 DIAGNOSIS — R42 DIZZINESS: ICD-10-CM

## 2024-06-11 DIAGNOSIS — Z13.0 SCREENING FOR DISORDER OF BLOOD AND BLOOD-FORMING ORGANS: ICD-10-CM

## 2024-06-11 DIAGNOSIS — Z13.228 SCREENING FOR ENDOCRINE, METABOLIC AND IMMUNITY DISORDER: Primary | ICD-10-CM

## 2024-06-11 DIAGNOSIS — I10 PRIMARY HYPERTENSION: ICD-10-CM

## 2024-06-11 DIAGNOSIS — E87.1 HYPONATREMIA: ICD-10-CM

## 2024-06-11 DIAGNOSIS — R14.0 BLOATING: ICD-10-CM

## 2024-06-11 DIAGNOSIS — Z13.29 SCREENING FOR ENDOCRINE, METABOLIC AND IMMUNITY DISORDER: Primary | ICD-10-CM

## 2024-06-11 DIAGNOSIS — R11.0 NAUSEA: ICD-10-CM

## 2024-06-11 DIAGNOSIS — Z13.0 SCREENING FOR ENDOCRINE, METABOLIC AND IMMUNITY DISORDER: Primary | ICD-10-CM

## 2024-06-11 DIAGNOSIS — E03.8 OTHER SPECIFIED HYPOTHYROIDISM: ICD-10-CM

## 2024-06-11 PROCEDURE — 3079F DIAST BP 80-89 MM HG: CPT | Performed by: NURSE PRACTITIONER

## 2024-06-11 PROCEDURE — 3077F SYST BP >= 140 MM HG: CPT | Performed by: NURSE PRACTITIONER

## 2024-06-11 PROCEDURE — 1126F AMNT PAIN NOTED NONE PRSNT: CPT | Performed by: NURSE PRACTITIONER

## 2024-06-11 PROCEDURE — 99214 OFFICE O/P EST MOD 30 MIN: CPT | Performed by: NURSE PRACTITIONER

## 2024-06-11 PROCEDURE — G2211 COMPLEX E/M VISIT ADD ON: HCPCS | Performed by: NURSE PRACTITIONER

## 2024-06-11 RX ORDER — HYDROCHLOROTHIAZIDE 12.5 MG/1
12.5 TABLET ORAL DAILY
Qty: 30 TABLET | Refills: 1 | Status: SHIPPED | OUTPATIENT
Start: 2024-06-11

## 2024-06-11 RX ORDER — HYDROCHLOROTHIAZIDE 12.5 MG/1
12.5 TABLET ORAL DAILY
Qty: 90 TABLET | OUTPATIENT
Start: 2024-06-11

## 2024-06-11 NOTE — PROGRESS NOTES
Office Visit      Patient Name: Deya Hernandez  : 1945   MRN: [id number]     Chief Complaint:    Chief Complaint   Patient presents with    Dizziness    Fatigue    GI Problem     Will try to contact gastro doctor       History of Present Illness: Deya Hernandez     History of Present Illness  The patient is a 78-year-old female who presents for evaluation of multiple medical concerns.    The patient has been experiencing dizziness for the past 2 to 3 weeks, accompanied by gastrointestinal discomfort. She is scheduled to consult her gastroenterologist tomorrow, a consultation she had scheduled 6 months ago. Currently, she is experiencing mild digestive issues and has been experiencing increased fatigue. The dizziness is not associated with specific activities such as standing up too quickly or late in the evening. She attributes the dizziness to fluctuations in her blood pressure, although she has not been monitoring her blood pressure for the past 3 weeks. She reports feeling lightheaded and dizzy at that time. She also experiences head pressure, which seems to alleviate slightly when she lowers her blood pressure. Her primary concern is the low diastolic blood pressure, a symptom she has not previously experienced, which makes her feel weak. She has been on antihypertensive medication for several years, and her blood pressure typically ranges from 120/80 to 130 to 140, which is manageable. However, she experienced a blood pressure reading of 178/92 last night, which she found concerning. She reduced her antihypertensive medication due to the low diastolic blood pressure, which normalized both systolic and diastolic blood pressure. She is scheduled to see her cardiologist on Monday. Several months ago, she experienced gastric issues, including difficulty eating and weakness, leading her to visit the ER. She was diagnosed with low potassium, sodium, and electrolyte imbalance and was prescribed  potassium for a month. She now consumes electrolyte-rich drinks at home. She has mild constipation, which she attributes to her digestive system. A few months ago, she experienced nausea, which was new to her and different. She was prescribed anti-nausea medication, which she has taken a couple of times in the past 2 to 3 days. She denies abdominal pain, but describes severe pressure and bloating, which her gastroenterologist is aware of. She has been using Gas-X for the past couple of years, which makes her body release gas, pass gas, and burp. She still has her gallbladder, which was evaluated last year during a scope and scan by her gastroenterologist.    She denies nasal congestion or earache. She wears sunglasses when exposed to sunlight and has an upcoming appointment with an ophthalmologist. Wearing sunglasses during appointment.      She occasionally experiences more swelling in her lower extremities than usual. Her former primary care physician prescribed hydrochlorothiazide 25 mg, but she discontinued it several months ago due to sodium loss. Recently, she has noticed swelling in her lower legs when she is on her feet for extended periods.    Hypertension: Taking amlodipine, carvedilol daily as prescribed. Denies chest pain, dyspnea, orthopnea, palpitations, lower extremity edema, confusion, headaches, weakness, visual disturbances.    Hypothyroid: taking levothyroxine as prescribed.  She reports hair thinning, which she attributes to age. Denies abnormal fatigue, weight change, temperature intolerance, skin/nail changes, palpitations, anxiety, sore throat, hoarseness.         Subjective      I have reviewed and the following portions of the patient's history were updated as appropriate: past family history, past medical history, past social history, past surgical history and problem list.      Current Outpatient Medications:     ALPRAZolam (XANAX) 0.5 MG tablet, Take 1 tablet by mouth 2 (Two) Times a Day  "As Needed for Anxiety., Disp: 30 tablet, Rfl: 0    amLODIPine (NORVASC) 5 MG tablet, Take 1 tablet by mouth Daily., Disp: 90 tablet, Rfl: 3    Aspirin Low Dose 81 MG EC tablet, TAKE 1 TABLET BY MOUTH DAILY, Disp: 90 tablet, Rfl: 3    buPROPion XL (WELLBUTRIN XL) 300 MG 24 hr tablet, TAKE 1 TABLET BY MOUTH DAILY (Patient taking differently: Take 150 mg by mouth Daily. Pt, takes half), Disp: 90 tablet, Rfl: 3    carvedilol (COREG) 12.5 MG tablet, TAKE 1 TABLET BY MOUTH TWICE DAILY WITH MEALS, Disp: 180 tablet, Rfl: 3    diclofenac (VOLTAREN) 1 % gel gel, Apply 4 g topically to the appropriate area as directed 4 (Four) Times a Day As Needed (pain)., Disp: 100 g, Rfl: 0    ondansetron (ZOFRAN) 4 MG tablet, Take 1 tablet by mouth Every 8 (Eight) Hours As Needed for Nausea or Vomiting., Disp: 30 tablet, Rfl: 1    pantoprazole (PROTONIX) 40 MG EC tablet, TAKE 1 TABLET BY MOUTH EVERY MORNING 30 MINUTES BEFORE BREAKFAST, Disp: 90 tablet, Rfl: 1    Simethicone (GAS-X PO), Take  by mouth As Needed., Disp: , Rfl:     simvastatin (ZOCOR) 40 MG tablet, TAKE 1 TABLET BY MOUTH EVERY NIGHT AT BEDTIME, Disp: 90 tablet, Rfl: 3    trandolapril (MAVIK) 4 MG tablet, TAKE 1 TABLET BY MOUTH DAILY, Disp: 90 tablet, Rfl: 1    hydroCHLOROthiazide 12.5 MG tablet, Take 1 tablet by mouth Daily., Disp: 30 tablet, Rfl: 1    levothyroxine (SYNTHROID, LEVOTHROID) 100 MCG tablet, Take 1 tablet by mouth Daily., Disp: 90 tablet, Rfl: 1    No Known Allergies    Objective     Physical Exam:  Vital Signs:   Vitals:    06/11/24 1311   BP: 144/86   Pulse: 65   Temp: 98.5 °F (36.9 °C)   SpO2: 99%   Weight: 90.3 kg (199 lb)   Height: 165.1 cm (65\")     Body mass index is 33.12 kg/m².         Physical Exam  Constitutional:       Appearance: She is not ill-appearing.   HENT:      Head: Normocephalic.      Right Ear: External ear normal.      Left Ear: External ear normal.      Ears:      Comments: Negative Lady-Hallpike  Eyes:      Extraocular Movements: " Extraocular movements intact.      Conjunctiva/sclera: Conjunctivae normal.      Pupils: Pupils are equal, round, and reactive to light.   Cardiovascular:      Rate and Rhythm: Normal rate and regular rhythm.      Pulses:           Radial pulses are 2+ on the right side and 2+ on the left side.        Dorsalis pedis pulses are 2+ on the right side and 2+ on the left side.      Heart sounds: Normal heart sounds.   Pulmonary:      Effort: Pulmonary effort is normal.      Breath sounds: Normal breath sounds.   Abdominal:      General: There is no distension.      Palpations: Abdomen is soft.      Tenderness: There is no abdominal tenderness. There is right CVA tenderness. There is no left CVA tenderness or rebound.   Musculoskeletal:      Cervical back: Normal range of motion and neck supple.      Right lower leg: No edema.      Left lower leg: No edema.   Skin:     General: Skin is warm.      Capillary Refill: Capillary refill takes less than 2 seconds.   Neurological:      Mental Status: She is alert and oriented to person, place, and time.      Cranial Nerves: Cranial nerves 2-12 are intact.      Motor: No abnormal muscle tone or pronator drift.      Coordination: Coordination normal.      Gait: Gait normal.   Psychiatric:         Mood and Affect: Mood normal.         Behavior: Behavior normal.         Thought Content: Thought content normal.             Assessment / Plan      Assessment/Plan:   Diagnoses and all orders for this visit:    1. Screening for endocrine, metabolic and immunity disorder (Primary)  -     Comprehensive Metabolic Panel  -     Magnesium    2. Screening for disorder of blood and blood-forming organs  -     CBC & Differential    3. Dizziness  -     Comprehensive Metabolic Panel  -     CBC & Differential  -     Magnesium  -     hydroCHLOROthiazide 12.5 MG tablet; Take 1 tablet by mouth Daily.  Dispense: 30 tablet; Refill: 1    4. Primary hypertension  -     Comprehensive Metabolic Panel  -      Magnesium  -     hydroCHLOROthiazide 12.5 MG tablet; Take 1 tablet by mouth Daily.  Dispense: 30 tablet; Refill: 1        - Follow heart healthy diet.  Keep sodium intake < 1500 mg per day.  Avoid processed & fast foods.          - Exercise as tolerated, with a goal of 30 minutes of moderate exercise most days.         - Take medications as prescribed.    5. Other specified hypothyroidism  -     T4, Free  -     TSH    6. Bloating  -     NM HIDA SCAN WITH PHARMACOLOGICAL INTERVENTION; Future    7. Nausea  -     NM HIDA SCAN WITH PHARMACOLOGICAL INTERVENTION; Future      Follow Up:   Return in about 8 weeks (around 8/6/2024) for Next scheduled follow up.    Patient was given instructions and counseling regarding her condition or for health maintenance advice. Please see specific information pulled into the AVS if appropriate.       [facility]     Please note that portions of this note may have been completed with a voice recognition program. Efforts were made to edit dictation, but occasionally words are mistranscribed.

## 2024-06-12 ENCOUNTER — OFFICE VISIT (OUTPATIENT)
Dept: GASTROENTEROLOGY | Facility: CLINIC | Age: 79
End: 2024-06-12
Payer: MEDICARE

## 2024-06-12 VITALS
SYSTOLIC BLOOD PRESSURE: 140 MMHG | HEART RATE: 60 BPM | OXYGEN SATURATION: 100 % | BODY MASS INDEX: 33.49 KG/M2 | HEIGHT: 65 IN | DIASTOLIC BLOOD PRESSURE: 70 MMHG | WEIGHT: 201 LBS

## 2024-06-12 DIAGNOSIS — K21.9 GASTROESOPHAGEAL REFLUX DISEASE WITHOUT ESOPHAGITIS: Primary | Chronic | ICD-10-CM

## 2024-06-12 DIAGNOSIS — Z12.11 ENCOUNTER FOR SCREENING FOR MALIGNANT NEOPLASM OF COLON: ICD-10-CM

## 2024-06-12 DIAGNOSIS — K76.0 FATTY (CHANGE OF) LIVER, NOT ELSEWHERE CLASSIFIED: Chronic | ICD-10-CM

## 2024-06-12 DIAGNOSIS — R11.0 NAUSEA: Chronic | ICD-10-CM

## 2024-06-12 DIAGNOSIS — E66.09 CLASS 1 OBESITY DUE TO EXCESS CALORIES WITH SERIOUS COMORBIDITY AND BODY MASS INDEX (BMI) OF 33.0 TO 33.9 IN ADULT: Chronic | ICD-10-CM

## 2024-06-12 DIAGNOSIS — K58.1 IRRITABLE BOWEL SYNDROME WITH CONSTIPATION: Chronic | ICD-10-CM

## 2024-06-12 LAB
ALBUMIN SERPL-MCNC: 4.4 G/DL (ref 3.5–5.2)
ALBUMIN/GLOB SERPL: 2.2 G/DL
ALP SERPL-CCNC: 55 U/L (ref 39–117)
ALT SERPL-CCNC: 16 U/L (ref 1–33)
AST SERPL-CCNC: 20 U/L (ref 1–32)
BASOPHILS # BLD AUTO: 0.04 10*3/MM3 (ref 0–0.2)
BASOPHILS NFR BLD AUTO: 0.6 % (ref 0–1.5)
BILIRUB SERPL-MCNC: 0.6 MG/DL (ref 0–1.2)
BUN SERPL-MCNC: 6 MG/DL (ref 8–23)
BUN/CREAT SERPL: 7.4 (ref 7–25)
CALCIUM SERPL-MCNC: 9.4 MG/DL (ref 8.6–10.5)
CHLORIDE SERPL-SCNC: 87 MMOL/L (ref 98–107)
CO2 SERPL-SCNC: 26.4 MMOL/L (ref 22–29)
CREAT SERPL-MCNC: 0.81 MG/DL (ref 0.57–1)
EGFRCR SERPLBLD CKD-EPI 2021: 74.9 ML/MIN/1.73
EOSINOPHIL # BLD AUTO: 0.11 10*3/MM3 (ref 0–0.4)
EOSINOPHIL NFR BLD AUTO: 1.6 % (ref 0.3–6.2)
ERYTHROCYTE [DISTWIDTH] IN BLOOD BY AUTOMATED COUNT: 12.5 % (ref 12.3–15.4)
GLOBULIN SER CALC-MCNC: 2 GM/DL
GLUCOSE SERPL-MCNC: 92 MG/DL (ref 65–99)
HCT VFR BLD AUTO: 38.7 % (ref 34–46.6)
HGB BLD-MCNC: 13.5 G/DL (ref 12–15.9)
IMM GRANULOCYTES # BLD AUTO: 0.01 10*3/MM3 (ref 0–0.05)
IMM GRANULOCYTES NFR BLD AUTO: 0.1 % (ref 0–0.5)
LYMPHOCYTES # BLD AUTO: 1.59 10*3/MM3 (ref 0.7–3.1)
LYMPHOCYTES NFR BLD AUTO: 22.7 % (ref 19.6–45.3)
MAGNESIUM SERPL-MCNC: 1.8 MG/DL (ref 1.6–2.4)
MCH RBC QN AUTO: 32 PG (ref 26.6–33)
MCHC RBC AUTO-ENTMCNC: 34.9 G/DL (ref 31.5–35.7)
MCV RBC AUTO: 91.7 FL (ref 79–97)
MONOCYTES # BLD AUTO: 0.71 10*3/MM3 (ref 0.1–0.9)
MONOCYTES NFR BLD AUTO: 10.1 % (ref 5–12)
NEUTROPHILS # BLD AUTO: 4.54 10*3/MM3 (ref 1.7–7)
NEUTROPHILS NFR BLD AUTO: 64.9 % (ref 42.7–76)
NRBC BLD AUTO-RTO: 0 /100 WBC (ref 0–0.2)
PLATELET # BLD AUTO: 492 10*3/MM3 (ref 140–450)
POTASSIUM SERPL-SCNC: 4.1 MMOL/L (ref 3.5–5.2)
PROT SERPL-MCNC: 6.4 G/DL (ref 6–8.5)
RBC # BLD AUTO: 4.22 10*6/MM3 (ref 3.77–5.28)
SODIUM SERPL-SCNC: 127 MMOL/L (ref 136–145)
T4 FREE SERPL-MCNC: 1.85 NG/DL (ref 0.92–1.68)
TSH SERPL DL<=0.005 MIU/L-ACNC: 1.34 UIU/ML (ref 0.27–4.2)
WBC # BLD AUTO: 7 10*3/MM3 (ref 3.4–10.8)

## 2024-06-12 RX ORDER — SODIUM CHLORIDE 1 G/1
1 TABLET ORAL 2 TIMES DAILY
Qty: 14 TABLET | Refills: 0 | Status: SHIPPED | OUTPATIENT
Start: 2024-06-12 | End: 2024-06-19

## 2024-06-12 NOTE — PROGRESS NOTES
Sodium is low.  Sending prescription for sodium supplement to be taken BID for a week, with labs to be repeated in 10 days. This can cause some of the symptoms she was experiencing.  Should symptoms worsen, go to ED. Carvedilol, hctz, can cause low sodium. May need to consider other medications for HTN if she was taking them when hyponatremia noted before.    Platelets are slightly elevated but nothing concerning at this time.  Free T4 slightly elevated, thyroid function normal per TSH.  Magnesium normal.

## 2024-06-12 NOTE — PATIENT INSTRUCTIONS
Antireflux measures: Avoid fried, fatty foods, alcohol, chocolate, coffee, tea,  soft drinks, peppermint and spearmint, spicy foods, tomatoes and tomato based foods, onion based foods, and smoking.  Other antireflux measures include weight reduction if overweight, avoiding tight clothing around the abdomen, elevating the head of the bed 6 inches with blocks under the head board, and don't drink or eat before going to bed and avoid lying down immediately after meals.  Pantoprazole 40 mg 1 po daily in the am 30 minutes before breakfast.  Recommended to take Levothyroxine first in the am upon waking, wait 30 minutes, then take Pantoprazole 40 mg, wait 30 minutes, then eat breakfast and take other medications.  Zofran 4 mg 1 po every 8 hours as needed for nausea.   The patient should eat 4-5 very small meals throughout the day. Avoid large meals.  It is recommended to eat a softer diet. Meats are best consumed ground. Fruits and vegetables are best consumed cooked or steamed and then mashed.   Low fiber, low fat diet with liberal water intake. May use soluble fiber.  Low FODMAP diet - avoid all dairy. May use lactose free/dairy free alternatives such as almond milk, rice milk, oat milk, etc.   Metamucil 1 packet/scoop daily or fiber gummies 2-4 per day.   Miralax 17 grams daily as needed for constipation.   Gas-X as needed.  Colonoscopy for screening in 2031.  Follow up: 6 months     Low-FODMAP Eating Plan    FODMAP stands for fermentable oligosaccharides, disaccharides, monosaccharides, and polyols. These are sugars that are hard for some people to digest. A low-FODMAP eating plan may help some people who have irritable bowel syndrome (IBS) and certain other bowel (intestinal) diseases to manage their symptoms.  This meal plan can be complicated to follow. Work with a diet and nutrition specialist (dietitian) to make a low-FODMAP eating plan that is right for you. A dietitian can help make sure that you get enough  nutrition from this diet.  What are tips for following this plan?  Reading food labels  Check labels for hidden FODMAPs such as:  High-fructose syrup.  Honey.  Agave.  Natural fruit flavors.  Onion or garlic powder.  Choose low-FODMAP foods that contain 3-4 grams of fiber per serving.  Check food labels for serving sizes. Eat only one serving at a time to make sure FODMAP levels stay low.  Shopping  Shop with a list of foods that are recommended on this diet and make a meal plan.  Meal planning  Follow a low-FODMAP eating plan for up to 6 weeks, or as told by your health care provider or dietitian.  To follow the eating plan:  Eliminate high-FODMAP foods from your diet completely. Choose only low-FODMAP foods to eat. You will do this for 2-6 weeks.  Gradually reintroduce high-FODMAP foods into your diet one at a time. Most people should wait a few days before introducing the next new high-FODMAP food into their meal plan. Your dietitian can recommend how quickly you may reintroduce foods.  Keep a daily record of what and how much you eat and drink. Make note of any symptoms that you have after eating.  Review your daily record with a dietitian regularly to identify which foods you can eat and which foods you should avoid.  General tips  Drink enough fluid each day to keep your urine pale yellow.  Avoid processed foods. These often have added sugar and may be high in FODMAPs.  Avoid most dairy products, whole grains, and sweeteners.  Work with a dietitian to make sure you get enough fiber in your diet.  Avoid high FODMAP foods at meals to manage symptoms.     Recommended foods  Fruits  Bananas, oranges, tangerines, gaurav, limes, blueberries, raspberries, strawberries, grapes, cantaloupe, honeydew melon, kiwi, papaya, passion fruit, and pineapple. Limited amounts of dried cranberries, banana chips, and shredded coconut.  Vegetables  Eggplant, zucchini, cucumber, peppers, green beans, bean sprouts, lettuce, arugula,  "kale, Swiss chard, spinach, edgard greens, bok quan, summer squash, potato, and tomato. Limited amounts of corn, carrot, and sweet potato. Green parts of scallions.  Grains  Gluten-free grains, such as rice, oats, buckwheat, quinoa, corn, polenta, and millet. Gluten-free pasta, bread, or cereal. Rice noodles. Corn tortillas.  Meats and other proteins  Unseasoned beef, pork, poultry, or fish. Eggs. Kc. Tofu (firm) and tempeh. Limited amounts of nuts and seeds, such as almonds, walnuts, brazil nuts, pecans, peanuts, nut butters, pumpkin seeds, grecia seeds, and sunflower seeds.  Dairy  Lactose-free milk, yogurt, and kefir. Lactose-free cottage cheese and ice cream. Non-dairy milks, such as almond, coconut, hemp, and rice milk. Non-dairy yogurt. Limited amounts of goat cheese, brie, mozzarella, parmesan, swiss, and other hard cheeses.  Fats and oils  Butter-free spreads. Vegetable oils, such as olive, canola, and sunflower oil.  Seasoning and other foods  Artificial sweeteners with names that do not end in \"ol,\" such as aspartame, saccharine, and stevia. Maple syrup, white table sugar, raw sugar, brown sugar, and molasses. Mayonnaise, soy sauce, and tamari. Fresh basil, coriander, parsley, rosemary, and thyme.  Beverages  Water and mineral water. Sugar-sweetened soft drinks. Small amounts of orange juice or cranberry juice. Black and green tea. Most dry dayami. Coffee.  The items listed above may not be a complete list of foods and beverages you can eat. Contact a dietitian for more information.     Foods to avoid  Fruits  Fresh, dried, and juiced forms of apple, pear, watermelon, peach, plum, cherries, apricots, blackberries, boysenberries, figs, nectarines, and anayeli. Avocado.  Vegetables  Chicory root, artichoke, asparagus, cabbage, snow peas, [place] sprouts, broccoli, sugar snap peas, mushrooms, celery, and cauliflower. Onions, garlic, leeks, and the white part of scallions.  Grains  Wheat, including kamut, " durum, and semolina. Barley and bulgur. Couscous. Wheat-based cereals. Wheat noodles, bread, crackers, and pastries.  Meats and other proteins  Fried or fatty meat. Sausage. Cashews and pistachios. Soybeans, baked beans, black beans, chickpeas, kidney beans, jennifer beans, navy beans, lentils, black-eyed peas, and split peas.  Dairy  Milk, yogurt, ice cream, and soft cheese. Cream and sour cream. Milk-based sauces. Custard. Buttermilk. Soy milk.  Seasoning and other foods  Any sugar-free gum or candy. Foods that contain artificial sweeteners such as sorbitol, mannitol, isomalt, or xylitol. Foods that contain honey, high-fructose corn syrup, or agave. Bouillon, vegetable stock, beef stock, and chicken stock. Garlic and onion powder. Condiments made with onion, such as hummus, chutney, pickles, relish, salad dressing, and salsa. Tomato paste.  Beverages  Chicory-based drinks. Coffee substitutes. Chamomile tea. Fennel tea. Sweet or fortified dayami such as port or danitza. Diet soft drinks made with isomalt, mannitol, maltitol, sorbitol, or xylitol. Apple, pear, and anayeli juice. Juices with high-fructose corn syrup.  The items listed above may not be a complete list of foods and beverages you should avoid. Contact a dietitian for more information.     Summary  FODMAP stands for fermentable oligosaccharides, disaccharides, monosaccharides, and polyols. These are sugars that are hard for some people to digest.  A low-FODMAP eating plan is a short-term diet that helps to ease symptoms of certain bowel diseases.  The eating plan usually lasts up to 6 weeks. After that, high-FODMAP foods are reintroduced gradually and one at a time. This can help you find out which foods may be causing symptoms.  A low-FODMAP eating plan can be complicated. It is best to work with a dietitian who has experience with this type of plan.  This information is not intended to replace advice given to you by your health care provider. Make sure you  discuss any questions you have with your health care provider.  Document Revised: 05/06/2021 Document Reviewed: 05/06/2021  [employer] Patient Education © 2023 [employer]

## 2024-06-12 NOTE — PROGRESS NOTES
"     Follow Up Note     Date: 2024   Patient Name: Deya Hernandez  MRN: [id number]  : 1945     Primary Care Provider: Rosaline Roberson DO     Chief Complaint   Patient presents with    Follow-up     6 month f/u     History of present illness:   2024  Deya Hernandez is a 79 y.o. female who is here today for follow up for nausea. She has been having some nausea off and on for the past week or so. She had a stomach virus not long ago that lasted 5 days but has gradually improved. She is feeling better today. No GI bleeding.     Interval History:  2023  Deya Hernandez is a 78 y.o. female who is here today for follow up for nausea. She has been feeling much better since her last visit. She is taking Pantoprazole 40 mg daily and it helps. Bowel movements are more regular. Denies any GI bleeding.      2020  For the past year or so, the patient will have periumbilical abdominal pain and bloating. The pain is described as a pressure in her abdomen and is very mild. The pressure may last a few days, then \"go away\" for a few days or even a month or so. Burping or passing gas improves the pressure. The patient denies nausea or vomiting. There is no history of heartburn or reflux. She had taken Pantoprazole in the past for similar symptoms, but she stopped it over 3 years ago.     The patient has had a few episodes of diarrhea over the past 2 weeks or so. She is not sure if it was something she had eaten. No rectal bleeding.      Last colonoscopy was 2016 with small polyps (1 tubular adenoma without dysplasia), diverticulosis, and internal hemorrhoids.     Subjective      Past Medical History:   Diagnosis Date    Abdominal bloating     Abnormal EKG     Ankle fracture     Anxiety and depression     Cataract Removed Mar.,23    Colon cancer screening     Colon polyp 2016    COVID     Disease of thyroid gland     Diverticulitis of colon     Dyspnea     Elbow pain, left     " Elevated cholesterol     Fatigue     Fatty infiltration of liver 10/12/2020    Fatty liver [id number]    Gastritis     GERD (gastroesophageal reflux disease)     H/O echocardiogram Unknown    Normal per pt.    H/O exercise stress test 2016    Normal result per pt.    H/O mammogram     H/O sinusitis     H/O: pneumonia     Heart palpitations 2014    Heartburn     Hemorrhoid     History of Holter monitoring 2014    Found to be normal per pt.    Hypertension     Plantar fasciitis     Sebaceous cyst     Sinusitis     Skin cancer      Past Surgical History:   Procedure Laterality Date    CARDIAC CATHETERIZATION  2003    Negative per pt.    CATARACT EXTRACTION W/ INTRAOCULAR LENS IMPLANT Left 03/09/2023    Procedure: CATARACT PHACO EXTRACTION WITH INTRAOCULAR LENS IMPLANT LEFT;  Surgeon: Dain Lyon MD;  Location: [facility] OR;  Service: Ophthalmology;  Laterality: Left;    CATARACT EXTRACTION W/ INTRAOCULAR LENS IMPLANT Right 03/23/2023    Procedure: CATARACT PHACO EXTRACTION WITH INTRAOCULAR LENS IMPLANT RIGHT;  Surgeon: Dain Lyon MD;  Location: [facility] OR;  Service: Ophthalmology;  Laterality: Right;    COLONOSCOPY  04/07/2016    COLONOSCOPY N/A 11/17/2021    Procedure: COLONOSCOPY;  Surgeon: Kimmie Chapa MD;  Location: [facility] ENDOSCOPY;  Service: Gastroenterology;  Laterality: N/A;    COLONOSCOPY W/ POLYPECTOMY      ENDOSCOPY  2016    ENDOSCOPY N/A 12/06/2022    Procedure: ESOPHAGOGASTRODUODENOSCOPY WITH BIOPSY;  Surgeon: Kimmie Chapa MD;  Location: [facility] ENDOSCOPY;  Service: Gastroenterology;  Laterality: N/A;    SKIN BIOPSY      TUBAL ABDOMINAL LIGATION      UPPER GASTROINTESTINAL ENDOSCOPY  03/2016     Family History   Problem Relation Age of Onset    Heart attack Other     Arthritis Other     Cancer Other     Diabetes Other     Hypertension Other     Stroke Other     Cancer Mother     Breast cancer Mother 70    Heart disease Mother     Emphysema Father     Thyroid disease Sister      Colon polyps Neg Hx     Esophageal cancer Neg Hx     Irritable bowel syndrome Neg Hx     Liver cancer Neg Hx     Liver disease Neg Hx     Stomach cancer Neg Hx     Colon cancer Neg Hx     Ovarian cancer Neg Hx      Social History     Socioeconomic History    Marital status:    Tobacco Use    Smoking status: Former     Current packs/day: 0.00     Average packs/day: 0.3 packs/day for 15.0 years (3.8 ttl pk-yrs)     Types: Cigarettes     Start date:      Quit date: 2017     Years since quittin.4     Passive exposure: Never    Smokeless tobacco: Never    Tobacco comments:     Never a heavy smoker   Vaping Use    Vaping status: Never Used   Substance and Sexual Activity    Alcohol use: Yes     Alcohol/week: 3.0 standard drinks of alcohol     Types: 1 Glasses of wine, 2 Cans of beer per week     Comment: social    Drug use: No    Sexual activity: Not Currently       Current Outpatient Medications:     ALPRAZolam (XANAX) 0.5 MG tablet, Take 1 tablet by mouth 2 (Two) Times a Day As Needed for Anxiety., Disp: 30 tablet, Rfl: 0    amLODIPine (NORVASC) 5 MG tablet, Take 1 tablet by mouth Daily., Disp: 90 tablet, Rfl: 3    Aspirin Low Dose 81 MG EC tablet, TAKE 1 TABLET BY MOUTH DAILY, Disp: 90 tablet, Rfl: 3    buPROPion XL (WELLBUTRIN XL) 300 MG 24 hr tablet, TAKE 1 TABLET BY MOUTH DAILY (Patient taking differently: Take 150 mg by mouth Daily. Pt, takes half), Disp: 90 tablet, Rfl: 3    carvedilol (COREG) 12.5 MG tablet, TAKE 1 TABLET BY MOUTH TWICE DAILY WITH MEALS, Disp: 180 tablet, Rfl: 3    diclofenac (VOLTAREN) 1 % gel gel, Apply 4 g topically to the appropriate area as directed 4 (Four) Times a Day As Needed (pain)., Disp: 100 g, Rfl: 0    hydroCHLOROthiazide 12.5 MG tablet, Take 1 tablet by mouth Daily., Disp: 30 tablet, Rfl: 1    levothyroxine (SYNTHROID, LEVOTHROID) 100 MCG tablet, Take 1 tablet by mouth Daily., Disp: 90 tablet, Rfl: 1    ondansetron (ZOFRAN) 4 MG tablet, Take 1 tablet by mouth  "Every 8 (Eight) Hours As Needed for Nausea or Vomiting., Disp: 30 tablet, Rfl: 1    pantoprazole (PROTONIX) 40 MG EC tablet, TAKE 1 TABLET BY MOUTH EVERY MORNING 30 MINUTES BEFORE BREAKFAST, Disp: 90 tablet, Rfl: 1    Simethicone (GAS-X PO), Take  by mouth As Needed., Disp: , Rfl:     simvastatin (ZOCOR) 40 MG tablet, TAKE 1 TABLET BY MOUTH EVERY NIGHT AT BEDTIME, Disp: 90 tablet, Rfl: 3    trandolapril (MAVIK) 4 MG tablet, TAKE 1 TABLET BY MOUTH DAILY, Disp: 90 tablet, Rfl: 1    sodium chloride 1 g tablet, Take 1 tablet by mouth 2 (Two) Times a Day for 7 days., Disp: 14 tablet, Rfl: 0    No Known Allergies    The following portions of the patient's history were reviewed and updated as appropriate: allergies, current medications, past family history, past medical history, past social history, past surgical history and problem list.  Objective     Physical Exam  Vitals and nursing note reviewed.   Constitutional:       General: She is not in acute distress.     Appearance: Normal appearance. She is well-developed.   HENT:      Head: Normocephalic and atraumatic.      Mouth/Throat:      Mouth: Mucous membranes are not pale, not dry and not cyanotic.   Eyes:      General: Lids are normal.   Neck:      Trachea: Trachea normal.   Cardiovascular:      Rate and Rhythm: Normal rate.   Pulmonary:      Effort: Pulmonary effort is normal. No respiratory distress.      Breath sounds: Normal breath sounds.   Abdominal:      Tenderness: There is no abdominal tenderness.   Skin:     General: Skin is warm and dry.   Neurological:      Mental Status: She is alert and oriented to person, place, and time.   Psychiatric:         Mood and Affect: Mood normal.         Speech: Speech normal.         Behavior: Behavior normal. Behavior is cooperative.       Vitals:    06/12/24 1525   BP: 140/70   Pulse: 60   SpO2: 100%   Weight: 91.2 kg (201 lb)   Height: 165.1 cm (65\")     Body mass index is 33.45 kg/m².     Results Review:   I reviewed " the patient's new clinical results.    Office Visit on 06/11/2024   Component Date Value Ref Range Status    Glucose 06/11/2024 92  65 - 99 mg/dL Final    BUN 06/11/2024 6 (L)  8 - 23 mg/dL Final    Creatinine 06/11/2024 0.81  0.57 - 1.00 mg/dL Final    EGFR Result 06/11/2024 74.9  >60.0 mL/min/1.73 Final    BUN/Creatinine Ratio 06/11/2024 7.4  7.0 - 25.0 Final    Sodium 06/11/2024 127 (L)  136 - 145 mmol/L Final    Potassium 06/11/2024 4.1  3.5 - 5.2 mmol/L Final    Chloride 06/11/2024 87 (L)  98 - 107 mmol/L Final    Total CO2 06/11/2024 26.4  22.0 - 29.0 mmol/L Final    Calcium 06/11/2024 9.4  8.6 - 10.5 mg/dL Final    Total Protein 06/11/2024 6.4  6.0 - 8.5 g/dL Final    Albumin 06/11/2024 4.4  3.5 - 5.2 g/dL Final    Globulin 06/11/2024 2.0  gm/dL Final    A/G Ratio 06/11/2024 2.2  g/dL Final    Total Bilirubin 06/11/2024 0.6  0.0 - 1.2 mg/dL Final    Alkaline Phosphatase 06/11/2024 55  39 - 117 U/L Final    AST (SGOT) 06/11/2024 20  1 - 32 U/L Final    ALT (SGPT) 06/11/2024 16  1 - 33 U/L Final    WBC 06/11/2024 7.00  3.40 - 10.80 10*3/mm3 Final    RBC 06/11/2024 4.22  3.77 - 5.28 10*6/mm3 Final    Hemoglobin 06/11/2024 13.5  12.0 - 15.9 g/dL Final    Hematocrit 06/11/2024 38.7  34.0 - 46.6 % Final    MCV 06/11/2024 91.7  79.0 - 97.0 fL Final    MCH 06/11/2024 32.0  26.6 - 33.0 pg Final    MCHC 06/11/2024 34.9  31.5 - 35.7 g/dL Final    RDW 06/11/2024 12.5  12.3 - 15.4 % Final    Platelets 06/11/2024 492 (H)  140 - 450 10*3/mm3 Final    Neutrophil Rel % 06/11/2024 64.9  42.7 - 76.0 % Final    Lymphocyte Rel % 06/11/2024 22.7  19.6 - 45.3 % Final    Monocyte Rel % 06/11/2024 10.1  5.0 - 12.0 % Final    Eosinophil Rel % 06/11/2024 1.6  0.3 - 6.2 % Final    Basophil Rel % 06/11/2024 0.6  0.0 - 1.5 % Final    Neutrophils Absolute 06/11/2024 4.54  1.70 - 7.00 10*3/mm3 Final    Lymphocytes Absolute 06/11/2024 1.59  0.70 - 3.10 10*3/mm3 Final    Monocytes Absolute 06/11/2024 0.71  0.10 - 0.90 10*3/mm3 Final     Eosinophils Absolute 06/11/2024 0.11  0.00 - 0.40 10*3/mm3 Final    Basophils Absolute 06/11/2024 0.04  0.00 - 0.20 10*3/mm3 Final    Immature Granulocyte Rel % 06/11/2024 0.1  0.0 - 0.5 % Final    Immature Grans Absolute 06/11/2024 0.01  0.00 - 0.05 10*3/mm3 Final    nRBC 06/11/2024 0.0  0.0 - 0.2 /100 WBC Final    Free T4 06/11/2024 1.85 (H)  0.92 - 1.68 ng/dL Final    TSH 06/11/2024 1.340  0.270 - 4.200 uIU/mL Final    Magnesium 06/11/2024 1.8  1.6 - 2.4 mg/dL Final     H. Pylori Breath Test - , (08/18/2023 10:05)     CTAP with contrast dated on 12/8/2022  1. No evidence of mass or ascites  2. No bowel obstruction     US Abdomen Complete     Result Date: 9/22/2023  Normal ultrasound of the abdomen.          Colonoscopy dated 11/17/2021 per Dr. Chapa  - Diverticulosis in the sigmoid colon.  - Anal papilla(e) were hypertrophied.  - No specimens collected.     EGD dated 12/6/2022 per Dr. Chapa  - Normal oropharynx.  - Z-line regular, 35 cm from the incisors.  - 1 cm hiatal hernia.  - No gross lesions in the entire esophagus.  - Erythematous mucosa in the posterior wall of the stomach and antrum. Biopsied.  - Normal duodenal bulb, first portion of the duodenum, second portion of the duodenum and third portion of the duodenum. Biopsied.  - No upper GI pathology that could explain her symptoms pending H pylori testing  - Patient appears to have functional GI disorder at baseline and Wellbutrin could worsen upper GI symptoms.  A.     DUODENUM, BIOPSY:   Duodenal type mucosa with no significant histopathologic abnormalities   B.     ANTRUM AND BODY, BIOPSY:   Gastric antral type mucosa with reactive (chemical) gastropathy   Negative for intestinal metaplasia or dysplasia   No Helicobacter pylori-like organisms seen     Assessment / Plan      1. Gastroesophageal reflux disease without esophagitis  2. Nausea  3. Irritable bowel syndrome with constipation  Symptoms reasonably controlled at this time. Denies GI  bleeding. Basic labs unremarkable. TSH normal. Celiac panel negative. H. Pylori breath test negative. Abdominal ultrasound 9/21/2023 unremarkable. CTAP in December 2022 unremarkable.  Colonoscopy dated 11/17/2021 unremarkable.  EGD dated 12/6/2022 unremarkable, no upper GI pathology that can explain her symptoms.  Biopsies negative for celiac and H. pylori.  Suspect IBS-C/functional GI disorder.   Anti-reflux measures.   Continue Pantoprazole 40 mg daily for now.   Zofran as needed for nausea.   High fiber, low fat diet with liberal water intake.   Low FODMAP diet.  Metamucil or fiber gummies daily. .   Gas-X as needed.    4. Class 1 obesity due to excess calories with serious comorbidity and body mass index (BMI) of 33.0 to 33.9 in adult  5. Fatty (change of) liver, not elsewhere classified  BMI 33.45  She has lost a couple of pounds since her last visit. There is no history of elevated liver enzymes. CTAP in December 2022 with solid abdominal organs unremarkable. Abdominal ultrasound 9/21/2023 with liver unremarkable.   Recommend low-fat diet, exercise and weight reduction.    6. Encounter for screening for malignant neoplasm of colon  Colonoscopy 11/17/2021 unremarkable, no polyps removed, no specimens collected. No family history of colon cancer.   Colonoscopy for screening in 2031, or sooner if needed.     Patient Instructions   Antireflux measures: Avoid fried, fatty foods, alcohol, chocolate, coffee, tea,  soft drinks, peppermint and spearmint, spicy foods, tomatoes and tomato based foods, onion based foods, and smoking.  Other antireflux measures include weight reduction if overweight, avoiding tight clothing around the abdomen, elevating the head of the bed 6 inches with blocks under the head board, and don't drink or eat before going to bed and avoid lying down immediately after meals.  Pantoprazole 40 mg 1 po daily in the am 30 minutes before breakfast.  Recommended to take Levothyroxine first in the am  upon waking, wait 30 minutes, then take Pantoprazole 40 mg, wait 30 minutes, then eat breakfast and take other medications.  Zofran 4 mg 1 po every 8 hours as needed for nausea.   The patient should eat 4-5 very small meals throughout the day. Avoid large meals.  It is recommended to eat a softer diet. Meats are best consumed ground. Fruits and vegetables are best consumed cooked or steamed and then mashed.   Low fiber, low fat diet with liberal water intake. May use soluble fiber.  Low FODMAP diet - avoid all dairy. May use lactose free/dairy free alternatives such as almond milk, rice milk, oat milk, etc.   Metamucil 1 packet/scoop daily or fiber gummies 2-4 per day.   Miralax 17 grams daily as needed for constipation.   Gas-X as needed.  Colonoscopy for screening in 2031.  Follow up: 6 months     Low-FODMAP Eating Plan    FODMAP stands for fermentable oligosaccharides, disaccharides, monosaccharides, and polyols. These are sugars that are hard for some people to digest. A low-FODMAP eating plan may help some people who have irritable bowel syndrome (IBS) and certain other bowel (intestinal) diseases to manage their symptoms.  This meal plan can be complicated to follow. Work with a diet and nutrition specialist (dietitian) to make a low-FODMAP eating plan that is right for you. A dietitian can help make sure that you get enough nutrition from this diet.  What are tips for following this plan?  Reading food labels  Check labels for hidden FODMAPs such as:  High-fructose syrup.  Honey.  Agave.  Natural fruit flavors.  Onion or garlic powder.  Choose low-FODMAP foods that contain 3-4 grams of fiber per serving.  Check food labels for serving sizes. Eat only one serving at a time to make sure FODMAP levels stay low.  Shopping  Shop with a list of foods that are recommended on this diet and make a meal plan.  Meal planning  Follow a low-FODMAP eating plan for up to 6 weeks, or as told by your health care provider or  dietitian.  To follow the eating plan:  Eliminate high-FODMAP foods from your diet completely. Choose only low-FODMAP foods to eat. You will do this for 2-6 weeks.  Gradually reintroduce high-FODMAP foods into your diet one at a time. Most people should wait a few days before introducing the next new high-FODMAP food into their meal plan. Your dietitian can recommend how quickly you may reintroduce foods.  Keep a daily record of what and how much you eat and drink. Make note of any symptoms that you have after eating.  Review your daily record with a dietitian regularly to identify which foods you can eat and which foods you should avoid.  General tips  Drink enough fluid each day to keep your urine pale yellow.  Avoid processed foods. These often have added sugar and may be high in FODMAPs.  Avoid most dairy products, whole grains, and sweeteners.  Work with a dietitian to make sure you get enough fiber in your diet.  Avoid high FODMAP foods at meals to manage symptoms.     Recommended foods  Fruits  Bananas, oranges, tangerines, gaurav, limes, blueberries, raspberries, strawberries, grapes, cantaloupe, honeydew melon, kiwi, papaya, passion fruit, and pineapple. Limited amounts of dried cranberries, banana chips, and shredded coconut.  Vegetables  Eggplant, zucchini, cucumber, peppers, green beans, bean sprouts, lettuce, arugula, kale, Swiss chard, spinach, edgard greens, bok quan, summer squash, potato, and tomato. Limited amounts of corn, carrot, and sweet potato. Green parts of scallions.  Grains  Gluten-free grains, such as rice, oats, buckwheat, quinoa, corn, polenta, and millet. Gluten-free pasta, bread, or cereal. Rice noodles. Corn tortillas.  Meats and other proteins  Unseasoned beef, pork, poultry, or fish. Eggs. Kc. Tofu (firm) and tempeh. Limited amounts of nuts and seeds, such as almonds, walnuts, brazil nuts, pecans, peanuts, nut butters, pumpkin seeds, grecia seeds, and sunflower  "seeds.  Dairy  Lactose-free milk, yogurt, and kefir. Lactose-free cottage cheese and ice cream. Non-dairy milks, such as almond, coconut, hemp, and rice milk. Non-dairy yogurt. Limited amounts of goat cheese, brie, mozzarella, parmesan, swiss, and other hard cheeses.  Fats and oils  Butter-free spreads. Vegetable oils, such as olive, canola, and sunflower oil.  Seasoning and other foods  Artificial sweeteners with names that do not end in \"ol,\" such as aspartame, saccharine, and stevia. Maple syrup, white table sugar, raw sugar, brown sugar, and molasses. Mayonnaise, soy sauce, and tamari. Fresh basil, coriander, parsley, rosemary, and thyme.  Beverages  Water and mineral water. Sugar-sweetened soft drinks. Small amounts of orange juice or cranberry juice. Black and green tea. Most dry dayami. Coffee.  The items listed above may not be a complete list of foods and beverages you can eat. Contact a dietitian for more information.     Foods to avoid  Fruits  Fresh, dried, and juiced forms of apple, pear, watermelon, peach, plum, cherries, apricots, blackberries, boysenberries, figs, nectarines, and anayeli. Avocado.  Vegetables  Chicory root, artichoke, asparagus, cabbage, snow peas, [place] sprouts, broccoli, sugar snap peas, mushrooms, celery, and cauliflower. Onions, garlic, leeks, and the white part of scallions.  Grains  Wheat, including kamut, durum, and semolina. Barley and bulgur. Couscous. Wheat-based cereals. Wheat noodles, bread, crackers, and pastries.  Meats and other proteins  Fried or fatty meat. Sausage. Cashews and pistachios. Soybeans, baked beans, black beans, chickpeas, kidney beans, jennifer beans, navy beans, lentils, black-eyed peas, and split peas.  Dairy  Milk, yogurt, ice cream, and soft cheese. Cream and sour cream. Milk-based sauces. Custard. Buttermilk. Soy milk.  Seasoning and other foods  Any sugar-free gum or candy. Foods that contain artificial sweeteners such as sorbitol, mannitol, isomalt, " or xylitol. Foods that contain honey, high-fructose corn syrup, or agave. Bouillon, vegetable stock, beef stock, and chicken stock. Garlic and onion powder. Condiments made with onion, such as hummus, chutney, pickles, relish, salad dressing, and salsa. Tomato paste.  Beverages  Chicory-based drinks. Coffee substitutes. Chamomile tea. Fennel tea. Sweet or fortified dayami such as port or danitza. Diet soft drinks made with isomalt, mannitol, maltitol, sorbitol, or xylitol. Apple, pear, and anayeli juice. Juices with high-fructose corn syrup.  The items listed above may not be a complete list of foods and beverages you should avoid. Contact a dietitian for more information.     Summary  FODMAP stands for fermentable oligosaccharides, disaccharides, monosaccharides, and polyols. These are sugars that are hard for some people to digest.  A low-FODMAP eating plan is a short-term diet that helps to ease symptoms of certain bowel diseases.  The eating plan usually lasts up to 6 weeks. After that, high-FODMAP foods are reintroduced gradually and one at a time. This can help you find out which foods may be causing symptoms.  A low-FODMAP eating plan can be complicated. It is best to work with a dietitian who has experience with this type of plan.  This information is not intended to replace advice given to you by your health care provider. Make sure you discuss any questions you have with your health care provider.  Document Revised: 05/06/2021 Document Reviewed: 05/06/2021  [employer] Patient Education © 2023 [employer]     Cedrick Chowdhury, TERESA  6/12/2024    Please note that portions of this note were completed with a voice recognition program.

## 2024-06-13 DIAGNOSIS — E03.8 OTHER SPECIFIED HYPOTHYROIDISM: ICD-10-CM

## 2024-06-14 RX ORDER — LEVOTHYROXINE SODIUM 0.1 MG/1
100 TABLET ORAL DAILY
Qty: 90 TABLET | Refills: 1 | Status: SHIPPED | OUTPATIENT
Start: 2024-06-14

## 2024-06-16 DIAGNOSIS — E03.8 OTHER SPECIFIED HYPOTHYROIDISM: ICD-10-CM

## 2024-06-16 NOTE — PROGRESS NOTES
"[facility]  Office Progress Note  Deya Hernandez  1945  [address] [place] [address]       Visit Date: 06/17/24    PCP: Rosaline Roberson,   [address]  [facility] [address]    IDENTIFICATION: A 79 y.o. 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 W/u benign. Holter recommended.  Holter 5/18 nonsustained SVT  8/22 7 day holter 49/66/123 <1% pac/pvc  Reformed nicotine addiction 2016  Exogenous obesity  Hypothyroidism     CC:   Chief Complaint   Patient presents with    Other forms of angina pectoris     Allergies  No Known Allergies    Current Medications  Current Outpatient Medications   Medication Instructions    ALPRAZolam (XANAX) 0.5 mg, Oral, 2 Times Daily PRN    amLODIPine (NORVASC) 5 mg, Oral, Daily    Aspirin Low Dose 81 MG EC tablet TAKE 1 TABLET BY MOUTH DAILY    buPROPion XL (WELLBUTRIN XL) 300 mg, Oral, Daily    carvedilol (COREG) 12.5 MG tablet TAKE 1 TABLET BY MOUTH TWICE DAILY WITH MEALS    diclofenac (VOLTAREN) 4 g, Topical, 4 Times Daily PRN    hydroCHLOROthiazide 12.5 mg, Oral, Daily    levothyroxine (SYNTHROID, LEVOTHROID) 100 mcg, Oral, Daily    ondansetron (ZOFRAN) 4 mg, Oral, Every 8 Hours PRN    pantoprazole (PROTONIX) 40 MG EC tablet TAKE 1 TABLET BY MOUTH EVERY MORNING 30 MINUTES BEFORE BREAKFAST    Simethicone (GAS-X PO) Oral, As Needed    simvastatin (ZOCOR) 40 mg, Oral, Every Night at Bedtime    sodium chloride 1 g, Oral, 2 Times Daily    trandolapril (MAVIK) 4 mg, Oral, Daily        History of Present Illness   Deya Hernandez is a 79 y.o. year old female here for overdue follow up.  She occasionally feels \"off balance\" " "or lightheaded.  She denies any exertional symptoms although admits to being fairly sedentary since COVID.  Activities are also limited by knee pain however she had tolerated recumbent bike in the past. Recent sodium 127 and PCP has addressed this.  She does admit she drinks a lot of water and has tried to substitute electrolyte replacement drinks to help with this.  She states prior to blood draw she did have a 5-day GI viral illness.  She states hydrochlorothiazide was discontinued however she developed lower extremity edema and requested a refill from her primary care.  Refill was authorized so she is taking half tablet daily (6.25 mg).  She reports home blood pressures have been well-controlled.  She reports she does have follow-up for repeat blood work.    Pt denies any chest pain, dyspnea, dyspnea on exertion, orthopnea, PND, palpitations, or claudication.      OBJECTIVE:  Vitals:    06/17/24 0918   BP: 122/70   BP Location: Right arm   Patient Position: Sitting   Cuff Size: Adult   Pulse: 68   SpO2: 99%   Weight: 90.5 kg (199 lb 9.6 oz)   Height: 165.1 cm (65\")     Body mass index is 33.22 kg/m².    Vitals reviewed.   Constitutional:       Appearance: Normal appearance. Well-developed and not in distress.   Pulmonary:      Effort: Pulmonary effort is normal.      Breath sounds: Normal breath sounds.   Cardiovascular:      Normal rate. Regular rhythm. Normal S1. Normal S2.       Murmurs: There is no murmur.   Pulses:     Intact distal pulses.   Edema:     Peripheral edema absent.   Skin:     General: Skin is warm and dry.   Neurological:      General: No focal deficit present.      Mental Status: Alert and oriented to person, place and time.   Psychiatric:         Behavior: Behavior is cooperative.         Diagnostic Data:  Lab Results   Component Value Date    GLUCOSE 92 06/11/2024    CALCIUM 9.4 06/11/2024     (L) 06/11/2024    K 4.1 06/11/2024    CO2 26.4 06/11/2024    CL 87 (L) 06/11/2024    BUN 6 " (L) 06/11/2024    CREATININE 0.81 06/11/2024    EGFRRESULT 74.9 06/11/2024    EGFR 66.4 09/09/2023    BCR 7.4 06/11/2024    ANIONGAP 16.8 (H) 09/09/2023      Lab Results   Component Value Date    WBC 7.00 06/11/2024    HGB 13.5 06/11/2024    HCT 38.7 06/11/2024    MCV 91.7 06/11/2024     (H) 06/11/2024      Lab Results   Component Value Date    TSH 1.340 06/11/2024      Lab Results   Component Value Date    CHOL 228 (H) 09/14/2016    CHLPL 159 08/18/2023    TRIG 92 08/18/2023    HDL 66 (H) 08/18/2023    LDL 76 08/18/2023      Procedures    Advance Care Planning   ACP discussion was declined by the patient. Patient does not have an advance directive, declines further assistance.         ASSESSMENT:   Diagnosis Plan   1. Primary hypertension        2. Mixed hyperlipidemia            PLAN:  HTN, controlled.  Target < 130/80  -Continue amlodipine, carvedilol, and Mavik  -Encouraged to reinstitute exercise regimen with her friend.  She is motivated to do so.    Hyperlipidemia, target LDL to <100  -Continue simvastatin    Hyponatremia-followed by primary care  -Advised to limit water intake  -Discuss continuation of HCTZ with primary care      TERESA Nieto

## 2024-06-17 ENCOUNTER — OFFICE VISIT (OUTPATIENT)
Dept: CARDIOLOGY | Facility: CLINIC | Age: 79
End: 2024-06-17
Payer: MEDICARE

## 2024-06-17 VITALS
HEART RATE: 68 BPM | OXYGEN SATURATION: 99 % | SYSTOLIC BLOOD PRESSURE: 122 MMHG | HEIGHT: 65 IN | DIASTOLIC BLOOD PRESSURE: 70 MMHG | WEIGHT: 199.6 LBS | BODY MASS INDEX: 33.26 KG/M2

## 2024-06-17 DIAGNOSIS — E78.2 MIXED HYPERLIPIDEMIA: ICD-10-CM

## 2024-06-17 DIAGNOSIS — I10 PRIMARY HYPERTENSION: Primary | ICD-10-CM

## 2024-06-17 PROCEDURE — 3078F DIAST BP <80 MM HG: CPT

## 2024-06-17 PROCEDURE — 3074F SYST BP LT 130 MM HG: CPT

## 2024-06-17 PROCEDURE — 1160F RVW MEDS BY RX/DR IN RCRD: CPT

## 2024-06-17 PROCEDURE — 99213 OFFICE O/P EST LOW 20 MIN: CPT

## 2024-06-17 PROCEDURE — 1159F MED LIST DOCD IN RCRD: CPT

## 2024-06-17 RX ORDER — LEVOTHYROXINE SODIUM 0.1 MG/1
100 TABLET ORAL DAILY
Qty: 90 TABLET | Refills: 1 | OUTPATIENT
Start: 2024-06-17

## 2024-08-05 DIAGNOSIS — R42 DIZZINESS: ICD-10-CM

## 2024-08-05 DIAGNOSIS — I10 PRIMARY HYPERTENSION: ICD-10-CM

## 2024-08-05 RX ORDER — HYDROCHLOROTHIAZIDE 12.5 MG/1
12.5 TABLET ORAL DAILY
Qty: 30 TABLET | Refills: 1 | Status: SHIPPED | OUTPATIENT
Start: 2024-08-05

## 2024-08-29 ENCOUNTER — PATIENT MESSAGE (OUTPATIENT)
Dept: GASTROENTEROLOGY | Facility: CLINIC | Age: 79
End: 2024-08-29
Payer: MEDICARE

## 2024-08-29 DIAGNOSIS — R10.9 ABDOMINAL PRESSURE: Chronic | ICD-10-CM

## 2024-08-29 RX ORDER — PANTOPRAZOLE SODIUM 40 MG/1
TABLET, DELAYED RELEASE ORAL
Qty: 90 TABLET | Refills: 1 | Status: SHIPPED | OUTPATIENT
Start: 2024-08-29

## 2024-08-29 NOTE — TELEPHONE ENCOUNTER
Carlie Najera MA 8/29/2024 9:37 AM EDT      ----- Message -----  From: Merari Schneider MA  Sent: 8/29/2024 9:36 AM EDT  To: Carlie Najera MA  Subject: FW: Prescription refill       ----- Message -----  From: Deya Hernandez  Sent: 8/29/2024 9:35 AM EDT  To: [facility]  Subject: Prescription refill     I need a refill of PANTOPRAZOLE 40MG TABLETS

## 2024-09-07 RX ORDER — TRANDOLAPRIL TABLETS 4 MG/1
4 TABLET ORAL DAILY
Qty: 90 TABLET | Refills: 1 | Status: SHIPPED | OUTPATIENT
Start: 2024-09-07

## 2024-09-17 ENCOUNTER — OFFICE VISIT (OUTPATIENT)
Dept: INTERNAL MEDICINE | Facility: CLINIC | Age: 79
End: 2024-09-17
Payer: MEDICARE

## 2024-09-17 VITALS
HEIGHT: 65 IN | HEART RATE: 70 BPM | RESPIRATION RATE: 20 BRPM | DIASTOLIC BLOOD PRESSURE: 74 MMHG | BODY MASS INDEX: 34.09 KG/M2 | WEIGHT: 204.6 LBS | SYSTOLIC BLOOD PRESSURE: 142 MMHG | TEMPERATURE: 97.2 F | OXYGEN SATURATION: 99 %

## 2024-09-17 DIAGNOSIS — F41.8 DEPRESSION WITH ANXIETY: ICD-10-CM

## 2024-09-17 DIAGNOSIS — E03.9 HYPOTHYROIDISM, UNSPECIFIED TYPE: ICD-10-CM

## 2024-09-17 DIAGNOSIS — R42 DIZZINESS: ICD-10-CM

## 2024-09-17 DIAGNOSIS — E87.1 HYPONATREMIA: Primary | ICD-10-CM

## 2024-09-17 PROCEDURE — 1126F AMNT PAIN NOTED NONE PRSNT: CPT | Performed by: STUDENT IN AN ORGANIZED HEALTH CARE EDUCATION/TRAINING PROGRAM

## 2024-09-17 PROCEDURE — 3078F DIAST BP <80 MM HG: CPT | Performed by: STUDENT IN AN ORGANIZED HEALTH CARE EDUCATION/TRAINING PROGRAM

## 2024-09-17 PROCEDURE — 1159F MED LIST DOCD IN RCRD: CPT | Performed by: STUDENT IN AN ORGANIZED HEALTH CARE EDUCATION/TRAINING PROGRAM

## 2024-09-17 PROCEDURE — 1160F RVW MEDS BY RX/DR IN RCRD: CPT | Performed by: STUDENT IN AN ORGANIZED HEALTH CARE EDUCATION/TRAINING PROGRAM

## 2024-09-17 PROCEDURE — 99214 OFFICE O/P EST MOD 30 MIN: CPT | Performed by: STUDENT IN AN ORGANIZED HEALTH CARE EDUCATION/TRAINING PROGRAM

## 2024-09-17 PROCEDURE — G2211 COMPLEX E/M VISIT ADD ON: HCPCS | Performed by: STUDENT IN AN ORGANIZED HEALTH CARE EDUCATION/TRAINING PROGRAM

## 2024-09-17 PROCEDURE — 3077F SYST BP >= 140 MM HG: CPT | Performed by: STUDENT IN AN ORGANIZED HEALTH CARE EDUCATION/TRAINING PROGRAM

## 2024-09-17 RX ORDER — BUPROPION HYDROCHLORIDE 150 MG/1
150 TABLET ORAL DAILY
Qty: 15 TABLET | Refills: 0 | Status: SHIPPED | OUTPATIENT
Start: 2024-09-17

## 2024-09-18 ENCOUNTER — APPOINTMENT (OUTPATIENT)
Dept: CT IMAGING | Facility: HOSPITAL | Age: 79
End: 2024-09-18
Payer: MEDICARE

## 2024-09-18 ENCOUNTER — HOSPITAL ENCOUNTER (EMERGENCY)
Facility: HOSPITAL | Age: 79
Discharge: HOME | End: 2024-09-18
Attending: EMERGENCY MEDICINE
Payer: MEDICARE

## 2024-09-18 VITALS
RESPIRATION RATE: 18 BRPM | WEIGHT: 185 LBS | TEMPERATURE: 97.8 F | SYSTOLIC BLOOD PRESSURE: 134 MMHG | OXYGEN SATURATION: 98 % | BODY MASS INDEX: 30.82 KG/M2 | DIASTOLIC BLOOD PRESSURE: 68 MMHG | HEIGHT: 65 IN | HEART RATE: 69 BPM

## 2024-09-18 DIAGNOSIS — E87.1 HYPONATREMIA: ICD-10-CM

## 2024-09-18 DIAGNOSIS — R42 VERTIGO: Primary | ICD-10-CM

## 2024-09-18 LAB
ALBUMIN SERPL-MCNC: 4.3 G/DL (ref 3.5–5.2)
ALBUMIN SERPL-MCNC: 4.4 G/DL (ref 3.8–4.8)
ALBUMIN/GLOB SERPL: 1.8 G/DL
ALP SERPL-CCNC: 59 U/L (ref 39–117)
ALP SERPL-CCNC: 62 IU/L (ref 44–121)
ALT SERPL W P-5'-P-CCNC: 14 U/L (ref 1–33)
ALT SERPL-CCNC: 15 IU/L (ref 0–32)
ANION GAP SERPL CALCULATED.3IONS-SCNC: 12.6 MMOL/L (ref 5–15)
APTT PPP: 25.2 SECONDS (ref 70–100)
AST SERPL-CCNC: 19 IU/L (ref 0–40)
AST SERPL-CCNC: 19 U/L (ref 1–32)
BASOPHILS # BLD AUTO: 0.04 10*3/MM3 (ref 0–0.2)
BASOPHILS NFR BLD AUTO: 0.6 % (ref 0–1.5)
BILIRUB SERPL-MCNC: 0.4 MG/DL (ref 0–1.2)
BILIRUB SERPL-MCNC: 0.5 MG/DL (ref 0–1.2)
BILIRUB UR QL STRIP: NEGATIVE
BUN SERPL-MCNC: 10 MG/DL (ref 8–27)
BUN SERPL-MCNC: 9 MG/DL (ref 8–23)
BUN/CREAT SERPL: 10.7 (ref 7–25)
BUN/CREAT SERPL: 11 (ref 12–28)
CALCIUM SERPL-MCNC: 9.4 MG/DL (ref 8.7–10.3)
CALCIUM SPEC-SCNC: 9.4 MG/DL (ref 8.6–10.5)
CHLORIDE SERPL-SCNC: 91 MMOL/L (ref 96–106)
CHLORIDE SERPL-SCNC: 91 MMOL/L (ref 98–107)
CLARITY UR: CLEAR
CO2 SERPL-SCNC: 22 MMOL/L (ref 20–29)
CO2 SERPL-SCNC: 25.4 MMOL/L (ref 22–29)
COLOR UR: YELLOW
CREAT SERPL-MCNC: 0.84 MG/DL (ref 0.57–1)
CREAT SERPL-MCNC: 0.93 MG/DL (ref 0.57–1)
DEPRECATED RDW RBC AUTO: 38.9 FL (ref 37–54)
EGFRCR SERPLBLD CKD-EPI 2021: 63 ML/MIN/1.73
EGFRCR SERPLBLD CKD-EPI 2021: 71.7 ML/MIN/1.73
EOSINOPHIL # BLD AUTO: 0.26 10*3/MM3 (ref 0–0.4)
EOSINOPHIL NFR BLD AUTO: 4 % (ref 0.3–6.2)
ERYTHROCYTE [DISTWIDTH] IN BLOOD BY AUTOMATED COUNT: 11.6 % (ref 12.3–15.4)
ERYTHROCYTE [DISTWIDTH] IN BLOOD BY AUTOMATED COUNT: 12 % (ref 11.7–15.4)
GLOBULIN SER CALC-MCNC: 2.4 G/DL (ref 1.5–4.5)
GLOBULIN UR ELPH-MCNC: 2.4 GM/DL
GLUCOSE SERPL-MCNC: 100 MG/DL (ref 65–99)
GLUCOSE SERPL-MCNC: 102 MG/DL (ref 70–99)
GLUCOSE UR STRIP-MCNC: NEGATIVE MG/DL
HCT VFR BLD AUTO: 36.4 % (ref 34–46.6)
HCT VFR BLD AUTO: 39.1 % (ref 34–46.6)
HGB BLD-MCNC: 12.9 G/DL (ref 12–15.9)
HGB BLD-MCNC: 13.1 G/DL (ref 11.1–15.9)
HGB UR QL STRIP.AUTO: NEGATIVE
IMM GRANULOCYTES # BLD AUTO: 0.01 10*3/MM3 (ref 0–0.05)
IMM GRANULOCYTES NFR BLD AUTO: 0.2 % (ref 0–0.5)
INR PPP: 0.92 (ref 0.9–1.1)
KETONES UR QL STRIP: ABNORMAL
LEUKOCYTE ESTERASE UR QL STRIP.AUTO: NEGATIVE
LYMPHOCYTES # BLD AUTO: 1.99 10*3/MM3 (ref 0.7–3.1)
LYMPHOCYTES NFR BLD AUTO: 30.3 % (ref 19.6–45.3)
MAGNESIUM SERPL-MCNC: 1.9 MG/DL (ref 1.6–2.4)
MCH RBC QN AUTO: 32.2 PG (ref 26.6–33)
MCH RBC QN AUTO: 32.5 PG (ref 26.6–33)
MCHC RBC AUTO-ENTMCNC: 33.5 G/DL (ref 31.5–35.7)
MCHC RBC AUTO-ENTMCNC: 35.4 G/DL (ref 31.5–35.7)
MCV RBC AUTO: 90.8 FL (ref 79–97)
MCV RBC AUTO: 97 FL (ref 79–97)
MONOCYTES # BLD AUTO: 0.6 10*3/MM3 (ref 0.1–0.9)
MONOCYTES NFR BLD AUTO: 9.1 % (ref 5–12)
NEUTROPHILS NFR BLD AUTO: 3.67 10*3/MM3 (ref 1.7–7)
NEUTROPHILS NFR BLD AUTO: 55.8 % (ref 42.7–76)
NITRITE UR QL STRIP: NEGATIVE
NRBC BLD AUTO-RTO: 0 /100 WBC (ref 0–0.2)
PH UR STRIP.AUTO: 7.5 [PH] (ref 5–8)
PLATELET # BLD AUTO: 387 10*3/MM3 (ref 140–450)
PLATELET # BLD AUTO: 430 X10E3/UL (ref 150–450)
PMV BLD AUTO: 8.7 FL (ref 6–12)
POTASSIUM SERPL-SCNC: 3.9 MMOL/L (ref 3.5–5.2)
POTASSIUM SERPL-SCNC: 4.1 MMOL/L (ref 3.5–5.2)
PROT SERPL-MCNC: 6.7 G/DL (ref 6–8.5)
PROT SERPL-MCNC: 6.8 G/DL (ref 6–8.5)
PROT UR QL STRIP: NEGATIVE
PROTHROMBIN TIME: 12.8 SECONDS (ref 12.3–15.1)
RBC # BLD AUTO: 4.01 10*6/MM3 (ref 3.77–5.28)
RBC # BLD AUTO: 4.03 X10E6/UL (ref 3.77–5.28)
SODIUM SERPL-SCNC: 129 MMOL/L (ref 134–144)
SODIUM SERPL-SCNC: 129 MMOL/L (ref 136–145)
SP GR UR STRIP: 1.01 (ref 1–1.03)
TROPONIN T SERPL HS-MCNC: 10 NG/L
TSH SERPL DL<=0.005 MIU/L-ACNC: 1.15 UIU/ML (ref 0.45–4.5)
UROBILINOGEN UR QL STRIP: ABNORMAL
WBC # BLD AUTO: 6.5 X10E3/UL (ref 3.4–10.8)
WBC NRBC COR # BLD AUTO: 6.57 10*3/MM3 (ref 3.4–10.8)

## 2024-09-18 PROCEDURE — 25010000002 METOCLOPRAMIDE PER 10 MG: Performed by: EMERGENCY MEDICINE

## 2024-09-18 PROCEDURE — 85025 COMPLETE CBC W/AUTO DIFF WBC: CPT | Performed by: EMERGENCY MEDICINE

## 2024-09-18 PROCEDURE — 80053 COMPREHEN METABOLIC PANEL: CPT | Performed by: EMERGENCY MEDICINE

## 2024-09-18 PROCEDURE — 99285 EMERGENCY DEPT VISIT HI MDM: CPT

## 2024-09-18 PROCEDURE — 25510000001 IOPAMIDOL 61 % SOLUTION: Performed by: EMERGENCY MEDICINE

## 2024-09-18 PROCEDURE — 85730 THROMBOPLASTIN TIME PARTIAL: CPT | Performed by: EMERGENCY MEDICINE

## 2024-09-18 PROCEDURE — 81003 URINALYSIS AUTO W/O SCOPE: CPT | Performed by: EMERGENCY MEDICINE

## 2024-09-18 PROCEDURE — 84484 ASSAY OF TROPONIN QUANT: CPT | Performed by: EMERGENCY MEDICINE

## 2024-09-18 PROCEDURE — 96374 THER/PROPH/DIAG INJ IV PUSH: CPT

## 2024-09-18 PROCEDURE — 25810000003 SODIUM CHLORIDE 0.9 % SOLUTION: Performed by: EMERGENCY MEDICINE

## 2024-09-18 PROCEDURE — 70496 CT ANGIOGRAPHY HEAD: CPT

## 2024-09-18 PROCEDURE — 85610 PROTHROMBIN TIME: CPT | Performed by: EMERGENCY MEDICINE

## 2024-09-18 PROCEDURE — 70450 CT HEAD/BRAIN W/O DYE: CPT

## 2024-09-18 PROCEDURE — 70498 CT ANGIOGRAPHY NECK: CPT

## 2024-09-18 PROCEDURE — 83735 ASSAY OF MAGNESIUM: CPT | Performed by: EMERGENCY MEDICINE

## 2024-09-18 RX ORDER — METOCLOPRAMIDE HYDROCHLORIDE 5 MG/ML
10 INJECTION INTRAMUSCULAR; INTRAVENOUS ONCE
Status: COMPLETED | OUTPATIENT
Start: 2024-09-18 | End: 2024-09-18

## 2024-09-18 RX ORDER — IOPAMIDOL 612 MG/ML
100 INJECTION, SOLUTION INTRAVASCULAR
Status: COMPLETED | OUTPATIENT
Start: 2024-09-18 | End: 2024-09-18

## 2024-09-18 RX ORDER — MECLIZINE HYDROCHLORIDE 25 MG/1
25 TABLET ORAL 3 TIMES DAILY PRN
Qty: 15 TABLET | Refills: 0 | Status: SHIPPED | OUTPATIENT
Start: 2024-09-18

## 2024-09-18 RX ORDER — MECLIZINE HYDROCHLORIDE 25 MG/1
25 TABLET ORAL ONCE
Status: COMPLETED | OUTPATIENT
Start: 2024-09-18 | End: 2024-09-18

## 2024-09-18 RX ADMIN — SODIUM CHLORIDE 1000 ML: 9 INJECTION, SOLUTION INTRAVENOUS at 16:38

## 2024-09-18 RX ADMIN — MECLIZINE HYDROCHLORIDE 25 MG: 25 TABLET ORAL at 16:38

## 2024-09-18 RX ADMIN — IOPAMIDOL 100 ML: 612 INJECTION, SOLUTION INTRAVENOUS at 17:23

## 2024-09-18 RX ADMIN — METOCLOPRAMIDE 10 MG: 5 INJECTION, SOLUTION INTRAMUSCULAR; INTRAVENOUS at 16:38

## 2024-09-24 ENCOUNTER — TELEPHONE (OUTPATIENT)
Dept: CARDIOLOGY | Facility: CLINIC | Age: 79
End: 2024-09-24
Payer: MEDICARE

## 2024-09-24 ENCOUNTER — TELEPHONE (OUTPATIENT)
Dept: INTERNAL MEDICINE | Facility: CLINIC | Age: 79
End: 2024-09-24

## 2024-09-25 ENCOUNTER — OFFICE VISIT (OUTPATIENT)
Dept: INTERNAL MEDICINE | Facility: CLINIC | Age: 79
End: 2024-09-25
Payer: MEDICARE

## 2024-09-25 VITALS
HEIGHT: 65 IN | HEART RATE: 64 BPM | BODY MASS INDEX: 33.82 KG/M2 | DIASTOLIC BLOOD PRESSURE: 82 MMHG | WEIGHT: 203 LBS | SYSTOLIC BLOOD PRESSURE: 128 MMHG | TEMPERATURE: 97.4 F | OXYGEN SATURATION: 100 %

## 2024-09-25 DIAGNOSIS — R14.0 BLOATING: ICD-10-CM

## 2024-09-25 DIAGNOSIS — E03.9 HYPOTHYROIDISM, UNSPECIFIED TYPE: ICD-10-CM

## 2024-09-25 DIAGNOSIS — R53.83 OTHER FATIGUE: ICD-10-CM

## 2024-09-25 DIAGNOSIS — R00.2 PALPITATIONS: ICD-10-CM

## 2024-09-25 DIAGNOSIS — R42 VERTIGO: Primary | ICD-10-CM

## 2024-09-25 PROCEDURE — G2211 COMPLEX E/M VISIT ADD ON: HCPCS | Performed by: STUDENT IN AN ORGANIZED HEALTH CARE EDUCATION/TRAINING PROGRAM

## 2024-09-25 PROCEDURE — 1160F RVW MEDS BY RX/DR IN RCRD: CPT | Performed by: STUDENT IN AN ORGANIZED HEALTH CARE EDUCATION/TRAINING PROGRAM

## 2024-09-25 PROCEDURE — 1126F AMNT PAIN NOTED NONE PRSNT: CPT | Performed by: STUDENT IN AN ORGANIZED HEALTH CARE EDUCATION/TRAINING PROGRAM

## 2024-09-25 PROCEDURE — 1159F MED LIST DOCD IN RCRD: CPT | Performed by: STUDENT IN AN ORGANIZED HEALTH CARE EDUCATION/TRAINING PROGRAM

## 2024-09-25 PROCEDURE — 3079F DIAST BP 80-89 MM HG: CPT | Performed by: STUDENT IN AN ORGANIZED HEALTH CARE EDUCATION/TRAINING PROGRAM

## 2024-09-25 PROCEDURE — 99214 OFFICE O/P EST MOD 30 MIN: CPT | Performed by: STUDENT IN AN ORGANIZED HEALTH CARE EDUCATION/TRAINING PROGRAM

## 2024-09-25 PROCEDURE — 3074F SYST BP LT 130 MM HG: CPT | Performed by: STUDENT IN AN ORGANIZED HEALTH CARE EDUCATION/TRAINING PROGRAM

## 2024-09-25 RX ORDER — BACILLUS COAGULANS/INULIN 1B-250 MG
CAPSULE ORAL
COMMUNITY
Start: 2024-06-01

## 2024-09-25 RX ORDER — SCOLOPAMINE TRANSDERMAL SYSTEM 1 MG/1
1 PATCH, EXTENDED RELEASE TRANSDERMAL
Qty: 10 PATCH | Refills: 0 | Status: SHIPPED | OUTPATIENT
Start: 2024-09-25

## 2024-09-26 ENCOUNTER — TELEPHONE (OUTPATIENT)
Dept: INTERNAL MEDICINE | Facility: CLINIC | Age: 79
End: 2024-09-26
Payer: MEDICARE

## 2024-09-26 DIAGNOSIS — E03.9 HYPOTHYROIDISM, UNSPECIFIED TYPE: Primary | ICD-10-CM

## 2024-09-28 LAB
A PHAGOCYTOPH IGG TITR SER IF: NEGATIVE {TITER}
A PHAGOCYTOPH IGM TITR SER IF: NEGATIVE {TITER}
ALBUMIN SERPL-MCNC: 4.5 G/DL (ref 3.5–5.2)
ALBUMIN/GLOB SERPL: 1.8 G/DL
ALP SERPL-CCNC: 59 U/L (ref 39–117)
ALT SERPL-CCNC: 16 U/L (ref 1–33)
ANA SER QL: NEGATIVE
AST SERPL-CCNC: 20 U/L (ref 1–32)
B BURGDOR IGG+IGM SER QL IA: NEGATIVE
BILIRUB SERPL-MCNC: 0.4 MG/DL (ref 0–1.2)
BUN SERPL-MCNC: 7 MG/DL (ref 8–23)
BUN/CREAT SERPL: 7.7 (ref 7–25)
CALCIUM SERPL-MCNC: 9.7 MG/DL (ref 8.6–10.5)
CHLORIDE SERPL-SCNC: 100 MMOL/L (ref 98–107)
CK SERPL-CCNC: 110 U/L (ref 20–180)
CO2 SERPL-SCNC: 25.5 MMOL/L (ref 22–29)
CREAT SERPL-MCNC: 0.91 MG/DL (ref 0.57–1)
E CHAFFEENSIS IGG TITR SER IF: NEGATIVE {TITER}
E CHAFFEENSIS IGM TITR SER IF: NEGATIVE {TITER}
EGFRCR SERPLBLD CKD-EPI 2021: 65.1 ML/MIN/1.73
GLOBULIN SER CALC-MCNC: 2.5 GM/DL
GLUCOSE SERPL-MCNC: 88 MG/DL (ref 65–99)
POTASSIUM SERPL-SCNC: 4.5 MMOL/L (ref 3.5–5.2)
PROT SERPL-MCNC: 7 G/DL (ref 6–8.5)
RESULT COMMENT:: NORMAL
RESULT COMMENT:: NORMAL
RICK SF IGG TITR SER: NORMAL {TITER}
RICK SF IGM TITR SER: NORMAL {TITER}
SODIUM SERPL-SCNC: 138 MMOL/L (ref 136–145)
THYROGLOB AB SERPL-ACNC: 44.2 IU/ML (ref 0–0.9)
THYROPEROXIDASE AB SERPL-ACNC: 24 IU/ML (ref 0–34)

## 2024-09-30 ENCOUNTER — TELEPHONE (OUTPATIENT)
Dept: GASTROENTEROLOGY | Facility: CLINIC | Age: 79
End: 2024-09-30
Payer: MEDICARE

## 2024-09-30 NOTE — TELEPHONE ENCOUNTER
Caller: Deya Hernandez    Relationship to patient: Self    Best call back number: [phone]    Patient is needing: PT IS WANTING REESOR TO RECOMMEND/REFER AN ENDOCRINOLOGIST LOCALLY. PT IS WANTING TO KNOW IF THERE IS AN ENDOCRINOLOGIST IN THE BUILDING.

## 2024-10-28 DIAGNOSIS — F41.8 DEPRESSION WITH ANXIETY: ICD-10-CM

## 2024-10-28 RX ORDER — BUPROPION HYDROCHLORIDE 150 MG/1
TABLET ORAL
Qty: 15 TABLET | Refills: 0 | OUTPATIENT
Start: 2024-10-28

## 2024-12-06 DIAGNOSIS — E03.8 OTHER SPECIFIED HYPOTHYROIDISM: ICD-10-CM

## 2024-12-06 RX ORDER — SIMVASTATIN 40 MG
40 TABLET ORAL
Qty: 90 TABLET | Refills: 3 | Status: SHIPPED | OUTPATIENT
Start: 2024-12-06

## 2024-12-06 RX ORDER — LEVOTHYROXINE SODIUM 100 UG/1
100 TABLET ORAL DAILY
Qty: 90 TABLET | Refills: 1 | Status: SHIPPED | OUTPATIENT
Start: 2024-12-06

## 2024-12-06 RX ORDER — CARVEDILOL 12.5 MG/1
TABLET ORAL
Qty: 180 TABLET | Refills: 3 | Status: SHIPPED | OUTPATIENT
Start: 2024-12-06

## 2024-12-12 ENCOUNTER — OFFICE VISIT (OUTPATIENT)
Dept: GASTROENTEROLOGY | Facility: CLINIC | Age: 79
End: 2024-12-12
Payer: MEDICARE

## 2024-12-12 VITALS
SYSTOLIC BLOOD PRESSURE: 120 MMHG | BODY MASS INDEX: 35.94 KG/M2 | WEIGHT: 216 LBS | OXYGEN SATURATION: 97 % | DIASTOLIC BLOOD PRESSURE: 84 MMHG | HEART RATE: 73 BPM

## 2024-12-12 DIAGNOSIS — Z12.11 ENCOUNTER FOR SCREENING FOR MALIGNANT NEOPLASM OF COLON: ICD-10-CM

## 2024-12-12 DIAGNOSIS — K58.1 IRRITABLE BOWEL SYNDROME WITH CONSTIPATION: Chronic | ICD-10-CM

## 2024-12-12 DIAGNOSIS — E66.01 CLASS 2 SEVERE OBESITY DUE TO EXCESS CALORIES WITH SERIOUS COMORBIDITY AND BODY MASS INDEX (BMI) OF 35.0 TO 35.9 IN ADULT: Chronic | ICD-10-CM

## 2024-12-12 DIAGNOSIS — E66.812 CLASS 2 SEVERE OBESITY DUE TO EXCESS CALORIES WITH SERIOUS COMORBIDITY AND BODY MASS INDEX (BMI) OF 35.0 TO 35.9 IN ADULT: Chronic | ICD-10-CM

## 2024-12-12 DIAGNOSIS — K76.0 FATTY (CHANGE OF) LIVER, NOT ELSEWHERE CLASSIFIED: Chronic | ICD-10-CM

## 2024-12-12 DIAGNOSIS — K21.9 GASTROESOPHAGEAL REFLUX DISEASE WITHOUT ESOPHAGITIS: Primary | Chronic | ICD-10-CM

## 2024-12-12 RX ORDER — PANTOPRAZOLE SODIUM 40 MG/1
TABLET, DELAYED RELEASE ORAL
Qty: 90 TABLET | Refills: 1 | Status: SHIPPED | OUTPATIENT
Start: 2024-12-12

## 2024-12-12 NOTE — PROGRESS NOTES
"     Follow Up Note     Date: 2024   Patient Name: Deya Hernandez  MRN: [id number]  : 1945     Primary Care Provider: Rosaline Roberson DO     Chief Complaint   Patient presents with    Heartburn     2024  History of Present Illness  The patient is a 79-year-old female who is here for follow-up of reflux.  She is taking pantoprazole 40 mg daily with reasonable control of reflux.  No difficulty swallowing.  She reports an overall improvement in her GI symptoms, with occasional episodes of bloating and discomfort that are typically alleviated by Gas-X. Denies GI bleeding.    Interval History:  2024  Deya Hernandez is a 79 y.o. female who is here today for follow up for nausea. She has been having some nausea off and on for the past week or so. She had a stomach virus not long ago that lasted 5 days but has gradually improved. She is feeling better today. No GI bleeding.      2020  For the past year or so, the patient will have periumbilical abdominal pain and bloating. The pain is described as a pressure in her abdomen and is very mild. The pressure may last a few days, then \"go away\" for a few days or even a month or so. Burping or passing gas improves the pressure. The patient denies nausea or vomiting. There is no history of heartburn or reflux. She had taken Pantoprazole in the past for similar symptoms, but she stopped it over 3 years ago.     The patient has had a few episodes of diarrhea over the past 2 weeks or so. She is not sure if it was something she had eaten. No rectal bleeding.      Last colonoscopy was 2016 with small polyps (1 tubular adenoma without dysplasia), diverticulosis, and internal hemorrhoids    Subjective      Past Medical History:   Diagnosis Date    Abdominal bloating     Abnormal EKG     Ankle fracture     Anxiety and depression     Cataract Removed Mar.,23    Colon cancer screening     Colon polyp 2016    COVID     Disease of thyroid " gland     Diverticulitis of colon     Dyspnea     Elbow pain, left     Elevated cholesterol     Fatigue     Fatty infiltration of liver 10/12/2020    Fatty liver [id number]    Gastritis     GERD (gastroesophageal reflux disease)     H/O echocardiogram Unknown    Normal per pt.    H/O exercise stress test 2016    Normal result per pt.    H/O mammogram     H/O sinusitis     H/O: pneumonia     Heart palpitations 2014    Heartburn     Hemorrhoid     History of Holter monitoring 2014    Found to be normal per pt.    Hypertension     Plantar fasciitis     Sebaceous cyst     Sinusitis     Skin cancer      Past Surgical History:   Procedure Laterality Date    CARDIAC CATHETERIZATION  2003    Negative per pt.    CATARACT EXTRACTION W/ INTRAOCULAR LENS IMPLANT Left 03/09/2023    Procedure: CATARACT PHACO EXTRACTION WITH INTRAOCULAR LENS IMPLANT LEFT;  Surgeon: Dain Lyon MD;  Location: [facility] OR;  Service: Ophthalmology;  Laterality: Left;    CATARACT EXTRACTION W/ INTRAOCULAR LENS IMPLANT Right 03/23/2023    Procedure: CATARACT PHACO EXTRACTION WITH INTRAOCULAR LENS IMPLANT RIGHT;  Surgeon: Dain Lyon MD;  Location: [facility] OR;  Service: Ophthalmology;  Laterality: Right;    COLONOSCOPY  04/07/2016    COLONOSCOPY N/A 11/17/2021    Procedure: COLONOSCOPY;  Surgeon: Kimmie Chapa MD;  Location: [facility] ENDOSCOPY;  Service: Gastroenterology;  Laterality: N/A;    COLONOSCOPY W/ POLYPECTOMY      ENDOSCOPY  2016    ENDOSCOPY N/A 12/06/2022    Procedure: ESOPHAGOGASTRODUODENOSCOPY WITH BIOPSY;  Surgeon: Kimmie Chapa MD;  Location: [facility] ENDOSCOPY;  Service: Gastroenterology;  Laterality: N/A;    SKIN BIOPSY      TUBAL ABDOMINAL LIGATION      UPPER GASTROINTESTINAL ENDOSCOPY  03/2016     Family History   Problem Relation Age of Onset    Heart attack Other     Arthritis Other     Cancer Other     Diabetes Other     Hypertension Other     Stroke Other     Cancer Mother     Breast cancer Mother 70     Heart disease Mother     Emphysema Father     Thyroid disease Sister     Colon polyps Neg Hx     Esophageal cancer Neg Hx     Irritable bowel syndrome Neg Hx     Liver cancer Neg Hx     Liver disease Neg Hx     Stomach cancer Neg Hx     Colon cancer Neg Hx     Ovarian cancer Neg Hx      Social History     Socioeconomic History    Marital status:    Tobacco Use    Smoking status: Former     Current packs/day: 0.00     Average packs/day: 0.3 packs/day for 15.0 years (3.8 ttl pk-yrs)     Types: Cigarettes     Start date: 2002     Quit date: 2017     Years since quittin.9     Passive exposure: Never    Smokeless tobacco: Never    Tobacco comments:     Never a heavy smoker   Vaping Use    Vaping status: Never Used   Substance and Sexual Activity    Alcohol use: Yes     Alcohol/week: 3.0 standard drinks of alcohol     Types: 1 Glasses of wine, 2 Cans of beer per week     Comment: social    Drug use: Never    Sexual activity: Not Currently       Current Outpatient Medications:     ALPRAZolam (XANAX) 0.5 MG tablet, Take 1 tablet by mouth 2 (Two) Times a Day As Needed for Anxiety., Disp: 30 tablet, Rfl: 0    amLODIPine (NORVASC) 5 MG tablet, Take 1 tablet by mouth Daily., Disp: 90 tablet, Rfl: 3    Bacillus Coagulans-Inulin (Probiotic) 1-250 BILLION-MG capsule, , Disp: , Rfl:     carvedilol (COREG) 12.5 MG tablet, TAKE 1 TABLET BY MOUTH TWICE DAILY WITH MEALS, Disp: 180 tablet, Rfl: 3    diclofenac (VOLTAREN) 1 % gel gel, Apply 4 g topically to the appropriate area as directed 4 (Four) Times a Day As Needed (pain)., Disp: 100 g, Rfl: 0    hydroCHLOROthiazide 12.5 MG tablet, TAKE 1 TABLET BY MOUTH DAILY, Disp: 30 tablet, Rfl: 1    levothyroxine (SYNTHROID, LEVOTHROID) 100 MCG tablet, TAKE 1 TABLET BY MOUTH DAILY, Disp: 90 tablet, Rfl: 1    meclizine (ANTIVERT) 25 MG tablet, Take 1 tablet by mouth 3 (Three) Times a Day As Needed for Dizziness., Disp: 15 tablet, Rfl: 0    ondansetron (ZOFRAN) 4 MG tablet, Take  1 tablet by mouth Every 8 (Eight) Hours As Needed for Nausea or Vomiting., Disp: 30 tablet, Rfl: 1    pantoprazole (PROTONIX) 40 MG EC tablet, TAKE 1 TABLET BY MOUTH EVERY MORNING 30 MINUTES BEFORE BREAKFAST, Disp: 90 tablet, Rfl: 1    Simethicone (GAS-X PO), Take  by mouth As Needed., Disp: , Rfl:     simvastatin (ZOCOR) 40 MG tablet, TAKE 1 TABLET BY MOUTH EVERY NIGHT AT BEDTIME, Disp: 90 tablet, Rfl: 3    trandolapril (MAVIK) 4 MG tablet, TAKE 1 TABLET BY MOUTH DAILY, Disp: 90 tablet, Rfl: 1    No Known Allergies    The following portions of the patient's history were reviewed and updated as appropriate: allergies, current medications, past family history, past medical history, past social history, past surgical history and problem list.  Objective     Physical Exam  Vitals and nursing note reviewed.   Constitutional:       General: She is not in acute distress.     Appearance: Normal appearance. She is well-developed.   HENT:      Head: Normocephalic and atraumatic.      Mouth/Throat:      Mouth: Mucous membranes are not pale, not dry and not cyanotic.   Eyes:      General: Lids are normal.   Neck:      Trachea: Trachea normal.   Cardiovascular:      Rate and Rhythm: Normal rate.   Pulmonary:      Effort: Pulmonary effort is normal. No respiratory distress.      Breath sounds: Normal breath sounds.   Abdominal:      Tenderness: There is no abdominal tenderness.   Skin:     General: Skin is warm and dry.   Neurological:      Mental Status: She is alert and oriented to person, place, and time.   Psychiatric:         Mood and Affect: Mood normal.         Speech: Speech normal.         Behavior: Behavior normal. Behavior is cooperative.       Vitals:    12/12/24 1456   BP: 120/84   Pulse: 73   SpO2: 97%   Weight: 98 kg (216 lb)     Body mass index is 35.94 kg/m².     Results Review:   I reviewed the patient's new clinical results.    Office Visit on 09/25/2024   Component Date Value Ref Range Status    Glucose  09/25/2024 88  65 - 99 mg/dL Final    BUN 09/25/2024 7 (L)  8 - 23 mg/dL Final    Creatinine 09/25/2024 0.91  0.57 - 1.00 mg/dL Final    EGFR Result 09/25/2024 65.1  >60.0 mL/min/1.73 Final    BUN/Creatinine Ratio 09/25/2024 7.7  7.0 - 25.0 Final    Sodium 09/25/2024 138  136 - 145 mmol/L Final    Potassium 09/25/2024 4.5  3.5 - 5.2 mmol/L Final    Chloride 09/25/2024 100  98 - 107 mmol/L Final    Total CO2 09/25/2024 25.5  22.0 - 29.0 mmol/L Final    Calcium 09/25/2024 9.7  8.6 - 10.5 mg/dL Final    Total Protein 09/25/2024 7.0  6.0 - 8.5 g/dL Final    Albumin 09/25/2024 4.5  3.5 - 5.2 g/dL Final    Globulin 09/25/2024 2.5  gm/dL Final    A/G Ratio 09/25/2024 1.8  g/dL Final    Total Bilirubin 09/25/2024 0.4  0.0 - 1.2 mg/dL Final    Alkaline Phosphatase 09/25/2024 59  39 - 117 U/L Final    AST (SGOT) 09/25/2024 20  1 - 32 U/L Final    ALT (SGPT) 09/25/2024 16  1 - 33 U/L Final    MEGHAN Direct 09/25/2024 Negative  Negative Final    Creatine Kinase 09/25/2024 110  20 - 180 U/L Final    Lyme Total Antibody EIA 09/25/2024 Negative  Negative Final    E. chaffeensis (HME) IgG Titer 09/25/2024 Negative  Neg:<1:64 Final    E. chaffeensis (HME) IgM Titer 09/25/2024 Negative  Neg:<1:20 Final    HGE IgG Titer 09/25/2024 Negative  Neg:<1:64 Final    HGE IgM Titer 09/25/2024 Negative  Neg:<1:20 Final    RESULT COMMENT: 09/25/2024 Comment   Final    Spotted Fever Group IgG 09/25/2024 <1:64  Neg:<1:64 Final    Spotted Fever Group IgM 09/25/2024 <1:64  Neg:<1:64 Final    RESULT COMMENT: 09/25/2024 Comment   Final    Thyroglobulin Ab 09/25/2024 44.2 (H)  0.0 - 0.9 IU/mL Final    Thyroid Peroxidase Antibody 09/25/2024 24  0 - 34 IU/mL Final   Admission on 09/18/2024, Discharged on 09/18/2024   Component Date Value Ref Range Status    Glucose 09/18/2024 100 (H)  65 - 99 mg/dL Final    BUN 09/18/2024 9  8 - 23 mg/dL Final    Creatinine 09/18/2024 0.84  0.57 - 1.00 mg/dL Final    Sodium 09/18/2024 129 (L)  136 - 145 mmol/L Final     Potassium 09/18/2024 3.9  3.5 - 5.2 mmol/L Final    Chloride 09/18/2024 91 (L)  98 - 107 mmol/L Final    CO2 09/18/2024 25.4  22.0 - 29.0 mmol/L Final    Calcium 09/18/2024 9.4  8.6 - 10.5 mg/dL Final    Total Protein 09/18/2024 6.7  6.0 - 8.5 g/dL Final    Albumin 09/18/2024 4.3  3.5 - 5.2 g/dL Final    ALT (SGPT) 09/18/2024 14  1 - 33 U/L Final    AST (SGOT) 09/18/2024 19  1 - 32 U/L Final    Alkaline Phosphatase 09/18/2024 59  39 - 117 U/L Final    Total Bilirubin 09/18/2024 0.4  0.0 - 1.2 mg/dL Final    Globulin 09/18/2024 2.4  gm/dL Final    A/G Ratio 09/18/2024 1.8  g/dL Final    BUN/Creatinine Ratio 09/18/2024 10.7  7.0 - 25.0 Final    Anion Gap 09/18/2024 12.6  5.0 - 15.0 mmol/L Final    eGFR 09/18/2024 71.7  >60.0 mL/min/1.73 Final    Protime 09/18/2024 12.8  12.3 - 15.1 Seconds Final    INR 09/18/2024 0.92  0.90 - 1.10 Final    PTT 09/18/2024 25.2 (L)  70.0 - 100.0 seconds Final    Magnesium 09/18/2024 1.9  1.6 - 2.4 mg/dL Final    HS Troponin T 09/18/2024 10  <14 ng/L Final    WBC 09/18/2024 6.57  3.40 - 10.80 10*3/mm3 Final    RBC 09/18/2024 4.01  3.77 - 5.28 10*6/mm3 Final    Hemoglobin 09/18/2024 12.9  12.0 - 15.9 g/dL Final    Hematocrit 09/18/2024 36.4  34.0 - 46.6 % Final    MCV 09/18/2024 90.8  79.0 - 97.0 fL Final    MCH 09/18/2024 32.2  26.6 - 33.0 pg Final    MCHC 09/18/2024 35.4  31.5 - 35.7 g/dL Final    RDW 09/18/2024 11.6 (L)  12.3 - 15.4 % Final    RDW-SD 09/18/2024 38.9  37.0 - 54.0 fl Final    MPV 09/18/2024 8.7  6.0 - 12.0 fL Final    Platelets 09/18/2024 387  140 - 450 10*3/mm3 Final   Office Visit on 09/17/2024   Component Date Value Ref Range Status    WBC 09/17/2024 6.5  3.4 - 10.8 x10E3/uL Final    RBC 09/17/2024 4.03  3.77 - 5.28 x10E6/uL Final    Hemoglobin 09/17/2024 13.1  11.1 - 15.9 g/dL Final    Hematocrit 09/17/2024 39.1  34.0 - 46.6 % Final    MCV 09/17/2024 97  79 - 97 fL Final    MCH 09/17/2024 32.5  26.6 - 33.0 pg Final    MCHC 09/17/2024 33.5  31.5 - 35.7 g/dL Final     RDW 09/17/2024 12.0  11.7 - 15.4 % Final    Platelets 09/17/2024 430  150 - 450 x10E3/uL Final    Glucose 09/17/2024 102 (H)  70 - 99 mg/dL Final    BUN 09/17/2024 10  8 - 27 mg/dL Final    Creatinine 09/17/2024 0.93  0.57 - 1.00 mg/dL Final    EGFR Result 09/17/2024 63  >59 mL/min/1.73 Final    BUN/Creatinine Ratio 09/17/2024 11 (L)  12 - 28 Final    Sodium 09/17/2024 129 (L)  134 - 144 mmol/L Final    Potassium 09/17/2024 4.1  3.5 - 5.2 mmol/L Final    Chloride 09/17/2024 91 (L)  96 - 106 mmol/L Final    Total CO2 09/17/2024 22  20 - 29 mmol/L Final    Calcium 09/17/2024 9.4  8.7 - 10.3 mg/dL Final    Total Protein 09/17/2024 6.8  6.0 - 8.5 g/dL Final    Albumin 09/17/2024 4.4  3.8 - 4.8 g/dL Final    Globulin 09/17/2024 2.4  1.5 - 4.5 g/dL Final    Total Bilirubin 09/17/2024 0.5  0.0 - 1.2 mg/dL Final    Alkaline Phosphatase 09/17/2024 62  44 - 121 IU/L Final    AST (SGOT) 09/17/2024 19  0 - 40 IU/L Final    ALT (SGPT) 09/17/2024 15  0 - 32 IU/L Final    TSH 09/17/2024 1.150  0.450 - 4.500 uIU/mL Final      H. Pylori Breath Test - , (08/18/2023 10:05)     CTAP with contrast    9/28/2020  No evidence of acute intra-abdominal process.   The  liver is diffusely hypodense consistent with fatty infiltration    CTAP with contrast dated on 12/8/2022  1. No evidence of mass or ascites  2. No bowel obstruction     US Abdomen Complete     Result Date: 9/22/2023  Normal ultrasound of the abdomen.         CT Angiogram Neck     Result Date: 9/18/2024  Impression: No aneurysm, abrupt cutoffs or significant stenosis.     CT Head Without Contrast     Result Date: 9/18/2024  Impression: No acute findings.    CT Angiogram Head     Result Date: 9/18/2024  Impression: No aneurysm, abrupt cutoffs or significant stenosis.     Colonoscopy 11/17/2021 per Dr. Chapa  - Diverticulosis in the sigmoid colon.  - Anal papilla(e) were hypertrophied.  - No specimens collected.     EGD 12/6/2022 per Dr. Chapa  - Normal oropharynx.  - Z-line  regular, 35 cm from the incisors.  - 1 cm hiatal hernia.  - No gross lesions in the entire esophagus.  - Erythematous mucosa in the posterior wall of the stomach and antrum. Biopsied.  - Normal duodenal bulb, first portion of the duodenum, second portion of the duodenum and third portion of the duodenum. Biopsied.  - No upper GI pathology that could explain her symptoms pending H pylori testing  - Patient appears to have functional GI disorder at baseline and Wellbutrin could worsen upper GI symptoms.  A.     DUODENUM, BIOPSY:   Duodenal type mucosa with no significant histopathologic abnormalities   B.     ANTRUM AND BODY, BIOPSY:   Gastric antral type mucosa with reactive (chemical) gastropathy   Negative for intestinal metaplasia or dysplasia   No Helicobacter pylori-like organisms seen     Assessment / Plan      1. Gastroesophageal reflux disease without esophagitis  2. Irritable bowel syndrome with constipation  Reflux reasonably controlled with pantoprazole 40 mg daily.  No difficulty swallowing.  She takes Gas-X as needed with reasonable control of bloating.  No GI bleeding. Basic labs unremarkable. TSH normal. Celiac panel negative. H. Pylori breath test negative. Abdominal ultrasound 9/21/2023 unremarkable. CTAP in December 2022 unremarkable.  Colonoscopy dated 11/17/2021 unremarkable.  EGD dated 12/6/2022 unremarkable, no upper GI pathology that can explain her symptoms.  Biopsies negative for celiac and H. pylori.  Suspect IBS-C/functional GI disorder.   Anti-reflux measures.   Continue Pantoprazole 40 mg daily for now.   Zofran as needed for nausea.   High fiber, low fat diet with liberal water intake.   Low FODMAP diet.  Metamucil or fiber gummies daily. .   Gas-X as needed.    - pantoprazole (PROTONIX) 40 MG EC tablet; TAKE 1 TABLET BY MOUTH EVERY MORNING 30 MINUTES BEFORE BREAKFAST  Dispense: 90 tablet; Refill: 1    3. Class 2 severe obesity due to excess calories with serious comorbidity and body  mass index (BMI) of 35.0 to 35.9 in adult  4. Fatty (change of) liver, not elsewhere classified  BMI 35.94  Recent liver enzymes normal, no history of elevated liver enzymes in the past.  MEGHAN negative.  PT/INR normal.  CTAP in 2020 with diffuse fatty infiltration of liver noted.  CTAP in December 2022 with solid abdominal organs unremarkable. Abdominal ultrasound 9/21/2023 with liver unremarkable.   Recommend low-fat diet, exercise and weight reduction.    5. Encounter for screening for malignant neoplasm of colon  Colonoscopy 11/17/2021 unremarkable, no polyps removed, no specimens collected. No family history of colon cancer.   Colonoscopy for screening in 2031, or sooner if needed.     Patient Instructions   Antireflux measures: Avoid fried, fatty foods, alcohol, chocolate, coffee, tea,  soft drinks, peppermint and spearmint, spicy foods, tomatoes and tomato based foods, onion based foods, and smoking.  Other antireflux measures include weight reduction if overweight, avoiding tight clothing around the abdomen, elevating the head of the bed 6 inches with blocks under the head board, and don't drink or eat before going to bed and avoid lying down immediately after meals.  Pantoprazole 40 mg 1 po daily in the am 30 minutes before breakfast.  Recommended to take Levothyroxine first in the am upon waking, wait 30 minutes, then take Pantoprazole 40 mg, wait 30 minutes, then eat breakfast and take other medications.  Zofran 4 mg 1 po every 8 hours as needed for nausea.   The patient should eat 4-5 very small meals throughout the day. Avoid large meals.  It is recommended to eat a softer diet. Meats are best consumed ground. Fruits and vegetables are best consumed cooked or steamed and then mashed.   Low fiber, low fat diet with liberal water intake. May use soluble fiber.  Low FODMAP diet - avoid all dairy. May use lactose free/dairy free alternatives such as almond milk, rice milk, oat milk, etc.   Metamucil 1  packet/scoop daily or fiber gummies/capsules 2-4 per day.   Miralax 17 grams daily as needed for constipation.   Gas-X at meals and bedtime as needed.  Colonoscopy for screening in 2031.  Follow up: 6 months    TERESA Palencia  12/12/2024    Please note that portions of this note were completed with a voice recognition program.     Patient or patient representative verbalized consent for the use of Ambient Listening during the visit with  TERESA Palencia for chart documentation. 12/12/2024  15:17 EST

## 2024-12-12 NOTE — PATIENT INSTRUCTIONS
Antireflux measures: Avoid fried, fatty foods, alcohol, chocolate, coffee, tea,  soft drinks, peppermint and spearmint, spicy foods, tomatoes and tomato based foods, onion based foods, and smoking.  Other antireflux measures include weight reduction if overweight, avoiding tight clothing around the abdomen, elevating the head of the bed 6 inches with blocks under the head board, and don't drink or eat before going to bed and avoid lying down immediately after meals.  Pantoprazole 40 mg 1 po daily in the am 30 minutes before breakfast.  Recommended to take Levothyroxine first in the am upon waking, wait 30 minutes, then take Pantoprazole 40 mg, wait 30 minutes, then eat breakfast and take other medications.  Zofran 4 mg 1 po every 8 hours as needed for nausea.   The patient should eat 4-5 very small meals throughout the day. Avoid large meals.  It is recommended to eat a softer diet. Meats are best consumed ground. Fruits and vegetables are best consumed cooked or steamed and then mashed.   Low fiber, low fat diet with liberal water intake. May use soluble fiber.  Low FODMAP diet - avoid all dairy. May use lactose free/dairy free alternatives such as almond milk, rice milk, oat milk, etc.   Metamucil 1 packet/scoop daily or fiber gummies/capsules 2-4 per day.   Miralax 17 grams daily as needed for constipation.   Gas-X at meals and bedtime as needed.  Colonoscopy for screening in 2031.  Follow up: 6 months

## 2025-01-17 RX ORDER — AMLODIPINE BESYLATE 5 MG/1
5 TABLET ORAL DAILY
Qty: 90 TABLET | Refills: 3 | Status: SHIPPED | OUTPATIENT
Start: 2025-01-17

## 2025-02-04 ENCOUNTER — OFFICE VISIT (OUTPATIENT)
Dept: INTERNAL MEDICINE | Facility: CLINIC | Age: 80
End: 2025-02-04
Payer: MEDICARE

## 2025-02-04 VITALS
TEMPERATURE: 97.5 F | HEIGHT: 65 IN | SYSTOLIC BLOOD PRESSURE: 140 MMHG | OXYGEN SATURATION: 100 % | BODY MASS INDEX: 35.12 KG/M2 | WEIGHT: 210.8 LBS | DIASTOLIC BLOOD PRESSURE: 84 MMHG | HEART RATE: 66 BPM

## 2025-02-04 DIAGNOSIS — J02.9 SORE THROAT: Primary | ICD-10-CM

## 2025-02-04 DIAGNOSIS — R68.89 FLU-LIKE SYMPTOMS: ICD-10-CM

## 2025-02-04 DIAGNOSIS — J01.40 ACUTE NON-RECURRENT PANSINUSITIS: ICD-10-CM

## 2025-02-04 LAB
EXPIRATION DATE: NORMAL
EXPIRATION DATE: NORMAL
FLUAV AG UPPER RESP QL IA.RAPID: NOT DETECTED
FLUBV AG UPPER RESP QL IA.RAPID: NOT DETECTED
INTERNAL CONTROL: NORMAL
INTERNAL CONTROL: NORMAL
Lab: NORMAL
Lab: NORMAL
S PYO AG THROAT QL: NEGATIVE
SARS-COV-2 AG UPPER RESP QL IA.RAPID: NOT DETECTED

## 2025-02-04 PROCEDURE — 87428 SARSCOV & INF VIR A&B AG IA: CPT | Performed by: FAMILY MEDICINE

## 2025-02-04 PROCEDURE — 99214 OFFICE O/P EST MOD 30 MIN: CPT | Performed by: FAMILY MEDICINE

## 2025-02-04 PROCEDURE — 1126F AMNT PAIN NOTED NONE PRSNT: CPT | Performed by: FAMILY MEDICINE

## 2025-02-04 PROCEDURE — 1159F MED LIST DOCD IN RCRD: CPT | Performed by: FAMILY MEDICINE

## 2025-02-04 PROCEDURE — 3079F DIAST BP 80-89 MM HG: CPT | Performed by: FAMILY MEDICINE

## 2025-02-04 PROCEDURE — 87880 STREP A ASSAY W/OPTIC: CPT | Performed by: FAMILY MEDICINE

## 2025-02-04 PROCEDURE — 1160F RVW MEDS BY RX/DR IN RCRD: CPT | Performed by: FAMILY MEDICINE

## 2025-02-04 PROCEDURE — 3077F SYST BP >= 140 MM HG: CPT | Performed by: FAMILY MEDICINE

## 2025-02-04 RX ORDER — TRIAMCINOLONE ACETONIDE 1 MG/G
CREAM TOPICAL
COMMUNITY
Start: 2024-12-18

## 2025-02-04 NOTE — PROGRESS NOTES
"Chief Complaint  Cough, Fatigue, Heartburn, and Headache (Onset Sat. before last. Neg. at home covid test. )    Subjective        Deya Hernandez presents to [facility]  History of Present Illness  History per MA reviewed  Flu like symptoms x 10 days  No fever no cough  Tried to treat symptoms--ride out as she figured flu, but not improving  Tested for covid two days ago--negative  Would have times she'd feel better but then feel bad again  Was having chills    Fearful as  had similar vague symptoms approx 15 years ago and passed.  Feels may need labs, blood cultures?    Some pain upper teeth and contacted her dentist this AM as she was worried about possibly infection there          Objective   Vital Signs:  /84   Pulse 66   Temp 97.5 °F (36.4 °C)   Ht 165.1 cm (65\")   Wt 95.6 kg (210 lb 12.8 oz)   SpO2 100%   BMI 35.08 kg/m²   Estimated body mass index is 35.08 kg/m² as calculated from the following:    Height as of this encounter: 165.1 cm (65\").    Weight as of this encounter: 95.6 kg (210 lb 12.8 oz).    Class 2 Severe Obesity (BMI >=35 and <=39.9). Obesity-related health conditions include the following: hypertension, dyslipidemias, and hepatic steatosis. Obesity is unchanged. BMI is is above average; BMI management plan is completed. We discussed Information on healthy weight added to patient's after visit summary.      Physical Exam  Vitals and nursing note reviewed.   Constitutional:       General: She is not in acute distress.     Appearance: Normal appearance. She is well-developed and well-groomed. She is obese. She is not ill-appearing, toxic-appearing or diaphoretic.      Interventions: Face mask in place.   HENT:      Head: Normocephalic and atraumatic.      Right Ear: Hearing, tympanic membrane, ear canal and external ear normal.      Left Ear: Hearing, tympanic membrane, ear canal and external ear normal.      Nose:      Right Sinus: Maxillary " sinus tenderness and frontal sinus tenderness present.      Left Sinus: No maxillary sinus tenderness or frontal sinus tenderness.   Eyes:      General: Lids are normal. No scleral icterus.     Extraocular Movements: Extraocular movements intact.   Neck:      Trachea: Phonation normal.   Cardiovascular:      Rate and Rhythm: Normal rate and regular rhythm.      Heart sounds: Normal heart sounds.   Pulmonary:      Effort: Pulmonary effort is normal.      Breath sounds: Normal breath sounds and air entry. No stridor.   Musculoskeletal:      Cervical back: Neck supple.   Skin:     Coloration: Skin is not jaundiced or pale.      Findings: No rash.   Neurological:      General: No focal deficit present.      Mental Status: She is alert and oriented to person, place, and time.      Motor: Motor function is intact.   Psychiatric:         Attention and Perception: Attention and perception normal.         Mood and Affect: Mood is anxious.         Speech: Speech normal.         Behavior: Behavior normal. Behavior is cooperative.         Thought Content: Thought content normal.         Cognition and Memory: Cognition and memory normal.         Judgment: Judgment normal.        Result Review :  The following data was reviewed by: Maegan Haert MD on 02/04/2025:  Office Visit on 02/04/2025   Component Date Value Ref Range Status    Rapid Strep A Screen 02/04/2025 Negative  Negative, VALID, INVALID, Not Performed Final    Internal Control 02/04/2025 Passed  Passed Final    Lot Number 02/04/2025 [id number]   Final    Expiration Date 02/04/2025 03/06/2027   Final    SARS Antigen 02/04/2025 Not Detected  Not Detected, Presumptive Negative Final    Influenza A Antigen DEMETRIO 02/04/2025 Not Detected  Not Detected Final    Influenza B Antigen DEMETRIO 02/04/2025 Not Detected  Not Detected Final    Internal Control 02/04/2025 Passed  Passed Final    Lot Number 02/04/2025 [id number]   Final    Expiration Date 02/04/2025 11/14/2025   Final                 Assessment and Plan   Diagnoses and all orders for this visit:    1. Sore throat (Primary)  -     POC Rapid Strep A  -     POCT SARS-CoV-2 Antigen DEMETRIO + Flu    2. Flu-like symptoms  -     POCT SARS-CoV-2 Antigen DEMETRIO + Flu    3. Acute non-recurrent pansinusitis  -     amoxicillin-clavulanate (AUGMENTIN) 875-125 MG per tablet; Take 1 tablet by mouth Every 12 (Twelve) Hours for 7 days.  Dispense: 14 tablet; Refill: 0      Discussed negative poc testing, though could be negative on flu given days since duration of symptoms  10 days with sinus tenderness and dental pain--recommend treating sinusitis. If develops yeast infection let us know. Yogurt encouraged. Hydration with water.  Schedule follow up with PCP. If still feeling poorly can discuss labs with PCP. At this point no indication for labs; high prevalence of uri illnesses in community at this time. No fever. Not tachycardic. No evidence on exam to support possible sepsis, blood cultures not indicated. Could do CBC but even with viral infection could see some abnormal findings.   Discussed case with PCP who also felt viral infection highly likely and did not feel blood cultures were indicated.       Follow Up   Return for medicare AWV with PCP.  Patient was given instructions and counseling regarding her condition or for health maintenance advice. Please see specific information pulled into the AVS if appropriate.

## 2025-03-06 RX ORDER — TRANDOLAPRIL TABLETS 4 MG/1
4 TABLET ORAL DAILY
Qty: 90 TABLET | Refills: 3 | Status: SHIPPED | OUTPATIENT
Start: 2025-03-06

## 2025-03-19 ENCOUNTER — PATIENT MESSAGE (OUTPATIENT)
Dept: INTERNAL MEDICINE | Facility: CLINIC | Age: 80
End: 2025-03-19
Payer: MEDICARE

## 2025-03-19 DIAGNOSIS — I10 PRIMARY HYPERTENSION: ICD-10-CM

## 2025-03-19 DIAGNOSIS — R42 DIZZINESS: ICD-10-CM

## 2025-03-20 RX ORDER — HYDROCHLOROTHIAZIDE 12.5 MG/1
12.5 TABLET ORAL DAILY
Qty: 90 TABLET | Refills: 0 | Status: SHIPPED | OUTPATIENT
Start: 2025-03-20

## 2025-03-25 ENCOUNTER — OFFICE VISIT (OUTPATIENT)
Dept: INTERNAL MEDICINE | Facility: CLINIC | Age: 80
End: 2025-03-25
Payer: MEDICARE

## 2025-03-25 VITALS
OXYGEN SATURATION: 99 % | WEIGHT: 207.5 LBS | TEMPERATURE: 97.1 F | HEIGHT: 65 IN | BODY MASS INDEX: 34.57 KG/M2 | HEART RATE: 65 BPM | SYSTOLIC BLOOD PRESSURE: 130 MMHG | DIASTOLIC BLOOD PRESSURE: 80 MMHG | RESPIRATION RATE: 20 BRPM

## 2025-03-25 DIAGNOSIS — R79.9 ABNORMAL FINDING OF BLOOD CHEMISTRY, UNSPECIFIED: ICD-10-CM

## 2025-03-25 DIAGNOSIS — E06.3 HYPOTHYROIDISM DUE TO HASHIMOTO THYROIDITIS: Primary | ICD-10-CM

## 2025-03-25 DIAGNOSIS — F41.9 ANXIETY: ICD-10-CM

## 2025-03-25 DIAGNOSIS — I10 PRIMARY HYPERTENSION: ICD-10-CM

## 2025-03-25 DIAGNOSIS — R60.0 BILATERAL LOWER EXTREMITY EDEMA: ICD-10-CM

## 2025-03-25 DIAGNOSIS — Z00.00 ANNUAL PHYSICAL EXAM: ICD-10-CM

## 2025-03-25 DIAGNOSIS — Z00.00 MEDICARE ANNUAL WELLNESS VISIT, SUBSEQUENT: ICD-10-CM

## 2025-03-25 DIAGNOSIS — E78.2 MIXED HYPERLIPIDEMIA: ICD-10-CM

## 2025-03-25 DIAGNOSIS — E55.9 VITAMIN D DEFICIENCY, UNSPECIFIED: ICD-10-CM

## 2025-03-25 DIAGNOSIS — E87.1 HYPONATREMIA: ICD-10-CM

## 2025-03-25 RX ORDER — LANOLIN ALCOHOL/MO/W.PET/CERES
1000 CREAM (GRAM) TOPICAL DAILY
COMMUNITY

## 2025-03-25 RX ORDER — ALPRAZOLAM 0.5 MG
0.5 TABLET ORAL 2 TIMES DAILY PRN
Qty: 30 TABLET | Refills: 0 | Status: SHIPPED | OUTPATIENT
Start: 2025-03-25

## 2025-03-25 RX ORDER — MUPIROCIN 20 MG/G
OINTMENT TOPICAL
COMMUNITY
Start: 2025-03-24

## 2025-03-25 RX ORDER — CHOLECALCIFEROL (VITAMIN D3) 25 MCG
1000 TABLET ORAL DAILY
COMMUNITY

## 2025-03-25 RX ORDER — CLINDAMYCIN HYDROCHLORIDE 300 MG/1
CAPSULE ORAL
COMMUNITY
Start: 2025-03-24

## 2025-03-25 NOTE — ASSESSMENT & PLAN NOTE
Orders:    Thyroid Peroxidase Antibody    Thyroglobulin Antibody    TSH    Vitamin B12    Vitamin D,25-Hydroxy    Hemoglobin A1c    CBC (No Diff)    Lipid Panel    Comprehensive Metabolic Panel

## 2025-03-25 NOTE — PROGRESS NOTES
Subjective   The ABCs of the Annual Wellness Visit  Medicare Wellness Visit      Deya Hernandez is a 79 y.o. patient who presents for a Medicare Wellness Visit.    The following portions of the patient's history were reviewed and   updated as appropriate: allergies, current medications, past family history, past medical history, past social history, past surgical history, and problem list.    Compared to one year ago, the patient's physical   health is worse.  Compared to one year ago, the patient's mental   health is the same.    Recent Hospitalizations:  She was not admitted to the hospital during the last year.     Current Medical Providers:  Patient Care Team:  Rosaline Roberson DO as PCP - General (Family Medicine)  Cedrick Chowdhury APRN as Nurse Practitioner (Gastroenterology)  Timbo Roberts MD as Consulting Physician (Cardiology)  Robina Jean MD (Dermatology)    Outpatient Medications Prior to Visit   Medication Sig Dispense Refill    amLODIPine (NORVASC) 5 MG tablet TAKE 1 TABLET BY MOUTH DAILY 90 tablet 3    Bacillus Coagulans-Inulin (Probiotic) 1-250 BILLION-MG capsule       carvedilol (COREG) 12.5 MG tablet TAKE 1 TABLET BY MOUTH TWICE DAILY WITH MEALS 180 tablet 3    Cholecalciferol 25 MCG (1000 UT) tablet Take 1 tablet by mouth Daily.      clindamycin (CLEOCIN) 300 MG capsule       diclofenac (VOLTAREN) 1 % gel gel Apply 4 g topically to the appropriate area as directed 4 (Four) Times a Day As Needed (pain). 100 g 0    hydroCHLOROthiazide 12.5 MG tablet Take 1 tablet by mouth Daily. prn 90 tablet 0    levothyroxine (SYNTHROID, LEVOTHROID) 100 MCG tablet TAKE 1 TABLET BY MOUTH DAILY 90 tablet 1    meclizine (ANTIVERT) 25 MG tablet Take 1 tablet by mouth 3 (Three) Times a Day As Needed for Dizziness. 15 tablet 0    mupirocin (BACTROBAN) 2 % ointment       ondansetron (ZOFRAN) 4 MG tablet Take 1 tablet by mouth Every 8 (Eight) Hours As Needed for Nausea or Vomiting. 30 tablet 1     pantoprazole (PROTONIX) 40 MG EC tablet TAKE 1 TABLET BY MOUTH EVERY MORNING 30 MINUTES BEFORE BREAKFAST 90 tablet 1    Simethicone (GAS-X PO) Take  by mouth As Needed.      simvastatin (ZOCOR) 40 MG tablet TAKE 1 TABLET BY MOUTH EVERY NIGHT AT BEDTIME 90 tablet 3    trandolapril (MAVIK) 4 MG tablet TAKE 1 TABLET BY MOUTH DAILY 90 tablet 3    triamcinolone (KENALOG) 0.1 % cream MIX WITH OTC TUB OF CETAPHIL CREAM AND APPLY TO RASH TWICE DAILY      vitamin B-12 (CYANOCOBALAMIN) 1000 MCG tablet Take 1 tablet by mouth Daily.      ALPRAZolam (XANAX) 0.5 MG tablet Take 1 tablet by mouth 2 (Two) Times a Day As Needed for Anxiety. 30 tablet 0     No facility-administered medications prior to visit.     No opioid medication identified on active medication list. I have reviewed chart for other potential  high risk medication/s and harmful drug interactions in the elderly.      Aspirin is not on active medication list.  Aspirin use is not indicated based on review of current medical condition/s. Risk of harm outweighs potential benefits.  .    Patient Active Problem List   Diagnosis    Hypertension    Hypothyroidism    Osteoarthritis of knee    Hyperlipidemia    Arteriosclerotic vascular disease    Burping    Diverticulosis of large intestine without hemorrhage    Internal hemorrhoids    Vascular ectasia of colon    Depression with anxiety    Abdominal pressure    Fatty infiltration of liver    Class 1 obesity due to excess calories with serious comorbidity and body mass index (BMI) of 33.0 to 33.9 in adult    Personal history of colonic polyps    Vitamin D deficiency, unspecified     Hyponatremia    Bloating    Nuclear sclerotic cataract of left eye    Nausea    Irritable bowel syndrome with constipation    Gastroesophageal reflux disease without esophagitis    Fatty (change of) liver, not elsewhere classified     Advance Care Planning Advance Directive is not on file.  ACP discussion was held with the patient during this  "visit. Patient does not have an advance directive, declines further assistance.            Objective   Vitals:    25 0839   BP: 130/80   Pulse: 65   Resp: 20   Temp: 97.1 °F (36.2 °C)   TempSrc: Temporal   SpO2: 99%   Weight: 94.1 kg (207 lb 8 oz)   Height: 165.1 cm (65\")   PainSc: 7        Estimated body mass index is 34.53 kg/m² as calculated from the following:    Height as of this encounter: 165.1 cm (65\").    Weight as of this encounter: 94.1 kg (207 lb 8 oz).                Does the patient have evidence of cognitive impairment? No                                                                                                Health  Risk Assessment    Smoking Status:  Social History     Tobacco Use   Smoking Status Former    Current packs/day: 0.00    Average packs/day: 0.3 packs/day for 15.0 years (3.8 ttl pk-yrs)    Types: Cigarettes    Start date: 2002    Quit date: 2017    Years since quittin.2    Passive exposure: Never   Smokeless Tobacco Never   Tobacco Comments    .  I never trackedit.  Previous guesses should already be in Health system     Alcohol Consumption:  Social History     Substance and Sexual Activity   Alcohol Use Yes    Alcohol/week: 3.0 standard drinks of alcohol    Types: 1 Glasses of wine, 2 Cans of beer per week    Comment: social       Fall Risk Screen  STEADI Fall Risk Assessment was completed, and patient is at LOW risk for falls.Assessment completed on:3/25/2025    Depression Screening   Little interest or pleasure in doing things? Not at all   Feeling down, depressed, or hopeless? Not at all   PHQ-2 Total Score 0      Health Habits and Functional and Cognitive Screening:      3/25/2025     8:44 AM   Functional & Cognitive Status   Do you have difficulty preparing food and eating? No   Do you have difficulty bathing yourself, getting dressed or grooming yourself? No   Do you have difficulty using the toilet? No   Do you have difficulty moving around from place to place? No "   Do you have trouble with steps or getting out of a bed or a chair? No   Current Diet Well Balanced Diet   Dental Exam Up to date   Eye Exam Up to date   Exercise (times per week) 0 times per week   Current Exercises Include No Regular Exercise   Do you need help using the phone?  No   Are you deaf or do you have serious difficulty hearing?  No   Do you need help to go to places out of walking distance? No   Do you need help shopping? No   Do you need help preparing meals?  No   Do you need help with housework?  No   Do you need help with laundry? No   Do you need help taking your medications? No   Do you need help managing money? No   Do you ever drive or ride in a car without wearing a seat belt? No   Have you felt unusual stress, anger or loneliness in the last month? Yes   Who do you live with? Alone   If you need help, do you have trouble finding someone available to you? No   Have you been bothered in the last four weeks by sexual problems? No   Do you have difficulty concentrating, remembering or making decisions? No           Age-appropriate Screening Schedule:  Refer to the list below for future screening recommendations based on patient's age, sex and/or medical conditions. Orders for these recommended tests are listed in the plan section. The patient has been provided with a written plan.    Health Maintenance List  Health Maintenance   Topic Date Due    LIPID PANEL  08/18/2024    ANNUAL WELLNESS VISIT  10/20/2024    INFLUENZA VACCINE  03/31/2025 (Originally 7/1/2024)    COVID-19 Vaccine (5 - 2024-25 season) 04/26/2025 (Originally 9/1/2024)    RSV Vaccine - Adults (1 - 1-dose 75+ series) 06/11/2025 (Originally 6/11/2022)    TDAP/TD VACCINES (2 - Td or Tdap) 09/21/2025 (Originally 7/3/2023)    DXA SCAN  11/03/2025    COLORECTAL CANCER SCREENING  11/17/2031    HEPATITIS C SCREENING  Completed    Pneumococcal Vaccine 50+  Completed    ZOSTER VACCINE  Completed    MAMMOGRAM  Discontinued                       "                                                                                                                          CMS Preventative Services Quick Reference  Risk Factors Identified During Encounter  Dental Screening Recommended  Vision Screening Recommended    The above risks/problems have been discussed with the patient.  Pertinent information has been shared with the patient in the After Visit Summary.  An After Visit Summary and PPPS were made available to the patient.    Follow Up:   Next Medicare Wellness visit to be scheduled in 1 year.         Additional E&M Note during same encounter follows:  Patient has additional, significant, and separately identifiable condition(s)/problem(s) that require work above and beyond the Medicare Wellness Visit     Chief Complaint  Medicare Wellness-subsequent and Fatigue    Subjective   Chronic fatigue, seems to be worsening.   Panic disorder - does not happen frequently. When it does she normally take xanax. They tend to last her a long time as she takes them infrequently but they do work well for her and stop her panic attacks      Deya is also being seen today for an annual adult preventative physical exam.     Review of Systems   All other systems reviewed and are negative.             Objective   Vital Signs:  /80   Pulse 65   Temp 97.1 °F (36.2 °C) (Temporal)   Resp 20   Ht 165.1 cm (65\")   Wt 94.1 kg (207 lb 8 oz)   SpO2 99%   BMI 34.53 kg/m²   Physical Exam  Vitals and nursing note reviewed.   Constitutional:       Appearance: Normal appearance.   HENT:      Head: Normocephalic and atraumatic.      Right Ear: External ear normal.      Left Ear: External ear normal.      Nose: Nose normal.      Mouth/Throat:      Mouth: Mucous membranes are moist.      Pharynx: Oropharynx is clear. No oropharyngeal exudate or posterior oropharyngeal erythema.   Eyes:      Extraocular Movements: Extraocular movements intact.      Conjunctiva/sclera: Conjunctivae " normal.      Pupils: Pupils are equal, round, and reactive to light.   Cardiovascular:      Rate and Rhythm: Normal rate and regular rhythm.      Pulses: Normal pulses.      Heart sounds: Normal heart sounds.   Pulmonary:      Effort: Pulmonary effort is normal.      Breath sounds: Normal breath sounds.   Abdominal:      General: Abdomen is flat. Bowel sounds are normal.      Palpations: Abdomen is soft.   Musculoskeletal:         General: Normal range of motion.      Cervical back: Normal range of motion.   Skin:     General: Skin is warm.      Capillary Refill: Capillary refill takes less than 2 seconds.   Neurological:      General: No focal deficit present.      Mental Status: She is alert and oriented to person, place, and time. Mental status is at baseline.   Psychiatric:         Mood and Affect: Mood normal.         Behavior: Behavior normal.         Thought Content: Thought content normal.         Judgment: Judgment normal.                  Assessment and Plan      Hypothyroidism due to Hashimoto thyroiditis    Orders:    Thyroid Peroxidase Antibody    Thyroglobulin Antibody    TSH    Vitamin B12    Vitamin D,25-Hydroxy    Hemoglobin A1c    CBC (No Diff)    Lipid Panel    Comprehensive Metabolic Panel    Mixed hyperlipidemia       Orders:    Thyroid Peroxidase Antibody    Thyroglobulin Antibody    TSH    Vitamin B12    Vitamin D,25-Hydroxy    Hemoglobin A1c    CBC (No Diff)    Lipid Panel    Comprehensive Metabolic Panel    Primary hypertension      Orders:    Thyroid Peroxidase Antibody    Thyroglobulin Antibody    TSH    Vitamin B12    Vitamin D,25-Hydroxy    Hemoglobin A1c    CBC (No Diff)    Lipid Panel    Comprehensive Metabolic Panel    Hyponatremia    Orders:    Thyroid Peroxidase Antibody    Thyroglobulin Antibody    TSH    Vitamin B12    Vitamin D,25-Hydroxy    Hemoglobin A1c    CBC (No Diff)    Lipid Panel    Comprehensive Metabolic Panel    Bilateral lower extremity edema    Orders:    Thyroid  Peroxidase Antibody    Thyroglobulin Antibody    TSH    Vitamin B12    Vitamin D,25-Hydroxy    Hemoglobin A1c    CBC (No Diff)    Lipid Panel    Comprehensive Metabolic Panel    Medicare annual wellness visit, subsequent    Orders:    Thyroid Peroxidase Antibody    Thyroglobulin Antibody    TSH    Vitamin B12    Vitamin D,25-Hydroxy    Hemoglobin A1c    CBC (No Diff)    Lipid Panel    Comprehensive Metabolic Panel    Annual physical exam    Orders:    Thyroid Peroxidase Antibody    Thyroglobulin Antibody    TSH    Vitamin B12    Vitamin D,25-Hydroxy    Hemoglobin A1c    CBC (No Diff)    Lipid Panel    Comprehensive Metabolic Panel    Vitamin D deficiency, unspecified    Orders:    Vitamin D,25-Hydroxy    Abnormal finding of blood chemistry, unspecified    Orders:    Hemoglobin A1c    Anxiety    Orders:    ALPRAZolam (XANAX) 0.5 MG tablet; Take 1 tablet by mouth 2 (Two) Times a Day As Needed for Anxiety.     Counseled on heart healthy diet  Counseled on exercise 30 minutes a day 5 days a week  Follow with optometry once a year  Follow with dentistry twice a year  Brush and floss twice a day  Wear seatbelt while in a car         Follow Up   No follow-ups on file.  Patient was given instructions and counseling regarding her condition or for health maintenance advice. Please see specific information pulled into the AVS if appropriate.

## 2025-03-26 LAB
25(OH)D3+25(OH)D2 SERPL-MCNC: 36.5 NG/ML (ref 30–100)
ALBUMIN SERPL-MCNC: 4.2 G/DL (ref 3.5–5.2)
ALBUMIN/GLOB SERPL: 1.9 G/DL
ALP SERPL-CCNC: 58 U/L (ref 39–117)
ALT SERPL-CCNC: 13 U/L (ref 1–33)
AST SERPL-CCNC: 18 U/L (ref 1–32)
BILIRUB SERPL-MCNC: 0.5 MG/DL (ref 0–1.2)
BUN SERPL-MCNC: 10 MG/DL (ref 8–23)
BUN/CREAT SERPL: 11.8 (ref 7–25)
CALCIUM SERPL-MCNC: 9.8 MG/DL (ref 8.6–10.5)
CHLORIDE SERPL-SCNC: 97 MMOL/L (ref 98–107)
CHOLEST SERPL-MCNC: 203 MG/DL (ref 0–200)
CO2 SERPL-SCNC: 26.5 MMOL/L (ref 22–29)
CREAT SERPL-MCNC: 0.85 MG/DL (ref 0.57–1)
EGFRCR SERPLBLD CKD-EPI 2021: 70.7 ML/MIN/1.73
ERYTHROCYTE [DISTWIDTH] IN BLOOD BY AUTOMATED COUNT: 12.8 % (ref 12.3–15.4)
GLOBULIN SER CALC-MCNC: 2.2 GM/DL
GLUCOSE SERPL-MCNC: 97 MG/DL (ref 65–99)
HBA1C MFR BLD: 5.5 % (ref 4.8–5.6)
HCT VFR BLD AUTO: 40.6 % (ref 34–46.6)
HDLC SERPL-MCNC: 79 MG/DL (ref 40–60)
HGB BLD-MCNC: 13.8 G/DL (ref 12–15.9)
LDLC SERPL CALC-MCNC: 104 MG/DL (ref 0–100)
MCH RBC QN AUTO: 32 PG (ref 26.6–33)
MCHC RBC AUTO-ENTMCNC: 34 G/DL (ref 31.5–35.7)
MCV RBC AUTO: 94.2 FL (ref 79–97)
PLATELET # BLD AUTO: 375 10*3/MM3 (ref 140–450)
POTASSIUM SERPL-SCNC: 4.5 MMOL/L (ref 3.5–5.2)
PROT SERPL-MCNC: 6.4 G/DL (ref 6–8.5)
RBC # BLD AUTO: 4.31 10*6/MM3 (ref 3.77–5.28)
SODIUM SERPL-SCNC: 136 MMOL/L (ref 136–145)
THYROGLOB AB SERPL-ACNC: 43.9 IU/ML (ref 0–0.9)
THYROPEROXIDASE AB SERPL-ACNC: 25 IU/ML (ref 0–34)
TRIGL SERPL-MCNC: 114 MG/DL (ref 0–150)
TSH SERPL DL<=0.005 MIU/L-ACNC: 1.49 UIU/ML (ref 0.27–4.2)
VIT B12 SERPL-MCNC: 690 PG/ML (ref 211–946)
VLDLC SERPL CALC-MCNC: 20 MG/DL (ref 5–40)
WBC # BLD AUTO: 6.01 10*3/MM3 (ref 3.4–10.8)

## 2025-04-14 DIAGNOSIS — F41.9 ANXIETY: ICD-10-CM

## 2025-04-14 RX ORDER — AMLODIPINE BESYLATE 5 MG/1
5 TABLET ORAL DAILY
Qty: 90 TABLET | Refills: 3 | Status: CANCELLED | OUTPATIENT
Start: 2025-04-14

## 2025-04-14 RX ORDER — ALPRAZOLAM 0.5 MG
0.5 TABLET ORAL 2 TIMES DAILY PRN
Qty: 30 TABLET | Refills: 0 | Status: CANCELLED | OUTPATIENT
Start: 2025-04-14

## 2025-04-15 ENCOUNTER — OFFICE VISIT (OUTPATIENT)
Dept: ENDOCRINOLOGY | Facility: CLINIC | Age: 80
End: 2025-04-15
Payer: MEDICARE

## 2025-04-15 VITALS
SYSTOLIC BLOOD PRESSURE: 120 MMHG | OXYGEN SATURATION: 97 % | HEIGHT: 65 IN | BODY MASS INDEX: 34.66 KG/M2 | DIASTOLIC BLOOD PRESSURE: 72 MMHG | WEIGHT: 208 LBS | HEART RATE: 69 BPM

## 2025-04-15 DIAGNOSIS — E06.3 HYPOTHYROIDISM DUE TO HASHIMOTO THYROIDITIS: Primary | ICD-10-CM

## 2025-04-15 LAB
T3FREE SERPL-MCNC: 2.73 PG/ML (ref 2–4.4)
T4 FREE SERPL-MCNC: 1.84 NG/DL (ref 0.92–1.68)
TSH SERPL DL<=0.05 MIU/L-ACNC: 0.94 UIU/ML (ref 0.27–4.2)

## 2025-04-15 PROCEDURE — 84443 ASSAY THYROID STIM HORMONE: CPT | Performed by: PHYSICIAN ASSISTANT

## 2025-04-15 PROCEDURE — 84439 ASSAY OF FREE THYROXINE: CPT | Performed by: PHYSICIAN ASSISTANT

## 2025-04-15 PROCEDURE — 84481 FREE ASSAY (FT-3): CPT | Performed by: PHYSICIAN ASSISTANT

## 2025-04-15 NOTE — PROGRESS NOTES
"     Office Note      Date: 04/15/2025  Patient Name: Deya Hernandez  MRN: [id number]  : 1945    Chief Complaint   Patient presents with    Hypothyroidism       History of Present Illness:   Deya Hernandez is a 79 y.o. female who presents for Hypothyroidism     Patient is seen for a new patient evaluation    Patient was diagnosed with hypothyroidism in  and started on levothyroxine at that time. Dose has been increased over the years but relatively stable in last few years. Thyroid levels have been normal when PCP has checked.    Had thyroid antibodies checked recently and Thyroglobulin ab was elevated. TPO ab was negative.    Notes that she does have fatigue, weight gain (attributes to less exercise). Notes some new cold intolerance. Has normal bowel movements. No changes in her neck, no compressive symptoms.    Takes levothyroxine on an empty stomach, then waits 30 minutes before she takes her pantoprazole. No missed doses.     Steroid: none recently  Biotin: none    Subjective      Patient was born where: [place].  Facial radiation exposure: No.  High iodine intake: No  Family hx of thyroid disease: Yes, describe: sister with hyperthyroidism.    Review of Systems:   Review of Systems   Constitutional:  Positive for fatigue and unexpected weight change.   Cardiovascular:  Negative for palpitations.   Gastrointestinal:  Negative for constipation and diarrhea.   Endocrine: Positive for cold intolerance.   Musculoskeletal:  Positive for arthralgias.   Neurological:  Negative for tremors.       The following portions of the patient's history were reviewed and updated as appropriate: allergies, current medications, past family history, past medical history, past social history, past surgical history, and problem list.    Objective     Visit Vitals  /72   Pulse 69   Ht 165.1 cm (65\")   Wt 94.3 kg (208 lb)   SpO2 97%   BMI 34.61 kg/m²           Physical Exam:  Physical Exam  Vitals and nursing note " reviewed.   Constitutional:       Appearance: She is well-developed.   HENT:      Head: Normocephalic and atraumatic.   Eyes:      Conjunctiva/sclera: Conjunctivae normal.   Neck:      Thyroid: No thyroid mass, thyromegaly or thyroid tenderness.   Cardiovascular:      Rate and Rhythm: Normal rate and regular rhythm.   Pulmonary:      Effort: Pulmonary effort is normal.      Breath sounds: Normal breath sounds.   Musculoskeletal:         General: Normal range of motion.      Cervical back: Normal range of motion.   Skin:     General: Skin is warm and dry.   Neurological:      Motor: No tremor.   Psychiatric:         Behavior: Behavior normal.         Assessment / Plan      Assessment & Plan:  Problem List Items Addressed This Visit          Endocrine and Metabolic    Hypothyroidism - Primary    Current Assessment & Plan   Hypothyroidism diagnosed in 2003 and well controlled on levothyroxine since then. Recent elevated thyroglobulin ab which is consistent with Hashimoto's thyroiditis, a common cause of hypothyroidism. Elevated antibodies do not need to be treated or monitored and this will not change patient's treatment plan of T4 replacement with levothyroxine. Patient is having some symptoms of fatigue and weight gain on current dose of levothyroxine 100 mcg. Will check thyroid levels today with further recommendations regarding dose adjustments and follow up based on results.         Relevant Medications    carvedilol (COREG) 12.5 MG tablet    levothyroxine (SYNTHROID, LEVOTHROID) 100 MCG tablet    Other Relevant Orders    T4, Free (Completed)    T3, Free (Completed)    TSH (Completed)        Portions of this note were completed with voice recognition program.  Electronically signed by Chey Leone PA-C  [facility]  04/15/2025

## 2025-04-17 NOTE — ASSESSMENT & PLAN NOTE
Hypothyroidism diagnosed in 2003 and well controlled on levothyroxine since then. Recent elevated thyroglobulin ab which is consistent with Hashimoto's thyroiditis, a common cause of hypothyroidism. Elevated antibodies do not need to be treated or monitored and this will not change patient's treatment plan of T4 replacement with levothyroxine. Patient is having some symptoms of fatigue and weight gain on current dose of levothyroxine 100 mcg. Will check thyroid levels today with further recommendations regarding dose adjustments and follow up based on results.

## 2025-06-01 DIAGNOSIS — E03.8 OTHER SPECIFIED HYPOTHYROIDISM: ICD-10-CM

## 2025-06-02 RX ORDER — LEVOTHYROXINE SODIUM 100 UG/1
100 TABLET ORAL DAILY
Qty: 90 TABLET | Refills: 1 | Status: SHIPPED | OUTPATIENT
Start: 2025-06-02

## 2025-06-12 ENCOUNTER — OFFICE VISIT (OUTPATIENT)
Dept: GASTROENTEROLOGY | Facility: CLINIC | Age: 80
End: 2025-06-12
Payer: MEDICARE

## 2025-06-12 VITALS
DIASTOLIC BLOOD PRESSURE: 72 MMHG | SYSTOLIC BLOOD PRESSURE: 118 MMHG | WEIGHT: 210 LBS | BODY MASS INDEX: 34.95 KG/M2 | OXYGEN SATURATION: 99 % | HEART RATE: 76 BPM

## 2025-06-12 DIAGNOSIS — Z12.11 ENCOUNTER FOR SCREENING FOR MALIGNANT NEOPLASM OF COLON: Chronic | ICD-10-CM

## 2025-06-12 DIAGNOSIS — K76.0 METABOLIC DYSFUNCTION-ASSOCIATED STEATOTIC LIVER DISEASE (MASLD): Chronic | ICD-10-CM

## 2025-06-12 DIAGNOSIS — E66.09 CLASS 1 OBESITY DUE TO EXCESS CALORIES WITH SERIOUS COMORBIDITY AND BODY MASS INDEX (BMI) OF 34.0 TO 34.9 IN ADULT: Chronic | ICD-10-CM

## 2025-06-12 DIAGNOSIS — K58.1 IRRITABLE BOWEL SYNDROME WITH CONSTIPATION: Chronic | ICD-10-CM

## 2025-06-12 DIAGNOSIS — K21.9 GASTROESOPHAGEAL REFLUX DISEASE WITHOUT ESOPHAGITIS: Primary | Chronic | ICD-10-CM

## 2025-06-12 DIAGNOSIS — E66.811 CLASS 1 OBESITY DUE TO EXCESS CALORIES WITH SERIOUS COMORBIDITY AND BODY MASS INDEX (BMI) OF 34.0 TO 34.9 IN ADULT: Chronic | ICD-10-CM

## 2025-06-12 RX ORDER — PANTOPRAZOLE SODIUM 40 MG/1
TABLET, DELAYED RELEASE ORAL
Qty: 90 TABLET | Refills: 3 | Status: SHIPPED | OUTPATIENT
Start: 2025-06-12

## 2025-06-12 NOTE — PROGRESS NOTES
"     Follow Up Note     Date: 2025   Patient Name: Deya Hernandez  MRN: [id number]  : 1945     Primary Care Provider: Rosaline Roberson DO     Chief Complaint   Patient presents with    Heartburn     2025  History of Present Illness  The patient is a 80-year-old female who is here for follow-up for reflux.    She reports no significant changes in her condition since the last visit. She has been managing her symptoms effectively, with occasional episodes of nausea at night depending on her diet. She is currently on pantoprazole with reflux reasonably controlled. She also uses Gas-X sparingly. She has noticed that if she overeats, that is when her symptoms are worse.  Denies GI bleeding.     Interval History:  2024  History of Present Illness  The patient is a 79-year-old female who is here for follow-up of reflux.  She is taking pantoprazole 40 mg daily with reasonable control of reflux.  No difficulty swallowing.  She reports an overall improvement in her GI symptoms, with occasional episodes of bloating and discomfort that are typically alleviated by Gas-X. Denies GI bleeding.    2020  For the past year or so, the patient will have periumbilical abdominal pain and bloating. The pain is described as a pressure in her abdomen and is very mild. The pressure may last a few days, then \"go away\" for a few days or even a month or so. Burping or passing gas improves the pressure. The patient denies nausea or vomiting. There is no history of heartburn or reflux. She had taken Pantoprazole in the past for similar symptoms, but she stopped it over 3 years ago.     The patient has had a few episodes of diarrhea over the past 2 weeks or so. She is not sure if it was something she had eaten. No rectal bleeding.      Last colonoscopy was 2016 with small polyps (1 tubular adenoma without dysplasia), diverticulosis, and internal hemorrhoids    Subjective      Past Medical History: "   Diagnosis Date    Abdominal bloating     Abnormal EKG     Ankle fracture     Anxiety and depression 2004    Cataract Removed Mar.,23    Colon cancer screening     Colon polyp 04/2016    COVID     Disease of thyroid gland     Diverticulitis of colon     Dyspnea     Elbow pain, left     Elevated cholesterol     Fatigue     Fatty infiltration of liver 10/12/2020    Fatty liver [id number]    Gastritis     GERD (gastroesophageal reflux disease)     H/O echocardiogram Unknown    Normal per pt.    H/O exercise stress test 2016    Normal result per pt.    H/O mammogram     H/O sinusitis     H/O: pneumonia     Heart palpitations 2014    Heartburn     Hemorrhoid     History of Holter monitoring 2014    Found to be normal per pt.    Hypertension     Treated with medication    Hypothyroidism 2003    Knee swelling Summer 2016    Plantar fasciitis     Sebaceous cyst     Sinusitis     Skin cancer      Past Surgical History:   Procedure Laterality Date    CARDIAC CATHETERIZATION  2003    Negative per pt.    CATARACT EXTRACTION W/ INTRAOCULAR LENS IMPLANT Left 03/09/2023    Procedure: CATARACT PHACO EXTRACTION WITH INTRAOCULAR LENS IMPLANT LEFT;  Surgeon: Dain Lyon MD;  Location: [facility] OR;  Service: Ophthalmology;  Laterality: Left;    CATARACT EXTRACTION W/ INTRAOCULAR LENS IMPLANT Right 03/23/2023    Procedure: CATARACT PHACO EXTRACTION WITH INTRAOCULAR LENS IMPLANT RIGHT;  Surgeon: Dain Lyon MD;  Location: [facility] OR;  Service: Ophthalmology;  Laterality: Right;    COLONOSCOPY  04/07/2016    COLONOSCOPY N/A 11/17/2021    Procedure: COLONOSCOPY;  Surgeon: Kimmie Chapa MD;  Location: [facility] ENDOSCOPY;  Service: Gastroenterology;  Laterality: N/A;    COLONOSCOPY W/ POLYPECTOMY      ENDOSCOPY  2016    ENDOSCOPY N/A 12/06/2022    Procedure: ESOPHAGOGASTRODUODENOSCOPY WITH BIOPSY;  Surgeon: Kimmie Chapa MD;  Location: [facility] ENDOSCOPY;  Service: Gastroenterology;  Laterality: N/A;    SKIN  BIOPSY      TUBAL ABDOMINAL LIGATION      UPPER GASTROINTESTINAL ENDOSCOPY  2016     Family History   Problem Relation Age of Onset    Heart attack Other     Arthritis Other     Cancer Other     Diabetes Other     Hypertension Other     Stroke Other     Cancer Mother     Breast cancer Mother 70    Heart disease Mother     Emphysema Father     Thyroid disease Sister     Cancer Sister     Colon polyps Neg Hx     Esophageal cancer Neg Hx     Irritable bowel syndrome Neg Hx     Liver cancer Neg Hx     Liver disease Neg Hx     Stomach cancer Neg Hx     Colon cancer Neg Hx     Ovarian cancer Neg Hx      Social History     Socioeconomic History    Marital status:    Tobacco Use    Smoking status: Former     Current packs/day: 0.00     Average packs/day: 0.3 packs/day for 15.0 years (3.8 ttl pk-yrs)     Types: Cigarettes     Start date: 2002     Quit date:      Years since quittin.4     Passive exposure: Never    Smokeless tobacco: Never    Tobacco comments:     Never a heavy smoker   Vaping Use    Vaping status: Never Used   Substance and Sexual Activity    Alcohol use: Yes     Alcohol/week: 3.0 standard drinks of alcohol     Types: 1 Glasses of wine, 2 Cans of beer per week     Comment: social    Drug use: No    Sexual activity: Not Currently     Comment: This info is already in [employer]       Current Outpatient Medications:     ALPRAZolam (XANAX) 0.5 MG tablet, Take 1 tablet by mouth 2 (Two) Times a Day As Needed for Anxiety., Disp: 30 tablet, Rfl: 0    amLODIPine (NORVASC) 5 MG tablet, TAKE 1 TABLET BY MOUTH DAILY, Disp: 90 tablet, Rfl: 3    Bacillus Coagulans-Inulin (Probiotic) 1-250 BILLION-MG capsule, , Disp: , Rfl:     carvedilol (COREG) 12.5 MG tablet, TAKE 1 TABLET BY MOUTH TWICE DAILY WITH MEALS, Disp: 180 tablet, Rfl: 3    diclofenac (VOLTAREN) 1 % gel gel, Apply 4 g topically to the appropriate area as directed 4 (Four) Times a Day As Needed (pain)., Disp: 100 g, Rfl: 0     hydroCHLOROthiazide 12.5 MG tablet, Take 1 tablet by mouth Daily. prn, Disp: 90 tablet, Rfl: 0    levothyroxine (SYNTHROID, LEVOTHROID) 100 MCG tablet, TAKE 1 TABLET BY MOUTH DAILY, Disp: 90 tablet, Rfl: 1    meclizine (ANTIVERT) 25 MG tablet, Take 1 tablet by mouth 3 (Three) Times a Day As Needed for Dizziness., Disp: 15 tablet, Rfl: 0    mupirocin (BACTROBAN) 2 % ointment, , Disp: , Rfl:     ondansetron (ZOFRAN) 4 MG tablet, Take 1 tablet by mouth Every 8 (Eight) Hours As Needed for Nausea or Vomiting., Disp: 30 tablet, Rfl: 1    pantoprazole (PROTONIX) 40 MG EC tablet, TAKE 1 TABLET BY MOUTH EVERY MORNING 30 MINUTES BEFORE BREAKFAST, Disp: 90 tablet, Rfl: 3    Simethicone (GAS-X PO), Take  by mouth As Needed., Disp: , Rfl:     simvastatin (ZOCOR) 40 MG tablet, TAKE 1 TABLET BY MOUTH EVERY NIGHT AT BEDTIME, Disp: 90 tablet, Rfl: 3    trandolapril (MAVIK) 4 MG tablet, TAKE 1 TABLET BY MOUTH DAILY, Disp: 90 tablet, Rfl: 3    triamcinolone (KENALOG) 0.1 % cream, MIX WITH OTC TUB OF CETAPHIL CREAM AND APPLY TO RASH TWICE DAILY, Disp: , Rfl:     Cholecalciferol 25 MCG (1000 UT) tablet, Take 1 tablet by mouth Daily., Disp: , Rfl:     clindamycin (CLEOCIN) 300 MG capsule, , Disp: , Rfl:     vitamin B-12 (CYANOCOBALAMIN) 1000 MCG tablet, Take 1 tablet by mouth Daily., Disp: , Rfl:     No Known Allergies    The following portions of the patient's history were reviewed and updated as appropriate: allergies, current medications, past family history, past medical history, past social history, past surgical history and problem list.  Objective     Physical Exam  Vitals and nursing note reviewed.   Constitutional:       General: She is not in acute distress.     Appearance: Normal appearance. She is well-developed.   HENT:      Head: Normocephalic and atraumatic.      Mouth/Throat:      Mouth: Mucous membranes are not pale, not dry and not cyanotic.   Eyes:      General: Lids are normal.   Neck:      Trachea: Trachea normal.    Cardiovascular:      Rate and Rhythm: Normal rate.   Pulmonary:      Effort: Pulmonary effort is normal. No respiratory distress.      Breath sounds: Normal breath sounds.   Abdominal:      Tenderness: There is no abdominal tenderness.   Skin:     General: Skin is warm and dry.   Neurological:      Mental Status: She is alert and oriented to person, place, and time.   Psychiatric:         Mood and Affect: Mood normal.         Speech: Speech normal.         Behavior: Behavior normal. Behavior is cooperative.       Vitals:    06/12/25 1424   BP: 118/72   Pulse: 76   SpO2: 99%   Weight: 95.3 kg (210 lb)     Body mass index is 34.95 kg/m².     Results Review:   I reviewed the patient's new clinical results.    Office Visit on 04/15/2025   Component Date Value Ref Range Status    Free T4 04/15/2025 1.84 (H)  0.92 - 1.68 ng/dL Final    T3, Free 04/15/2025 2.73  2.00 - 4.40 pg/mL Final    TSH 04/15/2025 0.943  0.270 - 4.200 uIU/mL Final   Office Visit on 03/25/2025   Component Date Value Ref Range Status    Thyroid Peroxidase Antibody 03/25/2025 25  0 - 34 IU/mL Final    Thyroglobulin Ab 03/25/2025 43.9 (H)  0.0 - 0.9 IU/mL Final    TSH 03/25/2025 1.490  0.270 - 4.200 uIU/mL Final    Vitamin B-12 03/25/2025 690  211 - 946 pg/mL Final    Results may be falsely increased if patient taking Biotin.    25 Hydroxy, Vitamin D 03/25/2025 36.5  30.0 - 100.0 ng/ml Final    Hemoglobin A1C 03/25/2025 5.50  4.80 - 5.60 % Final    WBC 03/25/2025 6.01  3.40 - 10.80 10*3/mm3 Final    RBC 03/25/2025 4.31  3.77 - 5.28 10*6/mm3 Final    Hemoglobin 03/25/2025 13.8  12.0 - 15.9 g/dL Final    Hematocrit 03/25/2025 40.6  34.0 - 46.6 % Final    MCV 03/25/2025 94.2  79.0 - 97.0 fL Final    MCH 03/25/2025 32.0  26.6 - 33.0 pg Final    MCHC 03/25/2025 34.0  31.5 - 35.7 g/dL Final    RDW 03/25/2025 12.8  12.3 - 15.4 % Final    Platelets 03/25/2025 375  140 - 450 10*3/mm3 Final    Total Cholesterol 03/25/2025 203 (H)  0 - 200 mg/dL Final     Triglycerides 03/25/2025 114  0 - 150 mg/dL Final    HDL Cholesterol 03/25/2025 79 (H)  40 - 60 mg/dL Final    VLDL Cholesterol Kike 03/25/2025 20  5 - 40 mg/dL Final    LDL Chol Calc (NIH) 03/25/2025 104 (H)  0 - 100 mg/dL Final    Glucose 03/25/2025 97  65 - 99 mg/dL Final    BUN 03/25/2025 10  8 - 23 mg/dL Final    Creatinine 03/25/2025 0.85  0.57 - 1.00 mg/dL Final    EGFR Result 03/25/2025 70.7  >60.0 mL/min/1.73 Final    BUN/Creatinine Ratio 03/25/2025 11.8  7.0 - 25.0 Final    Sodium 03/25/2025 136  136 - 145 mmol/L Final    Potassium 03/25/2025 4.5  3.5 - 5.2 mmol/L Final    Chloride 03/25/2025 97 (L)  98 - 107 mmol/L Final    Total CO2 03/25/2025 26.5  22.0 - 29.0 mmol/L Final    Calcium 03/25/2025 9.8  8.6 - 10.5 mg/dL Final    Total Protein 03/25/2025 6.4  6.0 - 8.5 g/dL Final    Albumin 03/25/2025 4.2  3.5 - 5.2 g/dL Final    Globulin 03/25/2025 2.2  gm/dL Final    A/G Ratio 03/25/2025 1.9  g/dL Final    Total Bilirubin 03/25/2025 0.5  0.0 - 1.2 mg/dL Final    Alkaline Phosphatase 03/25/2025 58  39 - 117 U/L Final    AST (SGOT) 03/25/2025 18  1 - 32 U/L Final    ALT (SGPT) 03/25/2025 13  1 - 33 U/L Final       H. Pylori Breath Test - , (08/18/2023 10:05)     CTAP with contrast    9/28/2020  No evidence of acute intra-abdominal process.   The liver is diffusely hypodense consistent with fatty infiltration     Colonoscopy 11/17/2021 per Dr. Chapa  - Diverticulosis in the sigmoid colon.  - Anal papilla(e) were hypertrophied.  - No specimens collected.     EGD 12/6/2022 per Dr. Chapa  - Normal oropharynx.  - Z-line regular, 35 cm from the incisors.  - 1 cm hiatal hernia.  - No gross lesions in the entire esophagus.  - Erythematous mucosa in the posterior wall of the stomach and antrum. Biopsied.  - Normal duodenal bulb, first portion of the duodenum, second portion of the duodenum and third portion of the duodenum. Biopsied.  - No upper GI pathology that could explain her symptoms pending H pylori  testing  - Patient appears to have functional GI disorder at baseline and Wellbutrin could worsen upper GI symptoms.  A.     DUODENUM, BIOPSY:   Duodenal type mucosa with no significant histopathologic abnormalities   B.     ANTRUM AND BODY, BIOPSY:   Gastric antral type mucosa with reactive (chemical) gastropathy   Negative for intestinal metaplasia or dysplasia   No Helicobacter pylori-like organisms seen     CTAP with contrast dated on 12/8/2022  1. No evidence of mass or ascites  2. No bowel obstruction     US Abdomen Complete     Result Date: 9/22/2023  Normal ultrasound of the abdomen.          CT Angiogram Neck     Result Date: 9/18/2024  Impression: No aneurysm, abrupt cutoffs or significant stenosis.      CT Head Without Contrast     Result Date: 9/18/2024  Impression: No acute findings.     CT Angiogram Head     Result Date: 9/18/2024  Impression: No aneurysm, abrupt cutoffs or significant stenosis.     Assessment / Plan      1. Gastroesophageal reflux disease without esophagitis  2. Irritable bowel syndrome with constipation  Symptoms reasonably controlled as long as she watches her diet and avoids overeating.  No GI bleeding. Reflux reasonably controlled with pantoprazole 40 mg daily.  No difficulty swallowing.  She takes Gas-X as needed with reasonable control of bloating.  Basic labs unremarkable. TSH normal. Celiac panel negative. H. Pylori breath test negative. Abdominal ultrasound 9/21/2023 unremarkable. CTAP in December 2022 unremarkable.  Colonoscopy dated 11/17/2021 unremarkable.  EGD dated 12/6/2022 unremarkable, no upper GI pathology that can explain her symptoms.  Biopsies negative for celiac and H. pylori.  Likely IBS-C/functional GI disorder.   Anti-reflux measures.   Continue Pantoprazole 40 mg daily for now.   Zofran as needed for nausea.   High fiber, low fat diet with liberal water intake.   Low FODMAP diet.  Metamucil or fiber gummies daily. .   Gas-X as needed.    - pantoprazole  (PROTONIX) 40 MG EC tablet; TAKE 1 TABLET BY MOUTH EVERY MORNING 30 MINUTES BEFORE BREAKFAST  Dispense: 90 tablet; Refill: 3    3. Class 1 obesity due to excess calories with serious comorbidity and body mass index (BMI) of 34.0 to 34.9 in adult  4. Metabolic dysfunction-associated steatotic liver disease (MASLD)   BMI 34.95  Liver enzymes are normal. MEGHAN negative.  PT/INR normal.  CTAP in 2020 with diffuse fatty infiltration of liver noted.  CTAP in December 2022 with solid abdominal organs unremarkable. Abdominal ultrasound 9/21/2023 with liver unremarkable. FIB4 score 1.04/low risk per labs 3/15/2025  Recommend low-fat diet, exercise and weight reduction.    5. Encounter for screening for malignant neoplasm of colon  Colonoscopy 11/17/2021 unremarkable, no polyps removed, no specimens collected. No family history of colon cancer.   Colonoscopy for screening in 2031, or sooner if needed.     Patient Instructions   Antireflux measures: Avoid fried, fatty foods, alcohol, chocolate, coffee, tea,  soft drinks, peppermint and spearmint, spicy foods, tomatoes and tomato based foods, onion based foods, and smoking.  Other antireflux measures include weight reduction if overweight, avoiding tight clothing around the abdomen, elevating the head of the bed 6 inches with blocks under the head board, and don't drink or eat before going to bed and avoid lying down immediately after meals.  Pantoprazole 40 mg 1 po daily in the am 30 minutes before breakfast.  Recommended to take Levothyroxine first in the am upon waking, wait 30 minutes, then take Pantoprazole 40 mg, wait 30 minutes, then eat breakfast and take other medications.  Zofran 4 mg 1 po every 8 hours as needed for nausea.   The patient should eat 4-5 very small meals throughout the day. Avoid large meals.  It is recommended to eat a softer diet. Meats are best consumed ground. Fruits and vegetables are best consumed cooked or steamed and then mashed.   Low fiber, low  fat diet with liberal water intake. May use soluble fiber.  Low FODMAP diet - avoid all dairy. May use lactose free/dairy free alternatives such as almond milk, rice milk, oat milk, etc.   Metamucil 1 packet/scoop daily or fiber gummies/capsules 2-4 per day.   Miralax 17 grams daily as needed for constipation.   Gas-X at meals and bedtime as needed.  Colonoscopy for screening in 2031.  Follow up: 6 months    TERESA Palencia  6/12/2025    Please note that portions of this note were completed with a voice recognition program.     Patient or patient representative verbalized consent for the use of Ambient Listening during the visit with  TERESA Palencia for chart documentation. 6/12/2025  14:58 EDT

## 2025-06-15 DIAGNOSIS — E03.8 OTHER SPECIFIED HYPOTHYROIDISM: ICD-10-CM

## 2025-06-16 RX ORDER — LEVOTHYROXINE SODIUM 100 UG/1
100 TABLET ORAL DAILY
Qty: 90 TABLET | Refills: 1 | OUTPATIENT
Start: 2025-06-16

## 2025-07-07 ENCOUNTER — HOSPITAL ENCOUNTER (OUTPATIENT)
Dept: GENERAL RADIOLOGY | Facility: HOSPITAL | Age: 80
Discharge: HOME | End: 2025-07-07
Admitting: SPECIALIST
Payer: MEDICARE

## 2025-07-07 ENCOUNTER — TRANSCRIBE ORDERS (OUTPATIENT)
Dept: ADMINISTRATIVE | Facility: HOSPITAL | Age: 80
End: 2025-07-07
Payer: MEDICARE

## 2025-07-07 DIAGNOSIS — J43.9 PULMONARY EMPHYSEMA, UNSPECIFIED EMPHYSEMA TYPE: Primary | ICD-10-CM

## 2025-07-07 PROCEDURE — 71046 X-RAY EXAM CHEST 2 VIEWS: CPT

## 2025-07-25 ENCOUNTER — PATIENT MESSAGE (OUTPATIENT)
Dept: INTERNAL MEDICINE | Facility: CLINIC | Age: 80
End: 2025-07-25
Payer: MEDICARE

## 2025-07-25 RX ORDER — MECLIZINE HYDROCHLORIDE 25 MG/1
25 TABLET ORAL 3 TIMES DAILY PRN
Qty: 30 TABLET | Refills: 2 | Status: SHIPPED | OUTPATIENT
Start: 2025-07-25

## 2025-08-09 DIAGNOSIS — I10 PRIMARY HYPERTENSION: ICD-10-CM

## 2025-08-09 DIAGNOSIS — R42 DIZZINESS: ICD-10-CM

## 2025-08-11 RX ORDER — HYDROCHLOROTHIAZIDE 12.5 MG/1
12.5 TABLET ORAL DAILY
Qty: 90 TABLET | Refills: 0 | Status: SHIPPED | OUTPATIENT
Start: 2025-08-11

## (undated) DEVICE — Device

## (undated) DEVICE — BLD BEAVER MICROSHARP 15D 3MM BLU LF

## (undated) DEVICE — CLEARCUT® HP2 SLIT KNIFE INTREPID MICRO-COAXIAL SYSTEM 2.4 DB: Brand: CLEARCUT®; INTREPID

## (undated) DEVICE — CANN IRR/INJ AIR ANT CHAMBER 6MM BEND 27G

## (undated) DEVICE — HP CONCL INTREPID COAX I/A CRV .3MM

## (undated) DEVICE — FRCP BX RADJAW4 NDL 2.8 240 STD OG

## (undated) DEVICE — SUCTION CANISTER, 1500CC, RIGID: Brand: DEROYAL

## (undated) DEVICE — SYR LUERLOK 5CC

## (undated) DEVICE — SOL IRRIG H2O 1000ML STRL

## (undated) DEVICE — HYBRID TUBING/CAP SET FOR OLYMPUS® SCOPES: Brand: ERBE

## (undated) DEVICE — EYE CARE POST OP KIT: Brand: MEDLINE INDUSTRIES, INC.

## (undated) DEVICE — ENDOSCOPY PORT CONNECTOR FOR OLYMPUS® SCOPES: Brand: ERBE

## (undated) DEVICE — GLV SURG SENSICARE PI LF PF 8 GRN STRL

## (undated) DEVICE — CONMED SCOPE SAVER BITE BLOCK, 20X27 MM: Brand: SCOPE SAVER

## (undated) DEVICE — LUBE JELLY PK/2.75GM STRL BX/144

## (undated) DEVICE — THE MONARCH® "D" CARTRIDGE IS A SINGLE-USE POLYPROPYLENE CARTRIDGE FOR POSTERIOR CHAMBER IOL DELIVERY: Brand: MONARCH® III

## (undated) DEVICE — VLV SXN AIR/H2O ORCAPOD3 1P/U STRL